# Patient Record
Sex: FEMALE | Race: WHITE | NOT HISPANIC OR LATINO | Employment: OTHER | URBAN - METROPOLITAN AREA
[De-identification: names, ages, dates, MRNs, and addresses within clinical notes are randomized per-mention and may not be internally consistent; named-entity substitution may affect disease eponyms.]

---

## 2018-10-23 ENCOUNTER — NURSE TRIAGE (OUTPATIENT)
Dept: PHYSICAL THERAPY | Facility: OTHER | Age: 83
End: 2018-10-23

## 2018-10-23 DIAGNOSIS — M54.50 ACUTE RIGHT-SIDED LOW BACK PAIN WITHOUT SCIATICA: Primary | ICD-10-CM

## 2018-10-23 NOTE — TELEPHONE ENCOUNTER
Background - Initial Assessment  Clinical complaint: low back pain, pain in right buttock  Date of onset: 8/1/18  Mechanism of injury: fell in garage reaching for cane, landed on back on concrete    Previous Treatment - Previous Treatment  Previous evaluation: spine Dr (at [facility])  Current provider: PCP   Diagnostics: XRAYs  Previous treatment: none    Protocols used: [facility] COMPREHENSIVE SPINE PROGRAM PROTOCOL    Friend, Laurel Cooney (RN) calling on behalf of pt to learn more about this program   States her friend fell in August and has been dealing with sciatic pain since then  Triage nurse explained that [facility] is a physical therapy based program that I would complete an assessment through the phone to determine if the patient would be a candidate for physical therapy or require more specialized treatment  If no red flags, the patient would be scheduled for a consult with one of our advanced spine physical therapists within the next 24-48hrs  RN was able to complete the intake form with pt, Sherri Patel  Pt states she saw a spine dr from [facility] and was told her spine was fine  Pt ambulates with walker and cane  Pt reports that when sit still, the pain is ok, when she gets up or moves its painful  Pt sits on left side  Pt agreed to PTx consult and requested the [facility] location  RN provided pt with facility address and phone after scheduling appt

## 2018-10-24 ENCOUNTER — EVALUATION (OUTPATIENT)
Dept: PHYSICAL THERAPY | Age: 83
End: 2018-10-24
Payer: MEDICARE

## 2018-10-24 VITALS — TEMPERATURE: 98.2 F | DIASTOLIC BLOOD PRESSURE: 75 MMHG | SYSTOLIC BLOOD PRESSURE: 128 MMHG

## 2018-10-24 DIAGNOSIS — M54.50 ACUTE RIGHT-SIDED LOW BACK PAIN WITHOUT SCIATICA: Primary | ICD-10-CM

## 2018-10-24 PROCEDURE — 97161 PT EVAL LOW COMPLEX 20 MIN: CPT | Performed by: PHYSICAL THERAPIST

## 2018-10-24 PROCEDURE — G8978 MOBILITY CURRENT STATUS: HCPCS | Performed by: PHYSICAL THERAPIST

## 2018-10-24 PROCEDURE — G8979 MOBILITY GOAL STATUS: HCPCS | Performed by: PHYSICAL THERAPIST

## 2018-10-24 NOTE — LETTER
2018    Merlin Gray, [address] [address]    Patient: Linda Roque   YOB: 1930    Date of Visit: 10/24/2018       Dear Dr Luis E Ryder:    One of your patients, Linda Roque, was referred to me by the [facility] program   Please review the attached evaluation summary from Carole Him recent visit  Please verify that you agree with the plan of care by signing the attached document and sending it back to our office  If you have any questions or concerns, please don't hesitate to call  Sincerely,    Galen Burk, PT      Primary Care Provider:      I certify that I have read the below Plan of Care and certify the need for these services furnished under this plan of treatment while under my care  Merlin Gray, MD  [address] [place] [address] Street: [phone]           PT Evaluation     Today's date: 10/24/2018  Patient name: Linda Roque  : 1930  MRN: [id number]  Referring provider: Lindy Mederos MD  Dx:   Encounter Diagnosis     ICD-10-CM    1  Acute right-sided low back pain without sciatica M54 5 Ambulatory referral to PT spine                  Assessment  Impairments: pain with function    Assessment details: Patient with significant R sacral/hip pain 8-10 weeks after initial fall  Would prefer to r/o bony disruption prior to starting care  Ambulatory referral to pain management initiated - will hold care until f/u  Understanding of Dx/Px/POC: good   Prognosis: fair    Goals  Short Term goals - 4 weeks  1  Patient will be independent HEP  2   Patient will report a 50% decrease in pain complaints  3   Increase strength 1/2 grade  4   Increase ROM 5-10 degrees  Long Term goals - 8 weeks  1  Patient will report elimination of pain complaints  2   Patient will return to all work related activities without restriction    3   Patient will return to all recreational activities without restriction  4   ROM WFL  5   Strength 5/5  Plan  Planned therapy interventions: manual therapy, strengthening and stretching  Frequency: 2x week  Duration in weeks: 4  Plan details: Start care if cleared by pain management  Subjective Evaluation    History of Present Illness  Mechanism of injury: Patient is a 80 y o  Female who suffered a fall in her garage on cement on 18  Patient was seen at [facility] and spent 2 days as a inpatient - no source of sx's was communicated to patient  Patient reports having multiple tests performed though I am unable to view them at this point  Current sx's:  R sacral/lumbar pain - severe  Sx's aggravated by ambulation, sitting, lying, and bending  MEDS:  None currently  Patient unable to sleep - trying to sleep upright on couch with R side unloaded  No previous history of LB issues  Quality of life: fair    Pain  Current pain ratin  At best pain ratin  At worst pain ratin  Quality: sharp and knife-like  Aggravating factors: standing and sitting  Progression: no change    Patient Goals  Patient goals for therapy: decreased pain, increased strength, independence with ADLs/IADLs and increased motion          Objective     Tenderness     Additional Tenderness Details  Tenderness R sacral region  Active Range of Motion     Lumbar   Flexion: 25 degrees with pain  Extension: 15 degrees with pain  Left lateral flexion: 20 degrees with pain  Right lateral flexion: 20 degrees with pain    Strength/Myotome Testing     Additional Strength Details  R hip strength testing did not increase sx's significantly  General Comments     Lumbar Comments  Unable to fully assess - patient unable to lie supine or prone  R hip ER in supine reproduced some sx's          Flowsheet Rows      Most Recent Value   PT/OT G-Codes   Current Score  44   Projected Score  62   Assessment Type  Evaluation   G code set  Mobility: Walking & Moving Around   Mobility: Walking and Moving Around Current Status ()  CL   Mobility: Walking and Moving Around Goal Status ()  CI          Precautions: Fall risk    Daily Treatment Diary     Manual                                                                                   Exercise Diary                                                                                                                                                                                                                                                                                      Modalities

## 2018-11-09 ENCOUNTER — OFFICE VISIT (OUTPATIENT)
Dept: PAIN MEDICINE | Facility: CLINIC | Age: 83
End: 2018-11-09
Payer: MEDICARE

## 2018-11-09 ENCOUNTER — HOSPITAL ENCOUNTER (OUTPATIENT)
Dept: RADIOLOGY | Facility: CLINIC | Age: 83
Discharge: HOME | End: 2018-11-09
Admitting: ANESTHESIOLOGY
Payer: MEDICARE

## 2018-11-09 VITALS
HEART RATE: 93 BPM | RESPIRATION RATE: 20 BRPM | TEMPERATURE: 97.9 F | OXYGEN SATURATION: 99 % | DIASTOLIC BLOOD PRESSURE: 78 MMHG | SYSTOLIC BLOOD PRESSURE: 168 MMHG

## 2018-11-09 VITALS
HEART RATE: 98 BPM | SYSTOLIC BLOOD PRESSURE: 152 MMHG | DIASTOLIC BLOOD PRESSURE: 80 MMHG | TEMPERATURE: 97.8 F | WEIGHT: 141 LBS

## 2018-11-09 DIAGNOSIS — M46.1 SACROILIITIS (HCC): ICD-10-CM

## 2018-11-09 DIAGNOSIS — M54.50 ACUTE RIGHT-SIDED LOW BACK PAIN WITHOUT SCIATICA: ICD-10-CM

## 2018-11-09 DIAGNOSIS — M46.1 SACROILIITIS (HCC): Primary | ICD-10-CM

## 2018-11-09 PROBLEM — I10 HYPERTENSION, ESSENTIAL: Status: ACTIVE | Noted: 2018-01-12

## 2018-11-09 PROBLEM — S32.000A LUMBAR COMPRESSION FRACTURE (HCC): Status: ACTIVE | Noted: 2018-08-01

## 2018-11-09 PROBLEM — S22.000A THORACIC COMPRESSION FRACTURE (HCC): Status: ACTIVE | Noted: 2018-08-01

## 2018-11-09 PROCEDURE — 27096 INJECT SACROILIAC JOINT: CPT | Performed by: ANESTHESIOLOGY

## 2018-11-09 PROCEDURE — 99204 OFFICE O/P NEW MOD 45 MIN: CPT | Performed by: ANESTHESIOLOGY

## 2018-11-09 RX ORDER — ATORVASTATIN CALCIUM 10 MG/1
10 TABLET, FILM COATED ORAL
COMMUNITY
Start: 2018-01-25

## 2018-11-09 RX ORDER — GABAPENTIN 100 MG/1
100 CAPSULE ORAL
COMMUNITY
Start: 2018-08-20 | End: 2019-08-20

## 2018-11-09 RX ORDER — 0.9 % SODIUM CHLORIDE 0.9 %
10 VIAL (ML) INJECTION ONCE
Status: COMPLETED | OUTPATIENT
Start: 2018-11-09 | End: 2018-11-09

## 2018-11-09 RX ORDER — ACETAMINOPHEN 500 MG
1000 TABLET ORAL
Status: ON HOLD | COMMUNITY
Start: 2018-08-03 | End: 2022-07-25

## 2018-11-09 RX ORDER — METHYLPREDNISOLONE ACETATE 80 MG/ML
80 INJECTION, SUSPENSION INTRA-ARTICULAR; INTRALESIONAL; INTRAMUSCULAR; PARENTERAL; SOFT TISSUE ONCE
Status: COMPLETED | OUTPATIENT
Start: 2018-11-09 | End: 2018-11-09

## 2018-11-09 RX ORDER — BUPIVACAINE HCL/PF 2.5 MG/ML
30 VIAL (ML) INJECTION ONCE
Status: COMPLETED | OUTPATIENT
Start: 2018-11-09 | End: 2018-11-09

## 2018-11-09 RX ORDER — LIDOCAINE 50 MG/G
1 PATCH TOPICAL DAILY
Qty: 30 PATCH | Refills: 0 | Status: SHIPPED | OUTPATIENT
Start: 2018-11-09 | End: 2018-12-13 | Stop reason: SDDI

## 2018-11-09 RX ORDER — UREA 10 %
1000 LOTION (ML) TOPICAL
COMMUNITY
Start: 2013-09-20

## 2018-11-09 RX ADMIN — Medication 3 ML: at 08:09

## 2018-11-09 RX ADMIN — IOHEXOL 1 ML: 300 INJECTION, SOLUTION INTRAVENOUS at 08:09

## 2018-11-09 RX ADMIN — Medication 2.5 ML: at 08:06

## 2018-11-09 RX ADMIN — METHYLPREDNISOLONE ACETATE 80 MG: 80 INJECTION, SUSPENSION INTRA-ARTICULAR; INTRALESIONAL; INTRAMUSCULAR; PARENTERAL; SOFT TISSUE at 08:09

## 2018-11-09 RX ADMIN — SODIUM CHLORIDE 2.5 ML: 9 INJECTION, SOLUTION INTRAMUSCULAR; INTRAVENOUS; SUBCUTANEOUS at 08:06

## 2018-11-09 NOTE — DISCHARGE INSTR - LAB
Steroid Joint Injection   WHAT YOU NEED TO KNOW:   A steroid joint injection is a procedure to inject steroid medicine into a joint  Steroid medicine decreases pain and inflammation  The injection may also contain an anesthetic (numbing medicine) to decrease pain  It may be done to treat conditions such as arthritis, gout, or carpal tunnel syndrome  The injections may be given in your knee, ankle, shoulder, elbow, wrist, ankle or sacroiliac joint  1  Do not apply heat to any area that is numb  If you have discomfort or soreness at the injection site, you may apply ice today, 20 minutes on and 20 minutes off  Tomorrow you may use ice or warm, moist heat  Do not apply ice or heat directly to the skin  2  You may have an increase or change in the discomfort for 36-48 hours after your treatment  Apply ice and continue with any pain medicine you have been prescribed  3  Do not do anything strenuous today  You may shower, but no tub baths or hot tubs today  You may resume your normal activities tomorrow, but do not overdo it  Resume normal activities slowly when you are feeling better  4  If you experience redness, drainage or swelling at the injection site, or if you develop a fever above 100 degrees, please call [facility] at [phone] or go to the Emergency Room  5  Continue to take all routine medicines prescribed by your primary care physician unless otherwise instructed by our staff  Most blood thinners should be started again according to your regularly scheduled dosing  If you have any questions, please give our office a call  If you have a problem specifically related to your procedure, please call our office at [phone]  Problems not related to your procedure should be directed to your primary care physician

## 2018-11-09 NOTE — H&P
History of Present Illness: The patient is a 80 y o  female who presents with complaints of right lower back pain secondary to sacroiliitis and is here today for right-sided sacroiliac joint injection  Patient Active Problem List   Diagnosis    Aortic regurgitation    Bilateral carotid artery disease (HCC)    History of breast cancer    Hypertension, essential    [facility] [address] [facility]    Mixed hyperlipidemia    Osteoporosis    Thoracic compression fracture ([facility] [address] )       No past medical history on file  No past surgical history on file        Current Outpatient Prescriptions:     acetaminophen (TYLENOL) 500 mg tablet, Take 1,000 mg by mouth, Disp: , Rfl:     atorvastatin (LIPITOR) 10 mg tablet, Take 10 mg by mouth, Disp: , Rfl:     B Complex Vitamins (VITAMIN-B COMPLEX PO), Take 1 tablet by mouth, Disp: , Rfl:     Calcium-Magnesium-Vitamin D ER (CALCIUM 1200+D3) 600- MG-MG-UNIT TB24, Take 1 tablet by mouth, Disp: , Rfl:     DOCOSAHEXAENOIC ACID PO, Take by mouth, Disp: , Rfl:     gabapentin (NEURONTIN) 100 mg capsule, Take 100 mg by mouth, Disp: , Rfl:     lidocaine (LIDODERM) 5 %, Apply 1 patch topically daily Remove & Discard patch within 12 hours or as directed by MD, Disp: 30 patch, Rfl: 0    vitamin B-12 (CYANOCOBALAMIN) 100 MCG tablet, Take 1,000 mcg by mouth, Disp: , Rfl:     Allergies   Allergen Reactions    Celecoxib Other (See Comments)       Physical Exam:   Vitals:    11/09/18 0736   BP: (!) 171/83   Pulse: 90   Resp: 18   Temp: 97 9 °F (36 6 °C)   SpO2: 99%     General: Awake, Alert, Oriented x 3, Mood and affect appropriate  Respiratory: Respirations even and unlabored  Cardiovascular: Peripheral pulses intact; no edema  Musculoskeletal Exam:   Right lower back tenderness    ASA Score: 2    Patient/Chart Verification  Patient ID Verified: Verbal  ID Band Applied: No  Consents Confirmed: Procedural, To be obtained in the Pre-Procedure area  H&P( within 30 days) Verified: To be obtained in the Pre-Procedure area  Interval H&P(within 24 hr) Complete (required for Outpatients and Surgery Admit only): To be obtained in the Pre-Procedure area  Allergies Reviewed: Yes  Currently on antibiotics?: No    Assessment:   1   Sacroiliitis ([facility] [address] )        Plan: Right SIJ Injection

## 2018-11-09 NOTE — PROGRESS NOTES
Assessment:  1  Sacroiliitis ([facility] [address] )    2  Acute right-sided low back pain without sciatica        Plan:  The patient's symptoms, history/physical are consistent with pain or from right-sided sacroiliitis following her fall on August 1st   Imaging done at that time showed no acute fractures but old compression fractures which did not correlate with her symptoms  Since she is still symptomatic, I discussed performing a right-sided sacroiliac joint injection which would be both diagnostic and therapeutic  She was apprised of the most common risks and would like to proceed  She will be scheduled as soon as possible under fluoroscopic guidance  In addition, I will prescribe a Lidoderm patch that she can apply to the right lower back 12 hr on/12 hr off to help with pain  Complete risks and benefits including bleeding, infection, tissue reaction, nerve injury and allergic reaction were discussed  The approach was demonstrated using models and literature was provided  Verbal and written consent was obtained  My impressions and treatment recommendations were discussed in detail with the patient who verbalized understanding and had no further questions  Discharge instructions were provided  I personally saw and examined the patient and I agree with the above discussed plan of care  Orders Placed This Encounter   Procedures    FL spine and pain procedure     Standing Status:   Future     Standing Expiration Date:   11/9/2022     Order Specific Question:   Reason for Exam:     Answer:   Right SIJ Injection     Order Specific Question:   Anticoagulant hold needed?      Answer:   No     New Medications Ordered This Visit   Medications    acetaminophen (TYLENOL) 500 mg tablet     Sig: Take 1,000 mg by mouth    atorvastatin (LIPITOR) 10 mg tablet     Sig: Take 10 mg by mouth    B Complex Vitamins (VITAMIN-B COMPLEX PO)     Sig: Take 1 tablet by mouth    Calcium-Magnesium-Vitamin D ER (CALCIUM 1200+D3) 600- MG-MG-UNIT TB24     Sig: Take 1 tablet by mouth    vitamin B-12 (CYANOCOBALAMIN) 100 MCG tablet     Sig: Take 1,000 mcg by mouth    gabapentin (NEURONTIN) 100 mg capsule     Sig: Take 100 mg by mouth    DOCOSAHEXAENOIC ACID PO     Sig: Take by mouth    lidocaine (LIDODERM) 5 %     Sig: Apply 1 patch topically daily Remove & Discard patch within 12 hours or as directed by MD     Dispense:  30 patch     Refill:  0       History of Present Illness:    Mynor Dodge is a 80 y o  female who lower back pain since August 2018  The patient fell in her garage  She was taken to [facility] where she was admitted and had workup that was negative for any acute fractures but old compression fractures  Since then she has been experiencing moderate pain that is rated 5-6/10 on a numeric rating scale and felt intermittently  Symptoms are described to be dull, aching with weakness felt in the legs  Pain is aggravated with lying down, standing, bending, walking, exercise  There is no change with relaxation, coughing, sneezing or bowel movements  Treatment history has included walking which has provided no relief  She is currently using Tylenol extra-strength and Aleve intermittently which is providing mild relief  I have personally reviewed and/or updated the patient's past medical history, past surgical history, family history, social history, current medications, allergies, and vital signs today  Review of Systems:    Review of Systems   Constitutional: Negative for fever and unexpected weight change  HENT: Negative for trouble swallowing  Eyes: Negative for visual disturbance  Respiratory: Negative for shortness of breath and wheezing  Cardiovascular: Negative for chest pain and palpitations  Gastrointestinal: Negative for constipation, diarrhea, nausea and vomiting  Endocrine: Negative for cold intolerance, heat intolerance and polydipsia     Genitourinary: Negative for difficulty urinating and frequency  Musculoskeletal: Positive for gait problem  Negative for arthralgias, joint swelling and myalgias  Skin: Negative for rash  Neurological: Negative for dizziness, seizures, syncope, weakness and headaches  Hematological: Bruises/bleeds easily  Psychiatric/Behavioral: Negative for dysphoric mood  All other systems reviewed and are negative  Patient Active Problem List   Diagnosis    Aortic regurgitation    Bilateral carotid artery disease (HCC)    History of breast cancer    Hypertension, essential    Lumbar compression fracture ([facility] [address] )    Mixed hyperlipidemia    Osteoporosis    Thoracic compression fracture ([facility] [facility] )       No past medical history on file  No past surgical history on file  No family history on file  Social History     Occupational History    Not on file  Social History Main Topics    Smoking status: Not on file    Smokeless tobacco: Not on file    Alcohol use Not on file    Drug use: Unknown    Sexual activity: Not on file       No current outpatient prescriptions on file prior to visit  No current facility-administered medications on file prior to visit  Allergies   Allergen Reactions    Celecoxib Other (See Comments)       Physical Exam:    /80   Pulse 98   Temp 97 8 °F (36 6 °C) (Oral)   Wt 64 kg (141 lb)     Constitutional: normal, well developed, well nourished, alert, in no distress and non-toxic and no overt pain behavior    Eyes: anicteric  HEENT: grossly intact  Neck: supple, symmetric, trachea midline and no masses   Pulmonary:even and unlabored  Cardiovascular:No edema or pitting edema present  Skin:Normal without rashes or lesions and well hydrated  Psychiatric:Mood and affect appropriate  Neurologic:Cranial Nerves II-XII grossly intact  Musculoskeletal:antalgic     Lumbar Spine Exam  Appearance:  Kyphosis  Palpation/Tenderness:  right sacroiliac joint tenderness  Sensory:  no sensory deficits noted  Range of Motion:  Flexion:  No limitation  without pain  Extension:  Minimally limited  with pain  Lateral Flexion - Left:  No limitation  without pain  Lateral Flexion - Right:  Minimally limited  with pain  Rotation - Left:  No limitation  without pain  Rotation - Right:  Moderately limited  with pain  Motor Strength:  Left hip flexion:  5/5  Left hip extension:  5/5  Right hip flexion:  5/5  Right hip extension:  5/5  Left knee flexion:  5/5  Left knee extension:  5/5  Right knee flexion:  5/5  Right knee extension:  5/5  Left foot dorsiflexion:  5/5  Left foot plantar flexion:  5/5  Right foot dorsiflexion:  5/5  Right foot plantar flexion:  5/5  Reflexes:  Left Patellar:  2+   Right Patellar:  2+   Left Achilles:  2+   Right Achilles:  2+   Special Tests:  Left Straight Leg Test:  negative  Right Straight Leg Test:  negative  Left Wm's Maneuver:  negative  Right Wm's Maneuver:  positive    Imaging    XR Sacrum and coccyx (9/11/2018)    No acute fracture or dislocation of the sacrococcygeal spine  CT Lumbar Spine (8/1/2018)    History: Fall  Procedure: Thin section axial CT scan of the Thoracic and Lumbar spine was  obtained by departmental protocol followed by sagittal and coronal reformations  Injury: There is a mild superior endplate compression deformity at T1 which is  stable  There is an age-indeterminate compression fractures of the T10 and T12  vertebral bodies  There is approximately 20% loss of height anteriorly at T10  and 20% loss of height centrally at T12  There is a 3-4 mm associated  retropulsion of the posterior cortex of T12, centrally  Alignment: Otherwise, normal   Prevertebral soft tissues: Atherosclerotic calcification of the intra-abdominal  aorta is noted  There is a multinodular thyroid with substernal extension  Please see the report for a same-day CT for findings in the chest, abdomen and  pelvis      Degenerative changes: There is multilevel degenerative disc disease  There is a  moderate disc bulge at L5 S1  There is also a small disc bulge at L2-L3  There  is multilevel facet arthrosis, greatest at L5-S1

## 2018-11-16 ENCOUNTER — TELEPHONE (OUTPATIENT)
Dept: OBGYN CLINIC | Facility: HOSPITAL | Age: 83
End: 2018-11-16

## 2018-11-23 ENCOUNTER — APPOINTMENT (OUTPATIENT)
Dept: RADIOLOGY | Facility: CLINIC | Age: 83
End: 2018-11-23
Payer: MEDICARE

## 2018-11-23 ENCOUNTER — TRANSCRIBE ORDERS (OUTPATIENT)
Dept: LAB | Facility: CLINIC | Age: 83
End: 2018-11-23

## 2018-11-23 ENCOUNTER — OFFICE VISIT (OUTPATIENT)
Dept: PAIN MEDICINE | Facility: CLINIC | Age: 83
End: 2018-11-23
Payer: MEDICARE

## 2018-11-23 VITALS
BODY MASS INDEX: 23.3 KG/M2 | SYSTOLIC BLOOD PRESSURE: 174 MMHG | DIASTOLIC BLOOD PRESSURE: 80 MMHG | WEIGHT: 145 LBS | HEIGHT: 66 IN | RESPIRATION RATE: 16 BRPM | HEART RATE: 90 BPM

## 2018-11-23 DIAGNOSIS — M51.16 LUMBAR DISC DISEASE WITH RADICULOPATHY: ICD-10-CM

## 2018-11-23 DIAGNOSIS — M25.551 RIGHT HIP PAIN: Primary | ICD-10-CM

## 2018-11-23 DIAGNOSIS — M25.551 RIGHT HIP PAIN: ICD-10-CM

## 2018-11-23 PROCEDURE — 99213 OFFICE O/P EST LOW 20 MIN: CPT | Performed by: NURSE PRACTITIONER

## 2018-11-23 PROCEDURE — 73502 X-RAY EXAM HIP UNI 2-3 VIEWS: CPT

## 2018-11-23 RX ORDER — NAPROXEN SODIUM 220 MG
220 TABLET ORAL 2 TIMES DAILY WITH MEALS
Status: ON HOLD | COMMUNITY
End: 2022-07-25

## 2018-11-23 NOTE — PROGRESS NOTES
Assessment:  1  Right hip pain    2  Lumbar disc disease with radiculopathy        Plan:  The patient is a 80 y o  female with a history of right-sided low back pain without sciatica and sacroiliitis  The patient presents with ongoing right-sided low back pain and hip pain, despite undergoing a right sacroiliac joint injection  The patient was noted to have right hip pain with internal and external rotation as well as tenderness of her right hip  Therefore at this time I have ordered the patient an x-ray of her right hip for further evaluation  The patient was instructed that our office will call with results of this study once is completed  The patient will follow up after her right hip x-ray, or sooner with the worsening of symptoms  History of Present Illness: The patient is a 80 y o  female with a history of right-sided low back pain without sciatica and sacroiliitis  The patient was last seen office on 11/9/2018 where she underwent a right sacroiliac joint injection  She reported that this injection provided her 1 week relief of symptoms prior to the re-emergence of symptoms  She presents for a follow up office visit in regards to chronic pain secondary to right-sided low back pain  The patient currently reports right-sided low back pain, that worsens with ambulation  She denies bilateral leg weakness, or bowel or bladder issues  She reports that her pain is intermittent in nature and has worsened since last office visit  I have personally reviewed and/or updated the patient's past medical history, past surgical history, family history, social history, current medications, allergies, and vital signs today  Review of Systems:    Review of Systems   Respiratory: Negative for shortness of breath  Cardiovascular: Negative for chest pain  Gastrointestinal: Negative for constipation, diarrhea, nausea and vomiting  Musculoskeletal: Positive for back pain and gait problem   Negative for arthralgias, joint swelling and myalgias  Skin: Negative for rash  Neurological: Negative for dizziness, seizures and weakness  All other systems reviewed and are negative  Past Medical History:   Diagnosis Date    Chronic pain disorder     Low back pain        History reviewed  No pertinent surgical history  History reviewed  No pertinent family history  Social History     Occupational History    retired      Social History Main Topics    Smoking status: Never Smoker    Smokeless tobacco: Never Used    Alcohol use No    Drug use: No    Sexual activity: No         Current Outpatient Prescriptions:     acetaminophen (TYLENOL) 500 mg tablet, Take 1,000 mg by mouth, Disp: , Rfl:     atorvastatin (LIPITOR) 10 mg tablet, Take 10 mg by mouth, Disp: , Rfl:     B Complex Vitamins (VITAMIN-B COMPLEX PO), Take 1 tablet by mouth, Disp: , Rfl:     Calcium-Magnesium-Vitamin D ER (CALCIUM 1200+D3) 600- MG-MG-UNIT TB24, Take 1 tablet by mouth, Disp: , Rfl:     gabapentin (NEURONTIN) 100 mg capsule, Take 100 mg by mouth, Disp: , Rfl:     naproxen sodium (ALEVE) 220 MG tablet, Take 220 mg by mouth 2 (two) times a day with meals, Disp: , Rfl:     vitamin B-12 (CYANOCOBALAMIN) 100 MCG tablet, Take 1,000 mcg by mouth, Disp: , Rfl:     DOCOSAHEXAENOIC ACID PO, Take by mouth, Disp: , Rfl:     lidocaine (LIDODERM) 5 %, Apply 1 patch topically daily Remove & Discard patch within 12 hours or as directed by MD, Disp: 30 patch, Rfl: 0    Allergies   Allergen Reactions    Celecoxib Other (See Comments)       Physical Exam:    BP (!) 174/80   Pulse 90   Resp 16   Ht 5' 6" (1 676 m)   Wt 65 8 kg (145 lb)   BMI 23 40 kg/m²     Constitutional:normal, well developed, well nourished, alert, in no distress and non-toxic and no overt pain behavior    Eyes:anicteric  HEENT:grossly intact  Neck:supple, symmetric, trachea midline and no masses   Pulmonary:even and unlabored  Cardiovascular:No edema or pitting edema present  Skin:Normal without rashes or lesions and well hydrated  Psychiatric:Mood and affect appropriate  Neurologic:Cranial Nerves II-XII grossly intact  Musculoskeletal:normal     Lumbar Spine Exam    Appearance:  Normal lordosis  Palpation/Tenderness:  Right hip tenderness   Sensory:  no sensory deficits noted  Range of Motion:  Flexion:  Minimally limited  with pain  Extension:  Minimally limited  with pain  Lateral Flexion - Left:  Minimally limited  with pain  Lateral Flexion - Right:  Minimally limited  with pain  Rotation - Left:  Minimally limited  with pain  Rotation - Right:  Minimally limited  with pain  Motor Strength:  Left hip flexion:  5/5  Right hip flexion:  5/5  Left knee extension:  5/5  Right knee extension:  5/5  Left foot dorsiflexion:  5/5  Left foot plantar flexion:  5/5  Right foot dorsiflexion:  5/5  Right foot plantar flexion:  5/5        Imaging  No orders to display   XR Sacrum and coccyx (9/11/2018)     No acute fracture or dislocation of the sacrococcygeal spine      CT Lumbar Spine (8/1/2018)     History: Fall  Procedure: Thin section axial CT scan of the Thoracic and Lumbar spine was  obtained by departmental protocol followed by sagittal and coronal reformations  Injury: There is a mild superior endplate compression deformity at T1 which is  stable  There is an age-indeterminate compression fractures of the T10 and T12  vertebral bodies  There is approximately 20% loss of height anteriorly at T10  and 20% loss of height centrally at T12  There is a 3-4 mm associated  retropulsion of the posterior cortex of T12, centrally  Alignment: Otherwise, normal   Prevertebral soft tissues: Atherosclerotic calcification of the intra-abdominal  aorta is noted  There is a multinodular thyroid with substernal extension  Please see the report for a same-day CT for findings in the chest, abdomen and  pelvis      Degenerative changes:  There is multilevel degenerative disc disease  There is a  moderate disc bulge at L5 S1  There is also a small disc bulge at L2-L3   There  is multilevel facet arthrosis, greatest at L5-S1           Orders Placed This Encounter   Procedures    XR hip/pelv 2-3 vws right if performed

## 2018-11-29 ENCOUNTER — TELEPHONE (OUTPATIENT)
Dept: RADIOLOGY | Facility: CLINIC | Age: 83
End: 2018-11-29

## 2018-11-29 ENCOUNTER — TELEPHONE (OUTPATIENT)
Dept: PAIN MEDICINE | Facility: CLINIC | Age: 83
End: 2018-11-29

## 2018-11-29 NOTE — TELEPHONE ENCOUNTER
I had sent a result note about this the other day  X-ray showed mild to moderate arthritis   Would recommend right hip injection

## 2018-11-29 NOTE — TELEPHONE ENCOUNTER
I s/w pt and informed her of her x-ray results and that FQ rec trying a right hip steroid inj  I told pt that our  Smith Velasco will call her to schedule it  I advised pt in the mean time to continue with using her Aleve or Tylenol and the lidocaine patches      Pls call pt and schedule inj

## 2018-11-29 NOTE — TELEPHONE ENCOUNTER
See message below from pt about x-ray results  Looks like x-ray ordered by ROWE  Results are final in computer

## 2018-11-29 NOTE — TELEPHONE ENCOUNTER
----- Message from Michelle Baird sent at 11/29/2018 11:16 AM EST -----  Contact: [phone]  PATIENT WALKED INTO OFFICE TODAY, SHE STATES SHE  NEVER RECEIVED A CALL BACK WITH RESULTS OF XRAY  SHE CONTINUES TO HAVE PAIN IN THE RIGHT HIP AREA

## 2018-11-29 NOTE — TELEPHONE ENCOUNTER
----- Message from Dena Loredo MD sent at 11/28/2018  6:07 PM EST -----  X-rays showed mild/moderate arthritis in hip    Would recommend trying right hip intra-articular steroid injection

## 2018-11-29 NOTE — TELEPHONE ENCOUNTER
Current message:  S/w patient would like to speak w/a nurse regarding XR results and increased pain  Call back # is [phone]    Previous message:    Message from Paulina Marti sent at 11/29/2018 11:16 AM EST -----  Contact: [phone]  PATIENT WALKED INTO OFFICE TODAY, SHE STATES SHE  NEVER RECEIVED A CALL BACK WITH RESULTS OF XRAY    SHE CONTINUES TO HAVE PAIN IN THE RIGHT HIP AREA

## 2018-12-13 ENCOUNTER — HOSPITAL ENCOUNTER (OUTPATIENT)
Dept: RADIOLOGY | Facility: CLINIC | Age: 83
Discharge: HOME | End: 2018-12-13
Attending: ANESTHESIOLOGY
Payer: MEDICARE

## 2018-12-13 VITALS
TEMPERATURE: 97.7 F | SYSTOLIC BLOOD PRESSURE: 166 MMHG | RESPIRATION RATE: 20 BRPM | OXYGEN SATURATION: 99 % | HEART RATE: 93 BPM | DIASTOLIC BLOOD PRESSURE: 81 MMHG

## 2018-12-13 DIAGNOSIS — M16.11 PRIMARY OSTEOARTHRITIS OF RIGHT HIP: ICD-10-CM

## 2018-12-13 PROCEDURE — 77002 NEEDLE LOCALIZATION BY XRAY: CPT | Performed by: ANESTHESIOLOGY

## 2018-12-13 PROCEDURE — 20600 DRAIN/INJ JOINT/BURSA W/O US: CPT | Performed by: ANESTHESIOLOGY

## 2018-12-13 PROCEDURE — 77002 NEEDLE LOCALIZATION BY XRAY: CPT

## 2018-12-13 RX ORDER — BUPIVACAINE HCL/PF 2.5 MG/ML
30 VIAL (ML) INJECTION ONCE
Status: COMPLETED | OUTPATIENT
Start: 2018-12-13 | End: 2018-12-13

## 2018-12-13 RX ORDER — METHYLPREDNISOLONE ACETATE 80 MG/ML
80 INJECTION, SUSPENSION INTRA-ARTICULAR; INTRALESIONAL; INTRAMUSCULAR; PARENTERAL; SOFT TISSUE ONCE
Status: COMPLETED | OUTPATIENT
Start: 2018-12-13 | End: 2018-12-13

## 2018-12-13 RX ORDER — 0.9 % SODIUM CHLORIDE 0.9 %
10 VIAL (ML) INJECTION ONCE
Status: COMPLETED | OUTPATIENT
Start: 2018-12-13 | End: 2018-12-13

## 2018-12-13 RX ADMIN — METHYLPREDNISOLONE ACETATE 80 MG: 80 INJECTION, SUSPENSION INTRA-ARTICULAR; INTRALESIONAL; INTRAMUSCULAR; PARENTERAL; SOFT TISSUE at 13:48

## 2018-12-13 RX ADMIN — IOHEXOL 4 ML: 300 INJECTION, SOLUTION INTRAVENOUS at 13:48

## 2018-12-13 RX ADMIN — SODIUM CHLORIDE 2.5 ML: 9 INJECTION, SOLUTION INTRAMUSCULAR; INTRAVENOUS; SUBCUTANEOUS at 13:46

## 2018-12-13 RX ADMIN — Medication 4 ML: at 13:48

## 2018-12-13 RX ADMIN — Medication 2.5 ML: at 13:46

## 2018-12-13 NOTE — H&P
History of Present Illness: The patient is a 80 y o  female who presents with complaints of right hip pain secondary to hip osteoarthritis and is here today for a right hip injection  Patient Active Problem List   Diagnosis    Aortic regurgitation    Bilateral carotid artery disease (HCC)    History of breast cancer    Hypertension, essential    [facility]bar compression fracture ([facility] [address] [facility]    Mixed hyperlipidemia    Osteoporosis    Thoracic compression fracture (HCC)    Sacroiliitis ([facility] [address] )       Past Medical History:   Diagnosis Date    Chronic pain disorder     Low back pain        No past surgical history on file  Current Outpatient Prescriptions:     acetaminophen (TYLENOL) 500 mg tablet, Take 1,000 mg by mouth, Disp: , Rfl:     atorvastatin (LIPITOR) 10 mg tablet, Take 10 mg by mouth, Disp: , Rfl:     B Complex Vitamins (VITAMIN-B COMPLEX PO), Take 1 tablet by mouth, Disp: , Rfl:     Calcium-Magnesium-Vitamin D ER (CALCIUM 1200+D3) 600- MG-MG-UNIT TB24, Take 1 tablet by mouth, Disp: , Rfl:     DOCOSAHEXAENOIC ACID PO, Take by mouth, Disp: , Rfl:     gabapentin (NEURONTIN) 100 mg capsule, Take 100 mg by mouth, Disp: , Rfl:     naproxen sodium (ALEVE) 220 MG tablet, Take 220 mg by mouth 2 (two) times a day with meals, Disp: , Rfl:     vitamin B-12 (CYANOCOBALAMIN) 100 MCG tablet, Take 1,000 mcg by mouth, Disp: , Rfl:     Allergies   Allergen Reactions    Celecoxib Other (See Comments)       Physical Exam:   Vitals:    12/13/18 1328   BP: (!) 172/88   Pulse: 89   Resp: 18   Temp: 97 7 °F (36 5 °C)   SpO2: 98%     General: Awake, Alert, Oriented x 3, Mood and affect appropriate  Respiratory: Respirations even and unlabored  Cardiovascular: Peripheral pulses intact; no edema  Musculoskeletal Exam:   Right hip pain    ASA Score: 3    Patient/Chart Verification  Patient ID Verified: Verbal  Consents Confirmed: Procedural, To be obtained in the Pre-Procedure area  H&P( within 30 days) Verified:  To be obtained in the Pre-Procedure area  Allergies Reviewed: Yes  Anticoag/NSAID held?: NA  Currently on antibiotics?: No    Assessment:   1   Primary osteoarthritis of right hip        Plan: R Hip Intra-articular Steroid Injection

## 2018-12-13 NOTE — DISCHARGE INSTR - LAB
Steroid Joint Injection   WHAT YOU NEED TO KNOW:   A steroid joint injection is a procedure to inject steroid medicine into a joint  Steroid medicine decreases pain and inflammation  The injection may also contain an anesthetic (numbing medicine) to decrease pain  It may be done to treat conditions such as arthritis, gout, or carpal tunnel syndrome  The injections may be given in your knee, ankle, shoulder, elbow, wrist, ankle or sacroiliac joint  1  Do not apply heat to any area that is numb  If you have discomfort or soreness at the injection site, you may apply ice today, 20 minutes on and 20 minutes off  Tomorrow you may use ice or warm, moist heat  Do not apply ice or heat directly to the skin  2  You may have an increase or change in the discomfort for 36-48 hours after your treatment  Apply ice and continue with any pain medicine you have been prescribed  3  Do not do anything strenuous today  You may shower, but no tub baths or hot tubs today  You may resume your normal activities tomorrow, but do not overdo it  Resume normal activities slowly when you are feeling better  4  If you experience redness, drainage or swelling at the injection site, or if you develop a fever above 100 degrees, please call [facility] at [phone] or go to the Emergency Room  5  Continue to take all routine medicines prescribed by your primary care physician unless otherwise instructed by our staff  Most blood thinners should be started again according to your regularly scheduled dosing  If you have any questions, please give our office a call  If you have a problem specifically related to your procedure, please call our office at [phone]  Problems not related to your procedure should be directed to your primary care physician

## 2018-12-20 ENCOUNTER — TELEPHONE (OUTPATIENT)
Dept: PAIN MEDICINE | Facility: CLINIC | Age: 83
End: 2018-12-20

## 2018-12-20 NOTE — TELEPHONE ENCOUNTER
Pt reports only a little improvement   She couldn't give a number of pain   I told her I will cb next week to see if more improvement

## 2018-12-31 NOTE — TELEPHONE ENCOUNTER
I told her at her injection that she had mild/moderate OA in the hips   Please have her scheduled with me for re-eval

## 2018-12-31 NOTE — TELEPHONE ENCOUNTER
Pt is calling stating she still has problems  Pt also stated that she had 2 hip xrays done and no one has told her what the results were  Pain level- (hard to say), sitting is fine, when walking it gets bad  Pt also stating that someone recommended an ortho doctor? Please advise       Pt can be reached at [phone]

## 2019-01-02 NOTE — TELEPHONE ENCOUNTER
Just an FYI: I called patient to schedule- she had just scheduled a f/u w/ Kendra for Friday (with the phone room)

## 2019-01-04 ENCOUNTER — OFFICE VISIT (OUTPATIENT)
Dept: PAIN MEDICINE | Facility: CLINIC | Age: 84
End: 2019-01-04
Payer: MEDICARE

## 2019-01-04 VITALS
BODY MASS INDEX: 22.18 KG/M2 | HEIGHT: 66 IN | SYSTOLIC BLOOD PRESSURE: 150 MMHG | WEIGHT: 138 LBS | DIASTOLIC BLOOD PRESSURE: 78 MMHG | RESPIRATION RATE: 18 BRPM

## 2019-01-04 DIAGNOSIS — M51.16 INTERVERTEBRAL DISC DISORDERS WITH RADICULOPATHY, LUMBAR REGION: ICD-10-CM

## 2019-01-04 DIAGNOSIS — G89.4 CHRONIC PAIN SYNDROME: Primary | ICD-10-CM

## 2019-01-04 DIAGNOSIS — M46.1 SACROILIITIS (HCC): ICD-10-CM

## 2019-01-04 DIAGNOSIS — M16.11 PRIMARY OSTEOARTHRITIS OF RIGHT HIP: ICD-10-CM

## 2019-01-04 PROCEDURE — 99214 OFFICE O/P EST MOD 30 MIN: CPT | Performed by: NURSE PRACTITIONER

## 2019-01-04 RX ORDER — TRAMADOL HYDROCHLORIDE 50 MG/1
TABLET ORAL
Qty: 7 TABLET | Refills: 0 | Status: ON HOLD | OUTPATIENT
Start: 2019-01-04 | End: 2022-07-25

## 2019-01-04 NOTE — PROGRESS NOTES
Pt c/o back and hip pain    Assessment:  1  Chronic pain syndrome    2  Intervertebral disc disorders with radiculopathy, lumbar region    3  Primary osteoarthritis of right hip    4  Sacroiliitis ([address] )        Plan:  Mary Treviño is a 80 y o  female with a history of right hip pain and lumbar disc disease with radiculopathy  The patient present with ongoing right-sided low back pain and right hip pain, despite undergoing a right hip injection on 12/13/2019  She also underwent a right sacroiliac joint injection on 11/9/2018, without relief of symptoms  I discussed with the patient that her symptoms may be stemming from a moderate disc bulge noted at L5-S1 on her last CT scan  I discussed with the patient about possibly moving forward with a right L5-S1 transforaminal epidural steroid injection  However the patient reports that she would like to hold off on any further injections at this time  The patient reports that she is currently taking Tylenol every 6 hr and Aleve every 12 hr, without relief of symptoms  She reports that she has taken tramadol in the past to help with her pain, with her last prescription being filled in September 2018  Therefore, at this time I will start the patient on tramadol 50 mg half a tablet daily as needed for pain  A small prescription for tramadol 50 mg, 7 tablets was sent to the patient's pharmacy, 14 day supply  The she was educated on the medications most common side effects which include dizziness, drowsiness, constipation and nausea  She was instructed not to drive or operate heavy machinery while taking this medication  She was instructed to call the office in 1-2 weeks to update office on the effectiveness of this medication, or sooner with adverse medication side effects  The patient reports that physical therapy had helped in the past with her ongoing low back pain    Therefore, at this time I will refer the patient to physical therapy for evaluation and treatment  There are risks associated with opioid medications, including dependence, addiction and tolerance  The patient understands and agrees to use these medications only as prescribed  Potential side effects of the medications include, but are not limited to, constipation, drowsiness, addiction, impaired judgment and risk of fatal overdose if not taken as prescribed  The patient was warned against driving while taking sedation medications  Sharing medications is a felony  At this point in time, the patient is showing no signs of addiction, abuse, diversion or suicidal ideation  [place] Prescription Drug Monitoring Program report was reviewed and was appropriate     The patient will follow up in 4 weeks, or sooner with the worsening of symptoms  My impressions and treatment recommendations were discussed in detail with the patient who verbalized understanding and had no further questions  Discharge instructions were provided  I personally saw and examined the patient and I agree with the above discussed plan of care  Orders Placed This Encounter   Procedures    Ambulatory referral to Physical Therapy     Standing Status:   Future     Standing Expiration Date:   7/4/2019     Referral Priority:   Routine     Referral Type:   Physical Therapy     Referral Reason:   Specialty Services Required     Requested Specialty:   Physical Therapy     Number of Visits Requested:   1     Expiration Date:   1/4/2020     New Medications Ordered This Visit   Medications    traMADol (ULTRAM) 50 mg tablet     Sig: Take 1/2 tablet daily as needed for pain  Dispense:  7 tablet     Refill:  0       History of Present Illness:  Karl Larson is a 80 y o  female with a history of right hip pain and lumbar disc disease with radiculopathy  The patient was last seen in the office on 12/13/2019, where she underwent a right hip injection  She reports no relief of symptoms after undergoing this injection    She presents for a follow up office visit in regards to Back Pain and Hip Pain  The patients current symptoms include right side low back pain that radiates into her right hip  She denies bilateral leg weakness, or bowel or bladder issues  She describes her pain as a sharp  She reports that her pain is intermittent in nature occurring mostly during the evening hours  She currently rates her pain 8 out 10 numeric pain scale  Current pain medications includes:  Aleve q 12 hours and Tylenol q 6 hours     The patient reports that this regimen is providing minimal % pain relief  The patient is reporting no side effects from this pain medication regimen  I have personally reviewed and/or updated the patient's past medical history, past surgical history, family history, social history, current medications, allergies, and vital signs today  Review of Systems   Respiratory: Negative for shortness of breath  Cardiovascular: Negative for chest pain  Gastrointestinal: Negative for constipation, diarrhea, nausea and vomiting  Musculoskeletal: Positive for gait problem  Negative for arthralgias, joint swelling and myalgias  Skin: Negative for rash  Neurological: Negative for dizziness, seizures and weakness  All other systems reviewed and are negative  Patient Active Problem List   Diagnosis    Aortic regurgitation    Bilateral carotid artery disease (HCC)    History of breast cancer    Hypertension, essential    Lumbar compression fracture ([facility] [address] )    Mixed hyperlipidemia    Osteoporosis    Thoracic compression fracture (HCC)    [facility] ([facility])    Primary osteoarthritis of right hip       Past Medical History:   Diagnosis Date    Chronic pain disorder     Low back pain        No past surgical history on file  No family history on file      Social History     Occupational History    retired      Social History Main Topics    Smoking status: Never Smoker    Smokeless tobacco: Never Used    Alcohol use No    Drug use: No    Sexual activity: No       Current Outpatient Prescriptions on File Prior to Visit   Medication Sig    acetaminophen (TYLENOL) 500 mg tablet Take 1,000 mg by mouth    atorvastatin (LIPITOR) 10 mg tablet Take 10 mg by mouth    B Complex Vitamins (VITAMIN-B COMPLEX PO) Take 1 tablet by mouth    Calcium-Magnesium-Vitamin D ER (CALCIUM 1200+D3) 600- MG-MG-UNIT TB24 Take 1 tablet by mouth    DOCOSAHEXAENOIC ACID PO Take by mouth    gabapentin (NEURONTIN) 100 mg capsule Take 100 mg by mouth    naproxen sodium (ALEVE) 220 MG tablet Take 220 mg by mouth 2 (two) times a day with meals    vitamin B-12 (CYANOCOBALAMIN) 100 MCG tablet Take 1,000 mcg by mouth     No current facility-administered medications on file prior to visit  Allergies   Allergen Reactions    Celecoxib Other (See Comments)       Physical Exam:    /78   Resp 18   Ht 5' 6" (1 676 m)   Wt 62 6 kg (138 lb)   BMI 22 27 kg/m²     Constitutional:normal, well developed, well nourished, alert, in no distress and non-toxic and no overt pain behavior    Eyes:anicteric  HEENT:grossly intact  Neck:supple, symmetric, trachea midline and no masses   Pulmonary:even and unlabored  Cardiovascular:No edema or pitting edema present  Skin:Normal without rashes or lesions and well hydrated  Psychiatric:Mood and affect appropriate  Neurologic:Cranial Nerves II-XII grossly intact  Musculoskeletal:normal     Lumbar Spine Exam    Appearance:  Normal lordosis  Palpation/Tenderness:  right lumbar paraspinal tenderness  Sensory:  no sensory deficits noted  Range of Motion:  Flexion:  Minimally limited  with pain  Extension:  Minimally limited  with pain  Lateral Flexion - Left:  Minimally limited  with pain  Lateral Flexion - Right:  Minimally limited  with pain  Rotation - Left:  Minimally limited  with pain  Rotation - Right:  Minimally limited  with pain  Motor Strength:  Left hip flexion:  5/5  Right hip flexion: 5/5  Left knee extension:  5/5  Right knee extension:  5/5  Left foot dorsiflexion:  5/5  Left foot plantar flexion:  5/5  Right foot dorsiflexion:  5/5  Right foot plantar flexion:  5/5    Imaging    Impression:     Age-indeterminate compression fractures at T10 and T12 with approximately 20%  maximal loss of height at each level  Mild associated retropulsion of the  posterior cortex of T12     Stable T1 compression deformity       Moderate disc bulge at L5 S1  The current study does not evaluate for ligamentous laxity or injury  Evaluation  of contents of the spinal canal is suboptimal  Follow trauma protocol  CT examination performed with dose lowering protocol in accordance with [place]  Workstation:[id number]   Result Narrative            History: Fall  Procedure: Thin section axial CT scan of the Thoracic and Lumbar spine was  obtained by departmental protocol followed by sagittal and coronal reformations  Injury: There is a mild superior endplate compression deformity at T1 which is  stable  There is an age-indeterminate compression fractures of the T10 and T12  vertebral bodies  There is approximately 20% loss of height anteriorly at T10  and 20% loss of height centrally at T12  There is a 3-4 mm associated  retropulsion of the posterior cortex of T12, centrally  Alignment: Otherwise, normal   Prevertebral soft tissues: Atherosclerotic calcification of the intra-abdominal  aorta is noted  There is a multinodular thyroid with substernal extension  Please see the report for a same-day CT for findings in the chest, abdomen and  pelvis      Degenerative changes: There is multilevel degenerative disc disease  There is a  moderate disc bulge at L5 S1  There is also a small disc bulge at L2-L3   There  is multilevel facet arthrosis, greatest at L5-S1           RIGHT HIP     INDICATION:   M25 551: Pain in right hip      COMPARISON:  None     VIEWS:  XR HIP/PELV 2-3 VWS RIGHT W PELVIS IF PERFORMED       FINDINGS:     There is no acute fracture or dislocation      Mild/moderate narrowing is noted bilaterally joints with mild marginal osteophyte formation  There is enthesopathy at the greater trochanters bilaterally  The bones are demineralized      No lytic or blastic osseous lesions      Soft tissues are unremarkable      Scattered degenerative spurring noted along the partially visualized lower lumbar spine      IMPRESSION:     No acute osseous abnormality    Mild/moderate degenerative arthritic changes of the hips         Workstation performed: [id number]

## 2019-01-07 ENCOUNTER — TELEPHONE (OUTPATIENT)
Dept: PAIN MEDICINE | Facility: CLINIC | Age: 84
End: 2019-01-07

## 2019-01-07 NOTE — TELEPHONE ENCOUNTER
Patient is calling to ask if something can be sent to her pharmacy for her pain from her arthritis  She states she is looking for something that is more long term  Right now she is taking the Tramadol and she states that it short term

## 2019-01-11 NOTE — TELEPHONE ENCOUNTER
I s/w pt who has been taking the tramadol 50 mh 0 5 tab daily for 1 wk now  She said the pain is 8/10 with walking but not as much pain with sitting  Pt took her 0 5 tab for today, she still has 3 whole pills left  Told pt I would make Kendra aware and c/b with her rec  FYI: pt was only given # 7 tabs on 1/4 and instr to take 0 5 tab daily

## 2019-01-11 NOTE — TELEPHONE ENCOUNTER
Attempted to reach pt art number provided, had to leave vm for pt to c/b  C/B # and [place] provided  May get [facility] nurse on the phone when pt c/b

## 2019-01-14 NOTE — TELEPHONE ENCOUNTER
I called the patient back she would like to try Tylenol arthritis to help with her arthritis pain and stop taking tramadol  She was instructed to call the office back if Tylenol Arthritis is not effective managing her pain and then we can resume Tramadol or possibly start diclofenac gel  She was instructed not to exceed over 3000 mg of tylenol in a 24 hours period

## 2019-01-17 ENCOUNTER — EVALUATION (OUTPATIENT)
Dept: PHYSICAL THERAPY | Age: 84
End: 2019-01-17
Payer: MEDICARE

## 2019-01-17 DIAGNOSIS — M51.16 INTERVERTEBRAL DISC DISORDERS WITH RADICULOPATHY, LUMBAR REGION: Primary | ICD-10-CM

## 2019-01-17 PROCEDURE — 97162 PT EVAL MOD COMPLEX 30 MIN: CPT | Performed by: PHYSICAL THERAPIST

## 2019-01-17 NOTE — PROGRESS NOTES
PT Evaluation     Today's date: 2019  Patient name: Mayra Ochoa  : 1930  MRN: [id number]  Referring provider: Juany Lloyd MD  Dx:   Encounter Diagnosis     ICD-10-CM    1  Intervertebral disc disorders with radiculopathy, lumbar region M51 16 Ambulatory referral to Physical Therapy                  Assessment  Assessment details: Patient seen for PT evaluation for chronic low back pain  Patient presents with decreased lumbar ROM in all directions, decreased LE strength, decreased core strength, decreased standing balance, pain at rest and with activity, no formal HEP to address deficits  PT established HEP for gentle stretching and strengthening  Talked with patient about arthritis, purpose of PT to strengthen muscles, to improve balance with walking, posture and to decrease back pain  Impairments: abnormal gait, abnormal or restricted ROM, activity intolerance, impaired balance, impaired physical strength, lacks appropriate home exercise program and pain with function  Functional limitations: Patient reports increased back pain with sit>stand transfers, with IADLs, with recreational activities, with standing/walking (ie meal prep)  Uses SPC all the time in the home and outside of the home  Understanding of Dx/Px/POC: good   Prognosis: good    Goals  Impairment Goals to be met within 4 weeks  - Decrease pain to 0/10 at rest, 3/10 with activity  - Improve ROM by 5-10 degrees  - Increase strength to 5/5 throughout B LEs  - Patient to be able to sustain balance x 5 sec SLS without UE support     Functional Goals to be met within 4-6 weeks     - Return to Prior Level of Function  - Increase Functional Status Measure to: expected  - Patient will be independent with HEP  - Improve stride length during gait with SPC   - Patient to be able to tolerate standing x 15 min with back pain <3/10       Plan  Patient would benefit from: skilled physical therapy  Planned modality interventions: cryotherapy and thermotherapy: hydrocollator packs  Planned therapy interventions: abdominal trunk stabilization, manual therapy, neuromuscular re-education, patient education, postural training, therapeutic exercise, strengthening, stretching, therapeutic activities, balance, home exercise program and gait training  Frequency: 2x week  Duration in weeks: 8  Treatment plan discussed with: patient        Subjective Evaluation    History of Present Illness  Mechanism of injury: Patient with history of fall in Fall 2018  Patient initially came to PT for back pain, was referred to spine and pain  Patient has since seen spine and pain, had injections for hip  Patient was diagnosed with moderate disc bulge at L5-S1 level (seen from CT scan), and sacroilliacitis and hip OA  Patient was referred again to PT after recent injection to assist with managing her pain  Patient currently ambulates with SPC  Pain  Current pain ratin  At best pain ratin  At worst pain ratin  Location: low back/SI area  Quality: sharp and dull ache  Aggravating factors: standing, walking, stair climbing and lifting  Progression: no change    Social Support  Steps to enter house: yes  Stairs in house: yes   Lives in: multiple-level home  Lives with: spouse    Employment status: not working    Diagnostic Tests  X-ray: abnormal  CT scan: abnormal  Treatments  Previous treatment: physical therapy  Patient Goals  Patient goals for therapy: decreased pain, improved balance, increased motion, increased strength and return to sport/leisure activities          Objective     Tenderness     Right Hip   Tenderness in the PSIS       Active Range of Motion     Lumbar   Flexion: 50 degrees with pain  Extension: 5 degrees with pain  Left lateral flexion: 10 degrees with pain  Right lateral flexion: 10 degrees with pain  Left rotation: 10 degrees   Right rotation: 10 degrees   Left Hip   Flexion: 120 degrees     Right Hip   Flexion: 120 degrees with pain  Left Knee Normal active range of motion  Extension: 0 degrees     Right Knee   Normal active range of motion  Extension: 0 degrees     Strength/Myotome Testing     Left Hip   Planes of Motion   Flexion: 3+  Extension: 3+  Abduction: 3+  Adduction: 3+    Right Hip   Planes of Motion   Flexion: 3+  Extension: 3+  Abduction: 3+  Adduction: 3+    Left Knee   Flexion: 3+  Extension: 4-    Right Knee   Flexion: 3+  Extension: 4-    Left Ankle/Foot   Dorsiflexion: 4  Plantar flexion: 4    Right Ankle/Foot   Dorsiflexion: 4  Plantar flexion: 4    Ambulation     Observational Gait   Gait: antalgic   Decreased walking speed and stride length  Precautions FALL RISK    Specialty Daily Treatment Diary     Start with reps of 10, progress from there  Manual                                Exercise Diary                 bridges        PPT?         SLR flexion        SAQ 2#       LAQ 3#               Standing hip 3 way        Standing ham curls        HR/TR        Step ups FW 4"               Progress to:        Sidestepping // bars        SLS        Tandem stance        Tandem walking                                Modalities        MH lumbar spine, seated PRN

## 2019-01-23 ENCOUNTER — OFFICE VISIT (OUTPATIENT)
Dept: PHYSICAL THERAPY | Age: 84
End: 2019-01-23
Payer: MEDICARE

## 2019-01-23 DIAGNOSIS — M51.16 INTERVERTEBRAL DISC DISORDERS WITH RADICULOPATHY, LUMBAR REGION: Primary | ICD-10-CM

## 2019-01-23 PROCEDURE — 97110 THERAPEUTIC EXERCISES: CPT

## 2019-01-23 PROCEDURE — 97112 NEUROMUSCULAR REEDUCATION: CPT

## 2019-01-23 NOTE — PROGRESS NOTES
Daily Note     Today's date: 2019  Patient name: Sherryle Perna  : 1930  MRN: [id number]  Referring provider: Sourav Ribera MD  Dx:   Encounter Diagnosis     ICD-10-CM    1  Intervertebral disc disorders with radiculopathy, lumbar region M51 16                   Subjective:  Patient reported she took OTC medication prior to coming to physical therapy today  Pt denies pain, but reported feeling stiffness today  Ambulates with a SPC  Objective: See treatment diary below      Assessment: Initiated treatment plan today, moderated to minimal challenge, VC for proper form and technique  Core engagement throughout all exercises to decrease overall pressure on lower back  Tolerated treatment well  Patient demonstrated fatigue post treatment, exhibited good technique with therapeutic exercises and would benefit from continued PT  Pt reported no pain post treatment and minimal fatigue  Precautions FALL RISK    Specialty Daily Treatment Diary     Start with reps of 10, progress from there  Manual        Foto completed every 4 visits  last date  Exercise Diary         Bike  5 min       bridges 2x10       PPT? SLR flexion 2x10       SAQ 2# 2x10       LAQ 2# 10x               Standing hip 3 way 2x 10       Standing ham curls 20x       HR/TR 20x       Step ups FW 4" 20x               Progress to:        Sidestepping // bars        SLS :10 x 3       Tandem stance        Tandem walking                                Modalities        MH lumbar spine, seated PRN Not needed  Plan: Continue per plan of care

## 2019-01-25 ENCOUNTER — OFFICE VISIT (OUTPATIENT)
Dept: PHYSICAL THERAPY | Age: 84
End: 2019-01-25
Payer: MEDICARE

## 2019-01-25 DIAGNOSIS — M51.16 INTERVERTEBRAL DISC DISORDERS WITH RADICULOPATHY, LUMBAR REGION: Primary | ICD-10-CM

## 2019-01-25 PROCEDURE — 97110 THERAPEUTIC EXERCISES: CPT

## 2019-01-25 NOTE — PROGRESS NOTES
Daily Note     Today's date: 2019  Patient name: Justin Keenan  : 1930  MRN: [id number]  Referring provider: Delaney Coello MD  Dx:   Encounter Diagnosis     ICD-10-CM    1  Intervertebral disc disorders with radiculopathy, lumbar region M51 16                   Subjective:  Patient reported that she only has pain while she walks for distances  Pt reported if she puts her hand on her back while ambulating that it decreased her pain and increased proper posture  Objective: See treatment diary below      Assessment: increased reps in sets today with minimal to moderate challenge  Tolerated treatment well  Patient demonstrated fatigue post treatment, exhibited good technique with therapeutic exercises and would benefit from continued PT  No increased discomfort noted throughout today's session  Precautions FALL RISK    Specialty Daily Treatment Diary     Start with reps of 10, progress from there  Manual       Foto completed every 4 visits  last date  Exercise Diary         Bike  5 min 8 min      bridges 2x10 2x10      PPT? 20x      SLR flexion 2x10 2x10      SAQ 2# 2x10 2# 20x      LAQ 2# 10x 2# 20x              Standing hip 3 way 2x 10 20x      Standing ham curls 20x 20x      HR/TR 20x nt      Step ups FW 4" 20x nt              Progress to:        Sidestepping // bars        SLS :10 x 3 nt      Tandem stance        Tandem walking                                Modalities        MH lumbar spine, seated PRN Not needed  Plan: Continue per plan of care

## 2019-01-30 ENCOUNTER — OFFICE VISIT (OUTPATIENT)
Dept: PHYSICAL THERAPY | Age: 84
End: 2019-01-30
Payer: MEDICARE

## 2019-01-30 DIAGNOSIS — M51.16 INTERVERTEBRAL DISC DISORDERS WITH RADICULOPATHY, LUMBAR REGION: Primary | ICD-10-CM

## 2019-01-30 PROCEDURE — 97110 THERAPEUTIC EXERCISES: CPT

## 2019-01-30 PROCEDURE — 97112 NEUROMUSCULAR REEDUCATION: CPT

## 2019-01-30 NOTE — PROGRESS NOTES
Daily Note     Today's date: 2019  Patient name: Darshan Hendrix  : 1930  MRN: [id number]  Referring provider: Franklin Sorto MD  Dx:   Encounter Diagnosis     ICD-10-CM    1  Intervertebral disc disorders with radiculopathy, lumbar region M51 16                   Subjective:  Patient ambulating with SPC  Pt reported feeling the same, no changes  LB pain is always there but better in the morning  Objective: See treatment diary below      Assessment:  Continued with treatment plan,  Tolerated treatment well  Patient demonstrated fatigue post treatment, exhibited good technique with therapeutic exercises and would benefit from continued PT  Pt compliant with HEP  Pt reported " I feel okay" throughout all exercises  Nv trial to increase wt with standing exercises  Precautions FALL RISK    Specialty Daily Treatment Diary     Start with reps of 10, progress from there  Manual      Foto completed every 4 visits  last date  Foto completed today                     Exercise Diary         Bike  5 min 8 min 10 min     bridges 2x10 2x10 20x     PPT? 20x 20x     SLR flexion 2x10 2x10 20x 1#? SAQ 2# 2x10 2# 20x 2# 20x     LAQ 2# 10x 2# 20x 2# 20x             Standing hip 3 way 2x 10 20x 20x 1#? Standing ham curls 20x 20x 20x 1#? HR/TR 20x nt 20x     Step ups FW 4" 20x nt 10x ea  6"             Progress to:        Sidestepping // bars   4 laps // bars  Add YTB    SLS :10 x 3 nt :05 x 5     Tandem stance   :05 x 5     Tandem walking   4 laps // bars  Modalities        MH lumbar spine, seated PRN Not needed  Not needed  Plan: Continue per plan of care

## 2019-02-01 ENCOUNTER — OFFICE VISIT (OUTPATIENT)
Dept: PHYSICAL THERAPY | Age: 84
End: 2019-02-01
Payer: MEDICARE

## 2019-02-01 DIAGNOSIS — M51.16 INTERVERTEBRAL DISC DISORDERS WITH RADICULOPATHY, LUMBAR REGION: Primary | ICD-10-CM

## 2019-02-01 PROCEDURE — 97110 THERAPEUTIC EXERCISES: CPT

## 2019-02-01 NOTE — PROGRESS NOTES
Daily Note     Today's date: 2019  Patient name: Michelle Serrato  : 1930  MRN: [id number]  Referring provider: Shilpa Lantigua MD  Dx:   No diagnosis found  Subjective:  Patient reported little pain, Reported she performed her exercises with 2# at home  Objective: See treatment diary below      Assessment:  Continued with treatment plan, added 2# to SLR today with minimal difficulty on R side  Pt required verbal and tactile cue to perform exercises with proper form and body mechanics  Tolerated treatment well  Patient demonstrated fatigue post treatment, exhibited good technique with therapeutic exercises and would benefit from continued PT  Pt D/C by evaluating therapist today  Pt received a updated Hep program to continue to perform at home  Precautions FALL RISK    Specialty Daily Treatment Diary     Start with reps of 10, progress from there  Manual     Foto completed every 4 visits  last date  Foto completed today                     Exercise Diary         Bike  5 min 8 min 10 min 10 min     bridges 2x10 2x10 20x 20x    PPT? 20x 20x 20x    SLR flexion 2x10 2x10 20x 2# 10x R, 20x L    SAQ 2# 2x10 2# 20x 2# 20x 2# 20x    LAQ 2# 10x 2# 20x 2# 20x 2# 20x            Standing hip 3 way 2x 10 20x 20x 20x    Standing ham curls 20x 20x 20x 20x    HR/TR 20x nt 20x 20x    Step ups FW 4" 20x nt 10x ea  6" 20x 6"            Progress to:        Sidestepping // bars   4 laps // bars  nt    SLS :10 x 3 nt :05 x 5 nt    Tandem stance   :05 x 5 nt    Tandem walking   4 laps // bars  nt                            Modalities        MH lumbar spine, seated PRN Not needed  Not needed  Plan: Continue per plan of care

## 2019-03-14 ENCOUNTER — TRANSCRIBE ORDERS (OUTPATIENT)
Dept: PHYSICAL THERAPY | Age: 84
End: 2019-03-14

## 2019-03-14 DIAGNOSIS — M54.50 ACUTE RIGHT-SIDED LOW BACK PAIN WITHOUT SCIATICA: Primary | ICD-10-CM

## 2019-07-22 ENCOUNTER — EVALUATION (OUTPATIENT)
Dept: PHYSICAL THERAPY | Age: 84
End: 2019-07-22
Payer: MEDICARE

## 2019-07-22 DIAGNOSIS — M54.16 LUMBAR RADICULOPATHY: Primary | ICD-10-CM

## 2019-07-22 PROCEDURE — 97162 PT EVAL MOD COMPLEX 30 MIN: CPT | Performed by: PHYSICAL THERAPIST

## 2019-07-22 NOTE — PROGRESS NOTES
PT Evaluation     Today's date: 2019  Patient name: Juan Ambrose  : 1930  MRN: [id number]  Referring provider: Evelina Islas MD  Dx:   Encounter Diagnosis     ICD-10-CM    1  Lumbar radiculopathy M54 16                   Assessment  Assessment details: Patient seen for PT evaluation for lumbar radiculopathy  Patient presents with decreased lumbar ROM specifically into extension > lateral flexion > flexion; no pain into LEs at this time  Palpable tightness and pain at SI, glutes and TFL  Pain with palpation at L TFL and glute medius area  Patient with poor posture, flexed with standing and walking, significant balance deficits, unable to sustain balance SLS each  Patient using SPC at all times for balance and gait- recommended patient to continue to use assisted device at all times  Patient presents as high risk for falls  Impairments: abnormal gait, abnormal or restricted ROM, activity intolerance, impaired balance, impaired physical strength, lacks appropriate home exercise program, pain with function, poor posture  and poor body mechanics  Functional limitations: Patient reports moderate limitations with IADLs, standing tolerance, gait, pain with sleeping at night, pain with IADLs and with recreational activities  Understanding of Dx/Px/POC: good   Prognosis: good    Goals  Impairment Goals to be met within 4 weeks  - Decrease pain to 0/10 at rest and with activity  - Improve ROM to minimal loss flexion  - Improve ROM to moderate loss with extension  - Increase strength to 4/5 throughout  - Patient to be able to maintain upright posture in standing  - Patient to be able to perform SLS x 10 sec each        Functional Goals to be met within 4-6 weeks     - Return to Prior Level of Function  - Increase Functional Status Measure to: expected  - Patient will be independent with HEP         Plan  Patient would benefit from: skilled physical therapy  Planned modality interventions: cryotherapy and thermotherapy: hydrocollator packs  Planned therapy interventions: abdominal trunk stabilization, manual therapy, neuromuscular re-education, patient education, postural training, stretching, strengthening, therapeutic activities, therapeutic exercise, home exercise program, gait training and balance  Frequency: 2x week  Duration in weeks: 8  Treatment plan discussed with: patient        Subjective Evaluation    History of Present Illness  Mechanism of injury: Patient with long history of back pain  Patient previously in PT for low back pain; felt relief - short term, then slowly increased again  Patient reports her pain is worse in the afternoon  Pain  Current pain ratin  At best pain ratin  At worst pain ratin  Location: low back   Quality: dull ache  Aggravating factors: standing, sitting, stair climbing and walking  Progression: no change    Social Support  Steps to enter house: yes  Stairs in house: yes   Lives in: multiple-level home  Lives with: spouse    Employment status: not working  Treatments  Previous treatment: physical therapy  Patient Goals  Patient goals for therapy: decreased pain, improved balance, increased motion and increased strength          Objective     Concurrent Complaints  Positive for disturbed sleep  Negative for night pain, bladder dysfunction, bowel dysfunction and saddle (S4) numbness    Postural Observations  Seated posture: fair  Standing posture: fair  Correction of posture: has no consistent effect        Tenderness     Lumbar Spine  Tenderness in the spinous process  Left Hip   Tenderness in the PSIS  Right Hip   Tenderness in the PSIS       Neurological Testing     Sensation     Lumbar   Left   Intact: light touch    Right   Intact: light touch    Knee   Left Knee   Intact: light touch    Right Knee   Intact: light touch     Reflexes   Left   Clonus sign: negative    Right   Clonus sign: negative    Active Range of Motion     Lumbar   Flexion:  with pain Restriction level: moderate  Extension:  with pain Restriction level: maximal  Left lateral flexion:  with pain Restriction level: moderate  Right lateral flexion:  with pain Restriction level: moderate  Left Hip   Flexion: 90 degrees     Right Hip   Flexion: 90 degrees   Left Knee   Normal active range of motion    Right Knee   Normal active range of motion    Strength/Myotome Testing     Left Hip   Planes of Motion   Flexion: 3  Abduction: 3+  External rotation: 3+    Right Hip   Planes of Motion   Flexion: 3  Abduction: 3+  External rotation: 3+    Left Knee   Flexion: 3+  Extension: 3+    Right Knee   Flexion: 3+  Extension: 3+    Tests     Lumbar     Left   Negative slump test      Right   Negative slump test      Ambulation     Observational Gait   Gait: antalgic   Decreased walking speed, stride length, left stance time, right stance time, left swing time, right swing time, left step length and right step length  Functional Assessment        Single Leg Stance   Left: 0 seconds  Right: 0 seconds             Precautions FALL RISK    Specialty Daily Treatment Diary       Manual        Ham stretch        IT band stretch                Exercise Diary                 Bike                 DBL knee to chest        Bridges?         Sup DLS iso abd        Sup DLS alt arm/legs                SLR flexion        S/l hip abd                Sit>stand, hi/lo                Sidestepping         Heel toe walking // bars                                                Modalities                                Progress as able

## 2019-07-25 ENCOUNTER — OFFICE VISIT (OUTPATIENT)
Dept: PHYSICAL THERAPY | Age: 84
End: 2019-07-25
Payer: MEDICARE

## 2019-07-25 DIAGNOSIS — M54.16 LUMBAR RADICULOPATHY: Primary | ICD-10-CM

## 2019-07-25 PROCEDURE — 97110 THERAPEUTIC EXERCISES: CPT

## 2019-07-30 ENCOUNTER — OFFICE VISIT (OUTPATIENT)
Dept: PHYSICAL THERAPY | Age: 84
End: 2019-07-30
Payer: MEDICARE

## 2019-07-30 DIAGNOSIS — M54.16 LUMBAR RADICULOPATHY: Primary | ICD-10-CM

## 2019-07-30 PROCEDURE — 97110 THERAPEUTIC EXERCISES: CPT

## 2019-07-30 NOTE — PROGRESS NOTES
Daily Note     Today's date: 2019  Patient name: Lupe Piña  : 1930  MRN: [id number]  Referring provider: Roberto Bowers MD  Dx:   Encounter Diagnosis     ICD-10-CM    1  Lumbar radiculopathy M54 16                   Subjective: Main c/o is L side LBP w/ some radiation into L hip but also gets pain in R hip at times Reports she cannot lay in supine without extreme pain and  has to help her get up  Objective: See treatment diary below  Manual          Ham stretch  24cmfw4  90nltq2         IT band stretch  69uncq5  63kymc8                       Exercise Diary                            Bike   10m  10m                       DBL knee to chest  w/assist x10  w/assist x10         SKTC  4ziml16  1lyhl60         Sup DLS iso abd  [id number]  5sec 2x10         Sup DLS alt arm/legs  3X61  1# 2x10          Tband abd in supine  red 2x10  red 3x10         SLR flexion             S/l hip abd              SAQ  # 2 2x10  3# 3x10         Sit>stand, hi/lo                           Sidestepping              Heel toe walking // bars              Seated flexion with physioball  42gtbe30  73gzoc36          Seated flexion full range    93levw09                                                   Modalities                                                           Assessment: Tolerated treatment well  Pt able to walk more upright with cane and felt she did not have LBP after session  Pt wants to see if she can longer relief of pain after today's session   Will monitor response and advance as tolerated      Plan: Cont PT per LPT plan

## 2019-08-01 ENCOUNTER — OFFICE VISIT (OUTPATIENT)
Dept: PHYSICAL THERAPY | Age: 84
End: 2019-08-01
Payer: MEDICARE

## 2019-08-01 DIAGNOSIS — M54.16 LUMBAR RADICULOPATHY: Primary | ICD-10-CM

## 2019-08-01 PROCEDURE — 97110 THERAPEUTIC EXERCISES: CPT

## 2019-08-01 NOTE — PROGRESS NOTES
Daily Note     Today's date: 2019  Patient name: Santos Dsouza  : 1930  MRN: [id number]  Referring provider: Janneth Bonilla MD  Dx:   No diagnosis found  Subjective: Reports  L side LBP w/ some radiation into L hip that is increased this AM   Pt just woke up that way per her report  Objective: See treatment diary below  Manual        Ham stretch  83gqms7  42ltbo7  47xhww3       IT band stretch  70wjub8  28jljy7  52hdfz8                     Exercise Diary                            Bike   10m  10m  10m                     DBL knee to chest  w/assist x10  w/assist x10  w/assist x10       SKTC  4iqtk85  0zahx80  3trvr73       Sup DLS iso abd  8FXTI29  5sec 2x10  0lbnm30       Sup DLS alt arm/legs  3Z37  1# 2x10  1# 2x10        Tband abd in supine  red 2x10  red 3x10  red 3x10       SLR flexion             S/l hip abd              SAQ  # 2 2x10  3# 3x10  3# 3x10       Sit>stand, hi/lo                           Sidestepping              Heel toe walking // bars              Seated flexion with physioball  25qhvj17  95uofb95  52jems75        Seated flexion full range    19bruz31  46mpqp20                                                 Modalities              MH to LB in sitting      10min                                       Assessment: Tolerated treatment well  Pt able to walk more upright with cane and felt she did not have LBP after session  Pt apprehensive about ex progression as she does not want to increase her pain   Will monitor response and advance as tolerated      Plan: Cont PT per LPT plan

## 2019-08-02 ENCOUNTER — APPOINTMENT (OUTPATIENT)
Dept: PHYSICAL THERAPY | Age: 84
End: 2019-08-02
Payer: MEDICARE

## 2019-08-06 ENCOUNTER — OFFICE VISIT (OUTPATIENT)
Dept: PHYSICAL THERAPY | Age: 84
End: 2019-08-06
Payer: MEDICARE

## 2019-08-06 DIAGNOSIS — M54.16 LUMBAR RADICULOPATHY: Primary | ICD-10-CM

## 2019-08-06 PROCEDURE — 97110 THERAPEUTIC EXERCISES: CPT

## 2019-08-06 NOTE — PROGRESS NOTES
Daily Note     Today's date: 2019  Patient name: Estuardo Ruiz  : 1930  MRN: [id number]  Referring provider: Erin Dalton MD  Dx:   Encounter Diagnosis     ICD-10-CM    1  Lumbar radiculopathy M54 16                   Subjective: Reports only L sided LB at 3/10 and no radicular pain  Objective: See treatment diary below  Manual      Ham stretch  05qkhb1  90onxp4  52sabq7  75euad2     IT band stretch  87lktz8  71pggu4  24vmas0  04fnqk6                   Exercise Diary                            Bike   10m  10m  10m  10m                   DBL knee to chest  w/assist x10  w/assist x10  w/assist x10  w/assist x10     SKTC  2nbbi54  8fyam20  7ylow25  7klzk37     Sup DLS iso abd  9WIAB10  5sec 2x10  2wsxa43  5sec 2x10     Sup DLS alt arm/legs  9O03  1# 2x10  1# 2x10  1 5# 3x10      Tband abd in supine  red 2x10  red 3x10  red 3x10  red 3x10     SLR flexion             S/l hip abd              SAQ  # 2 2x10  3# 3x10  3# 3x10  3# 3x10     Sit>stand, hi/lo                           Sidestepping              Heel toe walking // bars              Seated flexion with physioball  21omtj05  95knhu05  84eyxl04  61cjyo52      Seated flexion full range    19uynj97  51iise18  34cwux21                                               Modalities              MH to LB in sitting      10min  10 min                                     Assessment: Tolerated treatment well  Responds well to flexion as pain is abolished after session in LB  Will monitor response and advance as tolerated      Plan: Cont PT per LPT plan

## 2019-08-09 ENCOUNTER — OFFICE VISIT (OUTPATIENT)
Dept: PHYSICAL THERAPY | Age: 84
End: 2019-08-09
Payer: MEDICARE

## 2019-08-09 DIAGNOSIS — M54.16 LUMBAR RADICULOPATHY: Primary | ICD-10-CM

## 2019-08-09 PROCEDURE — 97110 THERAPEUTIC EXERCISES: CPT | Performed by: PHYSICAL THERAPIST

## 2019-08-09 NOTE — PROGRESS NOTES
Daily Note     Today's date: 2019  Patient name: Jacque Meza  : 1930  MRN: [id number]  Referring provider: Isha Scales MD  Dx:   Encounter Diagnosis     ICD-10-CM    1  Lumbar radiculopathy M54 16                   Subjective: Reports radicular symptoms are improving/lessening  Sore at L sided low back and c/o scapular pain last night when sleeping  Patient reports a steady, slow improvement  Objective: See treatment diary below      Manual    Ham stretch  16xwch0  69gumx3  75xwvg2  67jlie3 5x:20   IT band stretch  26rpaq5  59loex2  54kqqf6  24daun5  5x:20                 Exercise Diary                            Bike   10m  10m  10m  10m  10'                 DBL knee to chest  w/assist x10  w/assist x10  w/assist x10  w/assist x10  w/ assist x 10   SKTC  8zjzt02  4kquy84  0tiwc53  5wiql32  10x:05   Sup DLS iso abd  5HTMR61  5sec 2x10  8csmd14  5sec 2x10  2x10 :05   Sup DLS alt arm/legs  4W31  1# 2x10  1# 2x10  1 5# 3x10  1 5# 3x10    Tband abd in supine  red 2x10  red 3x10  red 3x10  red 3x10  red 3x10   SLR flexion             S/l hip abd              SAQ  # 2 2x10  3# 3x10  3# 3x10  3# 3x10  3# 3x10   Sit>stand, hi/lo                           Sidestepping              Heel toe walking // bars              Seated flexion with physioball  83gljj69  90nzwe27  95fnky97  46xkfw63  10x:10    Seated flexion full range    15zwxw27  00drke72  26bfka79  10x:10                                             Modalities              MH to LB in sitting      10min  10 min                                     Assessment: Continuing with current program  Making good progress with reduction in radicular symptoms with current exercise program  Scheduled for 2 more weeks  Will follow up and re-assess at that time         Plan: Cont PT per LPT plan

## 2019-08-13 ENCOUNTER — OFFICE VISIT (OUTPATIENT)
Dept: PHYSICAL THERAPY | Age: 84
End: 2019-08-13
Payer: MEDICARE

## 2019-08-13 DIAGNOSIS — M54.16 LUMBAR RADICULOPATHY: Primary | ICD-10-CM

## 2019-08-13 PROCEDURE — 97110 THERAPEUTIC EXERCISES: CPT

## 2019-08-13 PROCEDURE — 97112 NEUROMUSCULAR REEDUCATION: CPT

## 2019-08-13 NOTE — PROGRESS NOTES
Daily Note     Today's date: 2019  Patient name: Omar Pardo  : 1930  MRN: [id number]  Referring provider: Peace Corona MD  Dx:   Encounter Diagnosis     ICD-10-CM    1  Lumbar radiculopathy M54 16                   Subjective: Pt reports mild low back upon arrival  She states majority of her pain is present on R side and sx's increased with lying on back in bed  Objective: See treatment diary below      Manual    Ham stretch  80gllj4  58pgje0  62gxor4  56gglj9 5x:20 20"x5   IT band stretch  45bgvv0  51qewe4  73mrlf5  37xkxv3  5x:20 20"x5                  Exercise Diary                              Bike   10m  10m  10m  10m  10' 10'                  DBL knee to chest  w/assist x10  w/assist x10  w/assist x10  w/assist x10  w/ assist x 10 x10   SKTC  4dgen93  5bqcg31  1ljla22  2nnry43  10x:05 5"x10   Sup DLS iso abd  1NPWZ78  5sec 2x10  4jwbx25  5sec 2x10  2x10 :05 5" 2x10   Sup DLS alt arm/legs  6C26  1# 2x10  1# 2x10  1 5# 3x10  1 5# 3x10 1 5 3x10    Tband abd in supine  red 2x10  red 3x10  red 3x10  red 3x10  red 3x10 RTB   3x10   SLR flexion              S/l hip abd               SAQ  # 2 2x10  3# 3x10  3# 3x10  3# 3x10  3# 3x10 3# 3x10   Sit>stand, hi/lo                             Sidestepping               Heel toe walking // bars               Seated flexion with physioball  44ttsx12  62yehk90  62jazt20  62sqkf25  10x:10 10"x10    Seated flexion full range    00ltvu57  32oads19  32dtet53  10x:10 10"x10                                                Modalities               MH to LB in sitting      10min  10 min   10 min                                      Assessment: Pt doing well with current POC  Improved radicular sx's noted post exercise  Pt continues to improve in LE strength and endurance  Verbal cueing required for proper sequencing with exercise  Fatigue present post session  R hamstring > L hamstring with manual hamstring stretch   Pt would benefit from continued PT       Plan: Cont PT per LPT plan  Pt 1:1 with PTA JW 9:45-10:30   10:30-10:40

## 2019-08-16 ENCOUNTER — OFFICE VISIT (OUTPATIENT)
Dept: PHYSICAL THERAPY | Age: 84
End: 2019-08-16
Payer: MEDICARE

## 2019-08-16 DIAGNOSIS — M54.16 LUMBAR RADICULOPATHY: Primary | ICD-10-CM

## 2019-08-16 PROCEDURE — 97110 THERAPEUTIC EXERCISES: CPT | Performed by: PHYSICAL THERAPIST

## 2019-08-16 NOTE — PROGRESS NOTES
Daily Note     Today's date: 2019  Patient name: Vincent Londono  : 1930  MRN: [id number]  Referring provider: Ever Mcintyre MD  Dx:   Encounter Diagnosis     ICD-10-CM    1  Lumbar radiculopathy M54 16                   Subjective: Pt states her back pain is not too bad today, states that she feels it is slowly getting better  Objective: See treatment diary below      Manual    Ham stretch  82hjzi3  91mvud1  80mmnu6  31veiw3 5x:20 20"x5 20"x5   IT band stretch  04vyul3  96ptti3  34edgn6  23jyvv9  5x:20 20"x5 20x5"                   Exercise Diary                                Bike   10m  10m  10m  10m  10' 10' 10'                   DBL knee to chest  w/assist x10  w/assist x10  w/assist x10  w/assist x10  w/ assist x 10 x10 x10   SKTC  5aqxh24  2gdmo68  7mzas14  8iqoe75  10x:05 5"x10 5"x10   Sup DLS iso abd  8DBDP13  5sec 2x10  3nvkz47  5sec 2x10  2x10 :05 5" 2x10 5", 2x10   Sup DLS alt arm/legs  2X28  1# 2x10  1# 2x10  1 5# 3x10  1 5# 3x10 1 5 3x10 1 5#, x30    Tband abd in supine  red 2x10  red 3x10  red 3x10  red 3x10  red 3x10 RTB   3x10 RTB, x30   SLR flexion               S/l hip abd                SAQ  # 2 2x10  3# 3x10  3# 3x10  3# 3x10  3# 3x10 3# 3x10 LAQ, 2# 2x15   Sit>stand, hi/lo                               Sidestepping                Heel toe walking // bars                Seated flexion with physioball  60kxkd71  39rqtu33  88fofr26  13aiyh27  10x:10 10"x10 10x10"    Seated flexion full range    68eifo66  37faay48  70ajec73  10x:10 10"x10 10x10"                                                   Modalities                MH to LB in sitting      10min  10 min   10 min                                         Assessment: Pt notes increased tightness of R hamstring during manual hamstring stretch  Completed exercise with correct technique and was able to increase lumbar spine motion after exercise   States that she only has a few visits left and is preparing to complete exercises at home after d/c  Pt would benefit from continued PT to reduce pain and increase level of function  Plan: Continue POC  Progress as tolerated

## 2019-08-20 ENCOUNTER — EVALUATION (OUTPATIENT)
Dept: PHYSICAL THERAPY | Age: 84
End: 2019-08-20
Payer: MEDICARE

## 2019-08-20 DIAGNOSIS — M54.16 LUMBAR RADICULOPATHY: Primary | ICD-10-CM

## 2019-08-20 PROCEDURE — 97110 THERAPEUTIC EXERCISES: CPT

## 2019-08-20 NOTE — PROGRESS NOTES
PT Re-Evaluation     Today's date: 2019  Patient name: Primo Magana  : 1930  MRN: [id number]  Referring provider: Liya Palacios MD  Dx:   Encounter Diagnosis     ICD-10-CM    1  Lumbar radiculopathy M54 16        Start Time:   Stop Time: 754  Total time in clinic (min): 55 minutes    Assessment  Assessment details: Patient seen for PT re-assessment  PT performing repeated flexion biased exercises, tolerating well and resolving localized back pain  Adding core stabilization and strengthening program  Improving lumbar ROM; however, still maximally limited with ROM into extension at this time with pain limiting further ROM  However full lumbar flexion ROM in sitting, not full in standing, yet and minimal pain  Patient compliant with HEP and is making progress towards goals  Impairments: abnormal gait, abnormal or restricted ROM, activity intolerance, impaired balance, impaired physical strength, lacks appropriate home exercise program, pain with function, poor posture  and poor body mechanics  Functional limitations: Patient reports moderate limitations with IADLs, standing tolerance, gait, pain with sleeping at night, pain with IADLs and with recreational activities  Understanding of Dx/Px/POC: good   Prognosis: good    Goals  Impairment Goals to be met within 4 weeks  - Decrease pain to 0/10 at rest and with activity progressing  - Improve ROM to minimal loss flexion met  - Improve ROM to moderate loss with extension progressing  - Increase strength to 4/5 throughout progressing  - Patient to be able to maintain upright posture in standing partially met  - Patient to be able to perform SLS x 10 sec each  Not met       Functional Goals to be met within 4-6 weeks     - Return to Prior Level of Function progressing  - Increase Functional Status Measure to: expected progressing  - Patient will be independent with HEP progressing         Plan  Patient would benefit from: skilled physical therapy  Planned modality interventions: cryotherapy and thermotherapy: hydrocollator packs  Planned therapy interventions: abdominal trunk stabilization, manual therapy, neuromuscular re-education, patient education, postural training, stretching, strengthening, therapeutic activities, therapeutic exercise, home exercise program, gait training and balance  Frequency: 2x week  Duration in weeks: 6  Treatment plan discussed with: patient        Subjective Evaluation    History of Present Illness  Mechanism of injury: Patient reports less back pain since starting PT  Feels therapy is helping her  Pain  Current pain ratin  At best pain rating: 3  At worst pain ratin  Location: low back   Quality: dull ache  Aggravating factors: standing, sitting, stair climbing and walking  Progression: no change    Social Support  Steps to enter house: yes  Stairs in house: yes   Lives in: multiple-level home  Lives with: spouse    Employment status: not working  Treatments  Previous treatment: physical therapy  Patient Goals  Patient goals for therapy: decreased pain, improved balance, increased motion and increased strength          Objective     Concurrent Complaints  Positive for disturbed sleep  Negative for night pain, bladder dysfunction, bowel dysfunction and saddle (S4) numbness    Postural Observations  Seated posture: fair  Standing posture: fair  Correction of posture: has no consistent effect        Tenderness     Lumbar Spine  Tenderness in the spinous process  Left Hip   Tenderness in the PSIS  Right Hip   Tenderness in the PSIS       Neurological Testing     Sensation     Lumbar   Left   Intact: light touch    Right   Intact: light touch    Knee   Left Knee   Intact: light touch    Right Knee   Intact: light touch     Reflexes   Left   Clonus sign: negative    Right   Clonus sign: negative    Active Range of Motion     Lumbar   Flexion:  Restriction level: minimal  Extension:  with pain Restriction level: maximal  Left lateral flexion:  with pain Restriction level: moderate  Right lateral flexion:  with pain Restriction level: moderate  Left Hip   Flexion: 90 degrees     Right Hip   Flexion: 90 degrees   Left Knee   Normal active range of motion    Right Knee   Normal active range of motion    Strength/Myotome Testing     Left Hip   Planes of Motion   Flexion: 3+  Abduction: 4-  External rotation: 3+    Right Hip   Planes of Motion   Flexion: 3+  Abduction: 4-  External rotation: 3+    Left Knee   Flexion: 4-  Extension: 4-    Right Knee   Flexion: 4-  Extension: 4-    Tests     Lumbar     Left   Negative slump test      Right   Negative slump test      Ambulation     Observational Gait   Gait: antalgic   Decreased walking speed, stride length, left stance time, right stance time, left swing time, right swing time, left step length and right step length       Functional Assessment        Single Leg Stance   Left: 0 seconds  Right: 0 seconds

## 2019-08-20 NOTE — PROGRESS NOTES
Daily Note     Today's date: 2019  Patient name: Maribell Chand  : 1930  MRN: [id number]  Referring provider: Malinda Brice MD  Dx:   Encounter Diagnosis     ICD-10-CM    1  Lumbar radiculopathy M54 16                   Subjective: Pt states her LBP feels good after session but pain returns in LB carlitos at night  Objective: See treatment diary below      Manual    Ham stretch  96dcye0  31yado1  58ybto0  95ejxo4 5x:20 20"x5 20"x5   IT band stretch  65vhch4  75zeym4  19wani4  46truf6  5x:20 20"x5 20x5"                   Exercise Diary                                Bike   10m  10m  10m  10m  10' 10' 10'                   DBL knee to chest  w/assist x10  w/assist x10  w/assist x10  w/assist x10  w/ assist x 10 x10 x10   SKTC  0otyk83  7dgli95  6ctgd85  4ijpe81  10x:05 5"x10 5"x10   Sup DLS iso abd  7RKXS92  5sec 2x10  8muzo01  5sec 2x10  2x10 :05 5" 2x10 5", 2x10   Sup DLS alt arm/legs  0 3#5B80  1# 2x10  1# 2x10  1 5# 3x10  1 5# 3x10 1 5 3x10 1 5#, x30    Tband abd in supine  red 2x10  red 3x10  red 3x10  red 3x10  red 3x10 RTB   3x10 RTB, x30   SLR flexion               S/l hip abd                SAQ/JLF05ufzf72  # 4 2x10  3# 3x10  3# 3x10  3# 3x10  3# 3x10 3# 3x10 LAQ, 2# 2x15   Sit>stand, hi/lo                               Sidestepping                Heel toe walking // bars                Seated flexion with physioball  55wemd67  24lyde48  57hjmp56  17llap11  10x:10 10"x10 10x10"    Seated flexion full range  35ktdk86  49pkmn46  50sxgu07  54ilyy09  10x:10 10"x10 10x10"                                                   Modalities                MH to LB in sitting  10min    10min  10 min   10 min                                         Assessment: Pt benefits most from lumbar flexion ex as they impact pain most  Pt would benefit from continued PT to reduce pain and increase level of function  Plan: Continue POC  Progress as tolerated

## 2019-08-23 ENCOUNTER — APPOINTMENT (OUTPATIENT)
Dept: PHYSICAL THERAPY | Age: 84
End: 2019-08-23
Payer: MEDICARE

## 2019-08-27 ENCOUNTER — APPOINTMENT (OUTPATIENT)
Dept: PHYSICAL THERAPY | Age: 84
End: 2019-08-27
Payer: MEDICARE

## 2019-08-30 ENCOUNTER — APPOINTMENT (OUTPATIENT)
Dept: PHYSICAL THERAPY | Age: 84
End: 2019-08-30
Payer: MEDICARE

## 2020-01-21 ENCOUNTER — EVALUATION (OUTPATIENT)
Dept: PHYSICAL THERAPY | Age: 85
End: 2020-01-21
Payer: MEDICARE

## 2020-01-21 DIAGNOSIS — S72.25XD CLOSED NONDISPLACED SUBTROCHANTERIC FRACTURE OF LEFT FEMUR WITH ROUTINE HEALING, SUBSEQUENT ENCOUNTER: Primary | ICD-10-CM

## 2020-01-21 PROCEDURE — 97110 THERAPEUTIC EXERCISES: CPT | Performed by: PHYSICAL THERAPIST

## 2020-01-21 PROCEDURE — 97162 PT EVAL MOD COMPLEX 30 MIN: CPT | Performed by: PHYSICAL THERAPIST

## 2020-01-21 NOTE — LETTER
2020    Boyd Garcia, [id number]  Lydia Joe [address] [address]    Patient: Ismael Pierre   YOB: 1930   Date of Visit: 2020     Encounter Diagnosis     ICD-10-CM    1  Closed nondisplaced subtrochanteric fracture of left femur with routine healing, subsequent encounter S72 25XD        Dear Dr Paulino Farr: Thank you for your recent referral of Ismael Pierre  Please review the attached evaluation summary from Tanya's recent visit  Please verify that you agree with the plan of care by signing the attached order  If you have any questions or concerns, please do not hesitate to call  I sincerely appreciate the opportunity to share in the care of one of your patients and hope to have another opportunity to work with you in the near future  Sincerely,    Barry Bar, PT      Referring Provider:      I certify that I have read the below Plan of Care and certify the need for these services furnished under this plan of treatment while under my care  Boyd Garcia MD  [address] [place] [id number]  VIA Facsimile: [phone]          PT Evaluation     Today's date: 2020  Patient name: Ismael Pierre  : 1930  MRN: [id number]  Referring provider: Kirby Ga MD  Dx:   Encounter Diagnosis     ICD-10-CM    1  Closed nondisplaced subtrochanteric fracture of left femur with routine healing, subsequent encounter S72 25XD                   Assessment  Assessment details: Patient seen for PT evaluation s/p L hip ORIF s/p femur fracture  PT reviewed patient's HEP from homehealth PT  Recommended patient to continue with this HEP as she is doing this regularly - 3x/day   Patient presents with moderate weakness grossly throughout L LE, gait dysfunctions and balance deficits as a result of weakness in LE; also with mild- moderate swelling grossly throughout LE, no signs of pitting edema at foot, unable to put previously worn compression stockings on; not drinking enough water  Patient has been using RW at home, tried [facility] with home health, doesn't feel confident to use  Impairments: abnormal gait, abnormal or restricted ROM, activity intolerance, impaired balance, impaired physical strength, lacks appropriate home exercise program, pain with function, poor posture  and poor body mechanics  Functional limitations: Patient reports moderate limitations with IADls, with recreational activities, with sit>stand, with static standing, with stair climbing and with walking > 5 minutesUnderstanding of Dx/Px/POC: good   Prognosis: good    Goals  Impairment Goals to be met within 4 weeks  - Decrease pain to 0/10  - Improve ROM to 105 degrees hip flexion  - Increase strength to 4/5 throughout  - Patient to be able to tolerate SLS x 10 sec firm surface     Functional Goals to be met within 4-6 weeks  - Return to Prior Level of Function  - Increase Functional Status Measure to: expected  - Patient will be independent with HEP  - Patient to be able to progress to [facility]  - Patient to be able to ascend/descend stairs reciprocally  Plan  Patient would benefit from: skilled physical therapy  Planned modality interventions: cryotherapy and thermotherapy: hydrocollator packs  Planned therapy interventions: abdominal trunk stabilization, manual therapy, neuromuscular re-education, patient education, balance, postural training, strengthening, stretching, therapeutic activities, therapeutic exercise, home exercise program and gait training  Frequency: 2x week  Duration in weeks: 6  Treatment plan discussed with: patient and family        Subjective Evaluation    History of Present Illness  Date of surgery: 10/8/2019 (10/8/2019)  Mechanism of injury: Patient sustained L hip fracture s/p fall at home in Oct 2019  Patient reports standing up from a chair at home, was walking to get a book/magazine and blacked out   Kaylee Smith onto her L hip  Patient and spouse called for an ambulance  Patient was taken to the hospital and underwent emergency surgery for L hip ORIF  Patient was in the hospital x 1 week and then transferred to [facility] for 2 weeks, then discharged home to home health PT  Had home health PT x ~ 3 weeks, then discharged to outpatient PT  Patient follows up with surgeon tomorrow  Pain  Current pain ratin  At best pain ratin  At worst pain ratin  Location: L hip/femur  Quality: dull ache, tight and pressure  Relieving factors: ice, relaxation and rest  Aggravating factors: standing, walking, stair climbing, sitting and lifting  Progression: no change    Social Support  Steps to enter house: yes  Stairs in house: yes   Lives in: multiple-level home  Lives with: spouse    Employment status: not working    Diagnostic Tests  X-ray: abnormal  Treatments  No previous or current treatments  Patient Goals  Patient goals for therapy: decreased pain, improved balance, increased motion, return to sport/leisure activities and increased strength          Objective     Palpation   Left   Tenderness of the rectus femoris       Neurological Testing     Sensation     Hip   Left Hip   Intact: light touch  Hypersensation: light touch    Right Hip   Intact: light touch    Active Range of Motion   Left Hip   Flexion: 85 degrees with pain  Abduction: 15 degrees with pain    Strength/Myotome Testing     Left Hip   Planes of Motion   Flexion: 3+  Abduction: 3+  Adduction: 4    Right Hip   Planes of Motion   Flexion: 4+  Abduction: 4+  Adduction: 4+    Left Knee   Flexion: 4-  Extension: 3+    Right Knee   Flexion: 4+  Extension: 4+    Left Ankle/Foot   Dorsiflexion: 5  Plantar flexion: 4+    Right Ankle/Foot   Dorsiflexion: 5  Plantar flexion: 4+    Ambulation   Weight-Bearing Status   Weight-Bearing Status (Left): weight-bearing as tolerated   Assistive device used: rollator walker    Observational Gait   Decreased walking speed and stride length  Precautions ORTHOSTATIC HYPOTENSION, hip fx recently, decreased balance, h/o compression fracture in spine       Specialty Daily Treatment Diary       Manual 1/21                               Exercise Diary                 Nu step        Supine hip abd 10x       Heel slides 10x       QS 10x:03       Glut sets 10x:03       bridges        SAQ        LAQ                SLR x 3        Ham curls        sidestepping                Squats                                                 Modalities

## 2020-01-21 NOTE — PROGRESS NOTES
PT Evaluation     Today's date: 2020  Patient name: Armaan Dooley  : 1930  MRN: [id number]  Referring provider: Arther Mcardle, MD  Dx:   Encounter Diagnosis     ICD-10-CM    1  Closed nondisplaced subtrochanteric fracture of left femur with routine healing, subsequent encounter S72 25XD                   Assessment  Assessment details: Patient seen for PT evaluation s/p L hip ORIF s/p femur fracture  PT reviewed patient's HEP from homehealth PT  Recommended patient to continue with this HEP as she is doing this regularly - 3x/day  Patient presents with moderate weakness grossly throughout L LE, gait dysfunctions and balance deficits as a result of weakness in LE; also with mild- moderate swelling grossly throughout LE, no signs of pitting edema at foot, unable to put previously worn compression stockings on; not drinking enough water  Patient has been using RW at home, tried [facility] with home health, doesn't feel confident to use  Impairments: abnormal gait, abnormal or restricted ROM, activity intolerance, impaired balance, impaired physical strength, lacks appropriate home exercise program, pain with function, poor posture  and poor body mechanics  Functional limitations: Patient reports moderate limitations with IADls, with recreational activities, with sit>stand, with static standing, with stair climbing and with walking > 5 minutesUnderstanding of Dx/Px/POC: good   Prognosis: good    Goals  Impairment Goals to be met within 4 weeks  - Decrease pain to 0/10  - Improve ROM to 105 degrees hip flexion  - Increase strength to 4/5 throughout  - Patient to be able to tolerate SLS x 10 sec firm surface     Functional Goals to be met within 4-6 weeks  - Return to Prior Level of Function  - Increase Functional Status Measure to: expected  - Patient will be independent with HEP  - Patient to be able to progress to [facility]  - Patient to be able to ascend/descend stairs reciprocally         Plan  Patient would benefit from: skilled physical therapy  Planned modality interventions: cryotherapy and thermotherapy: hydrocollator packs  Planned therapy interventions: abdominal trunk stabilization, manual therapy, neuromuscular re-education, patient education, balance, postural training, strengthening, stretching, therapeutic activities, therapeutic exercise, home exercise program and gait training  Frequency: 2x week  Duration in weeks: 6  Treatment plan discussed with: patient and family        Subjective Evaluation    History of Present Illness  Date of surgery: 10/8/2019 (10/8/2019)  Mechanism of injury: Patient sustained L hip fracture s/p fall at home in Oct 2019  Patient reports standing up from a chair at home, was walking to get a book/magazine and blacked out  [employer] onto her L hip  Patient and spouse called for an ambulance  Patient was taken to the hospital and underwent emergency surgery for L hip ORIF  Patient was in the hospital x 1 week and then transferred to [facility] for 2 weeks, then discharged home to home health PT  Had home health PT x ~ 3 weeks, then discharged to outpatient PT  Patient follows up with surgeon tomorrow  Pain  Current pain ratin  At best pain ratin  At worst pain ratin  Location: L hip/femur  Quality: dull ache, tight and pressure  Relieving factors: ice, relaxation and rest  Aggravating factors: standing, walking, stair climbing, sitting and lifting  Progression: no change    Social Support  Steps to enter house: yes  Stairs in house: yes   Lives in: multiple-level home  Lives with: spouse    Employment status: not working    Diagnostic Tests  X-ray: abnormal  Treatments  No previous or current treatments  Patient Goals  Patient goals for therapy: decreased pain, improved balance, increased motion, return to sport/leisure activities and increased strength          Objective     Palpation   Left   Tenderness of the rectus femoris       Neurological Testing Sensation     Hip   Left Hip   Intact: light touch  Hypersensation: light touch    Right Hip   Intact: light touch    Active Range of Motion   Left Hip   Flexion: 85 degrees with pain  Abduction: 15 degrees with pain    Strength/Myotome Testing     Left Hip   Planes of Motion   Flexion: 3+  Abduction: 3+  Adduction: 4    Right Hip   Planes of Motion   Flexion: 4+  Abduction: 4+  Adduction: 4+    Left Knee   Flexion: 4-  Extension: 3+    Right Knee   Flexion: 4+  Extension: 4+    Left Ankle/Foot   Dorsiflexion: 5  Plantar flexion: 4+    Right Ankle/Foot   Dorsiflexion: 5  Plantar flexion: 4+    Ambulation   Weight-Bearing Status   Weight-Bearing Status (Left): weight-bearing as tolerated   Assistive device used: rollator walker    Observational Gait   Decreased walking speed and stride length  Precautions ORTHOSTATIC HYPOTENSION, hip fx recently, decreased balance, h/o compression fracture in spine       Specialty Daily Treatment Diary       Manual 1/21                               Exercise Diary                 Nu step        Supine hip abd 10x       Heel slides 10x       QS 10x:03       Glut sets 10x:03       bridges        SAQ        LAQ                SLR x 3        Ham curls        sidestepping                Squats                                                 Modalities

## 2020-01-23 ENCOUNTER — TRANSCRIBE ORDERS (OUTPATIENT)
Dept: PHYSICAL THERAPY | Age: 85
End: 2020-01-23

## 2020-01-23 DIAGNOSIS — S72.25XD CLOSED NONDISPLACED SUBTROCHANTERIC FRACTURE OF LEFT FEMUR WITH ROUTINE HEALING: Primary | ICD-10-CM

## 2020-01-24 ENCOUNTER — OFFICE VISIT (OUTPATIENT)
Dept: PHYSICAL THERAPY | Age: 85
End: 2020-01-24
Payer: MEDICARE

## 2020-01-24 DIAGNOSIS — S72.25XD CLOSED NONDISPLACED SUBTROCHANTERIC FRACTURE OF LEFT FEMUR WITH ROUTINE HEALING, SUBSEQUENT ENCOUNTER: Primary | ICD-10-CM

## 2020-01-24 PROCEDURE — 97110 THERAPEUTIC EXERCISES: CPT | Performed by: PHYSICAL THERAPIST

## 2020-01-24 NOTE — PROGRESS NOTES
Daily Note     Today's date: 2020  Patient name: Pieter Fullizaiah  : 1930  MRN: [id number]  Referring provider: Georgia العلي MD  Dx:   Encounter Diagnosis     ICD-10-CM    1  Closed nondisplaced subtrochanteric fracture of left femur with routine healing, subsequent encounter S72 25XD                   Subjective: Patient reports MD appt went well, no more follow ups unless something goes wrong  Patient feeling pain/fatigue in hip, 3-4/10      Objective: See treatment diary below      Assessment: Initiated standing and seated exercises today with ankle weights  Allowing for rest between TE as patient is fatigued with addition of weights  Encouraged fluids as patient has history of orthostatic hypotension  Resting as needed  Good tolerance to TE  Fatigued with steps and added blue foam to chair to improve technique with squats      Plan: Continue per plan of care  Precautions ORTHOSTATIC HYPOTENSION, hip fx recently, decreased balance, h/o compression fracture in spine       Specialty Daily Treatment Diary       Manual                               Exercise Diary                 Nu step        Supine hip abd 10x       Heel slides 10x       QS 10x:03       Glut sets 10x:03       bridges        SAQ          Seated HR/TR 2x10      LAQ  2# 2x10        Ball squeeze 2x10      SLR x 3 // bars  2# 2x10      Ham curls // bars  2# x10      sidestepping                Squats   15x to chair- 2 foam pads      Step ups FW  4" 10x ea                                      Modalities

## 2020-01-27 ENCOUNTER — OFFICE VISIT (OUTPATIENT)
Dept: PHYSICAL THERAPY | Age: 85
End: 2020-01-27
Payer: MEDICARE

## 2020-01-27 DIAGNOSIS — S72.25XD CLOSED NONDISPLACED SUBTROCHANTERIC FRACTURE OF LEFT FEMUR WITH ROUTINE HEALING, SUBSEQUENT ENCOUNTER: Primary | ICD-10-CM

## 2020-01-27 PROCEDURE — 97112 NEUROMUSCULAR REEDUCATION: CPT

## 2020-01-27 PROCEDURE — 97110 THERAPEUTIC EXERCISES: CPT

## 2020-01-29 ENCOUNTER — OFFICE VISIT (OUTPATIENT)
Dept: PHYSICAL THERAPY | Age: 85
End: 2020-01-29
Payer: MEDICARE

## 2020-01-29 DIAGNOSIS — S72.25XD CLOSED NONDISPLACED SUBTROCHANTERIC FRACTURE OF LEFT FEMUR WITH ROUTINE HEALING, SUBSEQUENT ENCOUNTER: Primary | ICD-10-CM

## 2020-01-29 PROCEDURE — 97112 NEUROMUSCULAR REEDUCATION: CPT

## 2020-01-29 PROCEDURE — 97110 THERAPEUTIC EXERCISES: CPT

## 2020-01-29 NOTE — PROGRESS NOTES
Daily Note     Today's date: 2020  Patient name: Mary Del Toro  : 1930  MRN: [id number]  Referring provider: Elias Lopez MD  Dx:   Encounter Diagnosis     ICD-10-CM    1  Closed nondisplaced subtrochanteric fracture of left femur with routine healing, subsequent encounter S72 25XD                   Subjective: Patient reports some L  Hip soreness after last session which has since improved      Objective: See treatment diary below      Assessment: Tolerated current regimen well Allowed for rest between TE as patient is fatigued and also to limit WB   Only ex that produced some soreness was step ups but it was minor in nature  Less soreness noted in L hip after session  Good tolerance to TE  Shows good endurance despite age  Plan: Continue per plan of care  Precautions ORTHOSTATIC HYPOTENSION, hip fx recently, decreased balance, h/o compression fracture in spine       Specialty Daily Treatment Diary       Manual                             Exercise Diary                 Nu step        Supine hip abd 10x       Heel slides 10x       QS 10x:03       Glut sets 10x:03       bridges        SAQ          Seated HR/TR 2x10 Seated 2x10 Seated 2x10    LAQ  2# 2x10 2# 3x10 2# 3x10      Ball squeeze 2x10 Ball squeeze 2x10 Ball squeeze 2x10    SLR x 3 // bars  2# 2x10 2# 2x10 2# 3x10    Ham curls // bars  2# x10 2# 2x10 2# 3x10    sidestepping   2# x5 at bars 2# x 5 at bars    Heel toe   X 5 at bars x5    Squats   15x to chair- 2 foam pads 2x10 with 2 foam pads 2x10 with 2 foam pads    Step ups FW  4" 10x ea 4 in 2x10 4in 2x10    Lateral step up   4 in x10 4 in 2x10                            Modalities  used

## 2020-02-03 ENCOUNTER — OFFICE VISIT (OUTPATIENT)
Dept: PHYSICAL THERAPY | Age: 85
End: 2020-02-03
Payer: MEDICARE

## 2020-02-03 DIAGNOSIS — S72.25XD CLOSED NONDISPLACED SUBTROCHANTERIC FRACTURE OF LEFT FEMUR WITH ROUTINE HEALING, SUBSEQUENT ENCOUNTER: Primary | ICD-10-CM

## 2020-02-03 PROCEDURE — 97110 THERAPEUTIC EXERCISES: CPT

## 2020-02-03 PROCEDURE — 97140 MANUAL THERAPY 1/> REGIONS: CPT

## 2020-02-03 NOTE — PROGRESS NOTES
Daily Note     Today's date: 2/3/2020  Patient name: Kate Palacios  : 1930  MRN: [id number]  Referring provider: Aria Orozco MD  Dx:   Encounter Diagnosis     ICD-10-CM    1  Closed nondisplaced subtrochanteric fracture of left femur with routine healing, subsequent encounter S72 25XD                   Subjective: Patient reports L hip pain has been hurting her  Feels it is affecting her sleep at night   Objective: See treatment diary below      Assessment: Tolerated current regimen well Allowed for rest between TE as patient is fatigued and also to limit WB   Added in hip PROM and knee flexion str on L to help alleviate pt's pain symptoms from immobility   Less soreness noted in L hip after session  Good tolerance to TE  Shows good endurance despite age  Plan: Continue per plan of care  Monitor if nighttime pain is better with PROM addition     Precautions ORTHOSTATIC HYPOTENSION, hip fx recently, decreased balance, h/o compression fracture in spine       Specialty Daily Treatment Diary       Manual 1/21 1/24 1/26 1/29 2/3   PROM L hip  x20 reps       S/l quad str [id number] on L               Exercise Diary                 Nu step        Supine hip abd 10x       Heel slides 10x       QS 10x:03       Glut sets 10x:03       bridges        SAQ          Seated HR/TR 2x10 Seated 2x10 Seated 2x10 Seated 23 3x10   LAQ  2# 2x10 2# 3x10 2# 3x10 2# 3x10     Ball squeeze 2x10 Ball squeeze 2x10 Ball squeeze 2x10    SLR x 3 // bars  2# 2x10 2# 2x10 2# 3x10 2# 3x10   Ham curls // bars  2# x10 2# 2x10 2# 3x10 2# 3x10   sidestepping   2# x5 at bars 2# x 5 at bars    Heel toe   X 5 at bars x5 x5   Squats   15x to chair- 2 foam pads 2x10 with 2 foam pads 2x10 with 2 foam pads 2x10 with foam pads   Step ups FW  4" 10x ea 4 in 2x10 4in 2x10 4 in 2x10   Lateral step up   4 in x10 4 in 2x10 4 in 2x10                           Modalities

## 2020-02-05 ENCOUNTER — OFFICE VISIT (OUTPATIENT)
Dept: PHYSICAL THERAPY | Age: 85
End: 2020-02-05
Payer: MEDICARE

## 2020-02-05 DIAGNOSIS — S72.25XD CLOSED NONDISPLACED SUBTROCHANTERIC FRACTURE OF LEFT FEMUR WITH ROUTINE HEALING, SUBSEQUENT ENCOUNTER: Primary | ICD-10-CM

## 2020-02-05 PROCEDURE — 97110 THERAPEUTIC EXERCISES: CPT

## 2020-02-05 PROCEDURE — 97140 MANUAL THERAPY 1/> REGIONS: CPT

## 2020-02-05 NOTE — PROGRESS NOTES
Daily Note     Today's date: 2020  Patient name: Grace Harper  : 1930  MRN: [id number]  Referring provider: Robert Pruett MD  Dx:   Encounter Diagnosis     ICD-10-CM    1  Closed nondisplaced subtrochanteric fracture of left femur with routine healing, subsequent encounter S72 25XD                   Subjective: Patient still reports L hip pain has been hurting her  Feels it is affecting her sleep at night and also with WB  Pt felt manual tx did help slightly after last visit       Objective: See treatment diary below      Assessment: Tolerated current regimen well Allowed for rest between TE as patient is fatigued and also to limit WB   Using hip PROM and knee flexion str on L to help alleviate pt's pain symptoms from immobility   Less soreness noted in L hip after session  Good tolerance to TE  Shows good endurance despite age  Plan: Continue per plan of care  Monitor if nighttime pain/ WB pain is better with PROM addition     Precautions ORTHOSTATIC HYPOTENSION, hip fx recently, decreased balance, h/o compression fracture in spine       Specialty Daily Treatment Diary       Manual 3   PROM L hip  x20 reps    x20 reps   S/l quad str [id number] on L    10sec x10           Exercise Diary                 Nu step        Supine hip abd        Heel slides        QS        Glut sets        bridges        SAQ         Stand HR, TR seated Seated HR/TR 2x10 Seated 2x10 Seated 2x10 Seated 2# 3x10   LAQ 3# 3x10 2# 2x10 2# 3x10 2# 3x10 2# 3x10     Ball squeeze 2x10 Ball squeeze 2x10 Ball squeeze 2x10    SLR x 3 // bars 2 5# 2# 2x10 2# 2x10 2# 3x10 2# 3x10   Ham curls // bars 2 5# 2# x10 2# 2x10 2# 3x10 2# 3x10   sidestepping 2# x5  2# x5 at bars 2# x 5 at bars 2# x5 at bars   Heel toe x5  X 5 at bars x5 x5   Squats  2x10 with foam pads 15x to chair- 2 foam pads 2x10 with 2 foam pads 2x10 with 2 foam pads 2x10 with foam pads   Step ups FW 6in 2x10 4" 10x ea 4 in 2x10 4in 2x10 4 in 2x10 Lateral step up 6in 2x10  4 in x10 4 in 2x10 4 in 2x10                           Modalities

## 2020-02-10 ENCOUNTER — OFFICE VISIT (OUTPATIENT)
Dept: PHYSICAL THERAPY | Age: 85
End: 2020-02-10
Payer: MEDICARE

## 2020-02-10 DIAGNOSIS — S72.25XD CLOSED NONDISPLACED SUBTROCHANTERIC FRACTURE OF LEFT FEMUR WITH ROUTINE HEALING, SUBSEQUENT ENCOUNTER: Primary | ICD-10-CM

## 2020-02-10 PROCEDURE — 97140 MANUAL THERAPY 1/> REGIONS: CPT

## 2020-02-10 PROCEDURE — 97110 THERAPEUTIC EXERCISES: CPT

## 2020-02-10 NOTE — PROGRESS NOTES
Daily Note     Today's date: 2/10/2020  Patient name: Tk Love  : 1930  MRN: [id number]  Referring provider: Ricki Benavidez MD  Dx:   Encounter Diagnosis     ICD-10-CM    1  Closed nondisplaced subtrochanteric fracture of left femur with routine healing, subsequent encounter S72 25XD                   Subjective: Patient still reports L hip pain has been hurting her  Feels it is affecting her sleep at night and also with WB  Main c/o is pain at L lateral hip    Objective: See treatment diary below      Assessment: Tolerated current regimen well and showing functional improvement with increased squat and step up reps  Allowed for rest between TE as patient is fatigued and also to limit WB   Using hip PROM and knee flexion str on L to help alleviate pt's pain symptoms from immobility   Less soreness noted in L hip after session  Good tolerance to TE  Shows good endurance despite age  Plan: Continue per plan of care  Monitor if nighttime pain/ WB pain is better with PROM addition     Precautions ORTHOSTATIC HYPOTENSION, hip fx recently, decreased balance, h/o compression fracture in spine       Specialty Daily Treatment Diary       Manual 2/5 2/10 1/26 1/29 2/3   PROM L hip  x20 reps x20 reps   x20 reps   S/l quad str [id number] on L [id number] on L   10sec x10           Exercise Diary                 Nu step        Supine hip abd        Heel slides        QS        Glut sets        bridges        SAQ         Stand HR, TR seated Seated HR/TR 2x10 Seated 2x10 Seated 2x10 Seated 2# 3x10   LAQ 3# 3x10 3# 3x10 2# 3x10 2# 3x10 2# 3x10     Ball squeeze 2x10 Ball squeeze 2x10 Ball squeeze 2x10    SLR x 3 // bars 2 5# 2 5# 3x10 2# 2x10 2# 3x10 2# 3x10   Ham curls // bars 2 5# 2 5# 3x10 2# 2x10 2# 3x10 2# 3x10   sidestepping 2# x5 2 5# x5 2# x5 at bars 2# x 5 at bars 2# x5 at bars   Heel toe x5 x5 X 5 at bars x5 x5   Squats  2x10 with foam pads 15x2 to chair- 2 foam pads 2x10 with 2 foam pads 2x10 with 2 foam pads 2x10 with foam pads   Step ups FW 6in 2x10 6in 2x`10 4 in 2x10 4in 2x10 4 in 2x10   Lateral step up 6in 2x10 6in 2x10 4 in x10 4 in 2x10 4 in 2x10                           Modalities

## 2020-02-12 ENCOUNTER — OFFICE VISIT (OUTPATIENT)
Dept: PHYSICAL THERAPY | Age: 85
End: 2020-02-12
Payer: MEDICARE

## 2020-02-12 DIAGNOSIS — S72.25XD CLOSED NONDISPLACED SUBTROCHANTERIC FRACTURE OF LEFT FEMUR WITH ROUTINE HEALING, SUBSEQUENT ENCOUNTER: Primary | ICD-10-CM

## 2020-02-12 PROCEDURE — 97110 THERAPEUTIC EXERCISES: CPT

## 2020-02-12 PROCEDURE — 97140 MANUAL THERAPY 1/> REGIONS: CPT

## 2020-02-12 NOTE — PROGRESS NOTES
Daily Note     Today's date: 2020  Patient name: Kristen Coates  : 1930  MRN: [id number]  Referring provider: Sera Potter MD  Dx:   Encounter Diagnosis     ICD-10-CM    1  Closed nondisplaced subtrochanteric fracture of left femur with routine healing, subsequent encounter S72 25XD                   Subjective: Patient still reports L hip pain has been hurting her  Feels it is affecting her sleep at night and also with WB  Main c/o is pain at L groin area today    Objective: See treatment diary below      Assessment: Tolerated current regimen well and showing functional improvement with increased squat and step up reps  Allowed for rest between TE as patient is fatigued and also to limit WB   Using hip PROM and knee flexion str on L to help alleviate pt's pain symptoms from immobility   Less soreness noted in L hip after session  Good tolerance to TE  Shows good endurance despite age  Plan: Continue per plan of care  Monitor if nighttime pain/ WB pain is better with PROM addition     Precautions ORTHOSTATIC HYPOTENSION, hip fx recently, decreased balance, h/o compression fracture in spine       Specialty Daily Treatment Diary       Manual 2/5 2/10 2/12 1/29 2/3   PROM L hip  x20 reps x20 reps   x20 reps   S/l quad str [id number] on L [id number] on L   10sec x10           Exercise Diary                 Nu step        Supine hip abd        Heel slides        QS        Glut sets        bridges        SAQ         Stand HR, TR seated Seated HR/TR 2x10 Seated 2x10 Seated 2x10 Seated 2# 3x10   LAQ 3# 3x10 3# 3x10 3# 3x10 2# 3x10 2# 3x10     Ball squeeze 2x10  Ball squeeze 2x10    SLR x 3 // bars 2 5# 2 5# 3x10 2 5# 3x10 2# 3x10 2# 3x10   Ham curls // bars 2 5# 2 5# 3x10 2 5# 3x10 2# 3x10 2# 3x10   sidestepping 2# x5 2 5# x5 2# x5 at bars 2# x 5 at bars 2# x5 at bars   Heel toe x5 x5 X 5 at bars x5 x5   Squats  2x10 with foam pads 15x2 to chair- 2 foam pads 2x10 with 2 foam pads 2x10 with 2 foam pads 2x10 with foam pads   Step ups FW 6in 2x10 6in 2x`10 6 in 2x10 4in 2x10 4 in 2x10   Lateral step up 6in 2x10 6in 2x10 6in x10 4 in 2x10 4 in 2x10                           Modalities

## 2020-02-17 ENCOUNTER — OFFICE VISIT (OUTPATIENT)
Dept: PHYSICAL THERAPY | Age: 85
End: 2020-02-17
Payer: MEDICARE

## 2020-02-17 DIAGNOSIS — S72.25XD CLOSED NONDISPLACED SUBTROCHANTERIC FRACTURE OF LEFT FEMUR WITH ROUTINE HEALING, SUBSEQUENT ENCOUNTER: Primary | ICD-10-CM

## 2020-02-17 PROCEDURE — 97112 NEUROMUSCULAR REEDUCATION: CPT | Performed by: PHYSICAL THERAPIST

## 2020-02-17 PROCEDURE — 97110 THERAPEUTIC EXERCISES: CPT | Performed by: PHYSICAL THERAPIST

## 2020-02-17 NOTE — PROGRESS NOTES
Daily Note     Today's date: 2020  Patient name: Mario Rowland  : 1930  MRN: [id number]  Referring provider: Zofia Orlando MD  Dx:   Encounter Diagnosis     ICD-10-CM    1  Closed nondisplaced subtrochanteric fracture of left femur with routine healing, subsequent encounter S72 25XD                   Subjective: Patient reports soreness at L hip with sleeping at night, alleviates once in sitting and gets up for the day  Objective: See treatment diary below      Assessment: Moderately fatigued with program today, requiring rest throughout session, able to complete program without increase in pain   asking about patient using SPC in the future  Too fatigued at end of session to practice today  Patient continues to lack hip flexion strength during swing phase, decreased balance during SLS  Progress as able  Plan: Continue per plan of care  Precautions ORTHOSTATIC HYPOTENSION, hip fx recently, decreased balance, h/o compression fracture in spine       Specialty Daily Treatment Diary       Manual 2/5 2/10 2/12 2/17 2/3   PROM L hip  x20 reps x20 reps   x20 reps   S/l quad str [id number] on L [id number] on L   10sec x10           Exercise Diary                 Nu step        Supine hip abd        Heel slides        QS        Glut sets        bridges        SAQ         Stand HR, TR seated Seated HR/TR 2x10 Seated 2x10 Seated 2x10 Seated 2# 3x10   LAQ 3# 3x10 3# 3x10 3# 3x10 3# 3x10 2# 3x10     Ball squeeze 2x10  Ball squeeze 2x10    SLR x 3 // bars 2 5# 2 5# 3x10 2 5# 3x10 2 5# 3x10 2# 3x10   Ham curls // bars 2 5# 2 5# 3x10 2 5# 3x10 2 5# 3x10 2# 3x10   sidestepping 2# x5 2 5# x5 2# x5 at bars 2 5# x 5 at bars 2# x5 at bars   Heel toe x5 x5 X 5 at bars X5 x5   Squats  2x10 with foam pads 15x2 to chair- 2 foam pads 2x10 with 2 foam pads 2x10 with 2 foam pads 2x10 with foam pads   Step ups FW 6in 2x10 6in 2x`10 6 in 2x10 6in 2x10 4 in 2x10   Lateral step up 6in 2x10 6in 2x10 6in x10 6 in x10 4 in 2x10           Gait training [address]- in future                Modalities

## 2020-02-19 ENCOUNTER — EVALUATION (OUTPATIENT)
Dept: PHYSICAL THERAPY | Age: 85
End: 2020-02-19
Payer: MEDICARE

## 2020-02-19 DIAGNOSIS — S72.25XD CLOSED NONDISPLACED SUBTROCHANTERIC FRACTURE OF LEFT FEMUR WITH ROUTINE HEALING, SUBSEQUENT ENCOUNTER: Primary | ICD-10-CM

## 2020-02-19 PROCEDURE — 97110 THERAPEUTIC EXERCISES: CPT | Performed by: PHYSICAL THERAPIST

## 2020-02-19 NOTE — PROGRESS NOTES
Daily Note / Re-assessment    Today's date: 2020  Patient name: Grace Harper  : 1930  MRN: [id number]  Referring provider: Robert Pruett MD  Dx:   Encounter Diagnosis     ICD-10-CM    1  Closed nondisplaced subtrochanteric fracture of left femur with routine healing, subsequent encounter S72 25XD                   Subjective: Patient's hip pain persists, muscle soreness  L ankle/foot swollen today, wears compression socks  Objective: See treatment diary below    ROM  Hip flexion 0-90* AROM  WNL grossly throughout    MMT  Hip flexion 4-/5 L  4+/5 R  Hip abduction 4-/5 L 4+/5 R  Hip adduction 4/5 B    Knee flexion 4-/5 L 4+/5 R  Knee extension 4/5 B    Ankle DF/PF 4/5 B - each    Goals  Impairment Goals to be met within 4 weeks  - Decrease pain to 0/10 - progressing  - Improve ROM to 105 degrees hip flexion progressing  - Increase strength to 4/5 throughout progressing  - Patient to be able to tolerate SLS x 10 sec firm surface progressing    Functional Goals to be met within 4-6 weeks  - Return to Prior Level of Function progressing  - Increase Functional Status Measure to: expected progressing  - Patient will be independent with HEP progressing  - Patient to be able to progress to [facility] progressing  - Patient to be able to ascend/descend stairs reciprocally  progressing      Assessment: Patient seen for re-assessment  Patient is making good progress towards goals, focusing on stretching, strengthening, gait training and balance within patient's tolerance  Trialed [facility] training today, good use of SPC; MIN A for balance and safety  Patient continues to present with decreased L hip flexion strength, decreased stride length on L compared to R  Plan: Recommending patient to continue with PT for strengthening, gait training, for balance and to wean from RW if able  Precautions ORTHOSTATIC HYPOTENSION, hip fx recently, decreased balance, h/o compression fracture in spine       Specialty Daily Treatment Diary       Manual 2/5 2/10 2/12 2/17 2/19   PROM L hip  x20 reps x20 reps      S/l quad str [id number] on L [id number] on L              Exercise Diary                 Nu step        Supine hip abd        Heel slides        QS        Glut sets        bridges        SAQ         Stand HR, TR seated Seated HR/TR 2x10 Seated 2x10 Seated 2x10 Seated 2# 3x10   LAQ 3# 3x10 3# 3x10 3# 3x10 3# 3x10 2# 3x10     Ball squeeze 2x10  Ball squeeze 2x10    SLR x 3 // bars 2 5# 2 5# 3x10 2 5# 3x10 2 5# 3x10 2 5# 3x10   Ham curls // bars 2 5# 2 5# 3x10 2 5# 3x10 2 5# 3x10 2 5# 3x10   sidestepping 2# x5 2 5# x5 2# x5 at bars 2 5# x 5 at bars 2 5# x5 at bars   Heel toe x5 x5 X 5 at bars X5 x5   Squats  2x10 with foam pads 15x2 to chair- 2 foam pads 2x10 with 2 foam pads 2x10 with 2 foam pads 2x10 with foam pads   Step ups FW 6in 2x10 6in 2x`10 6 in 2x10 6in 2x10 6 in 2x10   Lateral step up 6in 2x10 6in 2x10 6in x10 6 in x10 6 in 2x10           Gait training SPC- in future                Modalities

## 2020-02-24 ENCOUNTER — OFFICE VISIT (OUTPATIENT)
Dept: PHYSICAL THERAPY | Age: 85
End: 2020-02-24
Payer: MEDICARE

## 2020-02-24 DIAGNOSIS — S72.25XD CLOSED NONDISPLACED SUBTROCHANTERIC FRACTURE OF LEFT FEMUR WITH ROUTINE HEALING, SUBSEQUENT ENCOUNTER: Primary | ICD-10-CM

## 2020-02-24 PROCEDURE — 97112 NEUROMUSCULAR REEDUCATION: CPT

## 2020-02-24 PROCEDURE — 97110 THERAPEUTIC EXERCISES: CPT

## 2020-02-24 NOTE — PROGRESS NOTES
Daily Note     Today's date: 2020  Patient name: Grace Harper  : 1930  MRN: [id number]  Referring provider: Robert Pruett MD  Dx: No diagnosis found  Subjective: Reports MD appt coming up to look at her hip  Still reports medial pain at L hip at night especially  Pt feels pain is better than it was with WB      Objective: See treatment diary below      Assessment: Tolerated treatment well  Needs cue for step length and proper placement of cane  Pt 's balance is not sufficient for full time ambulation in decreased safety environment at this point in time  Plan: Cont PT per LPT plan     Precautions ORTHOSTATIC HYPOTENSION, hip fx recently, decreased balance, h/o compression fracture in spine       Specialty Daily Treatment Diary       Manual 2/24 2/10 2/12 2/17 2/19   PROM L hip   x20 reps      S/l quad str  [id number] on L              Exercise Diary                 Nu step 10m       Supine hip abd        Heel slides        QS        Glut sets        bridges        SAQ        Stand heel raises 3x10 Seated HR/TR 2x10 Seated 2x10 Seated 2x10 Seated 2# 3x10   LAQ 3# 3x10 3# 3x10 3# 3x10 3# 3x10 2# 3x10     Ball squeeze 2x10  Ball squeeze 2x10    SLR x 3 // bars 2 5# x3x10 2 5# 3x10 2 5# 3x10 2 5# 3x10 2 5# 3x10   Ham curls // bars 2 5#x3x10 2 5# 3x10 2 5# 3x10 2 5# 3x10 2 5# 3x10   sidestepping 2 5# x x5 2 5# x5 2# x5 at bars 2 5# x 5 at bars 2 5# x5 at bars   Heel toe x5 x5 X 5 at bars X5 x5   Squats  2x15 with foam pads 15x2 to chair- 2 foam pads 2x10 with 2 foam pads 2x10 with 2 foam pads 2x10 with foam pads   Step ups FW 6in 2x10 6in 2x`10 6 in 2x10 6in 2x10 6 in 2x10   Lateral step up 6in 2x10 6in 2x10 6in x10 6 in x10 6 in 2x10           Gait training [facility]- in future 100ft x2 with cg/sup of 1               Modalities

## 2020-02-26 ENCOUNTER — OFFICE VISIT (OUTPATIENT)
Dept: PHYSICAL THERAPY | Age: 85
End: 2020-02-26
Payer: MEDICARE

## 2020-02-26 DIAGNOSIS — S72.25XD CLOSED NONDISPLACED SUBTROCHANTERIC FRACTURE OF LEFT FEMUR WITH ROUTINE HEALING, SUBSEQUENT ENCOUNTER: Primary | ICD-10-CM

## 2020-02-26 PROCEDURE — 97110 THERAPEUTIC EXERCISES: CPT

## 2020-02-26 PROCEDURE — 97112 NEUROMUSCULAR REEDUCATION: CPT

## 2020-02-26 NOTE — PROGRESS NOTES
Daily Note     Today's date: 2020  Patient name: Judi Hernandez  : 1930  MRN: [id number]  Referring provider: Mirian Blanchard MD  Dx:   Encounter Diagnosis     ICD-10-CM    1  Closed nondisplaced subtrochanteric fracture of left femur with routine healing, subsequent encounter S72 25XD                   Subjective: Reports MD appt coming up to look at her hip  Still reports medial pain at L hip at night especially  Reports more general joint soreness this AM      Objective: See treatment diary below      Assessment: Tolerated treatment well  Needs cue for step length and proper placement of cane  Pt 's balance is not sufficient for full time ambulation in decreased safety environment at this point in time  Noted more fatigue and need for rest periods with increased weights  Pt denied increased L hip pain with ex      Plan: Cont PT per LPT plan     Precautions ORTHOSTATIC HYPOTENSION, hip fx recently, decreased balance, h/o compression fracture in spine       Specialty Daily Treatment Diary       Manual    PROM L hip         S/l quad str                Exercise Diary                 Nu step 10m 10m      Supine hip abd        Heel slides        QS        Glut sets        bridges        SAQ        Stand heel raises 3x10 3x10 Seated 2x10 Seated 2x10 Seated 2# 3x10   LAQ 3# 3x10 3# 3x10 3# 3x10 3# 3x10 2# 3x10       Ball squeeze 2x10    SLR x 3 // bars 2 5# x3x10 3# 3x10 2 5# 3x10 2 5# 3x10 2 5# 3x10   Ham curls // bars 2 5#x3x10 3# 3x10 2 5# 3x10 2 5# 3x10 2 5# 3x10   sidestepping 2 5# x x5 3# x5 2# x5 at bars 2 5# x 5 at bars 2 5# x5 at bars   Heel toe x5 x5 X 5 at bars X5 x5   Squats  2x15 with foam pads 15x2 to chair- 2 foam pads 2x10 with 2 foam pads 2x10 with 2 foam pads 2x10 with foam pads   Step ups FW 6in 2x10 6in 2x`10 6 in 2x10 6in 2x10 6 in 2x10   Lateral step up 6in 2x10 6in 2x10 6in x10 6 in x10 6 in 2x10           Gait training [facility]- in future 100ft x2 with cg/sup of 1 100ftx 2 with cg/sup of 1              Modalities

## 2020-03-02 ENCOUNTER — OFFICE VISIT (OUTPATIENT)
Dept: PHYSICAL THERAPY | Age: 85
End: 2020-03-02
Payer: MEDICARE

## 2020-03-02 DIAGNOSIS — S72.25XD CLOSED NONDISPLACED SUBTROCHANTERIC FRACTURE OF LEFT FEMUR WITH ROUTINE HEALING, SUBSEQUENT ENCOUNTER: Primary | ICD-10-CM

## 2020-03-02 PROCEDURE — 97110 THERAPEUTIC EXERCISES: CPT

## 2020-03-02 PROCEDURE — 97112 NEUROMUSCULAR REEDUCATION: CPT

## 2020-03-02 NOTE — PROGRESS NOTES
Daily Note     Today's date: 3/2/2020  Patient name: Eileen Byers  : 1930  MRN: [id number]  Referring provider: Gus Upton MD  Dx:   Encounter Diagnosis     ICD-10-CM    1  Closed nondisplaced subtrochanteric fracture of left femur with routine healing, subsequent encounter S72 25XD                   Subjective: Reports MD appt coming up to look at her hip  Still reports medial pain at L hip at night especially  " I don't sleep well at night"  " I have to see the endocrinologist because my osteoporosis is bad"      Objective: See treatment diary below      Assessment: Tolerated treatment well  Needs cue for step length and proper placement of cane  Pt 's balance is not sufficient for full time ambulation in decreased safety environment at this point in time  Noting more fatigue and need for rest periods with increased weights  Pt denied increased L hip pain with ex      Plan: Cont PT per LPT plan     Precautions ORTHOSTATIC HYPOTENSION, hip fx recently, decreased balance, h/o compression fracture in spine       Specialty Daily Treatment Diary       Manual 2/24 2/26 3/2 2/17 2/19   PROM L hip         S/l quad str                Exercise Diary                 Nu step 10m 10m      Supine hip abd        Heel slides        QS        Glut sets        bridges        SAQ        Stand heel raises 3x10 3x10 3x10 Seated 2x10 Seated 2# 3x10   LAQ 3# 3x10 3# 3x10 3# 3x10 3# 3x10 2# 3x10       Ball squeeze 2x10    SLR x 3 // bars 2 5# x3x10 3# 3x10 3# 3x10 2 5# 3x10 2 5# 3x10   Ham curls // bars 2 5#x3x10 3# 3x10 3# 3x10 2 5# 3x10 2 5# 3x10   sidestepping 2 5# x x5 3# x5 3# x5 at bars 2 5# x 5 at bars 2 5# x5 at bars   Heel toe x5 x5 X 5 at bars X5 x5   Squats  2x15 with foam pads 15x2 to chair- 2 foam pads 2x10 with 2 foam pads 2x10 with 2 foam pads 2x10 with foam pads   Step ups FW 6in 2x10 6in 2x`10 6 in 2x10 6in 2x10 6 in 2x10   Lateral step up 6in 2x10 6in 2x10 6in x10 6 in x10 6 in 2x10           Gait training [facility]- in future 100ft x2 with cg/sup of 1 100ftx 2 with cg/sup of 1 100ft x 2 with cg/sup of1             Modalities

## 2020-03-04 ENCOUNTER — OFFICE VISIT (OUTPATIENT)
Dept: PHYSICAL THERAPY | Age: 85
End: 2020-03-04
Payer: MEDICARE

## 2020-03-04 DIAGNOSIS — S72.25XD CLOSED NONDISPLACED SUBTROCHANTERIC FRACTURE OF LEFT FEMUR WITH ROUTINE HEALING, SUBSEQUENT ENCOUNTER: Primary | ICD-10-CM

## 2020-03-04 PROCEDURE — 97112 NEUROMUSCULAR REEDUCATION: CPT

## 2020-03-04 PROCEDURE — 97110 THERAPEUTIC EXERCISES: CPT

## 2020-03-04 NOTE — PROGRESS NOTES
Daily Note     Today's date: 3/4/2020  Patient name: Suzanne Patterson  : 1930  MRN: [id number]  Referring provider: Marian Robert MD  Dx:   Encounter Diagnosis     ICD-10-CM    1  Closed nondisplaced subtrochanteric fracture of left femur with routine healing, subsequent encounter S72 25XD                   Subjective: Reports MD appt coming up to look at her hip  Still reports medial pain at L hip at night especially  Pt going to eye doctor after therapy today      Objective: See treatment diary below      Assessment: Tolerated treatment well  Needs cue for step length and proper placement of cane  Pt 's balance is not sufficient for full time ambulation in decreased safety environment at this point in time  Noting more fatigue and need for rest periods with increased weights  Pt did have some L hip soreness after ex and may decrease weight if pt continue to struggle  Plan: Cont PT per LPT plan     Precautions ORTHOSTATIC HYPOTENSION, hip fx recently, decreased balance, h/o compression fracture in spine       Specialty Daily Treatment Diary       Manual 2/24 2/26 3/2 3/4 2/19   PROM L hip         S/l quad str                Exercise Diary                 Nu step 10m 10m      Supine hip abd        Heel slides        QS        Glut sets        bridges        SAQ        Stand heel raises 3x10 3x10 3x10 3x10 Seated 2# 3x10   LAQ 3# 3x10 3# 3x10 3# 3x10 3# 3x10 2# 3x10       Ball squeeze 2x10    SLR x 3 // bars 2 5# x3x10 3# 2x10 2 5# 3x10 3# 2x10 2 5# 3x10   Ham curls // bars 2 5#x3x10 3# 3x10 2 5# 3x10 3# 2x10 2 5# 3x10   sidestepping 2 5# x x5 3# x5 2 5# x5 at bars 3# x 5 at bars 2 5# x5 at bars   Heel toe x5 x5 X 5 at bars X5 x5   Squats  2x15 with foam pads 15x2 to chair- 2 foam pads 2x10 with 2 foam pads 2x10 with 2 foam pads 2x10 with foam pads   Step ups FW 6in 2x10 6in 2x`10 6 in 2x10 6in 2x10 6 in 2x10   Lateral step up 6in 2x10 6in 2x10 6in x10 6 in x10 6 in 2x10           Gait training [facility]- in future 100ft x2 with cg/sup of 1 100ftx 2 with cg/sup of 1 100ft x 2 with cg/sup of1 100ft with cg/sup of 1            Modalities

## 2020-03-09 ENCOUNTER — OFFICE VISIT (OUTPATIENT)
Dept: PHYSICAL THERAPY | Age: 85
End: 2020-03-09
Payer: MEDICARE

## 2020-03-09 DIAGNOSIS — S72.25XD CLOSED NONDISPLACED SUBTROCHANTERIC FRACTURE OF LEFT FEMUR WITH ROUTINE HEALING, SUBSEQUENT ENCOUNTER: Primary | ICD-10-CM

## 2020-03-09 PROCEDURE — 97112 NEUROMUSCULAR REEDUCATION: CPT

## 2020-03-09 PROCEDURE — 97110 THERAPEUTIC EXERCISES: CPT

## 2020-03-09 NOTE — PROGRESS NOTES
Daily Note     Today's date: 3/9/2020  Patient name: Armaan Dooley  : 1930  MRN: [id number]  Referring provider: Arther Mcardle, MD  Dx:   Encounter Diagnosis     ICD-10-CM    1  Closed nondisplaced subtrochanteric fracture of left femur with routine healing, subsequent encounter S72 25XD                   Subjective: Reports MD appt coming up to look at her hip  Still reports medial pain at L hip at night especially  Pt reports pain in hip is intermittent and more with prolonged WB      Objective: See treatment diary below      Assessment: Tolerated treatment well  Needs cue for step length and proper placement of cane  Pt 's balance is not sufficient for full time ambulation in decreased safety environment at this point in time  Pt is going to practice with walker outside to increase her stamina with nice weather today      Plan: Cont PT per LPT plan     Precautions ORTHOSTATIC HYPOTENSION, hip fx recently, decreased balance, h/o compression fracture in spine       Specialty Daily Treatment Diary       Manual 2/24 2/26 3/2 3/4 3/9   PROM L hip         S/l quad str                Exercise Diary                 Nu step 10m 10m      Supine hip abd        Heel slides        QS        Glut sets        bridges        SAQ        Stand heel raises 3x10 3x10 3x10 3x10 Seated 2# 3x10   LAQ 3# 3x10 3# 3x10 3# 3x10 3# 3x10 3# 3x10       Ball squeeze 2x10    SLR x 3 // bars 2 5# x3x10 3# 2x10 2 5# 3x10 3# 2x10 2 5# 3x10   Ham curls // bars 2 5#x3x10 3# 3x10 2 5# 3x10 3# 2x10 2 5# 3x10   sidestepping 2 5# x x5 3# x5 2 5# x5 at bars 3# x 5 at bars 2 5# x5 at bars   Heel toe x5 x5 X 5 at bars X5 x5   Squats  2x15 with foam pads 15x2 to chair- 2 foam pads 2x10 with 2 foam pads 2x10 with 2 foam pads 2x10 with foam pads   Step ups FW 6in 2x10 6in 2x`10 6 in 2x10 6in 2x10 6 in 2x10   Lateral step up 6in 2x10 6in 2x10 6in x10 6 in x10 6 in 2x10           Gait training [facility]- in future 100ft x2 with cg/sup of 1 100ftx 2 with cg/sup of 1 100ft x 2 with cg/sup of1 100ft with cg/sup of 1 100 ft with cg/sup of1           Modalities

## 2020-03-11 ENCOUNTER — OFFICE VISIT (OUTPATIENT)
Dept: PHYSICAL THERAPY | Age: 85
End: 2020-03-11
Payer: MEDICARE

## 2020-03-11 DIAGNOSIS — S72.25XD CLOSED NONDISPLACED SUBTROCHANTERIC FRACTURE OF LEFT FEMUR WITH ROUTINE HEALING, SUBSEQUENT ENCOUNTER: Primary | ICD-10-CM

## 2020-03-11 PROCEDURE — 97110 THERAPEUTIC EXERCISES: CPT

## 2020-03-11 PROCEDURE — 97112 NEUROMUSCULAR REEDUCATION: CPT

## 2020-03-11 NOTE — PROGRESS NOTES
Daily Note     Today's date: 3/11/2020  Patient name: Oscar Kirk  : 1930  MRN: [id number]  Referring provider: Justin Ta MD  Dx:   Encounter Diagnosis     ICD-10-CM    1  Closed nondisplaced subtrochanteric fracture of left femur with routine healing, subsequent encounter S72 25XD                   Subjective: Reports she had prolia shot at MD yesterday  Still reports medial pain at L hip at night especially  Pt reports pain in hip is intermittent and more with prolonged WB      Objective: See treatment diary below      Assessment: Tolerated treatment well  Needs cues for step length   Pt 's balance is not sufficient for full time ambulation in decreased safety environment at this point in time  Pt is going to practice with walker outside to increase her stamina when weather in nice   Denies increased pain with ex performed      Plan: Cont PT per LPT plan     Precautions ORTHOSTATIC HYPOTENSION, hip fx recently, decreased balance, h/o compression fracture in spine       Specialty Daily Treatment Diary       Manual 3/11 2/26 3/2 3/4 3/9   PROM L hip         S/l quad str                Exercise Diary                 Nu step 10m 10m 10m 10m 10m   Supine hip abd        Heel slides        QS        Glut sets        bridges        SAQ        Stand heel raises 3x10 3x10 3x10 3x10  2# 3x10   LAQ 3# 3x10 3# 3x10 3# 3x10 3# 3x10 3# 3x10       Ball squeeze 2x10    SLR x 3 // bars 2 5# x3x10 3# 2x10 2 5# 3x10 3# 2x10 2 5# 3x10   Ham curls // bars 2 5#x3x10 3# 3x10 2 5# 3x10 3# 2x10 2 5# 3x10   sidestepping 2 5# x x5 3# x5 2 5# x5 at bars 3# x 5 at bars 2 5# x5 at bars   Heel toe x5 x5 X 5 at bars X5 x5   Squats  2x15 with foam pads 15x2 to chair- 2 foam pads 2x10 with 2 foam pads 2x10 with 2 foam pads 2x10 with foam pads   Step ups FW 6in 2x10 6in 2x`10 6 in 2x10 6in 2x10 6 in 2x10   Lateral step up 6in 2x10 6in 2x10 6in x10 6 in x10 6 in 2x10           Gait training [facility]- in future 100ft x2 with cg/sup of 1 100ftx 2 with cg/sup of 1 100ft x 2 with cg/sup of1 100ft with cg/sup of 1 100 ft with cg/sup of1           Modalities

## 2020-03-16 ENCOUNTER — APPOINTMENT (OUTPATIENT)
Dept: PHYSICAL THERAPY | Age: 85
End: 2020-03-16
Payer: MEDICARE

## 2020-03-18 ENCOUNTER — APPOINTMENT (OUTPATIENT)
Dept: PHYSICAL THERAPY | Age: 85
End: 2020-03-18
Payer: MEDICARE

## 2020-03-30 ENCOUNTER — APPOINTMENT (OUTPATIENT)
Dept: PHYSICAL THERAPY | Age: 85
End: 2020-03-30
Payer: MEDICARE

## 2020-06-01 LAB — EXT SARS-COV-2: NOT DETECTED

## 2020-08-17 ENCOUNTER — EVALUATION (OUTPATIENT)
Dept: PHYSICAL THERAPY | Age: 85
End: 2020-08-17
Payer: MEDICARE

## 2020-08-17 DIAGNOSIS — S42.295D OTHER CLOSED NONDISPLACED FRACTURE OF PROXIMAL END OF LEFT HUMERUS WITH ROUTINE HEALING, SUBSEQUENT ENCOUNTER: Primary | ICD-10-CM

## 2020-08-17 DIAGNOSIS — M16.11 PRIMARY OSTEOARTHRITIS OF RIGHT HIP: ICD-10-CM

## 2020-08-17 PROCEDURE — 97110 THERAPEUTIC EXERCISES: CPT | Performed by: PHYSICAL THERAPIST

## 2020-08-17 PROCEDURE — 97163 PT EVAL HIGH COMPLEX 45 MIN: CPT | Performed by: PHYSICAL THERAPIST

## 2020-08-17 NOTE — PROGRESS NOTES
PT Evaluation     Today's date: 2020  Patient name: Abril Luque  : 1930  MRN: [id number]  Referring provider: Chichi Martinez MD  Dx:   Encounter Diagnosis     ICD-10-CM    1  Other closed nondisplaced fracture of proximal end of left humerus with routine healing, subsequent encounter  S42 295D    2  Primary osteoarthritis of right hip  M16 11                   Assessment  Assessment details: Patient seen for PT evaluation for L humerus fracture  Patient's ROM functional in L arm, lacks strength in UE and LE, decreased strength in L hip, significant balance deficits, recommending patient to continue to use the RW for all gait and balance  PT administered HEP for gentle stretching UEs and AROM exercises to improve strength  PT planning to focus on hip strengthening, gait and balance  Impairments: abnormal gait, abnormal or restricted ROM, activity intolerance, impaired balance, impaired physical strength, lacks appropriate home exercise program, pain with function, poor posture  and poor body mechanics  Functional limitations: Patient very fearful of falling, will not stand without her  present  Patient is able to ascend/descend the stairs with B handrails for support    Reports that she's able to get in/out of bed without help  Understanding of Dx/Px/POC: good   Prognosis: good    Goals  Impairment Goals to be met within 4 weeks  - Decrease pain to 0/10  - Improve ROM by 5-10 degrees L shoulder grossly throughout  - Increase strength to 4/5 throughout B UEs  - Increase strength to 4/5 B LEs     Functional Goals to be met within 4-6 weeks     - Return to Prior Level of Function  - Increase Functional Status Measure to: expected  - Patient will be independent with HEP  - Patient to be able to  Tolerate SLS x 3 sec ea LE      Plan  Patient would benefit from: skilled physical therapy  Planned modality interventions: cryotherapy and thermotherapy: hydrocollator packs  Planned therapy interventions: activity modification, manual therapy, neuromuscular re-education, postural training, patient education, strengthening, therapeutic activities, stretching, therapeutic exercise, home exercise program, gait training and balance  Frequency: 2x week  Duration in weeks: 6  Treatment plan discussed with: patient        Subjective Evaluation    History of Present Illness  Onset date: 2020  Mechanism of injury: Patient sustained a proximal humeral fracture s/p fall at home  Patient reports that she was standing up, getting up from a chair, some how lost her balance and ended up on the floor  Patient was taken to the hospital, found to have a proximal humeral fracture on the L  Patient was discharged home the following day, had home health PT/OT and was recently discharged  No surgery for shoulder, only PT  Pain  Current pain ratin  At best pain ratin  At worst pain ratin  Location: L shoulder     Quality: dull ache  Aggravating factors: standing, walking, lifting and overhead activity  Progression: improved    Social Support  Steps to enter house: yes  Stairs in house: yes   Lives in: multiple-level home  Lives with: spouse    Employment status: not working    Diagnostic Tests  X-ray: abnormal  Treatments  Previous treatment: physical therapy  Discharged from (in last 30 days): home health care  Patient Goals  Patient goals for therapy: decreased pain, improved balance, increased motion, increased strength and return to sport/leisure activities          Objective     Active Range of Motion   Left Shoulder   Flexion: 110 degrees   Abduction: 120 degrees   External rotation 45°: 20 degrees   Internal rotation 90°: 90 degrees     Right Shoulder   Normal active range of motion  Left Hip   Flexion: 90 degrees     Right Hip   Flexion: 95 degrees   Left Knee   Flexion: WFL  Extension: 0 degrees     Right Knee   Flexion: WFL  Extension: 0 degrees   Left Ankle/Foot   Normal active range of motion    Right Ankle/Foot   Normal active range of motion    Passive Range of Motion   Left Shoulder   Flexion: 130 degrees with pain  Abduction: 125 (scaption plane) degrees   External rotation 45°: 45 degrees     Strength/Myotome Testing     Left Shoulder     Planes of Motion   Flexion: 3+   Abduction: 3+   External rotation at 0°: 3   Internal rotation at 0°: 3+     Right Shoulder     Planes of Motion   Flexion: 4   Abduction: 4   External rotation at 0°: 4-   Internal rotation at 0°: 4     Left Hip   Planes of Motion   Flexion: 3+  Abduction: 3+  Adduction: 4-    Right Hip   Planes of Motion   Flexion: 4-  Abduction: 4  Adduction: 4    Left Ankle/Foot   Dorsiflexion: 4-  Plantar flexion: 4-    Right Ankle/Foot   Dorsiflexion: 4-  Plantar flexion: 4-    Ambulation     Observational Gait   Gait: antalgic   Decreased walking speed and stride length  Quality of Movement During Gait   Trunk  Forward lean  Flowsheet Rows      Most Recent Value   PT/OT G-Codes   Current Score  53   Projected Score  61             Precautions: FALL RISK     L proximal humeral fracture s/p fall (new)  L hip ORIF (old)  Supine with wedge    Manuals 8/17       PROM  For assessment only purposes, tight at end range and painful  May do better with Active and AA TE                                 Neuro Re-Ed                sidestepping        SLS                                        Ther Ex        Sup cane flexion/ CP        Table slides flexion, scaption        Seated ER stretch, wedge        SLR x 3        Ham curls        HR/TR                                                                        Ther Activity                        Gait Training        With RW PRN                Modalities

## 2020-08-19 ENCOUNTER — OFFICE VISIT (OUTPATIENT)
Dept: PHYSICAL THERAPY | Age: 85
End: 2020-08-19
Payer: MEDICARE

## 2020-08-19 DIAGNOSIS — S42.295D OTHER CLOSED NONDISPLACED FRACTURE OF PROXIMAL END OF LEFT HUMERUS WITH ROUTINE HEALING, SUBSEQUENT ENCOUNTER: Primary | ICD-10-CM

## 2020-08-19 DIAGNOSIS — M16.11 PRIMARY OSTEOARTHRITIS OF RIGHT HIP: ICD-10-CM

## 2020-08-19 PROCEDURE — 97110 THERAPEUTIC EXERCISES: CPT | Performed by: PHYSICAL THERAPIST

## 2020-08-19 NOTE — PROGRESS NOTES
Daily Note     Today's date: 2020  Patient name: Mary Treviño  : 1930  MRN: [id number]  Referring provider: Cherelle Batista MD  Dx:   Encounter Diagnosis     ICD-10-CM    1  Other closed nondisplaced fracture of proximal end of left humerus with routine healing, subsequent encounter  S42 295D    2  Primary osteoarthritis of right hip  M16 11                   Subjective: Patient feels so-so today  No increase in shoulder pain since IE  No issues with HEP  Objective: See treatment diary below      Assessment: Reviewed HEP today with patient and initiated full program today  Pain with shoulder TE, tight and painful at end range  Good tolerance to LE strengthening TE, started with 10 reps to build patient's tolerance to exercising and movement  Tight in to flexion at shoulder seen with pulleys and cane exercises  Feeling no pain after session  Plan: Continue per plan of care  Precautions: FALL RISK     L proximal humeral fracture s/p fall (new)  L hip ORIF (old)  Supine with wedge    Manuals       PROM  For assessment only purposes, tight at end range and painful  May do better with Active and AA TE                                 Neuro Re-Ed                sidestepping        SLS- future                                        Ther Ex                Nu step  x10' L1      Pulleys   2x10      Sup cane flexion/ CP  10x flexion      Table slides flexion, scaption  10x each      Seated ER stretch, wedge  5x: 5 secs              SLR x 3, standing, B  10x ea      LAQ  10x ea      Ball squeezes  10x ea      Ham curls  10x ea      HR/TR  10x ea                                                                      Ther Activity                        Gait Training        With RW PRN                Modalities

## 2020-08-21 ENCOUNTER — OFFICE VISIT (OUTPATIENT)
Dept: PHYSICAL THERAPY | Age: 85
End: 2020-08-21
Payer: MEDICARE

## 2020-08-21 DIAGNOSIS — S42.295D OTHER CLOSED NONDISPLACED FRACTURE OF PROXIMAL END OF LEFT HUMERUS WITH ROUTINE HEALING, SUBSEQUENT ENCOUNTER: Primary | ICD-10-CM

## 2020-08-21 PROCEDURE — 97110 THERAPEUTIC EXERCISES: CPT

## 2020-08-21 NOTE — PROGRESS NOTES
Daily Note     Today's date: 2020  Patient name: Josr Strickland  : 1930  MRN: [id number]  Referring provider: Anurag Silva MD  Dx:   Encounter Diagnosis     ICD-10-CM    1  Other closed nondisplaced fracture of proximal end of left humerus with routine healing, subsequent encounter  S42 167D                   Subjective: Patient feels so-so today  Felt ok after last tx and not fatigued  Patient continuing to do HEP with no complaints  Objective: See treatment diary below      Assessment: Good tolerance to TE today  Tight into flexion with L shld during pulleys and cane exercises  Tolerated increase in repetitions with no complaints  No complaints of new pain post tx  Plan: Continue per plan of care  Precautions: FALL RISK     L proximal humeral fracture s/p fall (new)  L hip ORIF (old)  Supine with wedge    Manuals      PROM  For assessment only purposes, tight at end range and painful  May do better with Active and AA TE                                 Neuro Re-Ed                sidestepping        SLS- future                                        Ther Ex                Nu step  x10' L1 x10' L1     Pulleys   2x10 2x10     Sup cane flexion/ CP  10x flexion 20x flexion  20x CP     Table slides flexion, scaption  10x each 20x each     Seated ER stretch, wedge  5x: 5 secs 20x: 5 secs             SLR x 3, standing, B  10x ea 20x ea     LAQ  10x ea 20x ea     Ball squeezes  10x ea 20x ea     Ham curls  10x ea 20x ea     HR/TR  10x ea 20x ea                                                                     Ther Activity                        Gait Training        With RW PRN                Modalities

## 2020-08-25 ENCOUNTER — OFFICE VISIT (OUTPATIENT)
Dept: PHYSICAL THERAPY | Age: 85
End: 2020-08-25
Payer: MEDICARE

## 2020-08-25 DIAGNOSIS — S42.295D OTHER CLOSED NONDISPLACED FRACTURE OF PROXIMAL END OF LEFT HUMERUS WITH ROUTINE HEALING, SUBSEQUENT ENCOUNTER: Primary | ICD-10-CM

## 2020-08-25 DIAGNOSIS — M16.11 PRIMARY OSTEOARTHRITIS OF RIGHT HIP: ICD-10-CM

## 2020-08-25 PROCEDURE — 97110 THERAPEUTIC EXERCISES: CPT

## 2020-08-25 NOTE — PROGRESS NOTES
Daily Note     Today's date: 2020  Patient name: Manuela Mcmillan  : 1930  MRN: [id number]  Referring provider: Lizeth Sherwood MD  Dx:   Encounter Diagnosis     ICD-10-CM    1  Other closed nondisplaced fracture of proximal end of left humerus with routine healing, subsequent encounter  S42 295D    2  Primary osteoarthritis of right hip  M16 11                   Subjective: Patient reports no issues after last tx  Pt notes some soreness in L hip today but reports L arm is feeling as good as the right      Objective: See treatment diary below      Assessment: Good tolerance to TE today  Tolerated increase in repetitions with no complaints    Did note L hip pain that only hindered performance of LAQ  Needed periodic rest periods between ex  Showed better shld flexion performance with ROM on L during can and pulleys today      Plan: Continue per plan of care  Precautions: FALL RISK     L proximal humeral fracture s/p fall (new)  L hip ORIF (old)  Supine with wedge    Manuals     PROM  For assessment only purposes, tight at end range and painful  May do better with Active and AA TE                                 Neuro Re-Ed                sidestepping        SLS- future                                        Ther Ex                Nu step  x10' L1 x10' L1 x10m L1    Pulleys   2x10 2x10 2x10    Sup cane flexion/ CP  10x flexion 20x flexion  20x CP 20x flexion, 20x CP    Table slides flexion, scaption  10x each 20x each 20x    Seated ER stretch, wedge  5x: 5 secs 20x: 5 secs 20x 5s            SLR x 3, standing, B  10x ea 20x ea 25xea    LAQ  10x ea 20x ea 25x ea    Ball squeezes  10x ea 20x ea 25xea    Ham curls  10x ea 20x ea 25xea    HR/TR  10x ea 20x ea 25xea    Squats at bars    25xea                                                            Ther Activity                        Gait Training        With RW PRN                Modalities

## 2020-08-28 ENCOUNTER — OFFICE VISIT (OUTPATIENT)
Dept: PHYSICAL THERAPY | Age: 85
End: 2020-08-28
Payer: MEDICARE

## 2020-08-28 DIAGNOSIS — S42.295D OTHER CLOSED NONDISPLACED FRACTURE OF PROXIMAL END OF LEFT HUMERUS WITH ROUTINE HEALING, SUBSEQUENT ENCOUNTER: Primary | ICD-10-CM

## 2020-08-28 DIAGNOSIS — M16.11 PRIMARY OSTEOARTHRITIS OF RIGHT HIP: ICD-10-CM

## 2020-08-28 PROCEDURE — 97110 THERAPEUTIC EXERCISES: CPT

## 2020-08-28 NOTE — PROGRESS NOTES
Daily Note     Today's date: 2020  Patient name: Maral Mckeon  : 1930  MRN: [id number]  Referring provider: Mansi Miller MD  Dx:   No diagnosis found  Subjective: Patient reports no issues after last tx  Pt clicking at L knee laterally that happens chronically      Objective: See treatment diary below      Assessment: Good tolerance to TE today  Tolerated increase in repetitions with no complaints    Did not have any hip pain on L with ex   Today  Needed periodic rest periods between ex  Showed better shld flexion performance with ROM on L during cane and pulleys again today      Plan: Continue per plan of care  Consider 1 lb weights for LE ex     Precautions: FALL RISK     L proximal humeral fracture s/p fall (new)  L hip ORIF (old)  Supine with wedge    Manuals    PROM  For assessment only purposes, tight at end range and painful  May do better with Active and AA TE                                 Neuro Re-Ed                sidestepping        SLS- future                                        Ther Ex                Nu step  x10' L1 x10' L1 x10m L1 x10m L1   Pulleys   2x10 2x10 2x10 2x10   Sup cane flexion/ CP  10x flexion 20x flexion  20x CP 20x flexion, 20x CP 20x flexion, 20 x CP   Table slides flexion, scaption  10x each 20x each 20x 20x   Seated ER stretch, wedge  5x: 5 secs 20x: 5 secs 20x 5s 20x 5s           SLR x 3, standing, B  10x ea 20x ea 25xea 25 ea   LAQ  10x ea 20x ea 25x ea 25 ea   Ball squeezes  10x ea 20x ea 25xea 25 ea   Ham curls  10x ea 20x ea 25xea 25 ea   HR/TR  10x ea 20x ea 25xea 25 ea   Squats at bars    25xea 25 ea                                                           Ther Activity                        Gait Training        With RW PRN                Modalities

## 2020-09-01 ENCOUNTER — OFFICE VISIT (OUTPATIENT)
Dept: PHYSICAL THERAPY | Age: 85
End: 2020-09-01
Payer: MEDICARE

## 2020-09-01 DIAGNOSIS — S42.295D OTHER CLOSED NONDISPLACED FRACTURE OF PROXIMAL END OF LEFT HUMERUS WITH ROUTINE HEALING, SUBSEQUENT ENCOUNTER: Primary | ICD-10-CM

## 2020-09-01 DIAGNOSIS — M16.11 PRIMARY OSTEOARTHRITIS OF RIGHT HIP: ICD-10-CM

## 2020-09-01 PROCEDURE — 97110 THERAPEUTIC EXERCISES: CPT

## 2020-09-01 NOTE — PROGRESS NOTES
Daily Note     Today's date: 2020  Patient name: Maribel Burkett  : 1930  MRN: [id number]  Referring provider: Magali Case MD  Dx:   Encounter Diagnosis     ICD-10-CM    1  Other closed nondisplaced fracture of proximal end of left humerus with routine healing, subsequent encounter  S42 295D    2  Primary osteoarthritis of right hip  M16 11                   Subjective: Patient reports she feeling good today  She has some pain in her L hip that she has chronically  Objective: See treatment diary below      Assessment: Good tolerance to TE today  Tolerated increase in intensity with LE exercises with no complaints  Needed rest periods after every 2 exercises  No complaints of increased pain during or post treatment  Plan: Continue per plan of care         Precautions: FALL RISK     L proximal humeral fracture s/p fall (new)  L hip ORIF (old)  Supine with wedge    Manuals    PROM                                 Neuro Re-Ed                sidestepping        SLS- future                                        Ther Ex                Nu step x10' L1 x10' L1 x10' L1 x10m L1 x10m L1   Pulleys  2x10 2x10 2x10 2x10 2x10   Sup cane flexion/ CP NT-doing at home 10x flexion 20x flexion  20x CP 20x flexion, 20x CP 20x flexion, 20 x CP   Table slides flexion, scaption 25x 10x each 20x each 20x 20x   Seated ER stretch, wedge 20x: 5 secs 5x: 5 secs 20x: 5 secs 20x 5s 20x 5s           SLR x 3, standing, B 1# 25x ea 10x ea 20x ea 25xea 25 ea   LAQ 1# 25x 10x ea 20x ea 25x ea 25 ea   Ball squeezes 25x 10x ea 20x ea 25xea 25 ea   Ham curls 1# 25x 10x ea 20x ea 25xea 25 ea   HR/TR 25x 10x ea 20x ea 25xea 25 ea   Squats at bars 25x   25xea 25 ea                                                           Ther Activity                        Gait Training        With RW PRN                Modalities

## 2020-09-04 ENCOUNTER — OFFICE VISIT (OUTPATIENT)
Dept: PHYSICAL THERAPY | Age: 85
End: 2020-09-04
Payer: MEDICARE

## 2020-09-04 DIAGNOSIS — S42.295D OTHER CLOSED NONDISPLACED FRACTURE OF PROXIMAL END OF LEFT HUMERUS WITH ROUTINE HEALING, SUBSEQUENT ENCOUNTER: Primary | ICD-10-CM

## 2020-09-04 DIAGNOSIS — M16.11 PRIMARY OSTEOARTHRITIS OF RIGHT HIP: ICD-10-CM

## 2020-09-04 PROCEDURE — 97110 THERAPEUTIC EXERCISES: CPT | Performed by: PHYSICAL THERAPIST

## 2020-09-04 NOTE — PROGRESS NOTES
Daily Note     Today's date: 2020  Patient name: Mary Treviño  : 1930  MRN: [id number]  Referring provider: Cherelle Batista MD  Dx:   Encounter Diagnosis     ICD-10-CM    1  Other closed nondisplaced fracture of proximal end of left humerus with routine healing, subsequent encounter  S42 295D    2  Primary osteoarthritis of right hip  M16 11                   Subjective: Patient reports she feeling good today  Objective: See treatment diary below      Assessment: Tolerated session well  Pt reports some pain and discomfort and end rage with shoulder AAROM exercises, but able to tolerate  Demonstrated fatigue needing short rest breaks  Cues needed to perform some exercicses properly  Would benefit from continued PT  Plan: Continue per plan of care         Precautions: FALL RISK     L proximal humeral fracture s/p fall (new)  L hip ORIF (old)  Supine with wedge    Manuals    PROM                                 Neuro Re-Ed                sidestepping        SLS- future                                        Ther Ex                Nu step x10' L1 x10' L1 x10' L1 x10m L1 x10m L1   Pulleys  2x10 2x10 2x10 2x10 2x10   Sup cane flexion/ CP NT-doing at home 20x flex 20x flexion  20x CP 20x flexion, 20x CP 20x flexion, 20 x CP   Table slides flexion, scaption 25x 25x ea 20x each 20x 20x   Seated ER stretch, wedge 20x: 5 secs 20x: 5 secs 20x: 5 secs 20x 5s 20x 5s           SLR x 3, standing, B 1# 25x ea 2# 25x ea 20x ea 25xea 25 ea   LAQ 1# 25x 2# 25x 20x ea 25x ea 25 ea   Ball squeezes 25x  20x ea 25xea 25 ea   Ham curls 1# 25x 2# 25x 20x ea 25xea 25 ea   HR/TR 25x 2# 20x 20x ea 25xea 25 ea   Squats at bars 25x 25x  25xea 25 ea   Cane ext and IR  20x ea standing against table for balance                                                       Ther Activity                        Gait Training        With RW PRN                Modalities

## 2020-09-08 ENCOUNTER — EVALUATION (OUTPATIENT)
Dept: PHYSICAL THERAPY | Age: 85
End: 2020-09-08
Payer: MEDICARE

## 2020-09-08 ENCOUNTER — TRANSCRIBE ORDERS (OUTPATIENT)
Dept: PHYSICAL THERAPY | Age: 85
End: 2020-09-08

## 2020-09-08 DIAGNOSIS — M16.11 PRIMARY OSTEOARTHRITIS OF RIGHT HIP: ICD-10-CM

## 2020-09-08 DIAGNOSIS — S42.295D TRAUMATIC CLOSED NONDISPLACED FRACTURE OF ANATOMICAL NECK OF LEFT HUMERUS WITH ROUTINE HEALING: Primary | ICD-10-CM

## 2020-09-08 DIAGNOSIS — S42.295D OTHER CLOSED NONDISPLACED FRACTURE OF PROXIMAL END OF LEFT HUMERUS WITH ROUTINE HEALING, SUBSEQUENT ENCOUNTER: Primary | ICD-10-CM

## 2020-09-08 PROCEDURE — 97110 THERAPEUTIC EXERCISES: CPT | Performed by: PHYSICAL THERAPIST

## 2020-09-08 NOTE — PROGRESS NOTES
Daily Note/Re-assessment     Today's date: 2020  Patient name: Naomie Barreto  : 1930  MRN: [id number]  Referring provider: Marcy Jacobo MD  Dx:   Encounter Diagnosis     ICD-10-CM    1  Other closed nondisplaced fracture of proximal end of left humerus with routine healing, subsequent encounter  S42 295D    2  Primary osteoarthritis of right hip  M16 11                   Subjective: Patient feels therapy is helping with her shoulder pain and is helping to regain ROM in the arm  Patient continues to always use RW with walking  Objective: See treatment diary below    Pain 3/10 L shoulder at rest and with activity    AROM  L Shoulder   Flexion 95  Extension 35  Abduction 98 - scaption plane  IR- 60  ER 50    MMT  L Shoulder  Flexion 3-/5  Abduction 3-/5  IR 3+/5  ER 3+/5    Biceps 3+/5 L, 4/5 R    Hip (L,R)  Flexion 4-/5 L 4/5 R  Abduction 3+/5 B  Adduction 4-/5 B    Knee   Flexion 4-/5 B  Extension 4-/5 B    Ankle   DF 4/5  B  PF 4-/5 B    Goals  Impairment Goals to be met within 4 weeks  - Decrease pain to 0/10 progressing  - Improve ROM by 5-10 degrees L shoulder grossly throughout progressing  - Increase strength to 4/5 throughout B UEs progressing  - Increase strength to 4/5 B LEs  progressing    Functional Goals to be met within 4-6 weeks  - Return to Prior Level of Function progressing  - Increase Functional Status Measure to: expected progressing  - Patient will be independent with HEP progressing  - Patient to be able to  Tolerate SLS x 3 sec ea LE not met        Assessment: Tolerated treatment well  Patient exhibited good technique with therapeutic exercises and was able to increase intensity and repetitions for exercises  No complaints of pain during or post treatment  Patient seen for brief re-assessment  Patient presents with improving shoulder ROM, progressing strength in UEs and LEs  Patient is compliant with HEP and is making progress towards goals   Patient always uses RW in and outside of the home, very fearful of falling  Plan: Continue per plan of care  Precautions: FALL RISK     L proximal humeral fracture s/p fall (new)  L hip ORIF (old)  Supine with wedge    Manuals 9/1 9/8 8/25 8/28   PROM                                 Neuro Re-Ed                sidestepping        SLS- future                                        Ther Ex                Nu step x10' L1 x10' L1 x10' L1 x10m L1 x10m L1   Pulleys  2x10 2x10 2x10 2x10 2x10   Sup cane flexion/ CP NT-doing at home 20x flex 20x flexion  20x CP 20x flexion, 20x CP 20x flexion, 20 x CP   Table slides flexion, scaption 25x 25x ea 20x each 20x 20x   Seated ER stretch, wedge 20x: 5 secs 20x: 5 secs 20x: 5 secs 20x 5s 20x 5s           SLR x 3, standing, B 1# 25x ea 2# 25x ea 2# 30 x ea 25xea 25 ea   LAQ 1# 25x 2# 25x 2# 30x ea 25x ea 25 ea   Ball squeezes 25x  30x ea 25xea 25 ea   Ham curls 1# 25x 2# 25x 2# 30x ea 25xea 25 ea   HR/TR 25x 2# 20x 30x ea 25xea 25 ea   Squats at bars 25x 25x 20x 25xea 25 ea   Cane ext and IR  20x ea standing against table for balance  NT     TB Rows and Ext     NV- YTB                                               Ther Activity                        Gait Training        With RW PRN                Modalities

## 2020-09-08 NOTE — LETTER
2020    Jolene Montes De Oca, [address]  [address] [facility] [address]    Patient: Shmuel Ricardo   YOB: 1930   Date of Visit: 2020     Encounter Diagnosis     ICD-10-CM    1  Other closed nondisplaced fracture of proximal end of left humerus with routine healing, subsequent encounter  S42 295D    2  Primary osteoarthritis of right hip  M16 11        Dear Dr Quiroz : Thank you for your recent referral of Shmuel Ricardo  Please review the attached evaluation summary from Tanya's recent visit  Please verify that you agree with the plan of care by signing the attached order  If you have any questions or concerns, please do not hesitate to call  I sincerely appreciate the opportunity to share in the care of one of your patients and hope to have another opportunity to work with you in the near future  Sincerely,    Dhaval Goldstein PT      Referring Provider:      I certify that I have read the below Plan of Care and certify the need for these services furnished under this plan of treatment while under my care  Jolene Montes De Oca MD  [address] [place] [id number]  [address] [place] [address] Avenue: [phone]          Daily Note/Re-assessment     Today's date: 2020  Patient name: Shmuel Ricardo  : 1930  MRN: [id number]  Referring provider: Anay Stahl MD  Dx:   Encounter Diagnosis     ICD-10-CM    1  Other closed nondisplaced fracture of proximal end of left humerus with routine healing, subsequent encounter  S42 295D    2  Primary osteoarthritis of right hip  M16 11                   Subjective: Patient feels therapy is helping with her shoulder pain and is helping to regain ROM in the arm  Patient continues to always use RW with walking         Objective: See treatment diary below    Pain 3/10 L shoulder at rest and with activity    AROM  L Shoulder   Flexion 95  Extension 35  Abduction 98 - scaption plane  IR- 60  ER 50    MMT  L Shoulder  Flexion 3-/5  Abduction 3-/5  IR 3+/5  ER 3+/5    Biceps 3+/5 L, 4/5 R    Hip (L,R)  Flexion 4-/5 L 4/5 R  Abduction 3+/5 B  Adduction 4-/5 B    Knee   Flexion 4-/5 B  Extension 4-/5 B    Ankle   DF 4/5  B  PF 4-/5 B    Goals  Impairment Goals to be met within 4 weeks  - Decrease pain to 0/10 progressing  - Improve ROM by 5-10 degrees L shoulder grossly throughout progressing  - Increase strength to 4/5 throughout B UEs progressing  - Increase strength to 4/5 B LEs  progressing    Functional Goals to be met within 4-6 weeks  - Return to Prior Level of Function progressing  - Increase Functional Status Measure to: expected progressing  - Patient will be independent with HEP progressing  - Patient to be able to  Tolerate SLS x 3 sec ea LE not met        Assessment: Tolerated treatment well  Patient exhibited good technique with therapeutic exercises and was able to increase intensity and repetitions for exercises  No complaints of pain during or post treatment  Patient seen for brief re-assessment  Patient presents with improving shoulder ROM, progressing strength in UEs and LEs  Patient is compliant with HEP and is making progress towards goals  Patient always uses RW in and outside of the home, very fearful of falling  Plan: Continue per plan of care        Precautions: FALL RISK     L proximal humeral fracture s/p fall (new)  L hip ORIF (old)  Supine with wedge    Manuals 9/1 9/8 8/25 8/28   PROM                                 Neuro Re-Ed                sidestepping        SLS- future                                        Ther Ex                Nu step x10' L1 x10' L1 x10' L1 x10m L1 x10m L1   Pulleys  2x10 2x10 2x10 2x10 2x10   Sup cane flexion/ CP NT-doing at home 20x flex 20x flexion  20x CP 20x flexion, 20x CP 20x flexion, 20 x CP   Table slides flexion, scaption 25x 25x ea 20x each 20x 20x   Seated ER stretch, wedge 20x: 5 secs 20x: 5 secs 20x: 5 secs 20x 5s 20x 5s SLR x 3, standing, B 1# 25x ea 2# 25x ea 2# 30 x ea 25xea 25 ea   LAQ 1# 25x 2# 25x 2# 30x ea 25x ea 25 ea   Ball squeezes 25x  30x ea 25xea 25 ea   Ham curls 1# 25x 2# 25x 2# 30x ea 25xea 25 ea   HR/TR 25x 2# 20x 30x ea 25xea 25 ea   Squats at bars 25x 25x 20x 25xea 25 ea   Cane ext and IR  20x ea standing against table for balance  NT     TB Rows and Ext     NV- YTB                                               Ther Activity                        Gait Training        With RW PRN                Modalities

## 2020-09-11 ENCOUNTER — OFFICE VISIT (OUTPATIENT)
Dept: PHYSICAL THERAPY | Age: 85
End: 2020-09-11
Payer: MEDICARE

## 2020-09-11 DIAGNOSIS — M16.11 PRIMARY OSTEOARTHRITIS OF RIGHT HIP: ICD-10-CM

## 2020-09-11 DIAGNOSIS — S42.295D OTHER CLOSED NONDISPLACED FRACTURE OF PROXIMAL END OF LEFT HUMERUS WITH ROUTINE HEALING, SUBSEQUENT ENCOUNTER: Primary | ICD-10-CM

## 2020-09-11 PROCEDURE — 97112 NEUROMUSCULAR REEDUCATION: CPT

## 2020-09-11 PROCEDURE — 97110 THERAPEUTIC EXERCISES: CPT

## 2020-09-11 NOTE — PROGRESS NOTES
Daily Note     Today's date: 2020  Patient name: Abril Luque  : 1930  MRN: [id number]  Referring provider: Chichi Martinez MD  Dx:   Encounter Diagnosis     ICD-10-CM    1  Other closed nondisplaced fracture of proximal end of left humerus with routine healing, subsequent encounter  S42 295D    2  Primary osteoarthritis of right hip  M16 11           Subjective: Pt states she is feeling okay today  Objective: See treatment diary below       Assessment: Tolerated treatment well with no increase in pain and min to moderate fatigue  Pt was able to add tband  rows and ext  Pt required rest breaks throughout session d/t fatigue  Pt  Patient would benefit from continued PT      Plan: Continue per plan of care  Precautions: FALL RISK     L proximal humeral fracture s/p fall (new)  L hip ORIF (old)  Supine with wedge    Manuals    PROM                             Neuro Re-Ed              sidestepping       SLS- future                                   Ther Ex              Nu step x10' L1 x10' L1 x10' L1 10x l1   Pulleys  2x10 2x10 2x10 2 x 10    Sup cane flexion/ CP NT-doing at home 20x flex 20x flexion  20x CP 20x flexion   20x CP    Table slides flexion, scaption 25x 25x ea 20x each 20x ea    Seated ER stretch, wedge 20x: 5 secs 20x: 5 secs 20x: 5 secs 20x 5"           SLR x 3, standing, B 1# 25x ea 2# 25x ea 2# 30 x ea 2# 30x ea   LAQ 1# 25x 2# 25x 2# 30x ea 2# 30x ea   Ball squeezes 25x  30x ea 30x   Ham curls 1# 25x 2# 25x 2# 30x ea 2#  30x ea   HR/TR 25x 2# 20x 30x ea 30x ea   Squats at bars 25x 25x 20x 20x   Cane ext and IR  20x ea standing against table for balance  NT nt   TB Rows and Ext     NV- YTB   YTB 2 x 10 ea                                       Ther Activity                     Gait Training       With RW PRN              Modalities

## 2020-09-14 ENCOUNTER — OFFICE VISIT (OUTPATIENT)
Dept: PHYSICAL THERAPY | Age: 85
End: 2020-09-14
Payer: MEDICARE

## 2020-09-14 DIAGNOSIS — M16.11 PRIMARY OSTEOARTHRITIS OF RIGHT HIP: ICD-10-CM

## 2020-09-14 DIAGNOSIS — S42.295D OTHER CLOSED NONDISPLACED FRACTURE OF PROXIMAL END OF LEFT HUMERUS WITH ROUTINE HEALING, SUBSEQUENT ENCOUNTER: Primary | ICD-10-CM

## 2020-09-14 PROCEDURE — 97110 THERAPEUTIC EXERCISES: CPT | Performed by: PHYSICAL THERAPIST

## 2020-09-14 NOTE — PROGRESS NOTES
Daily Note     Today's date: 2020  Patient name: Tracy Hwang  : 1930  MRN: [id number]  Referring provider: Glen Wang MD  Dx:   Encounter Diagnosis     ICD-10-CM    1  Other closed nondisplaced fracture of proximal end of left humerus with routine healing, subsequent encounter  S42 295D    2  Primary osteoarthritis of right hip  M16 11           Subjective: Patient denies pain in left upper arm and right hip but she noted fatigue and weakness persists which limits prolonged standing and walking activities but overall is better with use of RW  Objective: See treatment diary below       Assessment: Tolerated treatment well with no production in pain but moderate fatigue and sob persists  Patient continues to require multiple rest breaks throughout session d/t fatigue  Patient presents with left ue limitations during gait as exhibited by decrease in left ue weight baring and right sided lean with use of RW  Patient would benefit from continued PT  Plan: Continue per plan of care        Precautions: FALL RISK     L proximal humeral fracture s/p fall (new)  L hip ORIF (old)  Supine with wedge    Manuals    PROM                             Neuro Re-Ed              sidestepping       SLS- future                                   Ther Ex              Nu step x10' L1 x10' L1 x10' L1 10x l1   Pulleys  2x10 2x10 2x10 2 x 10    Sup cane flexion/ CP 20 x flexion   20 x CP 20x flex 20x flexion  20x CP 20x flexion   20x CP    Table slides flexion, scaption 25x 25x ea 20x each 20x ea    Seated ER stretch, wedge 20x: 5 secs 20x: 5 secs 20x: 5 secs 20x 5"           SLR x 3, standing, B 2# 30x ea 2# 25x ea 2# 30 x ea 2# 30x ea   LAQ 2# 30x 2# 25x 2# 30x ea 2# 30x ea   Ball squeezes 30x  30x ea 30x   Ham curls 2# 30x 2# 25x 2# 30x ea 2#  30x ea   HR/TR 30x 2# 20x 30x ea 30x ea   Squats at bars 30x 25x 20x 20x   Cane ext and IR  20x ea standing against table for balance  NT nt   TB Rows and Ext  YTB x 20  NV- YTB   YTB 2 x 10 ea                                       Ther Activity                     Gait Training       With RW PRN              Modalities

## 2020-09-17 ENCOUNTER — OFFICE VISIT (OUTPATIENT)
Dept: PHYSICAL THERAPY | Age: 85
End: 2020-09-17
Payer: MEDICARE

## 2020-09-17 DIAGNOSIS — M16.11 PRIMARY OSTEOARTHRITIS OF RIGHT HIP: ICD-10-CM

## 2020-09-17 DIAGNOSIS — S42.295D OTHER CLOSED NONDISPLACED FRACTURE OF PROXIMAL END OF LEFT HUMERUS WITH ROUTINE HEALING, SUBSEQUENT ENCOUNTER: Primary | ICD-10-CM

## 2020-09-17 PROCEDURE — 97110 THERAPEUTIC EXERCISES: CPT

## 2020-09-17 NOTE — PROGRESS NOTES
Daily Note     Today's date: 2020  Patient name: Cornel Swanson  : 1930  MRN: [id number]  Referring provider: Flores Garcia MD  Dx:   Encounter Diagnosis     ICD-10-CM    1  Other closed nondisplaced fracture of proximal end of left humerus with routine healing, subsequent encounter  S42 295D    2  Primary osteoarthritis of right hip  M16 11           Subjective: Patient reports minimal pain in her L shoulder but only when she is doing activities  She has noticed her ROM in her L shoulder is a lot better then it was when she first started PT  Objective: See treatment diary below       Assessment: Tolerated treatment well with no production in pain but required multiple rest breaks throughout treatment due to fatigue  Patient would benefit from continued PT to continue to improve ROM in L shoulder  Continue with strengthening and stretching program and progress as patient is able  Plan: Continue per plan of care  Precautions: FALL RISK     L proximal humeral fracture s/p fall (new)  L hip ORIF (old)  Supine with wedge    Manuals    PROM                             Neuro Re-Ed              sidestepping       SLS- future                                   Ther Ex              Nu step x10' L1 x10' L1 x10' L1 10x l1   Pulleys  2x10 2x10 2x10 2 x 10    Sup cane flexion/ CP 20 x flexion   20 x CP 20x flex  30x CP 20x flexion  20x CP 20x flexion   20x CP    Table slides flexion, scaption 25x 25x ea 20x each 20x ea    Seated ER stretch, wedge 20x: 5 secs 20x: 5 secs 20x: 5 secs 20x 5"           SLR x 3, standing, B 2# 30x ea 2# 30x ea 2# 30 x ea 2# 30x ea   LAQ 2# 30x 2# 30x 2# 30x ea 2# 30x ea   Ball squeezes 30x 30x 30x ea 30x   Ham curls 2# 30x 2# 30x 2# 30x ea 2#  30x ea   HR/TR 30x 2# 30x 30x ea 30x ea   Squats at bars 30x 25x 20x 20x   Cane ext and IR   NT nt   TB Rows and Ext   YTB x 20 NT NV- YTB   YTB 2 x 10 ea                                       Ther Activity Gait Training       With RW PRN              Modalities

## 2020-09-22 ENCOUNTER — OFFICE VISIT (OUTPATIENT)
Dept: PHYSICAL THERAPY | Age: 85
End: 2020-09-22
Payer: MEDICARE

## 2020-09-22 DIAGNOSIS — S42.295D OTHER CLOSED NONDISPLACED FRACTURE OF PROXIMAL END OF LEFT HUMERUS WITH ROUTINE HEALING, SUBSEQUENT ENCOUNTER: Primary | ICD-10-CM

## 2020-09-22 DIAGNOSIS — M16.11 PRIMARY OSTEOARTHRITIS OF RIGHT HIP: ICD-10-CM

## 2020-09-22 PROCEDURE — 97110 THERAPEUTIC EXERCISES: CPT

## 2020-09-22 NOTE — PROGRESS NOTES
Daily Note     Today's date: 2020  Patient name: Andrea Villanueva  : 1930  MRN: [id number]  Referring provider: Jeremy Sandhu MD  Dx:   Encounter Diagnosis     ICD-10-CM    1  Other closed nondisplaced fracture of proximal end of left humerus with routine healing, subsequent encounter  S42 295D    2  Primary osteoarthritis of right hip  M16 11           Subjective: Patient reports minimal pain in her L shoulder but only when she is doing activities  She has noticed her ROM in her L shoulder is a lot better then it was when she first started PT  Objective: See treatment diary below       Assessment: Tolerated treatment well with no production in pain but required multiple rest breaks throughout treatment due to fatigue  Patient would benefit from continued PT to continue to improve ROM in L shoulder  Continue with strengthening and stretching program and progress as patient is able  Plan: Continue per plan of care  Precautions: FALL RISK     L proximal humeral fracture s/p fall (new)  L hip ORIF (old)  Supine with wedge    Manuals    PROM                             Neuro Re-Ed              sidestepping       SLS- future                                   Ther Ex              Nu step x10' L1 x10' L1 x10' L1 10x l1   Pulleys  2x10 2x10 2x10 2 x 10    Sup cane flexion/ CP 20 x flexion   20 x CP 20x flex  30x CP 20x flexion  20x CP 20x flexion   20x CP    Table slides flexion, scaption 25x 25x ea 20x each 20x ea    Seated ER stretch, wedge 20x: 5 secs 20x: 5 secs 20x: 5 secs 20x 5"           SLR x 3, standing, B 2# 30x ea 2# 30x ea 2# 30 x ea 2# 30x ea   LAQ 2# 30x 2# 30x 2# 30x ea 2# 30x ea   Ball squeezes 30x 30x 30x ea 30x   Ham curls 2# 30x 2# 30x 2# 30x ea 2#  30x ea   HR/TR 30x 2# 30x 30x ea 30x ea   Squats at bars 30x 25x 20x 20x   Cane ext and IR   horiz ab/add in sitting x20 nt   TB Rows and Ext   YTB x 20 NT NV   YTB 2 x 10 ea Ther Activity                     Gait Training       With RW PRN              Modalities

## 2020-09-24 ENCOUNTER — OFFICE VISIT (OUTPATIENT)
Dept: PHYSICAL THERAPY | Age: 85
End: 2020-09-24
Payer: MEDICARE

## 2020-09-24 DIAGNOSIS — M16.11 PRIMARY OSTEOARTHRITIS OF RIGHT HIP: ICD-10-CM

## 2020-09-24 DIAGNOSIS — S42.295D OTHER CLOSED NONDISPLACED FRACTURE OF PROXIMAL END OF LEFT HUMERUS WITH ROUTINE HEALING, SUBSEQUENT ENCOUNTER: Primary | ICD-10-CM

## 2020-09-24 PROCEDURE — 97110 THERAPEUTIC EXERCISES: CPT

## 2020-09-24 NOTE — PROGRESS NOTES
Daily Note     Today's date: 2020  Patient name: Lani Lindsey  : 1930  MRN: [id number]  Referring provider: Cheyanne Douglas MD  Dx:   Encounter Diagnosis     ICD-10-CM    1  Other closed nondisplaced fracture of proximal end of left humerus with routine healing, subsequent encounter  S42 295D    2  Primary osteoarthritis of right hip  M16 11           Subjective: Patient reports minimal pain in her L shoulder but only when she is doing activities  She has noticed her ROM in her L shoulder is a lot better then it was when she first started PT  Objective: See treatment diary below       Assessment: Tolerated treatment well with no production in pain but required multiple rest breaks throughout treatment due to fatigue  Patient would benefit from continued PT to continue to improve ROM in L shoulder  Continue with strengthening and stretching program and progress as patient is able  Plan: Continue per plan of care  Precautions: FALL RISK     L proximal humeral fracture s/p fall (new)  L hip ORIF (old)  Supine with wedge    Manuals    PROM                             Neuro Re-Ed              sidestepping       SLS- future                                   Ther Ex              Nu step x10' L1 x10' L1 x10' L1 10x l1   Pulleys  2x10 2x10 2x10 2 x 10    Sup cane flexion/ CP 20 x flexion   20 x CP 20x flex  30x CP 20x flexion  20x CP 1 5 #20x flexion   1 5#20x CP    Table slides flexion, scaption 25x 25x ea 20x each 20x ea    Seated ER stretch, wedge 20x: 5 secs 20x: 5 secs 20x: 5 secs 20x 5"    Seated biceps curls    # 2x2x10   SLR x 3, standing, B 2# 30x ea 2# 30x ea 2# 30 x ea 2# 30x ea   LAQ 2# 30x 2# 30x 2# 30x ea 2# 30x ea   Ball squeezes 30x 30x 30x ea 30x   Ham curls 2# 30x 2# 30x 2# 30x ea 2#  30x ea   HR/TR 30x 2# 30x 30x ea 30x ea   Squats at bars 30x 25x 20x 20x   Cane ext and IR   horiz ab/add in sitting x20 horiz ab/add in sitting x20   TB Rows and Ext   YTB x 20 NT NV   Nv                                       Ther Activity                     Gait Training       With RW PRN              Modalities

## 2020-09-29 ENCOUNTER — OFFICE VISIT (OUTPATIENT)
Dept: PHYSICAL THERAPY | Age: 85
End: 2020-09-29
Payer: MEDICARE

## 2020-09-29 DIAGNOSIS — M16.11 PRIMARY OSTEOARTHRITIS OF RIGHT HIP: ICD-10-CM

## 2020-09-29 DIAGNOSIS — S42.295D OTHER CLOSED NONDISPLACED FRACTURE OF PROXIMAL END OF LEFT HUMERUS WITH ROUTINE HEALING, SUBSEQUENT ENCOUNTER: Primary | ICD-10-CM

## 2020-09-29 PROCEDURE — 97110 THERAPEUTIC EXERCISES: CPT

## 2020-09-29 NOTE — PROGRESS NOTES
Daily Note     Today's date: 2020  Patient name: Kendal Cr  : 1930  MRN: [id number]  Referring provider: Soledad Cunha MD  Dx:   Encounter Diagnosis     ICD-10-CM    1  Other closed nondisplaced fracture of proximal end of left humerus with routine healing, subsequent encounter  S42 295D    2  Primary osteoarthritis of right hip  M16 11           Subjective: Patient reports minimal pain in her L shoulder but only when she is doing activities  She has noticed her ROM in her L shoulder is a lot better then it was when she first started PT  Notes some L hip pain with weather change today      Objective: See treatment diary below       Assessment: Tolerated treatment well with no production in pain but required multiple rest breaks throughout treatment due to fatigue  Patient would benefit from continued PT to continue to improve ROM in L shoulder  Continue with strengthening and stretching program and progress as patient is able  Plan: Continue per plan of care        Precautions: FALL RISK     L proximal humeral fracture s/p fall (new)  L hip ORIF (old)  Supine with wedge    Manuals    PROM                             Neuro Re-Ed              sidestepping       SLS- future                                   Ther Ex              Nu step x10' L1 x10' L1 x10' L1 10x l1   Pulleys  2x10 2x10 2x10 2 x 10    Sup cane flexion/ CP 1 5# 20 x flexion   1 5# 20 x CP 20x flex  30x CP 20x flexion  20x CP 1 5 #20x flexion   1 5#20x CP    Table slides flexion, scaption 25x 25x ea 20x each 20x ea    Seated ER stretch, wedge 20x: 5 secs 20x: 5 secs 20x: 5 secs 20x 5"    Seated biceps curls    # 2x2x10   SLR x 3, standing, B 2# 30x ea 2# 30x ea 2# 30 x ea 2# 30x ea   LAQ 2# 30x 2# 30x 2# 30x ea 2# 30x ea   Ball squeezes 30x 30x 30x ea 30x   Ham curls 2# 30x 2# 30x 2# 30x ea 2#  30x ea   HR/TR 30x 2# 30x 30x ea 30x ea   Squats at bars 30x 25x 20x 20x   Cane ext and IR horiz ab/add x20 in sitting horiz ab/add in sitting x20 horiz ab/add in sitting x20   TB Rows and Ext   YTB x 20 NT NV   Nv                                       Ther Activity                     Gait Training       With RW PRN              Modalities

## 2020-10-01 ENCOUNTER — OFFICE VISIT (OUTPATIENT)
Dept: PHYSICAL THERAPY | Age: 85
End: 2020-10-01
Payer: MEDICARE

## 2020-10-01 DIAGNOSIS — M16.11 PRIMARY OSTEOARTHRITIS OF RIGHT HIP: ICD-10-CM

## 2020-10-01 DIAGNOSIS — S42.295D OTHER CLOSED NONDISPLACED FRACTURE OF PROXIMAL END OF LEFT HUMERUS WITH ROUTINE HEALING, SUBSEQUENT ENCOUNTER: Primary | ICD-10-CM

## 2020-10-01 PROCEDURE — 97110 THERAPEUTIC EXERCISES: CPT

## 2020-10-06 ENCOUNTER — OFFICE VISIT (OUTPATIENT)
Dept: PHYSICAL THERAPY | Age: 85
End: 2020-10-06
Payer: MEDICARE

## 2020-10-06 DIAGNOSIS — M16.11 PRIMARY OSTEOARTHRITIS OF RIGHT HIP: ICD-10-CM

## 2020-10-06 DIAGNOSIS — S42.295D OTHER CLOSED NONDISPLACED FRACTURE OF PROXIMAL END OF LEFT HUMERUS WITH ROUTINE HEALING, SUBSEQUENT ENCOUNTER: Primary | ICD-10-CM

## 2020-10-06 PROCEDURE — 97110 THERAPEUTIC EXERCISES: CPT | Performed by: PHYSICAL THERAPIST

## 2020-10-08 ENCOUNTER — OFFICE VISIT (OUTPATIENT)
Dept: PHYSICAL THERAPY | Age: 85
End: 2020-10-08
Payer: MEDICARE

## 2020-10-08 DIAGNOSIS — S42.295D OTHER CLOSED NONDISPLACED FRACTURE OF PROXIMAL END OF LEFT HUMERUS WITH ROUTINE HEALING, SUBSEQUENT ENCOUNTER: Primary | ICD-10-CM

## 2020-10-08 DIAGNOSIS — M16.11 PRIMARY OSTEOARTHRITIS OF RIGHT HIP: ICD-10-CM

## 2020-10-08 PROCEDURE — 97110 THERAPEUTIC EXERCISES: CPT

## 2020-10-13 ENCOUNTER — OFFICE VISIT (OUTPATIENT)
Dept: PHYSICAL THERAPY | Age: 85
End: 2020-10-13
Payer: MEDICARE

## 2020-10-13 DIAGNOSIS — S42.295D OTHER CLOSED NONDISPLACED FRACTURE OF PROXIMAL END OF LEFT HUMERUS WITH ROUTINE HEALING, SUBSEQUENT ENCOUNTER: Primary | ICD-10-CM

## 2020-10-13 DIAGNOSIS — M16.11 PRIMARY OSTEOARTHRITIS OF RIGHT HIP: ICD-10-CM

## 2020-10-13 PROCEDURE — 97110 THERAPEUTIC EXERCISES: CPT | Performed by: PHYSICAL THERAPIST

## 2020-10-15 ENCOUNTER — EVALUATION (OUTPATIENT)
Dept: PHYSICAL THERAPY | Age: 85
End: 2020-10-15
Payer: MEDICARE

## 2020-10-15 DIAGNOSIS — S42.295D OTHER CLOSED NONDISPLACED FRACTURE OF PROXIMAL END OF LEFT HUMERUS WITH ROUTINE HEALING, SUBSEQUENT ENCOUNTER: Primary | ICD-10-CM

## 2020-10-15 DIAGNOSIS — M16.11 PRIMARY OSTEOARTHRITIS OF RIGHT HIP: ICD-10-CM

## 2020-10-15 PROCEDURE — 97110 THERAPEUTIC EXERCISES: CPT | Performed by: PHYSICAL THERAPIST

## 2020-10-20 ENCOUNTER — OFFICE VISIT (OUTPATIENT)
Dept: PHYSICAL THERAPY | Age: 85
End: 2020-10-20
Payer: MEDICARE

## 2020-10-20 DIAGNOSIS — S42.295D OTHER CLOSED NONDISPLACED FRACTURE OF PROXIMAL END OF LEFT HUMERUS WITH ROUTINE HEALING, SUBSEQUENT ENCOUNTER: Primary | ICD-10-CM

## 2020-10-20 DIAGNOSIS — M16.11 PRIMARY OSTEOARTHRITIS OF RIGHT HIP: ICD-10-CM

## 2020-10-20 PROCEDURE — 97110 THERAPEUTIC EXERCISES: CPT

## 2020-10-22 ENCOUNTER — OFFICE VISIT (OUTPATIENT)
Dept: PHYSICAL THERAPY | Age: 85
End: 2020-10-22
Payer: MEDICARE

## 2020-10-22 DIAGNOSIS — S42.295D OTHER CLOSED NONDISPLACED FRACTURE OF PROXIMAL END OF LEFT HUMERUS WITH ROUTINE HEALING, SUBSEQUENT ENCOUNTER: Primary | ICD-10-CM

## 2020-10-22 DIAGNOSIS — M16.11 PRIMARY OSTEOARTHRITIS OF RIGHT HIP: ICD-10-CM

## 2020-10-22 PROCEDURE — 97110 THERAPEUTIC EXERCISES: CPT | Performed by: PHYSICAL THERAPIST

## 2020-10-27 ENCOUNTER — OFFICE VISIT (OUTPATIENT)
Dept: PHYSICAL THERAPY | Age: 85
End: 2020-10-27
Payer: MEDICARE

## 2020-10-27 DIAGNOSIS — M16.11 PRIMARY OSTEOARTHRITIS OF RIGHT HIP: ICD-10-CM

## 2020-10-27 DIAGNOSIS — S42.295D OTHER CLOSED NONDISPLACED FRACTURE OF PROXIMAL END OF LEFT HUMERUS WITH ROUTINE HEALING, SUBSEQUENT ENCOUNTER: Primary | ICD-10-CM

## 2020-10-27 PROCEDURE — 97110 THERAPEUTIC EXERCISES: CPT

## 2020-10-29 ENCOUNTER — OFFICE VISIT (OUTPATIENT)
Dept: PHYSICAL THERAPY | Age: 85
End: 2020-10-29
Payer: MEDICARE

## 2020-10-29 DIAGNOSIS — S42.295D OTHER CLOSED NONDISPLACED FRACTURE OF PROXIMAL END OF LEFT HUMERUS WITH ROUTINE HEALING, SUBSEQUENT ENCOUNTER: Primary | ICD-10-CM

## 2020-10-29 DIAGNOSIS — M16.11 PRIMARY OSTEOARTHRITIS OF RIGHT HIP: ICD-10-CM

## 2020-10-29 PROCEDURE — 97110 THERAPEUTIC EXERCISES: CPT

## 2020-11-03 ENCOUNTER — OFFICE VISIT (OUTPATIENT)
Dept: PHYSICAL THERAPY | Age: 85
End: 2020-11-03
Payer: MEDICARE

## 2020-11-03 DIAGNOSIS — S42.295D OTHER CLOSED NONDISPLACED FRACTURE OF PROXIMAL END OF LEFT HUMERUS WITH ROUTINE HEALING, SUBSEQUENT ENCOUNTER: Primary | ICD-10-CM

## 2020-11-03 DIAGNOSIS — M16.11 PRIMARY OSTEOARTHRITIS OF RIGHT HIP: ICD-10-CM

## 2020-11-03 PROCEDURE — 97110 THERAPEUTIC EXERCISES: CPT

## 2020-11-05 ENCOUNTER — OFFICE VISIT (OUTPATIENT)
Dept: PHYSICAL THERAPY | Age: 85
End: 2020-11-05
Payer: MEDICARE

## 2020-11-05 ENCOUNTER — TRANSCRIBE ORDERS (OUTPATIENT)
Dept: PHYSICAL THERAPY | Age: 85
End: 2020-11-05

## 2020-11-05 DIAGNOSIS — S42.295D OTHER CLOSED NONDISPLACED FRACTURE OF PROXIMAL END OF LEFT HUMERUS WITH ROUTINE HEALING, SUBSEQUENT ENCOUNTER: Primary | ICD-10-CM

## 2020-11-05 DIAGNOSIS — M16.11 PRIMARY OSTEOARTHRITIS OF RIGHT HIP: ICD-10-CM

## 2020-11-05 DIAGNOSIS — S42.295D OTHER NONDISPLACED FRACTURE OF UPPER END OF LEFT HUMERUS, SUBSEQUENT ENCOUNTER FOR FRACTURE WITH ROUTINE HEALING: Primary | ICD-10-CM

## 2020-11-05 PROCEDURE — 97110 THERAPEUTIC EXERCISES: CPT | Performed by: PHYSICAL THERAPIST

## 2020-11-10 ENCOUNTER — OFFICE VISIT (OUTPATIENT)
Dept: PHYSICAL THERAPY | Age: 85
End: 2020-11-10
Payer: MEDICARE

## 2020-11-10 DIAGNOSIS — S42.295D OTHER CLOSED NONDISPLACED FRACTURE OF PROXIMAL END OF LEFT HUMERUS WITH ROUTINE HEALING, SUBSEQUENT ENCOUNTER: Primary | ICD-10-CM

## 2020-11-10 DIAGNOSIS — M16.11 PRIMARY OSTEOARTHRITIS OF RIGHT HIP: ICD-10-CM

## 2020-11-10 PROCEDURE — 97110 THERAPEUTIC EXERCISES: CPT | Performed by: PHYSICAL THERAPIST

## 2020-11-12 ENCOUNTER — OFFICE VISIT (OUTPATIENT)
Dept: PHYSICAL THERAPY | Age: 85
End: 2020-11-12
Payer: MEDICARE

## 2020-11-12 DIAGNOSIS — M16.11 PRIMARY OSTEOARTHRITIS OF RIGHT HIP: ICD-10-CM

## 2020-11-12 DIAGNOSIS — S42.295D OTHER CLOSED NONDISPLACED FRACTURE OF PROXIMAL END OF LEFT HUMERUS WITH ROUTINE HEALING, SUBSEQUENT ENCOUNTER: Primary | ICD-10-CM

## 2020-11-12 PROCEDURE — 97110 THERAPEUTIC EXERCISES: CPT

## 2020-11-17 ENCOUNTER — OFFICE VISIT (OUTPATIENT)
Dept: PHYSICAL THERAPY | Age: 85
End: 2020-11-17
Payer: MEDICARE

## 2020-11-17 DIAGNOSIS — S42.295D OTHER CLOSED NONDISPLACED FRACTURE OF PROXIMAL END OF LEFT HUMERUS WITH ROUTINE HEALING, SUBSEQUENT ENCOUNTER: Primary | ICD-10-CM

## 2020-11-17 DIAGNOSIS — M16.11 PRIMARY OSTEOARTHRITIS OF RIGHT HIP: ICD-10-CM

## 2020-11-17 PROCEDURE — 97110 THERAPEUTIC EXERCISES: CPT

## 2020-11-19 ENCOUNTER — OFFICE VISIT (OUTPATIENT)
Dept: PHYSICAL THERAPY | Age: 85
End: 2020-11-19
Payer: MEDICARE

## 2020-11-19 DIAGNOSIS — M16.11 PRIMARY OSTEOARTHRITIS OF RIGHT HIP: ICD-10-CM

## 2020-11-19 DIAGNOSIS — S42.295D OTHER CLOSED NONDISPLACED FRACTURE OF PROXIMAL END OF LEFT HUMERUS WITH ROUTINE HEALING, SUBSEQUENT ENCOUNTER: Primary | ICD-10-CM

## 2020-11-19 PROCEDURE — 97110 THERAPEUTIC EXERCISES: CPT

## 2020-11-24 ENCOUNTER — APPOINTMENT (OUTPATIENT)
Dept: PHYSICAL THERAPY | Age: 85
End: 2020-11-24
Payer: MEDICARE

## 2021-01-28 ENCOUNTER — EVALUATION (OUTPATIENT)
Dept: PHYSICAL THERAPY | Age: 86
End: 2021-01-28
Payer: MEDICARE

## 2021-01-28 DIAGNOSIS — R26.9 GAIT ABNORMALITY: Primary | ICD-10-CM

## 2021-01-28 DIAGNOSIS — M25.552 LEFT HIP PAIN: ICD-10-CM

## 2021-01-28 PROCEDURE — 97162 PT EVAL MOD COMPLEX 30 MIN: CPT | Performed by: PHYSICAL THERAPIST

## 2021-01-28 PROCEDURE — 97110 THERAPEUTIC EXERCISES: CPT | Performed by: PHYSICAL THERAPIST

## 2021-01-28 NOTE — PROGRESS NOTES
PT Evaluation     Today's date: 2021  Patient name: Eliud Rome  : 1930  MRN: [id number]  Referring provider: Kris Cao MD  Dx:   Encounter Diagnosis     ICD-10-CM    1  Gait abnormality  R26 9    2  Left hip pain  M25 552                   Assessment  Assessment details: Patient seen for PT evaluation for gait, imbalance  Patient presents with decreased LE strength, gait dysfunction, decreased standing balance, tight hip flexors as patient has started to sleep in the recliner vs the bed (in fear of falling out of bed and for ease with getting to/from the bathroom)  PT initiated treatment today, focusing on building strength, activity tolerance  Patient presents with moderate decrease in strength since last therapy session and would benefit to initiate PT for balance and strengthening  PT will attempt to assist with patient's ability to tolerate lying flat again to progress patient back to sleeping in the bed  Impairments: abnormal gait, abnormal or restricted ROM, activity intolerance, impaired balance, impaired physical strength, lacks appropriate home exercise program, pain with function, poor posture  and poor body mechanics  Functional limitations: Patient reports hip and thigh pain with walking, sit>stand, with stairclimbing, with IADLs, with recreational activities, and with bed mobility and transfers  Understanding of Dx/Px/POC: good   Prognosis: good    Goals  Impairment Goals to be met within 4 weeks  - Decrease pain to 0/10 at rest and 3/10 at worst  - Improve ROM to [facility] lumbar flexion and extension  - Increase strength to 4/5 throughout B LEs  - Improve upright posture     Functional Goals to be met within 4-6 weeks  - Return to Prior Level of Function  - Increase Functional Status Measure to: expected  - Patient will be independent with HEP  - Patient to be able to tolerate sit<>stand transfers without pain  - Patient to be able to progress to tolerating lying in the bed again  Plan  Patient would benefit from: skilled physical therapy  Planned modality interventions: cryotherapy and thermotherapy: hydrocollator packs  Planned therapy interventions: abdominal trunk stabilization, manual therapy, neuromuscular re-education, patient education, postural training, strengthening, stretching, therapeutic activities, therapeutic exercise, home exercise program, gait training, balance and body mechanics training  Frequency: 2x week  Duration in weeks: 6  Treatment plan discussed with: patient        Subjective Evaluation    History of Present Illness  Mechanism of injury: Patient referred to PT from PCP with primary concern of balance, gait and hip pain  Patient with history of falls with 2 fractures- 2 separate falls, hip and shoulder  Patient had surgery for hip ORIF  Patient has been sleeping in a recliner at home because of her hip pain with sleeping at night  Patient also feels sleeping in a recliner is easier for her to get up in the night if she needs  Patient reports most of her hip pain is with walking, stair climbing  Notes that she does the steps backwards step to pattern for balance and her fear of falling; wanting to prevent falls  Patient uses a quad cane in the home, a RW when outside the home         Pain  Current pain rating: 3  At best pain ratin  At worst pain ratin  Location: L hip  Quality: dull ache and sharp  Aggravating factors: standing, walking, stair climbing and lifting  Progression: no change    Social Support  Steps to enter house: yes  Stairs in house: yes   Lives in: multiple-level home  Lives with: spouse    Employment status: not working    Diagnostic Tests  No diagnostic tests performed  Treatments  Previous treatment: physical therapy  Patient Goals  Patient goals for therapy: increased strength, return to sport/leisure activities, increased motion, decreased pain and improved balance          Objective     Active Range of Motion     Lumbar Flexion:  with pain Restriction level: minimal  Extension:  with pain Restriction level: moderate  Left lateral flexion:  Restriction level: moderate  Right lateral flexion:  Restriction level: moderate  Left rotation:  Restriction level: moderate  Right rotation:  Restriction level: moderate    Strength/Myotome Testing     Left Hip   Planes of Motion   Flexion: 3+  Extension: 3+  Abduction: 3  Adduction: 4    Right Hip   Planes of Motion   Flexion: 4-  Extension: 4  Abduction: 3+  Adduction: 4    Left Knee   Flexion: 3+  Extension: 3+    Right Knee   Flexion: 4-  Extension: 4-    Left Ankle/Foot   Dorsiflexion: 4  Plantar flexion: 4-    Right Ankle/Foot   Dorsiflexion: 4  Plantar flexion: 4-    Ambulation     Observational Gait   Gait: antalgic   Decreased walking speed and stride length  Quality of Movement During Gait   Trunk  Forward lean  Knee    Knee (Left): Positive stiff knee  Flowsheet Rows      Most Recent Value   PT/OT G-Codes   Current Score  31   Projected Score  50             Precautions: osteoporosis, h/o L hip ORIF and L humeral head fracture      Manuals 1/28                                       Neuro Re-Ed                sidestepping                                                Ther Ex                Nu step  x12'       pulleys x5'               SLR x 3        Ham curls        HR                LAQ        Ball squeezes        scap retraction                Hip flexor stretch? L?- standing back extension with overpressure?                                                 Ther Activity                        Gait Training        With [facility]?- when ready                Modalities

## 2021-02-02 ENCOUNTER — APPOINTMENT (OUTPATIENT)
Dept: PHYSICAL THERAPY | Age: 86
End: 2021-02-02
Payer: MEDICARE

## 2021-02-09 ENCOUNTER — OFFICE VISIT (OUTPATIENT)
Dept: PHYSICAL THERAPY | Age: 86
End: 2021-02-09
Payer: MEDICARE

## 2021-02-09 DIAGNOSIS — M25.552 LEFT HIP PAIN: ICD-10-CM

## 2021-02-09 DIAGNOSIS — R26.9 GAIT ABNORMALITY: Primary | ICD-10-CM

## 2021-02-09 PROCEDURE — 97110 THERAPEUTIC EXERCISES: CPT | Performed by: PHYSICAL THERAPIST

## 2021-02-09 NOTE — PROGRESS NOTES
Daily Note     Today's date: 2021  Patient name: Emir Recinos  : 1930  MRN: [id number]  Referring provider: Mariann Smallwood MD  Dx:   Encounter Diagnosis     ICD-10-CM    1  Gait abnormality  R26 9    2  Left hip pain  M25 552                   Subjective: No new complaints today  Ready to start her therapy, has missed ~ 2 weeks d/t poor weather and scheduling availability with MD appts  Objective: See treatment diary below      Assessment: Patient feeling good with TE today, feeling ready to try harder activities and progress to additional strengthening TE  Patient fatigued post exercising today  Challenged with supine SLR, fatigues easily after 10 reps  Needs cuing and guidance throughout exercise program       Plan: Continue per plan of care  Precautions: osteoporosis, h/o L hip ORIF and L humeral head fracture      Manuals                                       Neuro Re-Ed                sidestepping  1 5# // bars, 5x                                              Ther Ex                Nu step  x12' x10' L1      pulleys x5' x5'        UBE 3/3                              SLR x 3, stand  1 5# 2x10      Ham curls  1 5# 2x10      HR  2x10              LAQ  1 5# 2x10      Ball squeezes  2x10 :03      scap retraction  2x10 :03              Hip flexor stretch? L?- standing back extension with overpressure?                   TB rows and extension, standing Red x10 MIN A                LTR to R 10x:05                Sup cane flex/CP 2# 2x10        Sup SLR flex 10x ea        SAQ 2# 2x10        Bridges 10x                                                                              Ther Activity                        Gait Training        With [facility]?- when ready                Modalities

## 2021-02-11 ENCOUNTER — OFFICE VISIT (OUTPATIENT)
Dept: PHYSICAL THERAPY | Age: 86
End: 2021-02-11
Payer: MEDICARE

## 2021-02-11 DIAGNOSIS — R26.9 GAIT ABNORMALITY: Primary | ICD-10-CM

## 2021-02-11 DIAGNOSIS — M25.552 LEFT HIP PAIN: ICD-10-CM

## 2021-02-11 PROCEDURE — 97112 NEUROMUSCULAR REEDUCATION: CPT | Performed by: PHYSICAL THERAPIST

## 2021-02-11 PROCEDURE — 97110 THERAPEUTIC EXERCISES: CPT | Performed by: PHYSICAL THERAPIST

## 2021-02-11 NOTE — PROGRESS NOTES
Daily Note     Today's date: 2021  Patient name: Mavis Bender  : 1930  MRN: [id number]  Referring provider: Michel Harris MD  Dx:   Encounter Diagnosis     ICD-10-CM    1  Gait abnormality  R26 9    2  Left hip pain  M25 552                   Subjective: Patient reports that she's fatigued, was up and getting her blood work earlier today  Reports that she feels fine when she's here in PT, has more pain when she's home, resting  Objective: See treatment diary below      Assessment: Progressed program to new TE and increased length with sidestepping, increased time with UBE  Patient c/o increased lateral proximal thigh pain with lying flat position today, unable to tolerate this position for supine SLR flex, held exercise for today  Patient feeling good challenge today with program  No pain with TE other than lying supine  Requires cuing and facilitation for each exercise to perform correctly  Plan: Continue per plan of care  Precautions: osteoporosis, h/o L hip ORIF and L humeral head fracture      Manuals                                      Neuro Re-Ed                sidestepping  1 5# // bars, 5x 1 5# 3x ballet bar                                             Ther Ex                Nu step  x12' x10' L1 x10' L1     pulleys x5' x5' x5'       UBE 3/3 4/                             SLR x 3, stand  1 5# 2x10 1 5# 2x10     Ham curls  1 5# 2x10 1 5# 2x10     HR  2x10 2x10             LAQ  1 5# 2x10 1 5# 2x10     Ball squeezes  2x10 :03 2x10 :03     scap retraction  2x10 :03 2x10 :03             Hip flexor stretch? L?- standing back extension with overpressure?                   TB rows and extension, standing Red x10 MIN A NT               LTR to R 10x:05 NT- too painful after attempt at SLR flex               Sup cane flex/CP 2# 2x10 2# 2x10       Sup SLR flex 10x ea Too painful, held       SAQ 2# 2x10 NT       Bridges 10x 2x10 Ther Activity                        Gait Training        With [facility]?- when ready                Modalities

## 2021-02-16 ENCOUNTER — OFFICE VISIT (OUTPATIENT)
Dept: PHYSICAL THERAPY | Age: 86
End: 2021-02-16
Payer: MEDICARE

## 2021-02-16 DIAGNOSIS — M25.552 LEFT HIP PAIN: ICD-10-CM

## 2021-02-16 DIAGNOSIS — R26.9 GAIT ABNORMALITY: Primary | ICD-10-CM

## 2021-02-16 PROCEDURE — 97110 THERAPEUTIC EXERCISES: CPT

## 2021-02-16 NOTE — PROGRESS NOTES
Daily Note     Today's date: 2021  Patient name: Melisa Clarke  : 1930  MRN: [id number]  Referring provider: Jose Raul Rahman MD  Dx:   Encounter Diagnosis     ICD-10-CM    1  Gait abnormality  R26 9    2  Left hip pain  M25 552                   Subjective: Patient reports that she had no ill effects from last tx   Pt expresses frustration that she cannot walk normally and has to use walker and she wishes she didn't have to use the walker      Objective: See treatment diary below  Sofie Pearson for time [phone] today      Assessment: Progressed program weight and reps with ex  Today which challenged pt's strength  L LE weaker than R with all ex  The R LE weakness does limit safe walking without walker as L LE may buckle  Will continue to work toward pt goal of more safe ambulation  Plan: Continue per plan of care  Precautions: osteoporosis, h/o L hip ORIF and L humeral head fracture      Manuals                                     Neuro Re-Ed                sidestepping  1 5# // bars, 5x 1 5# 3x ballet bar 2# x5 at bars                                            Ther Ex                Nu step  x12' x10' L1 x10' L1 x10m L1    pulleys x5' x5' x5' x5      UBE 3/3 4/ 4/4                            SLR x 3, stand  1 5# 2x10 1 5# 2x10 2# 3x10    Ham curls  1 5# 2x10 1 5# 2x10 2#3x10    HR  2x10 2x10 2x10            LAQ  1 5# 2x10 1 5# 2x10 2# 3x10    Ball squeezes  2x10 :03 2x10 :03 2x10     scap retraction  2x10 :03 2x10 :03 2x10            Hip flexor stretch? L?- standing back extension with overpressure?                   TB rows and extension, standing Red x10 MIN A NT NT              LTR to R 10x:05 NT- too painful after attempt at SLR flex               Sup cane flex/CP 2# 2x10 2# 2x10 Seated 2# 2x15 ea      Sup SLR flex 10x ea Too painful, held Sup SLR 1 5# x30R 20 L      SAQ 2# 2x10 NT SAQ 3# 2x15      Bridges 10x 2x10 2x10 Ther Activity                        Gait Training        With [facility]?- when ready                Modalities

## 2021-02-18 ENCOUNTER — APPOINTMENT (OUTPATIENT)
Dept: PHYSICAL THERAPY | Age: 86
End: 2021-02-18
Payer: MEDICARE

## 2021-02-19 ENCOUNTER — APPOINTMENT (OUTPATIENT)
Dept: PHYSICAL THERAPY | Age: 86
End: 2021-02-19
Payer: MEDICARE

## 2021-02-23 ENCOUNTER — OFFICE VISIT (OUTPATIENT)
Dept: PHYSICAL THERAPY | Age: 86
End: 2021-02-23
Payer: MEDICARE

## 2021-02-23 DIAGNOSIS — M25.552 LEFT HIP PAIN: ICD-10-CM

## 2021-02-23 DIAGNOSIS — R26.9 GAIT ABNORMALITY: Primary | ICD-10-CM

## 2021-02-23 PROCEDURE — 97110 THERAPEUTIC EXERCISES: CPT

## 2021-02-23 NOTE — PROGRESS NOTES
Daily Note     Today's date: 2021  Patient name: Marina Burkett  : 1930  MRN: [id number]  Referring provider: Amy Allison MD  Dx:   Encounter Diagnosis     ICD-10-CM    1  Gait abnormality  R26 9    2  Left hip pain  M25 552                   Subjective: Patient reports that she feels L LE is weaker than R and doesn't think it will ever get better      Objective: See treatment diary below  Assessment: Progressed program weight and reps with ex  Remains challenged with pt's strength  L LE weaker than R with all ex  The L LE weakness does limit safe walking without walker as L LE may buckle  Will continue to work toward pt goal of more safe ambulation  Plan: Continue per plan of care  Precautions: osteoporosis, h/o L hip ORIF and L humeral head fracture      Manuals  2/2                                   Neuro Re-Ed                sidestepping  1 5# // bars, 5x 1 5# 3x ballet bar 2# x5 at bars 2# x5 at bars                                           Ther Ex                Nu step  x12' x10' L1 x10' L1 x10m L1 10m L1   pulleys x5' x5' x5' x5 x4     UBE 3/3 4/4 4/4 4/4                           SLR x 3, stand  1 5# 2x10 1 5# 2x10 2# 3x10 2# 3x10   Ham curls  1 5# 2x10 1 5# 2x10 2#3x10 2# 3x10   HR  2x10 2x10 2x10 2x10           LAQ  1 5# 2x10 1 5# 2x10 2# 3x10 2# 3x10   Ball squeezes  2x10 :03 2x10 :03 2x10  2x10   scap retraction  2x10 :03 2x10 :03 2x10            Hip flexor stretch? L?- standing back extension with overpressure?                   TB rows and extension, standing Red x10 MIN A NT NT NT             LTR to R 10x:05 NT- too painful after attempt at SLR flex               Sup cane flex/CP 2# 2x10 2# 2x10 Seated 2# 2x15 ea Seated 2# 2x15     Sup SLR flex 10x ea Too painful, held Sup SLR 1 5# x30R 20 L Supine SLR 1 5# 20 ea     SAQ 2# 2x10 NT SAQ 3# 2x15 SAQ 3# 2x15     Bridges 10x 2x10 2x10 2x10 Ther Activity                        Gait Training        With [facility]?- when ready                Modalities

## 2021-02-25 ENCOUNTER — APPOINTMENT (OUTPATIENT)
Dept: PHYSICAL THERAPY | Age: 86
End: 2021-02-25
Payer: MEDICARE

## 2021-03-02 ENCOUNTER — TRANSCRIBE ORDERS (OUTPATIENT)
Dept: PHYSICAL THERAPY | Age: 86
End: 2021-03-02

## 2021-03-02 ENCOUNTER — EVALUATION (OUTPATIENT)
Dept: PHYSICAL THERAPY | Age: 86
End: 2021-03-02
Payer: MEDICARE

## 2021-03-02 DIAGNOSIS — R26.9 ABNORMALITY OF GAIT: Primary | ICD-10-CM

## 2021-03-02 DIAGNOSIS — M25.552 LEFT HIP PAIN: ICD-10-CM

## 2021-03-02 DIAGNOSIS — R26.9 GAIT ABNORMALITY: Primary | ICD-10-CM

## 2021-03-02 PROCEDURE — 97110 THERAPEUTIC EXERCISES: CPT | Performed by: PHYSICAL THERAPIST

## 2021-03-02 PROCEDURE — 97112 NEUROMUSCULAR REEDUCATION: CPT | Performed by: PHYSICAL THERAPIST

## 2021-03-02 NOTE — LETTER
2021    Jose Savage, [facility] [facility] [address]    Patient: Rudie Jeans   YOB: 1930   Date of Visit: 3/2/2021     Encounter Diagnosis     ICD-10-CM    1  Gait abnormality  R26 9    2  Left hip pain  M25 552        Dear Dr Collin Case: Thank you for your recent referral of Rudie Jeans  Please review the attached evaluation summary from Tanya's recent visit  Please verify that you agree with the plan of care by signing the attached order  If you have any questions or concerns, please do not hesitate to call  I sincerely appreciate the opportunity to share in the care of one of your patients and hope to have another opportunity to work with you in the near future  Sincerely,    Melissa Hidalgo, PT      Referring Provider:      I certify that I have read the below Plan of Care and certify the need for these services furnished under this plan of treatment while under my care  Jose Savage MD  [address] [address]  Via Fax: [phone]          PT Re-Evaluation     Today's date: 3/2/2021  Patient name: Rudie Jeans  : 1930  MRN: [id number]  Referring provider: Jesus Juares MD  Dx:   Encounter Diagnosis     ICD-10-CM    1  Gait abnormality  R26 9    2  Left hip pain  M25 552                   Assessment  Assessment details: Patient seen for PT re-assessment for gait, imbalance, hip pain  Patient is making progress with strength, continues to present with guarded hip extension and decreased flexion range, increased pain at rest and with activity  Patient has been compliant with HEP at home  Patient also notes that she will often try to get outside to walk in her driveway- [facility] [place] 1[place], has had some weeks where she hasn't been able to get outside because of the snow (missed ~ 3 weeks), plans to get outside tomorrow   Patient demonstrates improvement in strength since last re-assessment, would benefit to continue with PT to continue to progress ROM, strength, gait, posture and balance  Impairments: abnormal gait, abnormal or restricted ROM, activity intolerance, impaired balance, impaired physical strength, lacks appropriate home exercise program, pain with function, poor posture  and poor body mechanics  Functional limitations: Patient reports hip and thigh pain and feels weakness with walking, sit>stand, with stairclimbing, with IADLs, with recreational activities, and with bed mobility and transfers  Understanding of Dx/Px/POC: good   Prognosis: good    Goals  Impairment Goals to be met within 4 weeks  - Decrease pain to 0/10 at rest and 3/10 at worst not met  - Improve ROM to [facility] lumbar flexion and extension progressing  - Increase strength to 4/5 throughout B LEs progressing, partially met  - Improve upright posture progressing    Functional Goals to be met within 4-6 weeks  - Return to Prior Level of Function progressing  - Increase Functional Status Measure to: expected progressing  - Patient will be independent with HEP progressing  - Patient to be able to tolerate sit<>stand transfers without pain progressing  - Patient to be able to progress to tolerating lying in the bed again  progressing      Plan  Patient would benefit from: skilled physical therapy  Planned modality interventions: cryotherapy and thermotherapy: hydrocollator packs  Planned therapy interventions: abdominal trunk stabilization, manual therapy, neuromuscular re-education, patient education, postural training, strengthening, stretching, therapeutic activities, therapeutic exercise, home exercise program, gait training, balance and body mechanics training  Frequency: 2x week  Duration in weeks: 6  Treatment plan discussed with: patient        Subjective Evaluation    History of Present Illness  Mechanism of injury: Patient referred to PT from PCP with primary concern of balance, gait and hip pain  Patient with history of falls with 2 fractures- 2 separate falls, hip and shoulder  Patient had surgery for hip ORIF  Patient has been sleeping in a recliner at home because of her hip pain with sleeping at night  Patient also feels sleeping in a recliner is easier for her to get up in the night if she needs  Patient reports most of her hip pain is with walking, stair climbing  Notes that she does the steps backwards step to pattern for balance and her fear of falling; wanting to prevent falls  Patient uses a quad cane in the home, a RW when outside the home  Pain  Current pain rating: 3  At best pain ratin  At worst pain ratin  Location: L hip  Quality: dull ache and sharp  Aggravating factors: standing, walking, stair climbing and lifting  Progression: no change    Social Support  Steps to enter house: yes  Stairs in house: yes   Lives in: multiple-level home  Lives with: spouse    Employment status: not working    Diagnostic Tests  No diagnostic tests performed  Treatments  Previous treatment: physical therapy  Patient Goals  Patient goals for therapy: increased strength, return to sport/leisure activities, increased motion, decreased pain and improved balance          Objective     Active Range of Motion   Left Shoulder   Flexion: 140 degrees with pain  Extension: 40 degrees   Abduction: 130 degrees   External rotation BTH: Active external rotation behind the head: top of the head  Internal rotation 90°: Left shoulder active internal rotation at 90 degrees: to lateral hip  Right Shoulder   Flexion: 150 degrees   Extension: 50 degrees   Abduction: 145 degrees   External rotation BTH: Active external rotation behind the head: just behind the head/C2 level       Left Elbow   Normal active range of motion    Right Elbow   Normal active range of motion    Lumbar   Flexion:  with pain Restriction level: minimal  Extension:  with pain Restriction level: moderate  Left lateral flexion:  Restriction level: moderate  Right lateral flexion:  Restriction level: moderate  Left rotation:  Restriction level: moderate  Right rotation:  Restriction level: moderate  Left Hip   Flexion: 95 degrees with pain  Extension: 0 degrees with pain  Abduction: WFL  Adduction: WFL    Strength/Myotome Testing     Left Shoulder     Planes of Motion   Flexion: 3+   Abduction: 3+   External rotation at 0°: 3+   Internal rotation at 0°: 3+     Right Shoulder     Planes of Motion   Flexion: 3+   Abduction: 3+   External rotation at 0°: 3+   Internal rotation at 0°: 3+     Left Elbow   Flexion: 4  Extension: 4-    Right Elbow   Flexion: 4  Extension: 4-    Left Hip   Planes of Motion   Flexion: 4-  Extension: 3+  Abduction: 3+  Adduction: 4+    Right Hip   Planes of Motion   Flexion: 4  Extension: 4  Abduction: 4-  Adduction: 4    Left Knee   Flexion: 4  Extension: 4-    Right Knee   Flexion: 4  Extension: 4    Left Ankle/Foot   Dorsiflexion: 4+  Plantar flexion: 4-    Right Ankle/Foot   Dorsiflexion: 4+  Plantar flexion: 4-    Ambulation     Observational Gait   Gait: antalgic   Decreased walking speed and stride length  Quality of Movement During Gait   Trunk  Forward lean  Knee    Knee (Left): Positive stiff knee  Functional Assessment        Comments  TUG time 15 sec with use of RW for balance, with 5 errors- slow speed, difficulty with turning managing her RW, increased steps to complete turn, takes 7 small steps to turn, uses hands to assist with lifting self in to upright position  SLS 0 sec ea without UE assist  SLS with hand support 10 sec before hip starts to have pain - on L, no pain on R, able to stand x 12 sec before fatigued  Sit to stand test, performs 5 reps at chair height, with UE assist in 30 seconds  Patient with h/o orthostatic hypotension, needs to perform slowly                Precautions: osteoporosis, h/o L hip ORIF and L humeral head fracture      Manuals 1/28 2/9 2/11 2/16 3/2 Neuro Re-Ed                sidestepping  1 5# // bars, 5x 1 5# 3x ballet bar 2# x5 at bars 2# x5 at bars                                           Ther Ex                Nu step  x12' x10' L1 x10' L1 x10m L1 10m L1   pulleys x5' x5' x5' x5 x5'     UBE 3/3 4/4 4/4 4/4                           SLR x 3, stand  1 5# 2x10 1 5# 2x10 2# 3x10 2# 3x10   Ham curls  1 5# 2x10 1 5# 2x10 2#3x10 2# 3x10   HR  2x10 2x10 2x10 2x10           LAQ  1 5# 2x10 1 5# 2x10 2# 3x10 2# 3x10   Ball squeezes  2x10 :03 2x10 :03 2x10  2x10   scap retraction  2x10 :03 2x10 :03 2x10 2x10 :03           Hip flexor stretch? L?- standing back extension with overpressure?                   TB rows and extension, standing Red x10 MIN A NT NT NT             LTR to R 10x:05 NT- too painful after attempt at SLR flex  -             Sup cane flex/CP 2# 2x10 2# 2x10 Seated 2# 2x15 ea Seated 2# 2x15     Sup SLR flex 10x ea Too painful, held Sup SLR 1 5# x30R 20 L Supine SLR 1 5# 20 ea- NT     SAQ 2# 2x10 NT SAQ 3# 2x15 SAQ 3# 2x15- NT     Bridges 10x 2x10 2x10 2x10 - NT                                                                           Ther Activity                        Gait Training        With [facility]?- when ready                Modalities

## 2021-03-02 NOTE — PROGRESS NOTES
PT Re-Evaluation     Today's date: 3/2/2021  Patient name: Mary Del Toro  : 1930  MRN: [id number]  Referring provider: Elias Lopez MD  Dx:   Encounter Diagnosis     ICD-10-CM    1  Gait abnormality  R26 9    2  Left hip pain  M25 552                   Assessment  Assessment details: Patient seen for PT re-assessment for gait, imbalance, hip pain  Patient is making progress with strength, continues to present with guarded hip extension and decreased flexion range, increased pain at rest and with activity  Patient has been compliant with HEP at home  Patient also notes that she will often try to get outside to walk in her driveway- tries for 1x/week, has had some weeks where she hasn't been able to get outside because of the snow (missed ~ 3 weeks), plans to get outside tomorrow  Patient demonstrates improvement in strength since last re-assessment, would benefit to continue with PT to continue to progress ROM, strength, gait, posture and balance  Impairments: abnormal gait, abnormal or restricted ROM, activity intolerance, impaired balance, impaired physical strength, lacks appropriate home exercise program, pain with function, poor posture  and poor body mechanics  Functional limitations: Patient reports hip and thigh pain and feels weakness with walking, sit>stand, with stairclimbing, with IADLs, with recreational activities, and with bed mobility and transfers  Understanding of Dx/Px/POC: good   Prognosis: good    Goals  Impairment Goals to be met within 4 weeks  - Decrease pain to 0/10 at rest and 3/10 at worst not met  - Improve ROM to [facility] lumbar flexion and extension progressing  - Increase strength to 4/5 throughout B LEs progressing, partially met  - Improve upright posture progressing    Functional Goals to be met within 4-6 weeks     - Return to Prior Level of Function progressing  - Increase Functional Status Measure to: expected progressing  - Patient will be independent with HEP progressing  - Patient to be able to tolerate sit<>stand transfers without pain progressing  - Patient to be able to progress to tolerating lying in the bed again  progressing      Plan  Patient would benefit from: skilled physical therapy  Planned modality interventions: cryotherapy and thermotherapy: hydrocollator packs  Planned therapy interventions: abdominal trunk stabilization, manual therapy, neuromuscular re-education, patient education, postural training, strengthening, stretching, therapeutic activities, therapeutic exercise, home exercise program, gait training, balance and body mechanics training  Frequency: 2x week  Duration in weeks: 6  Treatment plan discussed with: patient        Subjective Evaluation    History of Present Illness  Mechanism of injury: Patient referred to PT from PCP with primary concern of balance, gait and hip pain  Patient with history of falls with 2 fractures- 2 separate falls, hip and shoulder  Patient had surgery for hip ORIF  Patient has been sleeping in a recliner at home because of her hip pain with sleeping at night  Patient also feels sleeping in a recliner is easier for her to get up in the night if she needs  Patient reports most of her hip pain is with walking, stair climbing  Notes that she does the steps backwards step to pattern for balance and her fear of falling; wanting to prevent falls  Patient uses a quad cane in the home, a RW when outside the home         Pain  Current pain rating: 3  At best pain ratin  At worst pain ratin  Location: L hip  Quality: dull ache and sharp  Aggravating factors: standing, walking, stair climbing and lifting  Progression: no change    Social Support  Steps to enter house: yes  Stairs in house: yes   Lives in: multiple-level home  Lives with: spouse    Employment status: not working    Diagnostic Tests  No diagnostic tests performed  Treatments  Previous treatment: physical therapy  Patient Goals  Patient goals for therapy: increased strength, return to sport/leisure activities, increased motion, decreased pain and improved balance          Objective     Active Range of Motion   Left Shoulder   Flexion: 140 degrees with pain  Extension: 40 degrees   Abduction: 130 degrees   External rotation BTH: Active external rotation behind the head: top of the head  Internal rotation 90°: Left shoulder active internal rotation at 90 degrees: to lateral hip  Right Shoulder   Flexion: 150 degrees   Extension: 50 degrees   Abduction: 145 degrees   External rotation BTH: Active external rotation behind the head: just behind the head/C2 level       Left Elbow   Normal active range of motion    Right Elbow   Normal active range of motion    Lumbar   Flexion:  with pain Restriction level: minimal  Extension:  with pain Restriction level: moderate  Left lateral flexion:  Restriction level: moderate  Right lateral flexion:  Restriction level: moderate  Left rotation:  Restriction level: moderate  Right rotation:  Restriction level: moderate  Left Hip   Flexion: 95 degrees with pain  Extension: 0 degrees with pain  Abduction: WFL  Adduction: WFL    Strength/Myotome Testing     Left Shoulder     Planes of Motion   Flexion: 3+   Abduction: 3+   External rotation at 0°: 3+   Internal rotation at 0°: 3+     Right Shoulder     Planes of Motion   Flexion: 3+   Abduction: 3+   External rotation at 0°: 3+   Internal rotation at 0°: 3+     Left Elbow   Flexion: 4  Extension: 4-    Right Elbow   Flexion: 4  Extension: 4-    Left Hip   Planes of Motion   Flexion: 4-  Extension: 3+  Abduction: 3+  Adduction: 4+    Right Hip   Planes of Motion   Flexion: 4  Extension: 4  Abduction: 4-  Adduction: 4    Left Knee   Flexion: 4  Extension: 4-    Right Knee   Flexion: 4  Extension: 4    Left Ankle/Foot   Dorsiflexion: 4+  Plantar flexion: 4-    Right Ankle/Foot   Dorsiflexion: 4+  Plantar flexion: 4-    Ambulation     Observational Gait   Gait: antalgic Decreased walking speed and stride length  Quality of Movement During Gait   Trunk  Forward lean  Knee    Knee (Left): Positive stiff knee  Functional Assessment        Comments  TUG time 15 sec with use of RW for balance, with 5 errors- slow speed, difficulty with turning managing her RW, increased steps to complete turn, takes 7 small steps to turn, uses hands to assist with lifting self in to upright position  SLS 0 sec ea without UE assist  SLS with hand support 10 sec before hip starts to have pain - on L, no pain on R, able to stand x 12 sec before fatigued  Sit to stand test, performs 5 reps at chair height, with UE assist in 30 seconds  Patient with h/o orthostatic hypotension, needs to perform slowly  Precautions: osteoporosis, h/o L hip ORIF and L humeral head fracture      Manuals 1/28 2/9 2/11 2/16 3/2                                   Neuro Re-Ed                sidestepping  1 5# // bars, 5x 1 5# 3x ballet bar 2# x5 at bars 2# x5 at bars                                           Ther Ex                Nu step  x12' x10' L1 x10' L1 x10m L1 10m L1   pulleys x5' x5' x5' x5 x5'     UBE 3/3 4/4 4/4 4/4                           SLR x 3, stand  1 5# 2x10 1 5# 2x10 2# 3x10 2# 3x10   Ham curls  1 5# 2x10 1 5# 2x10 2#3x10 2# 3x10   HR  2x10 2x10 2x10 2x10           LAQ  1 5# 2x10 1 5# 2x10 2# 3x10 2# 3x10   Ball squeezes  2x10 :03 2x10 :03 2x10  2x10   scap retraction  2x10 :03 2x10 :03 2x10 2x10 :03           Hip flexor stretch? L?- standing back extension with overpressure?                   TB rows and extension, standing Red x10 MIN A NT NT NT             LTR to R 10x:05 NT- too painful after attempt at SLR flex  -             Sup cane flex/CP 2# 2x10 2# 2x10 Seated 2# 2x15 ea Seated 2# 2x15     Sup SLR flex 10x ea Too painful, held Sup SLR 1 5# x30R 20 L Supine SLR 1 5# 20 ea- NT     SAQ 2# 2x10 NT SAQ 3# 2x15 SAQ 3# 2x15- NT     Bridges 10x 2x10 2x10 2x10 - NT Ther Activity                        Gait Training        With [facility]?- when ready                Modalities

## 2021-03-04 ENCOUNTER — OFFICE VISIT (OUTPATIENT)
Dept: PHYSICAL THERAPY | Age: 86
End: 2021-03-04
Payer: MEDICARE

## 2021-03-04 DIAGNOSIS — M25.552 LEFT HIP PAIN: ICD-10-CM

## 2021-03-04 DIAGNOSIS — R26.9 GAIT ABNORMALITY: Primary | ICD-10-CM

## 2021-03-04 PROCEDURE — 97110 THERAPEUTIC EXERCISES: CPT

## 2021-03-04 NOTE — PROGRESS NOTES
Daily Note     Today's date: 3/4/2021  Patient name: Judi Hernandez  : 1930  MRN: [id number]  Referring provider: Mirian Blanchard MD  Dx:   Encounter Diagnosis     ICD-10-CM    1  Gait abnormality  R26 9    2  Left hip pain  M25 552                   Subjective: Reports that L hip feels "pretty good" today      Objective: See treatment diary below      Assessment: Tolerated treatment well  Challenged with endurance with current routine  May look to add rises out oh chair for more function to help pt get out of chair  Can benefit from pt for functional strengthening so pt can aid  with ADL's at home      Plan: Cont PT per LPT plan     Precautions: osteoporosis, h/o L hip ORIF and L humeral head fracture      Manuals 3/6 2/9 2/11 2/16 3/2                                   Neuro Re-Ed                sidestepping 2# x3 at bars 1 5# // bars, 5x 1 5# 3x ballet bar 2# x5 at bars 2# x5 at bars                                           Ther Ex                Nu step  x12' x10' L1 x10' L1 x10m L1 10m L1   pulleys x5' x5' x5' x5 x5'    UBE UBE 3/3 4/4 4/4 4/4                           SLR x 3, stand 2# 3x10 1 5# 2x10 1 5# 2x10 2# 3x10 2# 3x10   Ham curls 2# 3x10 1 5# 2x10 1 5# 2x10 2#3x10 2# 3x10   HR 2x10 2x10 2x10 2x10 2x10   Stand hip flexion 2# 3x10       LAQ  1 5# 2x10 1 5# 2x10 2# 3x10 2# 3x10   Ball squeezes  2x10 :03 2x10 :03 2x10  2x10   scap retraction  2x10 :03 2x10 :03 2x10 2x10 :03           Hip flexor stretch? L?- standing back extension with overpressure?                   TB rows and extension, standing Red x10 MIN A NT NT NT             LTR to R 10x:05 NT- too painful after attempt at SLR flex  -           Seated cane flex/CP 2# 3x10 Sup cane flex/CP 2# 2x10 2# 2x10 Seated 2# 2x15 ea Seated 2# 2x15   Seated bicep& hammer curls 2# 3x 10ea Sup SLR flex 10x ea Too painful, held Sup SLR 1 5# x30R 20 L Supine SLR 1 5# 20 ea- NT     SAQ 2# 2x10 NT SAQ 3# 2x15 SAQ 3# 2x15- NT     Bridges 10x 2x10 2x10 2x10 - NT                                                                           Ther Activity                        Gait Training        With [facility]?- when ready                Modalities

## 2021-03-05 ENCOUNTER — APPOINTMENT (OUTPATIENT)
Dept: PHYSICAL THERAPY | Age: 86
End: 2021-03-05
Payer: MEDICARE

## 2021-03-09 ENCOUNTER — OFFICE VISIT (OUTPATIENT)
Dept: PHYSICAL THERAPY | Age: 86
End: 2021-03-09
Payer: MEDICARE

## 2021-03-09 DIAGNOSIS — R26.9 GAIT ABNORMALITY: Primary | ICD-10-CM

## 2021-03-09 DIAGNOSIS — M25.552 LEFT HIP PAIN: ICD-10-CM

## 2021-03-09 PROCEDURE — 97110 THERAPEUTIC EXERCISES: CPT

## 2021-03-09 NOTE — PROGRESS NOTES
Daily Note     Today's date: 3/9/2021  Patient name: Rosi Harrington  : 1930  MRN: [id number]  Referring provider: Prabhjot Yeung MD  Dx:   Encounter Diagnosis     ICD-10-CM    1  Gait abnormality  R26 9    2  Left hip pain  M25 552                   Subjective: Reports that L hip feels is not as painful today  " It is a good day"      Objective: See treatment diary below      Assessment: Tolerated treatment well  Challenged with endurance with current routine  May look to add rises out of chair for more function to help pt get out of chair  Can benefit from pt for functional strengthening so pt can aid  with ADL's at home      Plan: Cont PT per LPT plan     Precautions: osteoporosis, h/o L hip ORIF and L humeral head fracture      Manuals 3/6 3/9 2/11 2/16 3/2                                   Neuro Re-Ed                sidestepping 2# x3 at bars 2# // bars, 5x 1 5# 3x ballet bar 2# x5 at bars 2# x5 at bars                                           Ther Ex                Nu step  x12' x10' L1 x10' L1 x10m L1 10m L1   pulleys x5' x5' x5' x5 x5'    UBE 5/5 UBE 5/5 4/4 4/4 4/4                           SLR x 3, stand 2# 3x10 2# 3x10 1 5# 2x10 2# 3x10 2# 3x10   Ham curls 2# 3x10 2# 3x10 1 5# 2x10 2#3x10 2# 3x10   HR 2x10 2x10 2x10 2x10 2x10   Stand hip flexion 2# 3x10       LAQ  2# 3x10 1 5# 2x10 2# 3x10 2# 3x10   Ball squeezes   2x10 :03 2x10  2x10   scap retraction   2x10 :03 2x10 2x10 :03           Hip flexor stretch? L?- standing back extension with overpressure?                    NT NT NT              NT- too painful after attempt at SLR flex  -           Seated cane flex/CP 2# 3x10  2# 3x10 2# 2x10 Seated 2# 2x15 ea Seated 2# 2x15   Seated bicep& hammer curls 2# 3x 10ea 2# 3x10 Too painful, held Sup SLR 1 5# x30R 20 L Supine SLR 1 5# 20 ea- NT     SAQ 2# 2x10- NT NT SAQ 3# 2x15 SAQ 3# 2x15- NT     Bridges 10x- NT 2x10 2x10 2x10 - NT Ther Activity                        Gait Training        With [facility]?- when ready                Modalities

## 2021-03-11 ENCOUNTER — OFFICE VISIT (OUTPATIENT)
Dept: PHYSICAL THERAPY | Age: 86
End: 2021-03-11
Payer: MEDICARE

## 2021-03-11 DIAGNOSIS — M25.552 LEFT HIP PAIN: ICD-10-CM

## 2021-03-11 DIAGNOSIS — R26.9 GAIT ABNORMALITY: Primary | ICD-10-CM

## 2021-03-11 PROCEDURE — 97110 THERAPEUTIC EXERCISES: CPT | Performed by: PHYSICAL THERAPIST

## 2021-03-11 NOTE — PROGRESS NOTES
Daily Note     Today's date: 3/11/2021  Patient name: Elsa Laguna  : 1930  MRN: [id number]  Referring provider: Shawnee Blankenship MD  Dx:   Encounter Diagnosis     ICD-10-CM    1  Gait abnormality  R26 9    2  Left hip pain  M25 552                   Subjective: Patient reports that she's feeling good today  Hasn't had a chance to get outside to walk yet, as it has been colder, but will try today  Objective: See treatment diary below      Assessment:  Patient fatigued with program today, tolerating 30 reps well with 2# weights  Challenged with L LE as patient is weaker here, needing cues to perform exercises correctly with standing TE d/t weakness at L compared to R  Less pain while exercising today  Still c/o pain with prolonged sitting at home and pain at night  Night pain 5/10, and varies from 3-5/10 during the day depending on her activity  Good use of RW with gait within clinic, improving safety with use of RW (ie with obstacle negotiation, backing up to the chair), etc        Plan: Continue per plan of care  Precautions: osteoporosis, h/o L hip ORIF and L humeral head fracture      Manuals 3/6 3/9 3/11 2/16 3/2                                   Neuro Re-Ed                sidestepping 2# x3 at bars 2# // bars, 5x NT 2# x5 at bars 2# x5 at bars                                           Ther Ex                Nu step  x12' x10' L1 x10' L1 x10m L1 10m L1   pulleys x5' x5' x5' x5 x5'    UBE 5/5 UBE 5/5 4/4 4/4 4/4                           SLR x 3, stand 2# 3x10 2# 3x10 2# 3x10 2# 3x10 2# 3x10   Ham curls 2# 3x10 2# 3x10 2# 3x10 2#3x10 2# 3x10   HR 2x10 2x10 2x10 2x10 2x10   Stand hip flexion 2# 3x10  - NT     LAQ  2# 3x10 2# 3x10 2# 3x10 2# 3x10   Ball squeezes    2x10  2x10   scap retraction    2x10 2x10 :03           Hip flexor stretch? L?- standing back extension with overpressure?                                      -           Seated cane flex/CP 2# 3x10  2# 3x10 2# 2310 Seated 2# 2x15 ea Seated 2# 2x15   Seated bicep& hammer curls 2# 3x 10ea 2# 3x10 2# 3x10 Sup SLR 1 5# x30R 20 L Supine SLR 1 5# 20 ea- NT     SAQ 2# 2x10- NT NT SAQ 3# 2x15 SAQ 3# 2x15- NT     Bridges 10x- NT 2x10 2x10 2x10 - NT                                                                           Ther Activity                        Gait Training        With [facility]?- when ready                Modalities

## 2021-03-12 ENCOUNTER — APPOINTMENT (OUTPATIENT)
Dept: PHYSICAL THERAPY | Age: 86
End: 2021-03-12
Payer: MEDICARE

## 2021-03-16 ENCOUNTER — OFFICE VISIT (OUTPATIENT)
Dept: PHYSICAL THERAPY | Age: 86
End: 2021-03-16
Payer: MEDICARE

## 2021-03-16 DIAGNOSIS — R26.9 GAIT ABNORMALITY: Primary | ICD-10-CM

## 2021-03-16 DIAGNOSIS — M25.552 LEFT HIP PAIN: ICD-10-CM

## 2021-03-16 PROCEDURE — 97110 THERAPEUTIC EXERCISES: CPT | Performed by: PHYSICAL THERAPIST

## 2021-03-16 NOTE — PROGRESS NOTES
Daily Note     Today's date: 3/16/2021  Patient name: Rosi Harrington  : 1930  MRN: [id number]  Referring provider: Prabhjot Yeung MD  Dx:   Encounter Diagnosis     ICD-10-CM    1  Gait abnormality  R26 9    2  Left hip pain  M25 552                   Subjective: Patient reports that she's feeling good today - does still c/o L hip pain and feels L LE is much weaker than her R side  Feels the shoulder motion is improving, will not be here this Thursday because of getting her 2nd COVID shot  Objective: See treatment diary below      Assessment: Patient complaining of clicking at lateral knee with LAQ exercise, palpated and appears to be IT band tightness causing pain  PT added stretching for IT band today, patient reporting less pain at lateral knee  May benefit from light [place] over lateral knee area  Good tolerance to exercises today  Patient fatigued with program, no increase in pain  Still challenged and working on hip/LE strength  Plan: Continue per plan of care  Precautions: osteoporosis, h/o L hip ORIF and L humeral head fracture      Manuals 3/6 3/9 3/11 3/16 3/2                                   Neuro Re-Ed                sidestepping 2# x3 at bars 2# // bars, 5x NT 2# x5 at bars 2# x5 at bars                                           Ther Ex                Nu step  x12' x10' L1 x10' L1 x12 m L1 10m L1   pulleys x5' x5' x5' x5 x5'    UBE 5/5 UBE 5/5 4/4 5/5 4/4                           SLR x 3, stand 2# 3x10 2# 3x10 2# 3x10 2# 3x10 2# 3x10   Ham curls 2# 3x10 2# 3x10 2# 3x10 2#3x10 2# 3x10   HR 2x10 2x10 2x10 2x10 2x10   Stand hip flexion 2# 3x10  - NT -    LAQ  2# 3x10 2# 3x10 2# 3x10 2# 3x10   Ball squeezes     2x10   scap retraction     2x10 :03           Hip flexor stretch? L?- standing back extension with overpressure?                                      -           Seated cane flex/CP 2# 3x10  2# 3x10 2# 2310 Seated 2# 2x15 ea Seated 2# 2x15   Seated bicep& hammer curls 2# 3x 10ea 2# 3x10 2# 3x10 Sup SLR 1 5# x30R 20 L Supine SLR 1 5# 20 ea- NT     SAQ 2# 2x10- NT NT SAQ 3# 2x15 SAQ 3# 2x15- NT     Bridges 10x- NT 2x10 2x10 2x10 - NT                                                                           Ther Activity                        Gait Training        With [facility]?- when ready                Modalities

## 2021-03-18 ENCOUNTER — APPOINTMENT (OUTPATIENT)
Dept: PHYSICAL THERAPY | Age: 86
End: 2021-03-18
Payer: MEDICARE

## 2021-03-23 ENCOUNTER — OFFICE VISIT (OUTPATIENT)
Dept: PHYSICAL THERAPY | Age: 86
End: 2021-03-23
Payer: MEDICARE

## 2021-03-23 DIAGNOSIS — M25.552 LEFT HIP PAIN: ICD-10-CM

## 2021-03-23 DIAGNOSIS — R26.9 GAIT ABNORMALITY: Primary | ICD-10-CM

## 2021-03-23 PROCEDURE — 97110 THERAPEUTIC EXERCISES: CPT

## 2021-03-23 NOTE — PROGRESS NOTES
Daily Note     Today's date: 3/23/2021  Patient name: Constantino Sanford  : 1930  MRN: [id number]  Referring provider: Severo Ask, MD  Dx:   Encounter Diagnosis     ICD-10-CM    1  Gait abnormality  R26 9    2  Left hip pain  M25 552                   Subjective: Patient reports no issues with 2nd Covid shot  Reports L hip is not too painful today  Pt still wants to improve her walking quality      Objective: See treatment diary below      Assessment: No c/o of knee pain today  Good tolerance to exercises today  Patient fatigued with program, no increase in pain  Still challenged and working on hip/LE strength  Plan: Continue per plan of care  Precautions: osteoporosis, h/o L hip ORIF and L humeral head fracture      Manuals 3/6 3/9 3/11 3/16 3/23                                   Neuro Re-Ed                sidestepping 2# x3 at bars 2# // bars, 5x NT 2# x5 at bars 2# x5 at bars                                           Ther Ex                Nu step  x12' x10' L1 x10' L1 x12 m L1 10m L1   pulleys x5' x5' x5' x5 x5'    UBE 5/5 UBE 5/5 4/4 5/5 4/4                           SLR x 3, stand 2# 3x10 2# 3x10 2# 3x10 2# 3x10 2# 3x10   Ham curls 2# 3x10 2# 3x10 2# 3x10 2#3x10 2# 3x10   HR 2x10 2x10 2x10 2x10 2x10   Stand hip flexion 2# 3x10  - NT -    LAQ  2# 3x10 2# 3x10 2# 3x10 2# 3x10   Ball squeezes     2x10   scap retraction     2x10 :03           Hip flexor stretch? L?- standing back extension with overpressure?                                      -           Seated cane flex/CP 2# 3x10  2# 3x10 2# 3x10 Seated 2# 2x15 ea Seated 2 5# 2x15   Seated bicep& hammer curls 2# 3x 10ea 2# 3x10 2# 3x10  2# 3x10     SAQ 2# 2x10- NT NT SAQ 3# 2x15      Bridges 10x- NT 2x10 2x10                                                                            Ther Activity                        Gait Training        With [facility]?- when ready                Modalities

## 2021-03-25 ENCOUNTER — OFFICE VISIT (OUTPATIENT)
Dept: PHYSICAL THERAPY | Age: 86
End: 2021-03-25
Payer: MEDICARE

## 2021-03-25 DIAGNOSIS — M25.552 LEFT HIP PAIN: ICD-10-CM

## 2021-03-25 DIAGNOSIS — R26.9 GAIT ABNORMALITY: Primary | ICD-10-CM

## 2021-03-25 PROCEDURE — 97110 THERAPEUTIC EXERCISES: CPT | Performed by: PHYSICAL THERAPIST

## 2021-03-25 NOTE — PROGRESS NOTES
Daily Note     Today's date: 3/25/2021  Patient name: Melisa Clarke  : 1930  MRN: [id number]  Referring provider: Jose Raul Rahman MD  Dx:   Encounter Diagnosis     ICD-10-CM    1  Gait abnormality  R26 9    2  Left hip pain  M25 552                   Subjective: Patient reports, " my pain isn't worth complaining about today " Patient feels good with exercises, feels therapy is helping her to walk better  Objective: See treatment diary below      Assessment: Progressed weight today with TE to 2 5#'s  Patient fatigued with increase in weights  Patient fatigued with current program today  Progressing well with strengthening, tolerance to exercises and with UE strength as well  Performed seated flexion with 2# weight vs use of the cane today  Plan: Continue per plan of care  Re-assessment next week  Precautions: osteoporosis, h/o L hip ORIF and L humeral head fracture      Manuals 3/25 3/9 3/11 3/16 3/23                                   Neuro Re-Ed                sidestepping 2 5# x5 at bars 2# // bars, 5x NT 2# x5 at bars 2# x5 at bars                                           Ther Ex                Nu step  x15' L1 x10' L1 x10' L1 x12 m L1 10m L1   pulleys x5' x5' x5' x5 x5'   UBE  UBE /5- NT UBE 5 / 5/5 4/4                           SLR x 3, stand 2 5# 3x10 2# 3x10 2# 3x10 2# 3x10 2# 3x10   Ham curls 2 5# 3x10 2# 3x10 2# 3x10 2#3x10 2# 3x10   HR 2x10 2x10 2x10 2x10 2x10   Stand hip flexion -  - NT -    squats 3x10               LAQ 2 5# 3x10 2# 3x10 2# 3x10 2# 3x10 2# 3x10   Ball squeezes 2x10    2x10   scap retraction 2x10:03    2x10 :03           Hip flexor stretch? L?- standing back extension with overpressure?                                      -   Shoulder scaption range, seated 2# 3x10       Seated cane flex/CP 2 5# 3x10- NT  2# 3x10 2# 3x10 Seated 2# 2x15 ea Seated 2 5# 2x15   Seated bicep& hammer curls 2# 3x 10ea 2# 3x10 2# 3x10  2# 3x10     SAQ 2# 2x10- NT NT SAQ 3# 2x15 Ottoniel 10x- NT 2x10 2x10                                                                            Ther Activity                        Gait Training        With [facility]?- when ready                Modalities

## 2021-03-30 ENCOUNTER — OFFICE VISIT (OUTPATIENT)
Dept: PHYSICAL THERAPY | Age: 86
End: 2021-03-30
Payer: MEDICARE

## 2021-03-30 DIAGNOSIS — R26.9 GAIT ABNORMALITY: Primary | ICD-10-CM

## 2021-03-30 DIAGNOSIS — M25.552 LEFT HIP PAIN: ICD-10-CM

## 2021-03-30 PROCEDURE — 97110 THERAPEUTIC EXERCISES: CPT | Performed by: PHYSICAL THERAPIST

## 2021-03-30 PROCEDURE — 97112 NEUROMUSCULAR REEDUCATION: CPT | Performed by: PHYSICAL THERAPIST

## 2021-03-30 NOTE — PROGRESS NOTES
Daily Note     Today's date: 3/30/2021  Patient name: Maame Case  : 1930  MRN: [id number]  Referring provider: Dyan Contreras MD  Dx:   Encounter Diagnosis     ICD-10-CM    1  Gait abnormality  R26 9    2  Left hip pain  M25 552                   Subjective: Patient reports that she's been feeling good today  Reports that she will be walking outside later today as it will be warm outside  Objective: See treatment diary below      Assessment: Continued with 2 5# weights today  Patient fatigued and challenged with current program  Great tolerance to exercises, resting between exercises as needed  No increase in pain with TE  Plan: Continue per plan of care  Re-assessment NV  Precautions: osteoporosis, h/o L hip ORIF and L humeral head fracture      Manuals 3/25 3/30 3/11 3/16 3/23                                   Neuro Re-Ed                sidestepping 2 5# x5 at bars 2 5# // bars, 5x NT 2# x5 at bars 2# x5 at bars                                           Ther Ex                Nu step  x15' L1 x15' L1 x10' L1 x12 m L1 10m L1   pulleys x5' x5' x5' x5 x5'   UBE  UBE 5/5- NT UBE 5/5- NT /4 5/5 4/4                           SLR x 3, stand 2 5# 3x10 2 5# 3x10 2# 3x10 2# 3x10 2# 3x10   Ham curls 2 5# 3x10 2 5# 3x10 2# 3x10 2#3x10 2# 3x10   HR 2x10 3x10 2x10 2x10 2x10   Stand hip flexion - 2 5# 30x - NT -    squats 3x10 3x10              LAQ 2 5# 3x10 2 5# 3x10 2# 3x10 2# 3x10 2# 3x10   Ball squeezes 2x10 2x10   2x10   scap retraction 2x10:03 2x10 :03   2x10 :03           Hip flexor stretch? L?- standing back extension with overpressure?                                      -   Shoulder scaption range, seated 2# 3x10 2# 3x10      Seated cane flex/CP 2 5# 3x10- NT  2# 3x10 2# 3x10 Seated 2# 2x15 ea Seated 2 5# 2x15   Seated bicep& hammer curls 2# 3x 10ea 2# 3x10 2# 3x10  2# 3x10     SAQ 2# 2x10- NT NT SAQ 3# 2x15      Bridges 10x- NT 2x10 2x10 Ther Activity                        Gait Training        With [facility]?- when ready                Modalities

## 2021-04-01 ENCOUNTER — APPOINTMENT (OUTPATIENT)
Dept: PHYSICAL THERAPY | Age: 86
End: 2021-04-01
Payer: MEDICARE

## 2021-04-06 ENCOUNTER — OFFICE VISIT (OUTPATIENT)
Dept: PHYSICAL THERAPY | Age: 86
End: 2021-04-06
Payer: MEDICARE

## 2021-04-06 DIAGNOSIS — M25.552 LEFT HIP PAIN: ICD-10-CM

## 2021-04-06 DIAGNOSIS — R26.9 GAIT ABNORMALITY: Primary | ICD-10-CM

## 2021-04-06 PROCEDURE — 97110 THERAPEUTIC EXERCISES: CPT

## 2021-04-06 NOTE — PROGRESS NOTES
Daily Note     Today's date: 2021  Patient name: Karl Larson  : 1930  MRN: [id number]  Referring provider: Claudine Bowen MD  Dx:   Encounter Diagnosis     ICD-10-CM    1  Gait abnormality  R26 9    2  Left hip pain  M25 552                   Subjective: Dash Tyson Reports that she is walking more with weather improvement  Reports L hip has been doing better with pain      Objective: See treatment diary below      Assessment: Continued with 2 5# weights today  Patient fatigued and challenged with current program  Great tolerance to exercises, resting between exercises as needed  Pt showing better pace of ambulation with walker  Plan: Continue per plan of care  Re-assessment NV  Precautions: osteoporosis, h/o L hip ORIF and L humeral head fracture      Manuals 3/25 3/30 4/6 3/16 3/23                                   Neuro Re-Ed                sidestepping 2 5# x5 at bars 2 5# // bars, 5x 2 5# // bars x5 2# x5 at bars 2# x5 at bars                                           Ther Ex                Nu step  x15' L1 x15' L1 x10' L1 x12 m L1 10m L1   pulleys x5' x5' x5' x5 x5'   UBE  UBE 5/5- NT UBE 5/5- NT 5/5 5/5                            SLR x 3, stand 2 5# 3x10 2 5# 3x10 2 5# 3x10 2# 3x10 2# 3x10   Ham curls 2 5# 3x10 2 5# 3x10 2 5# 3x10 2#3x10 2# 3x10   HR 2x10 3x10 2x10 2x10 2x10   Stand hip flexion - 2 5# 30x 2 5# x30 -    squats 3x10 3x10 3x10             LAQ 2 5# 3x10 2 5# 3x10 2# 3x10 2# 3x10 2# 3x10   Ball squeezes 2x10 2x10   2x10   scap retraction 2x10:03 2x10 :03   2x10 :03           Hip flexor stretch? L?- standing back extension with overpressure?                                      -   Shoulder scaption range, seated 2# 3x10 2# 3x10 2# 3x10     Seated cane flex/CP 2 5# 3x10- NT  2# 3x10 2 5# 3x10 Seated 2# 2x15 ea Seated 2 5# 2x15   Seated bicep& hammer curls 2# 3x 10ea 2# 3x10 2# 3x10  2# 3x10     SAQ 2# 2x10- NT NT SAQ 3# 2x15      Bridges 10x- NT NT 2x10 Ther Activity                        Gait Training        With [facility]?- when ready                Modalities

## 2021-04-08 ENCOUNTER — APPOINTMENT (OUTPATIENT)
Dept: PHYSICAL THERAPY | Age: 86
End: 2021-04-08
Payer: MEDICARE

## 2021-04-13 ENCOUNTER — OFFICE VISIT (OUTPATIENT)
Dept: PHYSICAL THERAPY | Age: 86
End: 2021-04-13
Payer: MEDICARE

## 2021-04-13 DIAGNOSIS — R26.9 GAIT ABNORMALITY: Primary | ICD-10-CM

## 2021-04-13 DIAGNOSIS — M25.552 LEFT HIP PAIN: ICD-10-CM

## 2021-04-13 PROCEDURE — 97110 THERAPEUTIC EXERCISES: CPT

## 2021-04-13 NOTE — PROGRESS NOTES
Daily Note     Today's date: 2021  Patient name: Karl Larson  : 1930  MRN: [id number]  Referring provider: Claudine Bowen MD  Dx:   Encounter Diagnosis     ICD-10-CM    1  Gait abnormality  R26 9    2  Left hip pain  M25 552                   Subjective: Dash Tyson Reports that she has recovered from being ill last week  Objective: See treatment diary below      Assessment: Continued with 2 5# weights today  Patient fatigued and challenged with current program  Great tolerance to exercises, resting between exercises as needed  Pt showing better pace of ambulation with walker  Still can imporve ability to get out of chair and will look to add ex to help that task      Plan: Continue per plan of care  Re-assessment NV  Precautions: osteoporosis, h/o L hip ORIF and L humeral head fracture      Manuals 3/25 3/30 4/6 4/13 3/23                                   Neuro Re-Ed                sidestepping 2 5# x5 at bars 2 5# // bars, 5x 2 5# // bars x5 2# x5 at bars 2# x5 at bars                                           Ther Ex                Nu step  x15' L1 x15' L1 x10' L1 x12 m L1 10m L1   pulleys x5' x5' x5' x5 x5'   UBE  UBE 5/5- NT UBE 5/5- NT                            SLR x 3, stand 2 5# 3x10 2 5# 3x10 2 5# 3x10 2 5# 3x10 2# 3x10   Ham curls 2 5# 3x10 2 5# 3x10 2 5# 3x10 2  5#3x10 2# 3x10   HR 2x10 3x10 2x10 2 5x10 2x10   Stand hip flexion - 2 5# 30x 2 5# x30 -    squats 3x10 3x10 3x10 3x10            LAQ 2 5# 3x10 2 5# 3x10 2# 3x10 2 5# 3x10 2# 3x10   Ball squeezes 2x10 2x10   2x10   scap retraction 2x10:03 2x10 :03   2x10 :03           Hip flexor stretch? L?- standing back extension with overpressure?                                      -   Shoulder scaption range, seated 2# 3x10 2# 3x10 2# 3x10 2# 3x10    Seated cane flex/CP 2 5# 3x10- NT  2# 3x10 2 5# 3x10 Seated 2 5# 2x15 ea Seated 2 5# 2x15   Seated bicep& hammer curls 2# 3x 10ea 2# 3x10 2# 3x10 2# x3x10 2# 3x10     SAQ 2# 2x10- NT NT Ottoniel 10x- NT NT                                                                             Ther Activity                        Gait Training        With [facility]?- when ready                Modalities

## 2021-04-15 ENCOUNTER — OFFICE VISIT (OUTPATIENT)
Dept: PHYSICAL THERAPY | Age: 86
End: 2021-04-15
Payer: MEDICARE

## 2021-04-15 ENCOUNTER — TRANSCRIBE ORDERS (OUTPATIENT)
Dept: PHYSICAL THERAPY | Age: 86
End: 2021-04-15

## 2021-04-15 DIAGNOSIS — R26.9 ABNORMALITY OF GAIT: Primary | ICD-10-CM

## 2021-04-15 DIAGNOSIS — M25.552 LEFT HIP PAIN: ICD-10-CM

## 2021-04-15 DIAGNOSIS — R26.9 GAIT ABNORMALITY: Primary | ICD-10-CM

## 2021-04-15 PROCEDURE — 97110 THERAPEUTIC EXERCISES: CPT | Performed by: PHYSICAL THERAPIST

## 2021-04-15 NOTE — PROGRESS NOTES
PT Re-Evaluation     Today's date: 4/15/2021  Patient name: Anil Francois  : 1930  MRN: [id number]  Referring provider: Reshma Corbett MD  Dx:   Encounter Diagnosis     ICD-10-CM    1  Gait abnormality  R26 9    2  Left hip pain  M25 552                   Assessment  Assessment details: Patient seen for PT re-assessment for gait, imbalance, hip pain  Patient is making steady progress in all areas especially strength and balance, improved L hip flexion since IE, pain unchanged at this time, appears to be arthritic in nature  PT recommending patient to continue with PT  Impairments: abnormal gait, abnormal or restricted ROM, activity intolerance, impaired balance, impaired physical strength, lacks appropriate home exercise program, pain with function, poor posture  and poor body mechanics  Functional limitations: Patient reports hip and thigh pain and feels weakness with walking >10 min, sit>stand, with stairclimbing, with IADLs, with recreational activities, and with bed mobility and transfers  Understanding of Dx/Px/POC: good   Prognosis: good    Goals  Impairment Goals to be met within 4 weeks  - Decrease pain to 0/10 at rest and 3/10 at worst not met  - Improve ROM to [facility] lumbar flexion and extension progressing  - Increase strength to 4/5 throughout B LEs progressing, partially met  - Improve upright posture progressing    Functional Goals to be met within 4-6 weeks  - Return to Prior Level of Function progressing  - Increase Functional Status Measure to: expected progressing  - Patient will be independent with HEP progressing  - Patient to be able to tolerate sit<>stand transfers without pain progressing  - Patient to be able to progress to tolerating lying in the bed again    progressing      Plan  Patient would benefit from: skilled physical therapy  Planned modality interventions: cryotherapy and thermotherapy: hydrocollator packs  Planned therapy interventions: abdominal trunk stabilization, manual therapy, neuromuscular re-education, patient education, postural training, strengthening, stretching, therapeutic activities, therapeutic exercise, home exercise program, gait training, balance and body mechanics training  Frequency: 2x week  Duration in weeks: 6  Treatment plan discussed with: patient        Subjective Evaluation    History of Present Illness  Mechanism of injury: Patient feels therapy is beneficial to her  Continues to feel lateral distal thigh pain  Patient does feel better when she comes to therapy and when she leaves  Patient attributes her pain to age  Patient has been staying active at home and is exercising at home in addition to PT and is trying to get back to a daily walking program  Patient was previously wanting to progress to walking with [facility], however, feels because of her balance, she needs to use the RW at all times, even in the home  From last re-eval  Patient referred to PT from PCP with primary concern of balance, gait and hip pain  Patient with history of falls with 2 fractures- 2 separate falls, hip and shoulder  Patient had surgery for hip ORIF  Patient has been sleeping in a recliner at home because of her hip pain with sleeping at night  Patient also feels sleeping in a recliner is easier for her to get up in the night if she needs  Patient reports most of her hip pain is with walking, stair climbing  Notes that she does the steps backwards step to pattern for balance and her fear of falling; wanting to prevent falls  Patient uses a quad cane in the home, a RW when outside the home         Pain  Current pain rating: 3  At best pain ratin  At worst pain ratin  Location: L distal thigh  Quality: dull ache  Aggravating factors: standing, walking, stair climbing and lifting  Progression: no change    Social Support  Steps to enter house: yes  Stairs in house: yes   Lives in: multiple-level home  Lives with: spouse    Employment status: not working    Diagnostic Tests  No diagnostic tests performed  Treatments  Previous treatment: physical therapy  Patient Goals  Patient goals for therapy: increased strength, return to sport/leisure activities, increased motion, decreased pain and improved balance          Objective     Active Range of Motion   Left Shoulder   Flexion: 140 degrees with pain  Extension: 40 degrees   Abduction: 130 degrees   External rotation BTH: Active external rotation behind the head: top of the head  Internal rotation 90°: Left shoulder active internal rotation at 90 degrees: to lateral hip  Right Shoulder   Flexion: 150 degrees   Extension: 50 degrees   Abduction: 145 degrees   External rotation BTH: Active external rotation behind the head: just behind the head/C2 level       Left Elbow   Normal active range of motion    Right Elbow   Normal active range of motion    Lumbar   Flexion:  with pain Restriction level: minimal  Extension:  with pain Restriction level: moderate  Left lateral flexion:  Restriction level: moderate  Right lateral flexion:  Restriction level: moderate  Left rotation:  Restriction level: moderate  Right rotation:  Restriction level: moderate  Left Hip   Flexion: 100 degrees with pain  Extension: 5 degrees with pain  Abduction: WFL  Adduction: WFL    Strength/Myotome Testing     Left Shoulder     Planes of Motion   Flexion: 3+   Abduction: 3+   External rotation at 0°: 3+   Internal rotation at 0°: 3+     Right Shoulder     Planes of Motion   Flexion: 3+   Abduction: 3+   External rotation at 0°: 3+   Internal rotation at 0°: 3+     Left Elbow   Flexion: 4  Extension: 4-    Right Elbow   Flexion: 4  Extension: 4-    Left Hip   Planes of Motion   Flexion: 4-  Extension: 4-  Abduction: 4  Adduction: 5    Right Hip   Planes of Motion   Flexion: 5  Extension: 4+  Abduction: 4+  Adduction: 5    Left Knee   Flexion: 5  Extension: 4-    Right Knee   Flexion: 5  Extension: 4+    Left Ankle/Foot   Dorsiflexion: 5  Plantar flexion: 5    Right Ankle/Foot   Dorsiflexion: 5  Plantar flexion: 5    Ambulation     Observational Gait   Gait: antalgic   Decreased walking speed and stride length  Quality of Movement During Gait   Trunk  Forward lean  Knee    Knee (Left): Positive stiff knee  Functional Assessment        Comments  TUG time 12 sec with use of RW for balance, with 5 errors- slow speed, increased steps to complete turn, takes 7 small steps to turn, uses hands to assist with lifting self in to upright position  SLS 3 sec L, 5 sec R without UE assist  No longer has pain during SLS on L as she did prior  Sit to stand test, performs 8 reps at chair height, with UE assist in 30 seconds  Patient with h/o orthostatic hypotension, needs to perform slowly  Precautions: osteoporosis, h/o L hip ORIF and L humeral head fracture      Manuals 3/25 3/30 4/6 4/13 4/15                                   Neuro Re-Ed                sidestepping 2 5# x5 at bars 2 5# // bars, 5x 2 5# // bars x5 2# x5 at bars 2 5# x5 at bars                                           Ther Ex                Nu step  x15' L1 x15' L1 x10' L1 x12 m L1 15m L1   pulleys x5' x5' x5' x5 x5'   UBE  UBE 5/5- NT UBE 5/5- NT 5/5 5/5 5/5                           SLR x 3, stand 2 5# 3x10 2 5# 3x10 2 5# 3x10 2 5# 3x10 2# 3x10   Ham curls 2 5# 3x10 2 5# 3x10 2 5# 3x10 2  5#3x10 2# 3x10   HR 2x10 3x10 2x10 2 5x10 2x10   Stand hip flexion - 2 5# 30x 2 5# x30 -    squats 3x10 3x10 3x10 3x10            LAQ 2 5# 3x10 2 5# 3x10 2# 3x10 2 5# 3x10 2# 3x10   Ball squeezes 2x10 2x10   2x10   scap retraction 2x10:03 2x10 :03   2x10 :03           Hip flexor stretch? L?- standing back extension with overpressure?                                      -   Shoulder scaption range, seated 2# 3x10 2# 3x10 2# 3x10 2# 3x10    Seated cane flex/CP 2 5# 3x10- NT  2# 3x10 2 5# 3x10 Seated 2 5# 2x15 ea -   Seated bicep& hammer curls 2# 3x 10ea 2# 3x10 2# 3x10 2# x3x10 -     SAQ 2# 2x10- NT TOMEKA Alcazar 10x- NT NT                                                                             Ther Activity                        Gait Training        With [facility]?- when ready                Modalities

## 2021-04-15 NOTE — LETTER
April 15, 2021    Nico Esparza, [address] [facility] [id number]    Patient: Lani Lindsey   YOB: 1930   Date of Visit: 4/15/2021     Encounter Diagnosis     ICD-10-CM    1  Gait abnormality  R26 9    2  Left hip pain  M25 552        Dear Dr Gaby Belle: Thank you for your recent referral of Lani Lindsey  Please review the attached evaluation summary from Tanya's recent visit  Please verify that you agree with the plan of care by signing the attached order  If you have any questions or concerns, please do not hesitate to call  I sincerely appreciate the opportunity to share in the care of one of your patients and hope to have another opportunity to work with you in the near future  Sincerely,    Samy Duran, PT      Referring Provider:      I certify that I have read the below Plan of Care and certify the need for these services furnished under this plan of treatment while under my care  Nico Esparza MD  [address] [place] [id number]  Via Fax: [phone]          PT Re-Evaluation     Today's date: 4/15/2021  Patient name: Lani Lindsey  : 1930  MRN: [id number]  Referring provider: Cheyanne Douglas MD  Dx:   Encounter Diagnosis     ICD-10-CM    1  Gait abnormality  R26 9    2  Left hip pain  M25 552                   Assessment  Assessment details: Patient seen for PT re-assessment for gait, imbalance, hip pain  Patient is making steady progress in all areas especially strength and balance, improved L hip flexion since IE, pain unchanged at this time, appears to be arthritic in nature  PT recommending patient to continue with PT    Impairments: abnormal gait, abnormal or restricted ROM, activity intolerance, impaired balance, impaired physical strength, lacks appropriate home exercise program, pain with function, poor posture  and poor body mechanics  Functional limitations: Patient reports hip and thigh pain and feels weakness with walking >10 min, sit>stand, with stairclimbing, with IADLs, with recreational activities, and with bed mobility and transfers  Understanding of Dx/Px/POC: good   Prognosis: good    Goals  Impairment Goals to be met within 4 weeks  - Decrease pain to 0/10 at rest and 3/10 at worst not met  - Improve ROM to [facility] lumbar flexion and extension progressing  - Increase strength to 4/5 throughout B LEs progressing, partially met  - Improve upright posture progressing    Functional Goals to be met within 4-6 weeks  - Return to Prior Level of Function progressing  - Increase Functional Status Measure to: expected progressing  - Patient will be independent with HEP progressing  - Patient to be able to tolerate sit<>stand transfers without pain progressing  - Patient to be able to progress to tolerating lying in the bed again  progressing      Plan  Patient would benefit from: skilled physical therapy  Planned modality interventions: cryotherapy and thermotherapy: hydrocollator packs  Planned therapy interventions: abdominal trunk stabilization, manual therapy, neuromuscular re-education, patient education, postural training, strengthening, stretching, therapeutic activities, therapeutic exercise, home exercise program, gait training, balance and body mechanics training  Frequency: 2x week  Duration in weeks: 6  Treatment plan discussed with: patient        Subjective Evaluation    History of Present Illness  Mechanism of injury: Patient feels therapy is beneficial to her  Continues to feel lateral distal thigh pain  Patient does feel better when she comes to therapy and when she leaves  Patient attributes her pain to age   Patient has been staying active at home and is exercising at home in addition to PT and is trying to get back to a daily walking program  Patient was previously wanting to progress to walking with [facility], however, feels because of her balance, she needs to use the  at all times, even in the home  From last re-eval  Patient referred to PT from PCP with primary concern of balance, gait and hip pain  Patient with history of falls with 2 fractures- 2 separate falls, hip and shoulder  Patient had surgery for hip ORIF  Patient has been sleeping in a recliner at home because of her hip pain with sleeping at night  Patient also feels sleeping in a recliner is easier for her to get up in the night if she needs  Patient reports most of her hip pain is with walking, stair climbing  Notes that she does the steps backwards step to pattern for balance and her fear of falling; wanting to prevent falls  Patient uses a quad cane in the home, a RW when outside the home  Pain  Current pain rating: 3  At best pain ratin  At worst pain ratin  Location: L distal thigh  Quality: dull ache  Aggravating factors: standing, walking, stair climbing and lifting  Progression: no change    Social Support  Steps to enter house: yes  Stairs in house: yes   Lives in: multiple-level home  Lives with: spouse    Employment status: not working    Diagnostic Tests  No diagnostic tests performed  Treatments  Previous treatment: physical therapy  Patient Goals  Patient goals for therapy: increased strength, return to sport/leisure activities, increased motion, decreased pain and improved balance          Objective     Active Range of Motion   Left Shoulder   Flexion: 140 degrees with pain  Extension: 40 degrees   Abduction: 130 degrees   External rotation BTH: Active external rotation behind the head: top of the head  Internal rotation 90°: Left shoulder active internal rotation at 90 degrees: to lateral hip  Right Shoulder   Flexion: 150 degrees   Extension: 50 degrees   Abduction: 145 degrees   External rotation BTH: Active external rotation behind the head: just behind the head/C2 level       Left Elbow   Normal active range of motion    Right Elbow   Normal active range of motion    Lumbar   Flexion:  with pain Restriction level: minimal  Extension:  with pain Restriction level: moderate  Left lateral flexion:  Restriction level: moderate  Right lateral flexion:  Restriction level: moderate  Left rotation:  Restriction level: moderate  Right rotation:  Restriction level: moderate  Left Hip   Flexion: 100 degrees with pain  Extension: 5 degrees with pain  Abduction: WFL  Adduction: WFL    Strength/Myotome Testing     Left Shoulder     Planes of Motion   Flexion: 3+   Abduction: 3+   External rotation at 0°: 3+   Internal rotation at 0°: 3+     Right Shoulder     Planes of Motion   Flexion: 3+   Abduction: 3+   External rotation at 0°: 3+   Internal rotation at 0°: 3+     Left Elbow   Flexion: 4  Extension: 4-    Right Elbow   Flexion: 4  Extension: 4-    Left Hip   Planes of Motion   Flexion: 4-  Extension: 4-  Abduction: 4  Adduction: 5    Right Hip   Planes of Motion   Flexion: 5  Extension: 4+  Abduction: 4+  Adduction: 5    Left Knee   Flexion: 5  Extension: 4-    Right Knee   Flexion: 5  Extension: 4+    Left Ankle/Foot   Dorsiflexion: 5  Plantar flexion: 5    Right Ankle/Foot   Dorsiflexion: 5  Plantar flexion: 5    Ambulation     Observational Gait   Gait: antalgic   Decreased walking speed and stride length  Quality of Movement During Gait   Trunk  Forward lean  Knee    Knee (Left): Positive stiff knee  Functional Assessment        Comments  TUG time 12 sec with use of RW for balance, with 5 errors- slow speed, increased steps to complete turn, takes 7 small steps to turn, uses hands to assist with lifting self in to upright position  SLS 3 sec L, 5 sec R without UE assist  No longer has pain during SLS on L as she did prior  Sit to stand test, performs 8 reps at chair height, with UE assist in 30 seconds  Patient with h/o orthostatic hypotension, needs to perform slowly                Precautions: osteoporosis, h/o L hip ORIF and L humeral head fracture      Manuals 3/25 3/30 4/6 4/13 4/15                                   Neuro Re-Ed                sidestepping 2 5# x5 at bars 2 5# // bars, 5x 2 5# // bars x5 2# x5 at bars 2 5# x5 at bars                                           Ther Ex                Nu step  x15' L1 x15' L1 x10' L1 x12 m L1 15m L1   pulleys x5' x5' x5' x5 x5'   UBE  UBE 5/5- NT UBE 5/5- NT 5/5 5/5 5/5                           SLR x 3, stand 2 5# 3x10 2 5# 3x10 2 5# 3x10 2 5# 3x10 2# 3x10   Ham curls 2 5# 3x10 2 5# 3x10 2 5# 3x10 2  5#3x10 2# 3x10   HR 2x10 3x10 2x10 2 5x10 2x10   Stand hip flexion - 2 5# 30x 2 5# x30 -    squats 3x10 3x10 3x10 3x10            LAQ 2 5# 3x10 2 5# 3x10 2# 3x10 2 5# 3x10 2# 3x10   Ball squeezes 2x10 2x10   2x10   scap retraction 2x10:03 2x10 :03   2x10 :03           Hip flexor stretch? L?- standing back extension with overpressure?                                      -   Shoulder scaption range, seated 2# 3x10 2# 3x10 2# 3x10 2# 3x10    Seated cane flex/CP 2 5# 3x10- NT  2# 3x10 2 5# 3x10 Seated 2 5# 2x15 ea -   Seated bicep& hammer curls 2# 3x 10ea 2# 3x10 2# 3x10 2# x3x10 -     SAQ 2# 2x10- NT NT       Bridges 10x- NT NT                                                                             Ther Activity                        Gait Training        With [facility]?- when ready                Modalities

## 2021-04-20 ENCOUNTER — OFFICE VISIT (OUTPATIENT)
Dept: PHYSICAL THERAPY | Age: 86
End: 2021-04-20
Payer: MEDICARE

## 2021-04-20 DIAGNOSIS — M25.552 LEFT HIP PAIN: ICD-10-CM

## 2021-04-20 DIAGNOSIS — R26.9 GAIT ABNORMALITY: Primary | ICD-10-CM

## 2021-04-20 PROCEDURE — 97110 THERAPEUTIC EXERCISES: CPT

## 2021-04-20 NOTE — PROGRESS NOTES
Daily Note     Today's date: 2021  Patient name: Lawyer Villa  : 1930  MRN: [id number]  Referring provider: Mercy Anton MD  Dx:   Encounter Diagnosis     ICD-10-CM    1  Gait abnormality  R26 9    2  Left hip pain  M25 552                   Subjective: Reports that her R hip always bothers her but is not any worse today      Objective: See treatment diary below      Assessment: Tolerated treatment well  Pt challenged with ex with endurance  Denies pain with ex performed  Pt continues to benefit from PT to keep function with limitations of pandemic  Plan: Cont PT per LPT plan     Precautions: osteoporosis, h/o L hip ORIF and L humeral head fracture      Manuals 4/20 3/30 4/6 4/13 4/15                                   Neuro Re-Ed                sidestepping 2 5 // bars 2 5# // bars, 5x 2 5# // bars x5 2# x5 at bars 2 5# x5 at bars                                           Ther Ex                Nu step  x15' L1 x15' L1 x10' L1 x12 m L1 15m L1   pulleys x5' x5' x5' x5 x5'   UBE  UBE 5/5- UBE 5/5- NT /5 5/5 5/5                           SLR x 3, stand 2 5# 3x10 2 5# 3x10 2 5# 3x10 2 5# 3x10 2# 3x10   Ham curls 2 5# 3x10 2 5# 3x10 2 5# 3x10 2  5#3x10 2# 3x10   HR 2x10 3x10 2x10 2 5x10 2x10   Stand hip flexion - 2 5# 30x 2 5# x30 -    squats 3x10 3x10 3x10 3x10            LAQ 2 5# 3x10 2 5# 3x10 2# 3x10 2 5# 3x10 2# 3x10   Ball squeezes 2x10 2x10   2x10   scap retraction 2x10:03 2x10 :03   2x10 :03           Hip flexor stretch? L?- standing back extension with overpressure?                                      -   Shoulder scaption range, seated 2# 3x10 2# 3x10 2# 3x10 2# 3x10    Seated cane flex/CP 2 5# 3x10  2# 3x10 2 5# 3x10 Seated 2 5# 2x15 ea -   Seated bicep& hammer curls 2# 3x 10ea 2# 3x10 2# 3x10 2# x3x10 -     SAQ 2# 2x10- NT NT       Bridges 10x- NT NT                                                                             Ther Activity                        Gait Training        With [facility]?- when ready                Modalities

## 2021-04-22 ENCOUNTER — OFFICE VISIT (OUTPATIENT)
Dept: PHYSICAL THERAPY | Age: 86
End: 2021-04-22
Payer: MEDICARE

## 2021-04-22 DIAGNOSIS — R26.9 GAIT ABNORMALITY: Primary | ICD-10-CM

## 2021-04-22 DIAGNOSIS — M25.552 LEFT HIP PAIN: ICD-10-CM

## 2021-04-22 PROCEDURE — 97110 THERAPEUTIC EXERCISES: CPT | Performed by: PHYSICAL THERAPIST

## 2021-04-22 NOTE — PROGRESS NOTES
Daily Note     Today's date: 2021  Patient name: Sylvester Ortega  : 1930  MRN: [id number]  Referring provider: Frank Crawford MD  Dx:   Encounter Diagnosis     ICD-10-CM    1  Gait abnormality  R26 9    2  Left hip pain  M25 552                   Subjective: Patient reports she's so-so today  Patient does feel therapy is beneficial to her ability to walk and keeping her strength up  Feels better after exercising, notices increased pain with static sitting  Objective: See treatment diary below      Assessment: Patient fatigued today with treatment, unable to complete full program within time constraints  Patient requiring increased rest with TE  No increase in pain  Patient requiring VC for correct technique  Plan: Continue per plan of care  Precautions: osteoporosis, h/o L hip ORIF and L humeral head fracture      Manuals 4/20 4/22 4/6 4/13 4/15                                   Neuro Re-Ed                sidestepping 2 5 // bars 2 5# bars, 5x 2 5# // bars x5 2# x5 at bars 2 5# x5 at bars                                           Ther Ex                Nu step  x15' L1 x15' L1 x10' L1 x12 m L1 15m L1   pulleys x5' x5' x5' x5 x5'   UBE  UBE 5/5- UBE 5/5 5/5 5/5 5/5                           SLR x 3, stand 2 5# 3x10 2 5# 3x10 2 5# 3x10 2 5# 3x10 2# 3x10   Ham curls 2 5# 3x10 2 5# 3x10 2 5# 3x10 2  5#3x10 2# 3x10   HR 2x10 3x10 2x10 2 5x10 2x10   Stand hip flexion - 2 5# 30x 2 5# x30 -    squats 3x10 3x10 3x10 3x10            LAQ 2 5# 3x10 2 5# 3x10 2# 3x10 2 5# 3x10 2# 3x10   Ball squeezes 2x10 2x10   2x10   scap retraction 2x10:03 2x10 :03   2x10 :03                                                -   Shoulder scaption range, seated 2# 3x10 2# 3x10- NT 2# 3x10 2# 3x10    Seated cane flex/CP 2 5# 3x10  2# 3x10- NT 2 5# 3x10 Seated 2 5# 2x15 ea -   Seated bicep& hammer curls 2# 3x 10ea 2# 3x10- NT 2# 3x10 2# x3x10 - Ther Activity                        Gait Training        With [facility]?- when ready                Modalities

## 2021-04-27 ENCOUNTER — OFFICE VISIT (OUTPATIENT)
Dept: PHYSICAL THERAPY | Age: 86
End: 2021-04-27
Payer: MEDICARE

## 2021-04-27 DIAGNOSIS — R26.9 GAIT ABNORMALITY: Primary | ICD-10-CM

## 2021-04-27 DIAGNOSIS — M25.552 LEFT HIP PAIN: ICD-10-CM

## 2021-04-27 PROCEDURE — 97110 THERAPEUTIC EXERCISES: CPT

## 2021-04-27 NOTE — PROGRESS NOTES
Daily Note     Today's date: 2021  Patient name: Suellen Varela  : 1930  MRN: [id number]  Referring provider: Camron Laguna MD  Dx:   Encounter Diagnosis     ICD-10-CM    1  Gait abnormality  R26 9    2  Left hip pain  M25 552                   Subjective: Patient reports her L hip hurts but pain is intermittent and is better today Feels better after exercising, notices increased pain with static sitting  Objective: See treatment diary below      Assessment: Patient fatigued today post exercises  Denies any increased pain with ex  Still challenged with weight and ex intensity  Advancing weight should be done cautiously as pt can have hip or shoulder pain with weight increases  Plan: Continue per plan of care  Precautions: osteoporosis, h/o L hip ORIF and L humeral head fracture      Manuals 4/20 4/22 4/27 4/13 4/15                                   Neuro Re-Ed                sidestepping 2 5 // bars 2 5# bars, 5x 2 5# // bars x5 2# x5 at bars 2 5# x5 at bars                                           Ther Ex                Nu step  x15' L1 x15' L1 x10' L1 x12 m L1 15m L1   pulleys x5' x5' x5' x5 x5'   UBE  UBE 5/5- UBE 5/5 5/5 5/5 5/5                           SLR x 3, stand 2 5# 3x10 2 5# 3x10 2 5# 3x10 2 5# 3x10 2# 3x10   Ham curls 2 5# 3x10 2 5# 3x10 2 5# 3x10 2  5#3x10 2# 3x10   HR 2x10 3x10 3x10 2 5x10 2x10   Stand hip flexion - 2 5# 30x 2 5# x30 -    squats 3x10 3x10 3x10 3x10            LAQ 2 5# 3x10 2 5# 3x10 2# 3x10 2 5# 3x10 2# 3x10   Ball squeezes 2x10 2x10 2x10  2x10   scap retraction 2x10:03 2x10 :03   2x10 :03                                                -   Shoulder scaption range, seated 2# 3x10 2# 3x10- NT 2# 3x10 2# 3x10    Seated cane flex/CP 2 5# 3x10  2# 3x10- NT 2 5# 3x10 Seated 2 5# 2x15 ea -   Seated bicep& hammer curls 2# 3x 10ea 2# 3x10- NT 2# 3x10 2# x3x10 -                                                                                           Ther Activity Gait Training        With [facility]?- when ready                Modalities

## 2021-04-29 ENCOUNTER — OFFICE VISIT (OUTPATIENT)
Dept: PHYSICAL THERAPY | Age: 86
End: 2021-04-29
Payer: MEDICARE

## 2021-04-29 DIAGNOSIS — M25.552 LEFT HIP PAIN: ICD-10-CM

## 2021-04-29 DIAGNOSIS — R26.9 GAIT ABNORMALITY: Primary | ICD-10-CM

## 2021-04-29 PROCEDURE — 97110 THERAPEUTIC EXERCISES: CPT | Performed by: PHYSICAL THERAPIST

## 2021-04-29 PROCEDURE — 97112 NEUROMUSCULAR REEDUCATION: CPT | Performed by: PHYSICAL THERAPIST

## 2021-04-29 NOTE — PROGRESS NOTES
Daily Note     Today's date: 2021  Patient name: Mary Treviño  : 1930  MRN: [id number]  Referring provider: Cherelle Batista MD  Dx:   Encounter Diagnosis     ICD-10-CM    1  Gait abnormality  R26 9    2  Left hip pain  M25 552                   Subjective: Patient denies increased pain today, feeling the same as she always does, arthritic pain  Objective: See treatment diary below      Assessment: Patient is able to tolerate program without increased pain  Focusing on building strength and endurance  May benefit from progression to maintenance program at next re-assessment  Patient does feel therapy is helpful in reducing her fall risk, in strengthening and for her balance  Modified program today to limitations d/t space in the clinic  Plan: Continue per plan of care  Precautions: osteoporosis, h/o L hip ORIF and L humeral head fracture      Manuals 4/20 4/22 4/27 4/29 4/15                                   Neuro Re-Ed                sidestepping 2 5 // bars 2 5# bars, 5x 2 5# // bars x5 2 5# x5 at bars 2 5# x5 at bars                                           Ther Ex                Nu step  x15' L1 x15' L1 x10' L1 x14 m L1 15m L1   pulleys x5' x5' x5' x5 x5'   UBE  UBE 5/5- UBE 5/5 5/5 5/5 5/5                           SLR x 3, stand 2 5# 3x10 2 5# 3x10 2 5# 3x10 2 5# 3x10 2# 3x10   Ham curls 2 5# 3x10 2 5# 3x10 2 5# 3x10 2  5#3x10 2# 3x10   HR 2x10 3x10 3x10 2 5x10 2x10   Stand hip flexion - 2 5# 30x 2 5# x30 -    squats 3x10 3x10 3x10 3x10            LAQ 2 5# 3x10 2 5# 3x10 2# 3x10 2 5# 3x10 2# 3x10   Ball squeezes 2x10 2x10 2x10 2x10 2x10   scap retraction 2x10:03 2x10 :03  2x10:03 2x10 :03                                                -   Shoulder scaption range, seated 2# 3x10 2# 3x10- NT 2# 3x10 2# 3x10 NT    Seated cane flex/CP 2 5# 3x10  2# 3x10- NT 2 5# 3x10 Seated 2 5# 2x15 ea NT -   Seated bicep& hammer curls 2# 3x 10ea 2# 3x10- NT 2# 3x10 2# x3x10 NT - Ther Activity                        Gait Training        With [facility]?- when ready                Modalities

## 2021-05-04 ENCOUNTER — OFFICE VISIT (OUTPATIENT)
Dept: PHYSICAL THERAPY | Age: 86
End: 2021-05-04
Payer: MEDICARE

## 2021-05-04 ENCOUNTER — APPOINTMENT (OUTPATIENT)
Dept: PHYSICAL THERAPY | Age: 86
End: 2021-05-04
Payer: MEDICARE

## 2021-05-04 DIAGNOSIS — R26.9 GAIT ABNORMALITY: Primary | ICD-10-CM

## 2021-05-04 DIAGNOSIS — M25.552 LEFT HIP PAIN: ICD-10-CM

## 2021-05-04 PROCEDURE — 97110 THERAPEUTIC EXERCISES: CPT

## 2021-05-04 NOTE — PROGRESS NOTES
Daily Note     Today's date: 2021  Patient name: Reva Arora  : 1930  MRN: [id number]  Referring provider: Sami Carney MD  Dx:   Encounter Diagnosis     ICD-10-CM    1  Gait abnormality  R26 9    2  Left hip pain  M25 552                   Subjective: Patient denies increased pain today, just normal aches and pains  Pt notes she has to take  to MD due to lack of urination after her session      Objective: See treatment diary below      Assessment: Patient is able to tolerate program without increased pain  Focusing on building strength and endurance  May benefit from progression to maintenance program at next re-assessment  Patient does feel therapy is helpful in reducing her fall risk, in strengthening and for her balance  Plan: Continue per plan of care  Precautions: osteoporosis, h/o L hip ORIF and L humeral head fracture      Manuals                                    Neuro Re-Ed                sidestepping 2 5 // bars 2 5# bars, 5x 2 5# // bars x5 2 5# x5 at bars 2 5# x5 at bars                                           Ther Ex                Nu step  x15' L1 x15' L1 x10' L1 x14 m L1 15m L1   pulleys x5' x5' x5' x5 x5'   UBE  UBE 5/5- UBE 5/5 5/5 5/5 5/5                           SLR x 3, stand 2 5# 3x10 2 5# 3x10 2 5# 3x10 2 5# 3x10 2 5# 3x10   Ham curls 2 5# 3x10 2 5# 3x10 2 5# 3x10 2  5#3x10 2 5# 3x10   HR 2x10 3x10 3x10 2 5x10 2 5# 3x10   Stand hip flexion - 2 5# 30x 2 5# x30 -    squats 3x10 3x10 3x10 3x10 3x10           LAQ 2 5# 3x10 2 5# 3x10 2# 3x10 2 5# 3x10 2 5# 3x10   Ball squeezes 2x10 2x10 2x10 2x10 2x10   scap retraction 2x10:03 2x10 :03  2x10:03 2x10 :03                                                -   Shoulder scaption range, seated 2# 3x10 2# 3x10- NT 2# 3x10 2# 3x10 NT 2# 3x10   Seated cane flex/CP 2 5# 3x10  2# 3x10- NT 2 5# 3x10 Seated 2 5# 2x15 ea NT Seated 2 5# 2x15   Seated bicep& hammer curls 2# 3x 10ea 2# 3x10- NT 2# 3x10 2# x3x10 NT 2# 3x10                                                                                           Ther Activity                        Gait Training        With [facility]?- when ready                Modalities

## 2021-05-06 ENCOUNTER — OFFICE VISIT (OUTPATIENT)
Dept: PHYSICAL THERAPY | Age: 86
End: 2021-05-06
Payer: MEDICARE

## 2021-05-06 DIAGNOSIS — M25.552 LEFT HIP PAIN: ICD-10-CM

## 2021-05-06 DIAGNOSIS — R26.9 GAIT ABNORMALITY: Primary | ICD-10-CM

## 2021-05-06 PROCEDURE — 97110 THERAPEUTIC EXERCISES: CPT | Performed by: PHYSICAL THERAPIST

## 2021-05-06 NOTE — PROGRESS NOTES
Daily Note     Today's date: 2021  Patient name: Talisha Lawrence  : 1930  MRN: [id number]  Referring provider: Kym López MD  Dx:   Encounter Diagnosis     ICD-10-CM    1  Gait abnormality  R26 9    2  Left hip pain  M25 552                   Subjective: Patient offers no new complaints today  Her  is doing better since the other day  Objective: See treatment diary below      Assessment: Tolerated treatment fair today  Attempted to increase weight with standing TE, patient unable to tolerate 3# weight with any exercise today  Decreased weight to 2 5# again and needing increased rest today  Patient feeling pain and weakness in L LE with exercises, good UE strength and tolerance to exercise today  Plan: Continue per plan of care  Plan to progress patient to maintenance program next re-assessment  Precautions: osteoporosis, h/o L hip ORIF and L humeral head fracture      Manuals                                    Neuro Re-Ed                sidestepping 2 5 // bars 2 5# bars, 5x 2 5# // bars x5 2 5# x5 at bars 2 5# x5 at bars                                           Ther Ex                Nu step  x15' L1 x15' L1 x10' L1 x14 m L1 15m L1   pulleys x5' x5' x5' x5 x5'   UBE  UBE - NT UBE /                           SLR x 3, stand 2 5# 3x10 2 5# 3x10 2 5# 3x10 2 5# 3x10 2 5# 3x10   Ham curls 2 5# 3x103 2 5# 3x10 2 5# 3x10 2  5#3x10 2 5# 3x10   HR 2x10 3x10 3x10 2 5x10 2 5# 3x10   Stand hip flexion - 2 5# 30x 2 5# x30 -    squats 3x10 NT 3x10 3x10 3x10 3x10           LAQ 2 5# 3x10 2 5# 3x10 2# 3x10 2 5# 3x10 2 5# 3x10   Ball squeezes 2x10 2x10 2x10 2x10 2x10   scap retraction 2x10:03 2x10 :03  2x10:03 2x10 :03                                                -   Shoulder scaption range, seated 2# 3x10 2# 3x10- NT 2# 3x10 2# 3x10 NT 2# 3x10   Seated cane flex/CP 2# 3x10  2# 3x10- NT 2 5# 3x10 Seated 2 5# 2x15 ea NT Seated 2 5# 2x15   Seated marvel& hammer curls 2# 3x 10ea 2# 3x10- NT 2# 3x10 2# x3x10 NT 2# 3x10                                                                                           Ther Activity                        Gait Training        With [facility]?- when ready                Modalities

## 2021-05-11 ENCOUNTER — OFFICE VISIT (OUTPATIENT)
Dept: PHYSICAL THERAPY | Age: 86
End: 2021-05-11
Payer: MEDICARE

## 2021-05-11 DIAGNOSIS — M25.552 LEFT HIP PAIN: ICD-10-CM

## 2021-05-11 DIAGNOSIS — R26.9 GAIT ABNORMALITY: Primary | ICD-10-CM

## 2021-05-11 PROCEDURE — 97110 THERAPEUTIC EXERCISES: CPT

## 2021-05-11 NOTE — PROGRESS NOTES
Daily Note     Today's date: 2021  Patient name: Linda Roque  : 1930  MRN: [id number]  Referring provider: Jonathan Ramos MD  Dx:   Encounter Diagnosis     ICD-10-CM    1  Gait abnormality  R26 9    2  Left hip pain  M25 552                   Subjective: Patient reports her L hip was hurting this AM and  encouraged her to do therapy so it would feel better      Objective: See treatment diary below      Assessment: Tolerated treatment fair today  Pt felt L hip pain was improved and did not have pain with exercise performed  Continues to have fatigue and is challenged with current ex regimen    Plan: Continue per plan of care  Plan to progress patient to maintenance program next re-assessment  Precautions: osteoporosis, h/o L hip ORIF and L humeral head fracture      Manuals                                    Neuro Re-Ed                sidestepping 2 5 // bars 2 5# bars, 5x 2 5# // bars x5 2 5# x5 at bars 2 5# x5 at bars                                           Ther Ex                Nu step  x15' L1 x15' L1 x10' L1 x14 m L1 15m L1   pulleys x5' x5' x5' x5 x5'   UBE  UBE 5/5- NT UBE //5 5/5 5/5                           SLR x 3, stand 2 5# 3x10 2 5# 3x10 2 5# 3x10 2 5# 3x10 2 5# 3x10   Ham curls 2 5# 3x103 2 5# 3x10 2 5# 3x10 2  5#3x10 2 5# 3x10   HR 2x10 3x10 3x10 2 5x10 2 5# 3x10   Stand hip flexion - 2,5# 30x 2 5# x30 -    squats 3x10 NT 3x10 3x10 3x10 3x10           LAQ 2 5# 3x10 3# 3x10 2# 3x10 2 5# 3x10 2 5# 3x10   Ball squeezes 2x10 2x10 2x10 2x10 2x10   scap retraction 2x10:03 2x10 :03  2x10:03 2x10 :03                                                -   Shoulder scaption range, seated 2# 3x10 2# 3x10- NT 2# 3x10 2# 3x10 NT 2# 3x10   Seated cane flex/CP 2# 3x10  3# 3x10- NT 2 5# 3x10 Seated 2 5# 2x15 ea NT Seated 2 5# 2x15   Seated bicep& hammer curls 2# 3x 10ea 2# 3x10- NT 2# 3x10 2# x3x10 NT 2# 3x10 Ther Activity                        Gait Training        With [facility]?- when ready                Modalities

## 2021-05-13 ENCOUNTER — OFFICE VISIT (OUTPATIENT)
Dept: PHYSICAL THERAPY | Age: 86
End: 2021-05-13
Payer: MEDICARE

## 2021-05-13 DIAGNOSIS — M25.552 LEFT HIP PAIN: ICD-10-CM

## 2021-05-13 DIAGNOSIS — R26.9 GAIT ABNORMALITY: Primary | ICD-10-CM

## 2021-05-13 PROCEDURE — 97110 THERAPEUTIC EXERCISES: CPT

## 2021-05-13 NOTE — PROGRESS NOTES
Daily Note     Today's date: 2021  Patient name: Arpan Davidson  : 1930  MRN: [id number]  Referring provider: Elidia Willoughby MD  Dx:   Encounter Diagnosis     ICD-10-CM    1  Gait abnormality  R26 9    2  Left hip pain  M25 552                   Subjective: Patient reports her L hip is better and less sore today  Objective: See treatment diary below      Assessment: Tolerated treatment fair today  Continues to have fatigue and is challenged with current ex regimen  Pt can still improve ability to transfer out of chair and increase her walking distances    Plan: Continue per plan of care  Plan to progress patient to maintenance program next re-assessment  Precautions: osteoporosis, h/o L hip ORIF and L humeral head fracture      Manuals                                    Neuro Re-Ed                sidestepping 2 5 // bars 2 5# bars, 5x 3# // bars x5 2 5# x5 at bars 2 5# x5 at bars                                           Ther Ex                Nu step  x15' L1 x15' L1 x10' L1 x14 m L1 15m L1   pulleys x5' x5' x5' x5 x5'   UBE  UBE 5/5- NT UBE /5 //                           SLR x 3, stand 2 5# 3x10 2 5# 3x10 3# 3x10 2 5# 3x10 2 5# 3x10   Ham curls 2 5# 3x103 2 5# 3x10 3# 3x10 2  5#3x10 2 5# 3x10   HR 2x10 3x10 3x10 2 5x10 2 5# 3x10   Stand hip flexion - 2,5# 30x 3# x30 -    squats 3x10 NT 3x10 3x10 3x10 3x10           LAQ 2 5# 3x10 3# 3x10 3# 3x10 2 5# 3x10 2 5# 3x10   Ball squeezes 2x10 2x10 2x10 2x10 2x10   scap retraction 2x10:03 2x10 :03  2x10:03 2x10 :03                                                -   Shoulder scaption range, seated 2# 3x10 2# 3x10- NT 3# 3x10 2# 3x10 NT 2# 3x10   Seated cane flex/CP 2# 3x10  3# 3x10- NT 3# 3x10 Seated 2 5# 2x15 ea NT Seated 2 5# 2x15   Seated bicep& hammer curls 2# 3x 10ea 2# 3x10- NT 2# 3x10 2# x3x10 NT 2# 3x10                                                                                           Ther Activity Gait Training        With [facility]?- when ready                Modalities

## 2021-05-18 ENCOUNTER — OFFICE VISIT (OUTPATIENT)
Dept: PHYSICAL THERAPY | Age: 86
End: 2021-05-18
Payer: MEDICARE

## 2021-05-18 DIAGNOSIS — M25.552 LEFT HIP PAIN: ICD-10-CM

## 2021-05-18 DIAGNOSIS — R26.9 GAIT ABNORMALITY: Primary | ICD-10-CM

## 2021-05-18 PROCEDURE — 97110 THERAPEUTIC EXERCISES: CPT

## 2021-05-18 NOTE — PROGRESS NOTES
Daily Note     Today's date: 2021  Patient name: Daryn Jimenez  : 1930  MRN: [id number]  Referring provider: Sindi Wilson MD  Dx:   Encounter Diagnosis     ICD-10-CM    1  Gait abnormality  R26 9    2  Left hip pain  M25 552                   Subjective: Patient reports she broke her glasses so she doesn't see a well today  Pt has appt to get them repaired  No increased pain c/o's      Objective: See treatment diary below      Assessment: Tolerated treatment fair today  Continues to have fatigue and is challenged with current ex regimen  Pt can still improve ability to transfer out of chair and increase her walking distances    Plan: Continue per plan of care  Plan to progress patient to maintenance program next re-assessment       Precautions: osteoporosis, h/o L hip ORIF and L humeral head fracture      Manuals  5                                   Neuro Re-Ed                sidestepping 2 5 // bars 2 5# bars, 5x 3# // bars x5 3# x5 at bars 2 5# x5 at bars                                           Ther Ex                Nu step  x15' L1 x15' L1 x10' L1 x15 m L1 15m L1   pulleys x5' x5' x5' x5 x5'   UBE  UBE 5/5- NT UBE / 5/5 5/5 5/5                           SLR x 3, stand 2 5# 3x10 2 5# 3x10 3# 3x10 3# 3x10 2 5# 3x10   Ham curls 2 5# 3x103 2 5# 3x10 3# 3x10 3#3x10 2 5# 3x10   HR 2x10 3x10 3x10 3x10 2 5# 3x10   Stand hip flexion - 2,5# 30x 3# x30 -    squats 3x10 NT 3x10 3x10 3x10 3x10           LAQ 2 5# 3x10 3# 3x10 3# 3x10 3# 3x10 2 5# 3x10   Ball squeezes 2x10 2x10 2x10 2x10 2x10   scap retraction 2x10:03 2x10 :03  2x10:03 2x10 :03                                                -   Shoulder scaption range, seated 2# 3x10 2# 3x10- NT 3# 3x10 2# 3x10 2# 3x10   Seated cane flex/CP 2# 3x10  3# 3x10- NT 3# 3x10 Seated 3# 3x10 Seated 2 5# 2x15   Seated bicep& hammer curls 2# 3x 10ea 2# 3x10- NT 2# 3x10 2# x3x10 2# 3x10 Ther Activity                        Gait Training        With [facility]?- when ready                Modalities

## 2021-05-20 ENCOUNTER — OFFICE VISIT (OUTPATIENT)
Dept: PHYSICAL THERAPY | Age: 86
End: 2021-05-20
Payer: MEDICARE

## 2021-05-20 DIAGNOSIS — M25.552 LEFT HIP PAIN: ICD-10-CM

## 2021-05-20 DIAGNOSIS — R26.9 GAIT ABNORMALITY: Primary | ICD-10-CM

## 2021-05-20 PROCEDURE — 97110 THERAPEUTIC EXERCISES: CPT

## 2021-05-20 NOTE — PROGRESS NOTES
Daily Note     Today's date: 2021  Patient name: Willian Machado  : 1930  MRN: [id number]  Referring provider: Kallie Robbins MD  Dx:   Encounter Diagnosis     ICD-10-CM    1  Gait abnormality  R26 9    2  Left hip pain  M25 552                   Subjective: Tim Servin Pt frustrated over not having her glasses repaired yet  No increased pain c/o's      Objective: See treatment diary below      Assessment: Tolerated treatment fair today  Continues to have fatigue and is challenged with current ex regimen  Pt can still improve ability to transfer out of chair and increase her walking distances    Plan: Continue per plan of care  Plan to progress patient to maintenance program next re-assessment       Precautions: osteoporosis, h/o L hip ORIF and L humeral head fracture      Manuals                                    Neuro Re-Ed                sidestepping 2 5 // bars 2 5# bars, 5x 3# // bars x5 3# x5 at bars 3# x5 at bars                                           Ther Ex                Nu step  x15' L1 x15' L1 x10' L1 x15 m L1 15m L1   pulleys x5' x5' x5' x5 x5'   UBE  UBE 5/5- NT UBE 5/5 5/5 5/5 5/5                           SLR x 3, stand 2 5# 3x10 2 5# 3x10 3# 3x10 3# 3x10 3# 3x10   Ham curls 2 5# 3x103 2 5# 3x10 3# 3x10 3#3x10 3# 3x10   HR 2x10 3x10 3x10 3x10  3x10   Stand hip flexion - 2,5# 30x 3# x30 -    squats 3x10 NT 3x10 3x10 3x10 3x10           LAQ 2 5# 3x10 3# 3x10 3# 3x10 3# 3x10 3# 3x10   Ball squeezes 2x10 2x10 2x10 2x10 2x10   scap retraction 2x10:03 2x10 :03  2x10:03 2x10 :03                                                -   Shoulder scaption range, seated 2# 3x10 2# 3x10- NT 3# 3x10 2# 3x10 2# 3x10   Seated cane flex/CP 2# 3x10  3# 3x10- NT 3# 3x10 Seated 3# 3x10 Seated 3# 2x15   Seated bicep& hammer curls 2# 3x 10ea 2# 3x10- NT 2# 3x10 2# x3x10 3# 3x10                                                                                           Ther Activity Gait Training        With [facility]?- when ready                Modalities

## 2021-05-25 ENCOUNTER — APPOINTMENT (OUTPATIENT)
Dept: PHYSICAL THERAPY | Age: 86
End: 2021-05-25
Payer: MEDICARE

## 2021-05-27 ENCOUNTER — OFFICE VISIT (OUTPATIENT)
Dept: PHYSICAL THERAPY | Age: 86
End: 2021-05-27
Payer: MEDICARE

## 2021-05-27 DIAGNOSIS — R26.9 GAIT ABNORMALITY: Primary | ICD-10-CM

## 2021-05-27 PROCEDURE — 97110 THERAPEUTIC EXERCISES: CPT | Performed by: PHYSICAL THERAPIST

## 2021-05-27 NOTE — PROGRESS NOTES
Daily Note  Re-assessment    Today's date: 2021  Patient name: Sandeep Urrutia  : 1930  MRN: [id number]  Referring provider: Roxie Bryan MD  Dx:   Encounter Diagnosis     ICD-10-CM    1  Gait abnormality  R26 9                   Subjective: Patient and spouse concerned regarding patient's persistent L hip pain during weightbearing and patient's inability to ambulate with [facility] despite her strengthening in PT  Patient plans to see surgeon regarding hip pain and will stop PT in the mean time  Objective: See treatment diary below      Assessment: PT discussed patient's concerns  Patient has been tolerating progressive strengthening program well, however, has not had any specific reduction in pain since PT  PT in agreement with patient setting up an appt and seeing the surgeon  Recommended patient to use RW in the home vs her cane as she walks better with the RW and doesn't have any pain in the hip  POC:  Discharge from PT             Precautions: osteoporosis, h/o L hip ORIF and L humeral head fracture      Manuals                                    Neuro Re-Ed                sidestepping 3# x 5 // bars 2 5# bars, 5x 3# // bars x5 3# x5 at bars 3# x5 at bars                                           Ther Ex                Nu step  x15' L1 x15' L1 x10' L1 x15 m L1 15m L1   pulleys x5' x5' x5' x5 x5'   UBE  UBE 5/5 UBE 5/5 5/5 5/5 5/5                           SLR x 3, stand 3# 3x10 2 5# 3x10 3# 3x10 3# 3x10 3# 3x10   Ham curls 3# 3x103 2 5# 3x10 3# 3x10 3#3x10 3# 3x10   HR 3x10 3x10 3x10 3x10  3x10   Stand hip flexion - 2,5# 30x 3# x30 -    squats 3x10 3x10 3x10 3x10 3x10           LAQ 3# 3x10 3# 3x10 3# 3x10 3# 3x10 3# 3x10   Ball squeezes 2x10 2x10 2x10 2x10 2x10   scap retraction 2x10:03 2x10 :03  2x10:03 2x10 :03                                            NT- below    -   Shoulder scaption range, seated 2# 3x10 2# 3x10- NT 3# 3x10 2# 3x10 2# 3x10   Seated cane flex/CP 2# 3x10  3# 3x10- NT 3# 3x10 Seated 3# 3x10 Seated 3# 2x15   Seated bicep& hammer curls 2  # 3x 10ea 2# 3x10- NT 2# 3x10 2# x3x10 3# 3x10                                                                                           Ther Activity                        Gait Training        With [facility]?- when ready                Modalities

## 2021-06-01 ENCOUNTER — APPOINTMENT (OUTPATIENT)
Dept: PHYSICAL THERAPY | Age: 86
End: 2021-06-01
Payer: MEDICARE

## 2021-06-03 ENCOUNTER — APPOINTMENT (OUTPATIENT)
Dept: PHYSICAL THERAPY | Age: 86
End: 2021-06-03
Payer: MEDICARE

## 2021-06-08 ENCOUNTER — APPOINTMENT (OUTPATIENT)
Dept: PHYSICAL THERAPY | Age: 86
End: 2021-06-08
Payer: MEDICARE

## 2021-06-10 ENCOUNTER — APPOINTMENT (OUTPATIENT)
Dept: PHYSICAL THERAPY | Age: 86
End: 2021-06-10
Payer: MEDICARE

## 2021-06-15 ENCOUNTER — APPOINTMENT (OUTPATIENT)
Dept: PHYSICAL THERAPY | Age: 86
End: 2021-06-15
Payer: MEDICARE

## 2021-06-17 ENCOUNTER — APPOINTMENT (OUTPATIENT)
Dept: PHYSICAL THERAPY | Age: 86
End: 2021-06-17
Payer: MEDICARE

## 2021-06-22 ENCOUNTER — APPOINTMENT (OUTPATIENT)
Dept: PHYSICAL THERAPY | Age: 86
End: 2021-06-22
Payer: MEDICARE

## 2021-06-24 ENCOUNTER — APPOINTMENT (OUTPATIENT)
Dept: PHYSICAL THERAPY | Age: 86
End: 2021-06-24
Payer: MEDICARE

## 2021-06-29 ENCOUNTER — EVALUATION (OUTPATIENT)
Dept: PHYSICAL THERAPY | Age: 86
End: 2021-06-29
Payer: MEDICARE

## 2021-06-29 ENCOUNTER — APPOINTMENT (OUTPATIENT)
Dept: PHYSICAL THERAPY | Age: 86
End: 2021-06-29
Payer: MEDICARE

## 2021-06-29 DIAGNOSIS — R26.9 GAIT ABNORMALITY: Primary | ICD-10-CM

## 2021-06-29 PROCEDURE — 97164 PT RE-EVAL EST PLAN CARE: CPT | Performed by: PHYSICAL THERAPIST

## 2021-06-29 PROCEDURE — 97110 THERAPEUTIC EXERCISES: CPT | Performed by: PHYSICAL THERAPIST

## 2021-06-29 NOTE — LETTER
2021    Derek Ormond, [place] [address] [facility] [address]  [address] [facility] [address]    Patient: Josr Strickland   YOB: 1930   Date of Visit: 2021     Encounter Diagnosis     ICD-10-CM    1  Gait abnormality  R26 9        Dear Dr Patria Landeros: Thank you for your recent referral of Josr Strickland  Please review the attached evaluation summary from Tanya's recent visit  Please verify that you agree with the plan of care by signing the attached order  If you have any questions or concerns, please do not hesitate to call  I sincerely appreciate the opportunity to share in the care of one of your patients and hope to have another opportunity to work with you in the near future  Sincerely,    Vale Orlando, PT      Referring Provider:      I certify that I have read the below Plan of Care and certify the need for these services furnished under this plan of treatment while under my care  Derek Ormond, MD  [address] [address]  Via Fax: [phone]          PT Evaluation  and PT Re-Evaluation     Today's date: 2021  Patient name: Josr Strickland  : 1930  MRN: [id number]  Referring provider: Anurag Silva MD  Dx:   Encounter Diagnosis     ICD-10-CM    1  Gait abnormality  R26 9                   Assessment  Assessment details: Patient seen for re-assessment as patient has been discharged/referred back in to the doctor as she was having more pain  Patient presents with decreased strength since last re-eval, balance and gait deficits, lacks walking distance within 2 minute time frame  Patient would benefit to continue PT to improve balance, strength and gait then planning to transition back to a maintenance program to prevent further decline of function and gait     Impairments: abnormal gait, abnormal or restricted ROM, activity intolerance, impaired balance, impaired physical strength, lacks appropriate home exercise program, pain with function, poor posture  and poor body mechanics  Functional limitations: Patient with moderate limitations - decreased standing balance, tolerance, decreased gait tolerance, pain in L hip with functional activites  Has not been able to keep up her exercises as home and outside of PTUnderstanding of Dx/Px/POC: good   Prognosis: good    Goals  Impairment Goals to be met within 4 weeks  - Decrease pain to 3/10 with activity  - Increase strength to 4/5 throughout B LEs  - Improve UE strength to 4/5  - Improve B shoulder flexion ROM by 10 degrees     Functional Goals to be met within 4-6 weeks  - Return to Prior Level of Function  - Increase Functional Status Measure to: expected  - Patient will be independent with HEP  - Patient to be able to perform 5 sit to stands within 15 seconds   - Improve 2 minute walk test by 50'         Plan  Patient would benefit from: skilled physical therapy  Planned modality interventions: cryotherapy and thermotherapy: hydrocollator packs  Planned therapy interventions: activity modification, joint mobilization, manual therapy, neuromuscular re-education, patient education, postural training, strengthening, stretching, therapeutic activities, therapeutic exercise, home exercise program, balance and body mechanics training  Frequency: 1x month  Duration in weeks: 6  Treatment plan discussed with: patient and family        Subjective Evaluation    History of Present Illness  Mechanism of injury: Patient has since followed up with the surgeon and was told that she's not a surgical candidate at this time to address her ongoing hip pain  Patient reports that she wasn't able to keep up with the exercises at home and would like to re-start therapy as she still has hip pain, would like to keep and maintain her independence at home with her spouse   Patient reports that her hip does hurt, but always felt better when she was in therapy and isn't able to replicate the therapy at home and at the gym as she is fearful of COVID-19  Pain  Current pain ratin  At best pain ratin  At worst pain ratin  Location: L hip  Quality: sharp and dull ache  Aggravating factors: sitting, standing, walking, stair climbing and lifting  Progression: no change    Social Support  Steps to enter house: yes  Stairs in house: yes   Lives in: multiple-level home  Lives with: spouse    Employment status: not working    Diagnostic Tests  No diagnostic tests performed  Treatments  Previous treatment: physical therapy  Patient Goals  Patient goals for therapy: decreased pain, increased motion, improved balance, increased strength and return to sport/leisure activities          Objective     Active Range of Motion   Left Shoulder   Flexion: 140 degrees     Right Shoulder   Flexion: 140 degrees   Left Knee   Normal active range of motion    Right Knee   Normal active range of motion    Strength/Myotome Testing     Left Shoulder     Planes of Motion   Flexion: 3+   Abduction: 3+     Right Shoulder     Planes of Motion   Flexion: 3+   Abduction: 3+     Left Hip   Planes of Motion   Flexion: 3  Extension: 3+  Abduction: 3+  Adduction: 3+    Right Hip   Planes of Motion   Flexion: 4-  Extension: 4-  Abduction: 4-  Adduction: 4-    Left Knee   Flexion: 3+  Prone flexion: 3+    Right Knee   Flexion: 4-  Prone flexion: 4-    Left Ankle/Foot   Dorsiflexion: 4  Plantar flexion: 4-    Right Ankle/Foot   Dorsiflexion: 4  Plantar flexion: 4-  Neuro Exam:     Functional outcomes   5x sit to stand: 20 (seconds)  2 minute walk test: 125'  TU (seconds)  Functional outcome gait comment: Patient ambulates with decreased and unequal step length, pain with WB during L SLS, lacks  L hip extension which limits stride length and decreased push off B  Uses RW at all times for balance and gait        Balance assessments   Single leg stance   Left eyes open firm surface: 1  Right eyes open firm surface: 5 Precautions: FALL risk      Manuals 6/29                                       Neuro Re-Ed                sidestepping                                                Ther Ex                Nu step x10'       pulleys x5'       UBE x10' 120 rpm               SLR x 3        Ham curls        HR                Cane flex/CP        Bicep curls        Cane horizontal abd/add                                                                                                Ther Activity                        Gait Training                        Modalities

## 2021-06-29 NOTE — PROGRESS NOTES
PT Evaluation  and PT Re-Evaluation     Today's date: 2021  Patient name: Justin Keenan  : 1930  MRN: [id number]  Referring provider: Andrea Villa MD  Dx:   Encounter Diagnosis     ICD-10-CM    1  Gait abnormality  R26 9                   Assessment  Assessment details: Patient seen for re-assessment as patient has been discharged/referred back in to the doctor as she was having more pain  Patient presents with decreased strength since last re-eval, balance and gait deficits, lacks walking distance within 2 minute time frame  Patient would benefit to continue PT to improve balance, strength and gait then planning to transition back to a maintenance program to prevent further decline of function and gait  Impairments: abnormal gait, abnormal or restricted ROM, activity intolerance, impaired balance, impaired physical strength, lacks appropriate home exercise program, pain with function, poor posture  and poor body mechanics  Functional limitations: Patient with moderate limitations - decreased standing balance, tolerance, decreased gait tolerance, pain in L hip with functional activites  Has not been able to keep up her exercises as home and outside of PTUnderstanding of Dx/Px/POC: good   Prognosis: good    Goals  Impairment Goals to be met within 4 weeks  - Decrease pain to 3/10 with activity  - Increase strength to 4/5 throughout B LEs  - Improve UE strength to 4/5  - Improve B shoulder flexion ROM by 10 degrees     Functional Goals to be met within 4-6 weeks     - Return to Prior Level of Function  - Increase Functional Status Measure to: expected  - Patient will be independent with HEP  - Patient to be able to perform 5 sit to stands within 15 seconds   - Improve 2 minute walk test by 50'         Plan  Patient would benefit from: skilled physical therapy  Planned modality interventions: cryotherapy and thermotherapy: hydrocollator packs  Planned therapy interventions: activity modification, joint mobilization, manual therapy, neuromuscular re-education, patient education, postural training, strengthening, stretching, therapeutic activities, therapeutic exercise, home exercise program, balance and body mechanics training  Frequency: 1x month  Duration in weeks: 6  Treatment plan discussed with: patient and family        Subjective Evaluation    History of Present Illness  Mechanism of injury: Patient has since followed up with the surgeon and was told that she's not a surgical candidate at this time to address her ongoing hip pain  Patient reports that she wasn't able to keep up with the exercises at home and would like to re-start therapy as she still has hip pain, would like to keep and maintain her independence at home with her spouse  Patient reports that her hip does hurt, but always felt better when she was in therapy and isn't able to replicate the therapy at home and at the gym as she is fearful of COVID-19  Pain  Current pain ratin  At best pain ratin  At worst pain ratin  Location: L hip     Quality: sharp and dull ache  Aggravating factors: sitting, standing, walking, stair climbing and lifting  Progression: no change    Social Support  Steps to enter house: yes  Stairs in house: yes   Lives in: multiple-level home  Lives with: spouse    Employment status: not working    Diagnostic Tests  No diagnostic tests performed  Treatments  Previous treatment: physical therapy  Patient Goals  Patient goals for therapy: decreased pain, increased motion, improved balance, increased strength and return to sport/leisure activities          Objective     Active Range of Motion   Left Shoulder   Flexion: 140 degrees     Right Shoulder   Flexion: 140 degrees   Left Knee   Normal active range of motion    Right Knee   Normal active range of motion    Strength/Myotome Testing     Left Shoulder     Planes of Motion   Flexion: 3+   Abduction: 3+     Right Shoulder     Planes of Motion   Flexion: 3+ Abduction: 3+     Left Hip   Planes of Motion   Flexion: 3  Extension: 3+  Abduction: 3+  Adduction: 3+    Right Hip   Planes of Motion   Flexion: 4-  Extension: 4-  Abduction: 4-  Adduction: 4-    Left Knee   Flexion: 3+  Prone flexion: 3+    Right Knee   Flexion: 4-  Prone flexion: 4-    Left Ankle/Foot   Dorsiflexion: 4  Plantar flexion: 4-    Right Ankle/Foot   Dorsiflexion: 4  Plantar flexion: 4-  Neuro Exam:     Functional outcomes   5x sit to stand: 20 (seconds)  2 minute walk test: 125'  TU (seconds)  Functional outcome gait comment: Patient ambulates with decreased and unequal step length, pain with WB during L SLS, lacks  L hip extension which limits stride length and decreased push off B  Uses RW at all times for balance and gait        Balance assessments   Single leg stance   Left eyes open firm surface: 1  Right eyes open firm surface: 5             Precautions: FALL risk      Manuals                                        Neuro Re-Ed                sidestepping                                                Ther Ex                Nu step x10'       pulleys x5'       UBE x10' 120 rpm               SLR x 3        Ham curls        HR                Cane flex/CP        Bicep curls        Cane horizontal abd/add                                                                                                Ther Activity                        Gait Training                        Modalities

## 2021-07-01 ENCOUNTER — APPOINTMENT (OUTPATIENT)
Dept: PHYSICAL THERAPY | Age: 86
End: 2021-07-01
Payer: MEDICARE

## 2021-07-01 ENCOUNTER — OFFICE VISIT (OUTPATIENT)
Dept: PHYSICAL THERAPY | Age: 86
End: 2021-07-01
Payer: MEDICARE

## 2021-07-01 DIAGNOSIS — R26.9 GAIT ABNORMALITY: Primary | ICD-10-CM

## 2021-07-01 PROCEDURE — 97110 THERAPEUTIC EXERCISES: CPT

## 2021-07-01 NOTE — PROGRESS NOTES
Daily Note     Today's date: 2021  Patient name: Justus Frazier  : 1930  MRN: [id number]  Referring provider: Wm Vickers MD  Dx:   Encounter Diagnosis     ICD-10-CM    1  Gait abnormality  R26 9                   Subjective: Pt returns after month hiatus  Pt's  brought her back due to decline in strength and functional status  Pt is familiar with therapy and is motivated to return to exercise      Objective: See treatment diary below      Assessment: Tolerated treatment well  Reintroducing ex back to program with less intensity to build pt back to previous status  Pt showed good recall with ex but noted that her endurance has declined since last therapy in spring        Plan: Cont PT per LPT plan     Precautions: FALL risk      Manuals                                       Neuro Re-Ed                sidestepping                                                Ther Ex                Nu step x10' x10      pulleys x5' x5      UBE x10' 120 rpm 10 min 120RPM              SLR x 3  2# x30      Ham curls  2# x30      HR  x30      squats  x20      Cane flex/CP  # 2 x 20      Bicep curls  # 2 reg and hammer 2x10      Cane horizontal abd/add  2# 2x10                                                                                              Ther Activity                        Gait Training                        Modalities

## 2021-07-07 ENCOUNTER — APPOINTMENT (OUTPATIENT)
Dept: PHYSICAL THERAPY | Age: 86
End: 2021-07-07
Payer: MEDICARE

## 2021-07-09 ENCOUNTER — OFFICE VISIT (OUTPATIENT)
Dept: PHYSICAL THERAPY | Age: 86
End: 2021-07-09
Payer: MEDICARE

## 2021-07-09 DIAGNOSIS — R26.9 GAIT ABNORMALITY: Primary | ICD-10-CM

## 2021-07-09 PROCEDURE — 97110 THERAPEUTIC EXERCISES: CPT

## 2021-07-09 NOTE — PROGRESS NOTES
Daily Note     Today's date: 2021  Patient name: Juan Ambrose  : 1930  MRN: [id number]  Referring provider: Michael Kothari MD  Dx:   Encounter Diagnosis     ICD-10-CM    1  Gait abnormality  R26 9                   Subjective: Pt returns after month hiatus  Pt's  brought her back due to decline in strength and functional status  Pt is familiar with therapy and is motivated to return to exercise  Pt reports her L  Hip is sore and stiff at previous fx  area      Objective: See treatment diary below      Assessment: Tolerated treatment well  Reintroducing ex back to program with less intensity to build pt back to previous status  Pt showed good recall with ex but noted that her endurance has declined since last therapy in spring        Plan: Cont PT per LPT plan     Precautions: FALL risk      Manuals                                      Neuro Re-Ed                sidestepping                                                Ther Ex                Nu step x10' x10 x10 m     pulleys x5' x5 x5     UBE x10' 120 rpm 10 min 120RPM 10 min 90 RPM             SLR x 3  2# x30 2# x30     Ham curls  2# x30 2# x30     HR  x30 x30     squats  x20 x30     Cane flex/CP  # 2 x 20 2# x30     Bicep curls  # 2 reg and hammer 2x10 #2 reg& hammer x30     Cane horizontal abd/add  2# 2x10 2# 2x15                                                                                             Ther Activity                        Gait Training                        Modalities

## 2021-07-14 ENCOUNTER — OFFICE VISIT (OUTPATIENT)
Dept: PHYSICAL THERAPY | Age: 86
End: 2021-07-14
Payer: MEDICARE

## 2021-07-14 DIAGNOSIS — R26.9 GAIT ABNORMALITY: Primary | ICD-10-CM

## 2021-07-14 PROCEDURE — 97110 THERAPEUTIC EXERCISES: CPT

## 2021-07-14 NOTE — PROGRESS NOTES
Daily Note     Today's date: 2021  Patient name: Simone Osler  : 1930  MRN: [id number]  Referring provider: Boaz Salazar MD  Dx:   Encounter Diagnosis     ICD-10-CM    1  Gait abnormality  R26 9                   Subjective: Pt reports that her L hip continues to be painful but acknowledges that MD cannot do anything about it due to her age      Objective: See treatment diary below      Assessment: Tolerated treatment well  Reintroducing ex back to program with less intensity to build pt back to previous status   Pt showed good recall with ex but noted that her endurance is less and L hip is more painful      Plan: Cont PT per LPT plan     Precautions: FALL risk      Manuals                                    Neuro Re-Ed                sidestepping                                                Ther Ex                Nu step x10' x10 x10 m x10 m x10 min   pulleys x5' x5 x5 x5 x5m   UBE x10' 120 rpm 10 min 120RPM 10 min 90 RPM 10 min 90RPM 10 min 90RPM           SLR x 3  2# x30 2# x30 2 5# x30    Ham curls  2# x30 2# x30 2 5#x30    HR  x30 x30 x30    squats  x20 x30 x30    Cane flex/CP  # 2 x 20 2# x30 2# x30    Bicep curls  # 2 reg and hammer 2x10 #2 reg& hammer x30 2# reg & hammer x30    Cane horizontal abd/add  2# 2x10 2# 2x15 2# 2x15                                                                                            Ther Activity                        Gait Training                        Modalities

## 2021-07-16 ENCOUNTER — OFFICE VISIT (OUTPATIENT)
Dept: PHYSICAL THERAPY | Age: 86
End: 2021-07-16
Payer: MEDICARE

## 2021-07-16 DIAGNOSIS — R26.9 GAIT ABNORMALITY: Primary | ICD-10-CM

## 2021-07-16 PROCEDURE — 97110 THERAPEUTIC EXERCISES: CPT | Performed by: PHYSICAL THERAPIST

## 2021-07-16 NOTE — PROGRESS NOTES
Daily Note     Today's date: 2021  Patient name: Gerry Johnson  : 1930  MRN: [id number]  Referring provider: Yousuf Londono MD  Dx:   Encounter Diagnosis     ICD-10-CM    1  Gait abnormality  R26 9                   Subjective: Patient reports that she's feeling good today  Feels weakness in the leg and is challenged with exercising in PT  Objective: See treatment diary below      Assessment: Tolerated treatment well  Patient demonstrated fatigue post treatment, exhibited good technique with therapeutic exercises and would benefit from continued PT Patient visibly dragging her R LE during walking with her walker  Tired and challenged with PT, no increase in pain with PT  Plan: Continue per plan of care        Precautions: FALL risk      Manuals                                    Neuro Re-Ed                sidestepping     3x                                           Ther Ex                Nu step x10' x10 x10 m x10 m x10 min   pulleys x5' x5 x5 x5 x5m   UBE x10' 120 rpm 10 min 120RPM 10 min 90 RPM 10 min 90RPM 10 min 90RPM           SLR x 3  2# x30 2# x30 2 5# x30 2 5# 30x   Ham curls  2# x30 2# x30 2 5#x30 2 5# 30x   HR  x30 x30 x30 x30   squats  x20 x30 x30 x30   Cane flex/CP  # 2 x 20 2# x30 2# x30 2# 30x   Bicep curls  # 2 reg and hammer 2x10 #2 reg& hammer x30 2# reg & hammer x30 2# 30x, both   Cane horizontal abd/add  2# 2x10 2# 2x15 2# 2x15 2# 2x15                                                                                           Ther Activity                        Gait Training                        Modalities

## 2021-07-19 ENCOUNTER — EVALUATION (OUTPATIENT)
Dept: PHYSICAL THERAPY | Age: 86
End: 2021-07-19
Payer: MEDICARE

## 2021-07-19 DIAGNOSIS — R26.9 GAIT ABNORMALITY: Primary | ICD-10-CM

## 2021-07-19 PROCEDURE — 97110 THERAPEUTIC EXERCISES: CPT | Performed by: PHYSICAL THERAPIST

## 2021-07-19 NOTE — PROGRESS NOTES
PT Re-Evaluation     Today's date: 2021  Patient name: Florence Connelly  : 1930  MRN: [id number]  Referring provider: Lisa Bui MD  Dx:   Encounter Diagnosis     ICD-10-CM    1  Gait abnormality  R26 9                   Assessment  Assessment details: Patient seen for re-assessment  Patient is making good progress towards goals of strengthening, ROM in shoulders  Gait deficits persist and patient is a high risk for falls  Patient presents with immediate improvement in gait after PT today, specifically improved stride length (equal)  Patient has potential to gain additional strength with goal of improving gait pattern consistently and posture prior to placing patient on a maintenance therapy program- will re-assess next month to see gains and will transition to maintenance program once ready- if applicable  Impairments: abnormal gait, abnormal or restricted ROM, activity intolerance, impaired balance, impaired physical strength, lacks appropriate home exercise program, pain with function, poor posture  and poor body mechanics  Functional limitations: Patient with moderate limitations - decreased standing balance, tolerance, decreased gait tolerance, pain in L hip with functional activites  Has not been able to keep up her exercises as home and outside of PTUnderstanding of Dx/Px/POC: good   Prognosis: good    Goals  Impairment Goals to be met within 4 weeks  - Decrease pain to 3/10 with activity progressing  - Increase strength to 4/5 throughout B LEs progressing  - Improve UE strength to 4/5 progressing  - Improve B shoulder flexion ROM by 10 degrees  Partially met    Functional Goals to be met within 4-6 weeks  - Return to Prior Level of Function progressing  - Increase Functional Status Measure to: expected met  - Patient will be independent with HEP has HEP, not consistent with exercising at home, but will try to encourage more walking in the home    - Patient to be able to perform 5 sit to stands within 15 seconds not met   - Improve 2 minute walk test by 50'  Not met        Plan  Patient would benefit from: skilled physical therapy  Planned modality interventions: cryotherapy and thermotherapy: hydrocollator packs  Planned therapy interventions: activity modification, joint mobilization, manual therapy, neuromuscular re-education, patient education, postural training, strengthening, stretching, therapeutic activities, therapeutic exercise, home exercise program, balance and body mechanics training  Frequency: 2x week  Duration in weeks: 6  Treatment plan discussed with: patient and family        Subjective Evaluation    History of Present Illness  Mechanism of injury: Patient does feel therapy is important to her for strengthening and to keep her ability to walk at home with her   Patient does report that her pain has not changed, but her goal is to stay home with her  and to stay physically active  Patient has since followed up with the surgeon and was told that she's not a surgical candidate at this time to address her ongoing hip pain  Patient reports that she wasn't able to keep up with the exercises at home and would like to re-start therapy as she still has hip pain, would like to keep and maintain her independence at home with her spouse  Patient reports that her hip does hurt, but always felt better when she was in therapy and isn't able to replicate the therapy at home and at the gym as she is fearful of COVID-19  Pain  Current pain ratin  At best pain ratin  At worst pain ratin  Location: L hip     Quality: sharp and dull ache  Aggravating factors: sitting, standing, walking, stair climbing and lifting  Progression: no change    Social Support  Steps to enter house: yes  Stairs in house: yes   Lives in: multiple-level home  Lives with: spouse    Employment status: not working    Diagnostic Tests  No diagnostic tests performed  Treatments  Previous treatment: physical therapy  Patient Goals  Patient goals for therapy: decreased pain, increased motion, improved balance, increased strength and return to sport/leisure activities          Objective     Active Range of Motion   Left Shoulder   Flexion: 150 degrees     Right Shoulder   Flexion: 160 degrees   Left Knee   Normal active range of motion    Right Knee   Normal active range of motion    Strength/Myotome Testing     Left Shoulder     Planes of Motion   Flexion: 4-   Abduction: 4-     Right Shoulder     Planes of Motion   Flexion: 4-   Abduction: 4-     Left Elbow   Flexion: 4-  Extension: 4-    Right Elbow   Flexion: 4-  Extension: 4-    Left Hip   Planes of Motion   Flexion: 3+  Extension: 4-  Abduction: 3+  Adduction: 4-    Right Hip   Planes of Motion   Flexion: 4-  Extension: 4-  Abduction: 4-  Adduction: 4-    Left Knee   Flexion: 4-  Prone flexion: 3+    Right Knee   Flexion: 4-  Prone flexion: 4-    Left Ankle/Foot   Dorsiflexion: 4  Plantar flexion: 4-    Right Ankle/Foot   Dorsiflexion: 4  Plantar flexion: 4-  Neuro Exam:     Functional outcomes   5x sit to stand: 20 (seconds)  2 minute walk test: 125'  TU (seconds)  Functional outcome gait comment: Patient ambulates with RW, unequal stride length, decreased L stride length compared to R 2* weakness in the L hip, no push off/foot flat contact, decreased gait speed, flexed trunk/posture        Balance assessments   Single leg stance   Left eyes open firm surface: 1  Right eyes open firm surface: 5             Precautions: FALL risk      Manuals  7    FOTO d/c'd                               Neuro Re-Ed                sidestepping 3x 2 5#     3x                                           Ther Ex                Nu step x10' x10 x10 m x10 m x10 min   pulleys x5' x5 x5 x5 x5m   UBE x10' 90 rpm 10 min 120RPM 10 min 90 RPM 10 min 90RPM 10 min 90RPM           SLR x 3 2 5# 30x 2# x30 2# x30 2 5# x30 2 5# 30x   Ham curls 2 5# 30x 2# x30 2# x30 2 5#x30 2 5# 30x   HR x30 x30 x30 x30 x30   squats x30 sit to stand to chair, holding on to ballet bar x20 x30 x30 x30   Cane flex/CP 2# 30x # 2 x 20 2# x30 2# x30 2# 30x   Bicep curls, regular and hammer curls 2# 30x  # 2 reg and hammer 2x10 #2 reg& hammer x30 2# reg & hammer x30 2# 30x, both   Cane horizontal abd/add 2# 30x 2# 2x10 2# 2x15 2# 2x15 2# 2x15                                                                                           Ther Activity                        Gait Training                        Modalities

## 2021-07-21 ENCOUNTER — OFFICE VISIT (OUTPATIENT)
Dept: PHYSICAL THERAPY | Age: 86
End: 2021-07-21
Payer: MEDICARE

## 2021-07-21 DIAGNOSIS — R26.9 GAIT ABNORMALITY: Primary | ICD-10-CM

## 2021-07-21 PROCEDURE — 97110 THERAPEUTIC EXERCISES: CPT | Performed by: PHYSICAL THERAPIST

## 2021-07-21 NOTE — PROGRESS NOTES
Daily Note     Today's date: 2021  Patient name: Thee Mancilla  : 1930  MRN: [id number]  Referring provider: Livan Mora MD  Dx:   Encounter Diagnosis     ICD-10-CM    1  Gait abnormality  R26 9                   Subjective: Patient reports that she's sore today in her L hip  Patient's pain is 5/10 today  Patient reporting her pain is 3/10 today after session  And feels that she can walk better today  Objective: See treatment diary below      Assessment: Patient needing cues for exercise progression  Good tolerance to program and exercising today despite pain  Patient eager to progress with program and to strengthen with PT  Patient continues to demonstrate improved gait pattern after PT, specifically improved and equal stride length; however, today, patient fatigued and slower in gait  Plan: Continue per plan of care        Precautions: FALL risk      Manuals     FOTO d/c'd                               Neuro Re-Ed                sidestepping 3x 2 5#  NT   3x                                           Ther Ex                Nu step x10' x10 x10 m x10 m x10 min   pulleys x5' x5 x5 x5 x5m   UBE x10' 90 rpm 10 min 90RPM 10 min 90 RPM 10 min 90RPM 10 min 90RPM           SLR x 3 2 5# 30x 2# x30 2# x30 2 5# x30 2 5# 30x   Ham curls 2 5# 30x 2# x30 2# x30 2 5#x30 2 5# 30x   HR x30 x30 x30 x30 x30   squats x30 sit to stand to chair, holding on to ballet bar x20 x30 x30 x30   Cane flex/CP 2# 30x # 2 x 20 2# x30 2# x30 2# 30x   Bicep curls, regular and hammer curls 2# 30x  # 2 reg and hammer 2x10 #2 reg& hammer x30 2# reg & hammer x30 2# 30x, both   Cane horizontal abd/add 2# 30x 2# 2x10 2# 2x15 2# 2x15 2# 2x15                                                                                           Ther Activity                        Gait Training                        Modalities

## 2021-07-27 ENCOUNTER — OFFICE VISIT (OUTPATIENT)
Dept: PHYSICAL THERAPY | Age: 86
End: 2021-07-27
Payer: MEDICARE

## 2021-07-27 DIAGNOSIS — R26.9 GAIT ABNORMALITY: Primary | ICD-10-CM

## 2021-07-27 PROCEDURE — 97110 THERAPEUTIC EXERCISES: CPT

## 2021-07-27 NOTE — PROGRESS NOTES
Daily Note     Today's date: 2021  Patient name: Angela Covington  : 1930  MRN: [id number]  Referring provider: Diego Menezes MD  Dx:   Encounter Diagnosis     ICD-10-CM    1  Gait abnormality  R26 9                   Subjective: Patient reports she had some nausea that almost caused her to cancel but she is now feeling better    Objective: See treatment diary below      Assessment: Patient needing cues for exercise progression  Good tolerance to program and exercising today despite pain  Patient eager to progress with program and to strengthen with PT  Patient continues to demonstrate improved gait pattern after PT, specifically improved and equal stride length      Plan: Continue per plan of care        Precautions: FALL risk      Manuals     FOTO d/c'd                               Neuro Re-Ed                sidestepping 3x 2 5#  NT 3x 2 5#  3x                                           Ther Ex                Nu step x10' x10 x10 m x10 m x10 min   pulleys x5' x5 x5 x5 x5m   UBE x10' 90 rpm 10 min 90RPM 10 min 90 RPM 10 min 90RPM 10 min 90RPM           SLR x 3 2 5# 30x 2# x30 2 5# x30 2 5# x30 2 5# 30x   Ham curls 2 5# 30x 2# x30 2 5# x30 2 5#x30 2 5# 30x   HR x30 x30 x30 x30 x30   squats x30 sit to stand to chair, holding on to ballet bar x20 x30 x30 x30   Cane flex/CP 2# 30x # 2 x 20 2 5# x30 2# x30 2# 30x   Bicep curls, regular and hammer curls 2# 30x  # 2 reg and hammer 2x10 #2 reg& hammer x30 2# reg & hammer x30 2# 30x, both   Cane horizontal abd/add 2# 30x 2# 2x10 2# 2x15 2# 2x15 2# 2x15                                                                                           Ther Activity                        Gait Training                        Modalities

## 2021-07-29 ENCOUNTER — OFFICE VISIT (OUTPATIENT)
Dept: PHYSICAL THERAPY | Age: 86
End: 2021-07-29
Payer: MEDICARE

## 2021-07-29 DIAGNOSIS — R26.9 GAIT ABNORMALITY: Primary | ICD-10-CM

## 2021-07-29 PROCEDURE — 97110 THERAPEUTIC EXERCISES: CPT | Performed by: PHYSICAL THERAPIST

## 2021-07-29 NOTE — PROGRESS NOTES
Daily Note     Today's date: 2021  Patient name: Dima Beard  : 1930  MRN: [id number]  Referring provider: Steve Velez MD  Dx:   Encounter Diagnosis     ICD-10-CM    1  Gait abnormality  R26 9                   Subjective: Patient feels good today, tired, but no more than her usual every day pain  Rates her pain as 4/10 today in the L hip  Objective: See treatment diary below      Assessment: Tolerated treatment well  Patient demonstrated fatigue post treatment, exhibited good technique with therapeutic exercises and would benefit from continued PT   Patient was able to tolerate full program today with less rest despite fatigue initially  Patient does present with improved gait, specifically stride length after PT compared to a smaller, antalgic gait pattern prior  Plan: Continue per plan of care        Precautions: FALL risk      Manuals     FOTO d/c'd                               Neuro Re-Ed                sidestepping 3x 2 5#  NT 3x 2 5#  3x                                           Ther Ex                Nu step x10' x10 x10 m x10 m x10 min   pulleys x5' x5 x5 x5 x5m   UBE x10' 90 rpm 10 min 90RPM 10 min 90 RPM 10 min 90RPM 10 min 90RPM           SLR x 3 2 5# 30x 2# x30 2 5# x30 2 5# x30 2 5# 30x   Ham curls 2 5# 30x 2# x30 2 5# x30 2 5#x30 2 5# 30x   HR x30 x30 x30 x30 x30   squats x30 sit to stand to chair, holding on to ballet bar x20 x30 x30 x30   Cane flex/CP 2# 30x # 2 x 20 2 5# x30 2# x30 2# 30x   Bicep curls, regular and hammer curls 2# 30x  # 2 reg and hammer 2x10 #2 reg& hammer x30 2# reg & hammer x30 2# 30x, both   Cane horizontal abd/add 2# 30x 2# 2x10 2# 2x15 2# 2x15 2# 2x15                                                                                           Ther Activity                        Gait Training                        Modalities

## 2021-08-03 ENCOUNTER — OFFICE VISIT (OUTPATIENT)
Dept: PHYSICAL THERAPY | Age: 86
End: 2021-08-03
Payer: MEDICARE

## 2021-08-03 DIAGNOSIS — R26.9 GAIT ABNORMALITY: Primary | ICD-10-CM

## 2021-08-03 PROCEDURE — 97110 THERAPEUTIC EXERCISES: CPT

## 2021-08-03 NOTE — PROGRESS NOTES
Daily Note     Today's date: 8/3/2021  Patient name: Estuardo Ruiz  : 1930  MRN: [id number]  Referring provider: Anthony Deutsch MD  Dx:   Encounter Diagnosis     ICD-10-CM    1  Gait abnormality  R26 9                   Subjective: Rates her pain as 4/10 today in the L hip  Notes some fatigue as well      Objective: See treatment diary below      Assessment: Tolerated treatment well  Patient demonstrated fatigue post treatment, exhibited good technique with therapeutic exercises and would benefit from continued PT   Patient was able to tolerate full program today with less rest despite fatigue initially  Patient does present with improved gait, specifically stride length after PT compared to a smaller, antalgic gait pattern prior  Plan: Continue per plan of care        Precautions: FALL risk      Manuals 7/19 7/21 7/27 7/29 8/3    FOTO d/c'd                               Neuro Re-Ed                sidestepping 3x 2 5#  NT 3x 2 5#  3x 2 5#                                           Ther Ex                Nu step x10' x10 x10 m x10 m x10 min   pulleys x5' x5 x5 x5 x5m   UBE x10' 90 rpm 10 min 90RPM 10 min 90 RPM 10 min 90RPM 10 min 90RPM           SLR x 3 2 5# 30x 2# x30 2 5# x30 2 5# x30 2 5# 30x   Ham curls 2 5# 30x 2# x30 2 5# x30 2 5#x30 2 5# 30x   HR x30 x30 x30 x30 x30   squats x30 sit to stand to chair, holding on to ballet bar x20 x30 x30 x30   Cane flex/CP 2# 30x # 2 x 20 2 5# x30 2# x30 2# 30x   Bicep curls, regular and hammer curls 2# 30x  # 2 reg and hammer 2x10 #2 reg& hammer x30 2# reg & hammer x30 2# 30x, both   Cane horizontal abd/add 2# 30x 2# 2x10 2# 2x15 2# 2x15 2# 2x15                                                                                           Ther Activity                        Gait Training                        Modalities

## 2021-08-05 ENCOUNTER — OFFICE VISIT (OUTPATIENT)
Dept: PHYSICAL THERAPY | Age: 86
End: 2021-08-05
Payer: MEDICARE

## 2021-08-05 DIAGNOSIS — R26.9 GAIT ABNORMALITY: Primary | ICD-10-CM

## 2021-08-05 PROCEDURE — 97530 THERAPEUTIC ACTIVITIES: CPT | Performed by: PHYSICAL MEDICINE & REHABILITATION

## 2021-08-05 PROCEDURE — 97112 NEUROMUSCULAR REEDUCATION: CPT | Performed by: PHYSICAL MEDICINE & REHABILITATION

## 2021-08-05 PROCEDURE — 97110 THERAPEUTIC EXERCISES: CPT | Performed by: PHYSICAL MEDICINE & REHABILITATION

## 2021-08-05 NOTE — PROGRESS NOTES
Daily Note     Today's date: 2021  Patient name: Lupe Piña  : 1930  MRN: [id number]  Referring provider: Dav Lal MD  Dx:   Encounter Diagnosis     ICD-10-CM    1  Gait abnormality  R26 9                   Subjective: Patient notes continued LLE discomfort with activity, offers no new complaints to begin session  Objective: See treatment diary below      Assessment: Tolerated treatment well  Challenged with current program specifically in terms of fatigue  Intermittent VCs for form maintenance with fair carryover  Patient demonstrated fatigue post treatment, exhibited good technique with therapeutic exercises and would benefit from continued PT  Plan: Continue per plan of care        Precautions: FALL risk      Manuals 8/5  7/27 7/29 8/3                                   Neuro Re-Ed                sidestepping 5 laps  3x 2 5#  3x 2 5#                                           Ther Ex                Nu step 10'  x10 m x10 m x10 min   pulleys 5'  x5 x5 x5m   UBE 5'/5'  10 min 90 RPM 10 min 90RPM 10 min 90RPM           SLR x 3 2 5# 30x ea  2 5# x30 2 5# x30 2 5# 30x   Ham curls 2 5# 30x ea  2 5# x30 2 5#x30 2 5# 30x   HR 30x  x30 x30 x30   squats 30x  x30 x30 x30   Cane flex/CP 2#, 30x  2 5# x30 2# x30 2# 30x   Bicep curls, regular and hammer curls 2#, 30x ea  #2 reg& hammer x30 2# reg & hammer x30 2# 30x, both   Cane horizontal abd/add   2# 2x15 2# 2x15 2# 2x15                                                                                           Ther Activity                        Gait Training                        Modalities

## 2021-08-10 ENCOUNTER — OFFICE VISIT (OUTPATIENT)
Dept: PHYSICAL THERAPY | Age: 86
End: 2021-08-10
Payer: MEDICARE

## 2021-08-10 DIAGNOSIS — R26.9 GAIT ABNORMALITY: Primary | ICD-10-CM

## 2021-08-10 PROCEDURE — 97110 THERAPEUTIC EXERCISES: CPT | Performed by: PHYSICAL THERAPIST

## 2021-08-10 NOTE — PROGRESS NOTES
Daily Note     Today's date: 8/10/2021  Patient name: Bridgett Lundborg  : 1930  MRN: [id number]  Referring provider: Curt Harmon MD  Dx:   Encounter Diagnosis     ICD-10-CM    1  Gait abnormality  R26 9                   Subjective: Patient reports that she's feeling so-so today  Has increased pain in the thigh when she's at home, no pain when she's here in PT today  Patient plans to discuss her pain with ortho and is hoping for an injection for her pain and/or a cream that she can use for pain  Objective: See treatment diary below      Assessment: Tolerated treatment well despite her current pain  Patient does tolerate therapy well, feels good with exercises, ambulates better and transfers better after therapy  Patient still feels some pain (describes more as bone pain) and is moderately fatigued post treatment  Plan: Continue per plan of care        Precautions: FALL risk      Manuals 8/5 8/10 7/27 7/29 8/3                                   Neuro Re-Ed                sidestepping 5 laps 2 5# 5x 3x 2 5#  3x 2 5#                                           Ther Ex                Nu step 10' x10' x10 m x10 m x10 min   pulleys 5' 5' x5 x5 x5m   UBE 5'/5' 5/5 10 min 90 RPM 10 min 90RPM 10 min 90RPM           SLR x 3 2 5# 30x ea 2 5# 30x ea 2 5# x30 2 5# x30 2 5# 30x   Ham curls 2 5# 30x ea 2 5# 30x 2 5# x30 2 5#x30 2 5# 30x   HR 30x 30x x30 x30 x30   squats 30x 30x NT x30 x30 x30   Cane flex/CP 2#, 30x 2# 30x NT 2 5# x30 2# x30 2# 30x   Bicep curls, regular and hammer curls 2#, 30x ea 2# 30x NT #2 reg& hammer x30 2# reg & hammer x30 2# 30x, both   Cane horizontal abd/add  2# 30x NT 2# 2x15 2# 2x15 2# 2x15                                                                                           Ther Activity                        Gait Training                        Modalities

## 2021-08-12 ENCOUNTER — OFFICE VISIT (OUTPATIENT)
Dept: PHYSICAL THERAPY | Age: 86
End: 2021-08-12
Payer: MEDICARE

## 2021-08-12 DIAGNOSIS — R26.9 GAIT ABNORMALITY: Primary | ICD-10-CM

## 2021-08-12 PROCEDURE — 97110 THERAPEUTIC EXERCISES: CPT | Performed by: PHYSICAL THERAPIST

## 2021-08-12 NOTE — PROGRESS NOTES
Daily Note     Today's date: 2021  Patient name: Calixto Mendoza  : 1930  MRN: [id number]  Referring provider: Marce Wallace MD  Dx:   Encounter Diagnosis     ICD-10-CM    1  Gait abnormality  R26 9                   Subjective: Patient reports that she's so-so today  Patient is more tired today, tired of her persistent pain in the L thigh  Objective: See treatment diary below      Assessment: Progressed to 3# weight today  Patient willing to try increase in weight  Patient frustrated with her constant pain in the thigh, plans to call ortho for assessment/any medications for her pain  Patient will be taking next week off from therapy, will be going away to a wedding, locally  Will re-assess patient and discuss patient's goals NV  Plan: Continue per plan of care  Progress note during next visit        Precautions: FALL risk      Manuals 8/5 8/10 8/12 7/29 8/3                                   Neuro Re-Ed                sidestepping 5 laps 2 5# 5x Too fatigued  3x 2 5#                                           Ther Ex                Nu step 10' x10' x10 m x10 m x10 min   pulleys 5' 5' x5 x5 x5m   UBE 5'/5' 5/5 10 min 90 RPM- NT time constraints 10 min 90RPM 10 min 90RPM           SLR x 3 2 5# 30x ea 2 5# 30x ea 3# x30 2 5# x30 2 5# 30x   Ham curls 2 5# 30x ea 2 5# 30x 3# x30 2 5#x30 2 5# 30x   HR 30x 30x x30 x30 x30   squats 30x 30x NT x30- NT x30 x30   Cane flex/CP 2#, 30x 2# 30x NT 2 5# x30- NT 2# x30 2# 30x   Bicep curls, regular and hammer curls 2#, 30x ea 2# 30x NT #2 reg& hammer x30- NT 2# reg & hammer x30 2# 30x, both   Cane horizontal abd/add  2# 30x NT 2# 2x15 - NT 2# 2x15 2# 2x15                                                                                           Ther Activity                        Gait Training                        Modalities

## 2021-08-17 ENCOUNTER — APPOINTMENT (OUTPATIENT)
Dept: PHYSICAL THERAPY | Age: 86
End: 2021-08-17
Payer: MEDICARE

## 2021-08-19 ENCOUNTER — APPOINTMENT (OUTPATIENT)
Dept: PHYSICAL THERAPY | Age: 86
End: 2021-08-19
Payer: MEDICARE

## 2021-08-24 ENCOUNTER — APPOINTMENT (OUTPATIENT)
Dept: PHYSICAL THERAPY | Age: 86
End: 2021-08-24
Payer: MEDICARE

## 2021-08-24 ENCOUNTER — OFFICE VISIT (OUTPATIENT)
Dept: PHYSICAL THERAPY | Age: 86
End: 2021-08-24
Payer: MEDICARE

## 2021-08-24 DIAGNOSIS — R26.9 GAIT ABNORMALITY: Primary | ICD-10-CM

## 2021-08-24 PROCEDURE — 97164 PT RE-EVAL EST PLAN CARE: CPT | Performed by: PHYSICAL THERAPIST

## 2021-08-24 PROCEDURE — 97140 MANUAL THERAPY 1/> REGIONS: CPT | Performed by: PHYSICAL THERAPIST

## 2021-08-24 NOTE — PROGRESS NOTES
PT Re-Evaluation     Today's date: 2021  Patient name: Juan Ambrose  : 1930  MRN: [id number]  Referring provider: Michael Kothari MD  Dx:   Encounter Diagnosis     ICD-10-CM    1  Gait abnormality  R26 9                   Assessment  Assessment details: Patient seen for re-assessment  Patient has been having increasing L lateral hip pain that has not decreased with previous exercise program  PT modified program and added Graston, stretched L hip manually  Patient did report less lateral thigh pain after Graston today  Performing Graston very light over quads and IT band  Modified session today and focused on stretching  Patient presents with tightness at adductors, lacks ER ROM, WFL IT band with minor stretching  Discussed plan with patient to add Graston to treatment program to see if patient has any residual decrease in pain; and patient will decide NV if she'd like to continue if she finds any relief with PT  Patient would benefit to continue PT if she finds relief of leg pain with Graston and manual stretching in addition to PT treatment plan  Impairments: abnormal gait, abnormal or restricted ROM, activity intolerance, impaired balance, impaired physical strength, lacks appropriate home exercise program, pain with function, poor posture  and poor body mechanics  Functional limitations: Patient with moderate limitations - decreased standing balance, tolerance, decreased gait tolerance, pain in L hip with functional activites  Has not been able to keep up her exercises as home and outside of PTUnderstanding of Dx/Px/POC: good   Prognosis: good    Goals  Impairment Goals to be met within 4 weeks  - Decrease pain to 3/10 with activity not met- recent increase in pain  - Increase strength to 4/5 throughout B LEs still very painful today  - Improve UE strength to 4/5 progressing  - Improve B shoulder flexion ROM by 10 degrees  Partially met    Functional Goals to be met within 4-6 weeks     - Return to Prior Level of Function progressing  - Increase Functional Status Measure to: expected met  - Patient will be independent with HEP has HEP, not consistent with exercising at home, but will try to encourage more walking in the home  - Patient to be able to perform 5 sit to stands within 15 seconds not met   - Improve 2 minute walk test by 50'  Not met        Plan  Patient would benefit from: skilled physical therapy  Planned modality interventions: cryotherapy and thermotherapy: hydrocollator packs  Planned therapy interventions: activity modification, joint mobilization, manual therapy, neuromuscular re-education, patient education, postural training, strengthening, stretching, therapeutic activities, therapeutic exercise, home exercise program, balance and body mechanics training  Frequency: 2x week  Duration in weeks: 6  Treatment plan discussed with: patient and family        Subjective Evaluation    History of Present Illness  Mechanism of injury: Patient reports that she called and followed up with ortho again, was given another injection for her thigh pain, hoping this will give her some relief  Patient also notes that she still has pain today  Patient unsure if she should continue with PT or stop as her pain has steadily worsened over the past few weeks  Patient's spouse did report that MD is not able to perform surgery as patient is not a surgical candidate to address her pain and would benefit to continue exercising  Pain  Current pain ratin  At best pain ratin  At worst pain ratin  Location: L hip     Quality: sharp and dull ache  Aggravating factors: sitting, standing, walking, stair climbing and lifting  Progression: no change    Social Support  Steps to enter house: yes  Stairs in house: yes   Lives in: multiple-level home  Lives with: spouse    Employment status: not working    Diagnostic Tests  No diagnostic tests performed  Treatments  Previous treatment: physical therapy  Patient Goals  Patient goals for therapy: decreased pain, increased motion, improved balance, increased strength and return to sport/leisure activities          Objective     Palpation     Additional Palpation Details  Tenderness at lateral knee and IT band area  Active Range of Motion   Left Shoulder   Flexion: 150 degrees     Right Shoulder   Flexion: 160 degrees   Left Knee   Normal active range of motion    Right Knee   Normal active range of motion    Strength/Myotome Testing     Left Shoulder     Planes of Motion   Flexion: 4-   Abduction: 4-     Right Shoulder     Planes of Motion   Flexion: 4-   Abduction: 4-     Left Elbow   Flexion: 4-  Extension: 4-    Right Elbow   Flexion: 4-  Extension: 4-    Left Hip   Planes of Motion   Flexion: 3+  Extension: 4-  Abduction: 3+  Adduction: 4-    Right Hip   Planes of Motion   Flexion: 4-  Extension: 4-  Abduction: 4-  Adduction: 4-    Left Knee   Flexion: 4-  Prone flexion: 3+    Right Knee   Flexion: 4-  Prone flexion: 4-    Left Ankle/Foot   Dorsiflexion: 4  Plantar flexion: 4-    Right Ankle/Foot   Dorsiflexion: 4  Plantar flexion: 4-  Neuro Exam:     Functional outcomes   5x sit to stand: 20 (seconds)  2 minute walk test: 125'  TU (seconds)  Functional outcome gait comment: Patient ambulates with RW, unequal stride length, decreased L stride length compared to R 2* weakness in the L hip, no push off/foot flat contact, decreased gait speed, flexed trunk/posture        Balance assessments   Single leg stance   Left eyes open firm surface: 1  Right eyes open firm surface: 5             Precautions: FALL risk      Manuals 8/5 8/10 8/12 8/24 8/3       Graston x 10' supine L IT band        IT band stretch 5x:20        Supine adductor stretch 5x:20 L            Neuro Re-Ed                sidestepping 5 laps 2 5# 5x Too fatigued - 3x 2 5#                                           Ther Ex                Nu step 10' x10' x10 m x10 m x10 min   pulleys 5' 5' x5 - x5m UBE 5'/5' 5/5 10 min 90 RPM- NT time constraints - 10 min 90RPM           SLR x 3 2 5# 30x ea 2 5# 30x ea 3# x30 0# x30 2 5# 30x   Ham curls 2 5# 30x ea 2 5# 30x 3# x30 - 2 5# 30x   HR 30x 30x x30 - x30   squats 30x 30x NT x30- NT - x30   Cane flex/CP 2#, 30x 2# 30x NT 2 5# x30- NT - 2# 30x   Bicep curls, regular and hammer curls 2#, 30x ea 2# 30x NT #2 reg& hammer x30- NT - 2# 30x, both   Cane horizontal abd/add  2# 30x NT 2# 2x15 - NT - 2# 2x15                                                                                           Ther Activity                        Gait Training                        Modalities

## 2021-08-26 ENCOUNTER — OFFICE VISIT (OUTPATIENT)
Dept: PHYSICAL THERAPY | Age: 86
End: 2021-08-26
Payer: MEDICARE

## 2021-08-26 ENCOUNTER — APPOINTMENT (OUTPATIENT)
Dept: PHYSICAL THERAPY | Age: 86
End: 2021-08-26
Payer: MEDICARE

## 2021-08-26 DIAGNOSIS — R26.9 GAIT ABNORMALITY: Primary | ICD-10-CM

## 2021-08-26 PROCEDURE — 97110 THERAPEUTIC EXERCISES: CPT

## 2021-08-26 PROCEDURE — 97140 MANUAL THERAPY 1/> REGIONS: CPT

## 2021-08-26 NOTE — PROGRESS NOTES
Daily Note     Today's date: 2021  Patient name: Maribell Chand  : 1930  MRN: [id number]  Referring provider: Nat Deleon MD  Dx:   Encounter Diagnosis     ICD-10-CM    1  Gait abnormality  R26 9                   Subjective: Reports L hip pain is constant  " I don't know if it will ever get better"      Objective: See treatment diary below      Assessment: Tolerated treatment fair  Pursued PROM of L hip once again to try to provide some pain relief for patient  Noted tightness especially with adductors  Pt may get some relief if flexibility in that area is improved  Will let pain and inflammation reduce and adjust active ex as needed        Plan: Cont PT per LPT plan     Precautions: FALL risk      Manuals 8/5 8/10 8/12 8/24 8/26       Graston x 10' supine L IT band Not today       IT band stretch 5x:20 IT band stretch 5x 20s       Supine adductor stretch 5x:20 L Adductor stretch x10 reps           Neuro Re-Ed                sidestepping 5 laps 2 5# 5x Too fatigued -                                            Ther Ex                Nu step 10' x10' x10 m x10 m x10 min   pulleys 5' 5' x5 - x5m   UBE 5'/5' 5/5 10 min 90 RPM- NT time constraints - 10 min 90RPM           SLR x 3 2 5# 30x ea 2 5# 30x ea 3# x30 0# x30 1 5# 20x   Ham curls 2 5# 30x ea 2 5# 30x 3# x30 - 1 5# 20x   HR 30x 30x x30 - x20   squats 30x 30x NT x30- NT - x20   Cane flex/CP 2#, 30x 2# 30x NT 2 5# x30- NT - 2# 30x- NT   Bicep curls, regular and hammer curls 2#, 30x ea 2# 30x NT #2 reg& hammer x30- NT - 2# 30x, both- NT   Cane horizontal abd/add  2# 30x NT 2# 2x15 - NT - 2# 2x15- NT                                                                                           Ther Activity                        Gait Training                        Modalities

## 2021-08-31 ENCOUNTER — OFFICE VISIT (OUTPATIENT)
Dept: PHYSICAL THERAPY | Age: 86
End: 2021-08-31
Payer: MEDICARE

## 2021-08-31 DIAGNOSIS — R26.9 GAIT ABNORMALITY: Primary | ICD-10-CM

## 2021-08-31 PROCEDURE — 97140 MANUAL THERAPY 1/> REGIONS: CPT

## 2021-08-31 PROCEDURE — 97110 THERAPEUTIC EXERCISES: CPT

## 2021-08-31 NOTE — PROGRESS NOTES
Daily Note     Today's date: 2021  Patient name: Jacque Meza  : 1930  MRN: [id number]  Referring provider: Jeff Swanson MD  Dx:   Encounter Diagnosis     ICD-10-CM    1  Gait abnormality  R26 9                   Subjective: Reports L hip pain is constant  " I really hurt on L hip and back today"      Objective: See treatment diary below      Assessment: Tolerated treatment fair  Pursued PROM of L hip once again to try to provide some pain relief for patient  Noted tightness especially with adductors  Decreased L LE emphasis to help LBP/hip pain and focused on UE ex  Also applied MH to L hip and LB for pain control  Will let pain and inflammation reduce and adjust active ex as needed        Plan: Cont PT per LPT plan     Precautions: FALL risk      Manuals 8/31 8/10 8/12 8/24 8/26       Graston x 10' supine L IT band Not today       IT band stretch 5x:20 IT band stretch 5x 20s       Supine adductor stretch 5x:20 L Adductor stretch x10 reps           Neuro Re-Ed                sidestepping 5 laps- NT 2 5# 5x Too fatigued -                                            Ther Ex                Nu step 10' x10' x10 m x10 m x10 min   pulleys 5' 5' x5 - x5m   UBE 5'/5' 5/5 10 min 90 RPM- NT time constraints - 10 min 90RPM           SLR x 3 2 5# 30x ea-NT 2 5# 30x ea 3# x30 0# x30 1 5# 20x   Ham curls 2 5# 30x ea- NT 2 5# 30x 3# x30 - 1 5# 20x   HR 30x- NT 30x x30 - x20   squats 30x- NT 30x NT x30- NT - x20   Cane flex/CP 2#, 30x 2# 30x NT 2 5# x30- NT - 2# 30x- NT   Bicep curls, regular and hammer curls 2#, 30x ea 2# 30x NT #2 reg& hammer x30- NT - 2# 30x, both- NT   Cane horizontal abd/add 2# 30x 2# 30x NT 2# 2x15 - NT - 2# 2x15- NT   SAQ 2# x30       LAQ 2# x30                                                                               Ther Activity                        Gait Training                        Modalities        MH to LB /l hip 10min

## 2022-02-17 NOTE — TELEPHONE ENCOUNTER
FYI only:  I s/w pt and explained that darci just started her on the tramadol last Friday to help her pain  [facility] just started her on a 2 wk Rx of tramadol 50 mg take 0 5 tab once a day to see how that works before prescribing more  I told pt that [facility] wants her to c/b after taking for 1 wk with an update, told pt to c/b this Fri or next Mon  Pt understood and agreed  Pt said she is going to do the PT near [facility] which is closer to where she lives  I told pt that was fine and I would make [facility] aware  Head , normocephalic , atraumatic , Face , Face within normal limits , Ears , External ears within normal limits , Nose/Nasopharynx , External nose  normal appearance , Mouth and Throat , Oral cavity appearance normal

## 2022-04-04 ENCOUNTER — APPOINTMENT (OUTPATIENT)
Dept: LAB | Age: 87
End: 2022-04-04
Payer: MEDICARE

## 2022-04-04 DIAGNOSIS — M81.8 IDIOPATHIC OSTEOPOROSIS: ICD-10-CM

## 2022-04-04 LAB
ALBUMIN SERPL BCP-MCNC: 3.7 G/DL (ref 3.5–5)
ALP SERPL-CCNC: 65 U/L (ref 46–116)
ALT SERPL W P-5'-P-CCNC: 25 U/L (ref 12–78)
ANION GAP SERPL CALCULATED.3IONS-SCNC: 4 MMOL/L (ref 4–13)
AST SERPL W P-5'-P-CCNC: 21 U/L (ref 5–45)
BILIRUB SERPL-MCNC: 0.64 MG/DL (ref 0.2–1)
BUN SERPL-MCNC: 12 MG/DL (ref 5–25)
CALCIUM SERPL-MCNC: 9.4 MG/DL (ref 8.3–10.1)
CHLORIDE SERPL-SCNC: 108 MMOL/L (ref 100–108)
CO2 SERPL-SCNC: 28 MMOL/L (ref 21–32)
CREAT SERPL-MCNC: 0.66 MG/DL (ref 0.6–1.3)
GFR SERPL CREATININE-BSD FRML MDRD: 77 ML/MIN/1.73SQ M
GLUCOSE SERPL-MCNC: 119 MG/DL (ref 65–140)
POTASSIUM SERPL-SCNC: 4.1 MMOL/L (ref 3.5–5.3)
PROT SERPL-MCNC: 7.2 G/DL (ref 6.4–8.2)
SODIUM SERPL-SCNC: 140 MMOL/L (ref 136–145)

## 2022-04-04 PROCEDURE — 80053 COMPREHEN METABOLIC PANEL: CPT

## 2022-04-04 PROCEDURE — 36415 COLL VENOUS BLD VENIPUNCTURE: CPT

## 2022-07-24 ENCOUNTER — APPOINTMENT (EMERGENCY)
Dept: RADIOLOGY | Facility: HOSPITAL | Age: 87
DRG: 177 | End: 2022-07-24
Payer: MEDICARE

## 2022-07-24 ENCOUNTER — HOSPITAL ENCOUNTER (EMERGENCY)
Facility: HOSPITAL | Age: 87
Discharge: HOME | DRG: 177 | End: 2022-07-25
Attending: EMERGENCY MEDICINE
Payer: MEDICARE

## 2022-07-24 ENCOUNTER — APPOINTMENT (EMERGENCY)
Dept: CT IMAGING | Facility: HOSPITAL | Age: 87
DRG: 177 | End: 2022-07-24
Payer: MEDICARE
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
## 2022-07-24 LAB
ALBUMIN SERPL BCP-MCNC: 3.5 G/DL (ref 3.5–5)
ALP SERPL-CCNC: 47 U/L (ref 34–104)
ALT SERPL W P-5'-P-CCNC: 10 U/L (ref 7–52)
ANION GAP SERPL CALCULATED.3IONS-SCNC: 7 MMOL/L (ref 4–13)
AST SERPL W P-5'-P-CCNC: 16 U/L (ref 13–39)
BASOPHILS # BLD AUTO: 0.03 THOUSANDS/ΜL (ref 0–0.1)
BASOPHILS NFR BLD AUTO: 0 % (ref 0–1)
BILIRUB SERPL-MCNC: 0.59 MG/DL (ref 0.2–1)
BNP SERPL-MCNC: 59 PG/ML (ref 0–100)
BUN SERPL-MCNC: 15 MG/DL (ref 5–25)
CALCIUM SERPL-MCNC: 8.4 MG/DL (ref 8.4–10.2)
CARDIAC TROPONIN I PNL SERPL HS: 4 NG/L
CHLORIDE SERPL-SCNC: 102 MMOL/L (ref 96–108)
CO2 SERPL-SCNC: 25 MMOL/L (ref 21–32)
CREAT SERPL-MCNC: 0.68 MG/DL (ref 0.6–1.3)
EOSINOPHIL # BLD AUTO: 0.09 THOUSAND/ΜL (ref 0–0.61)
EOSINOPHIL NFR BLD AUTO: 1 % (ref 0–6)
ERYTHROCYTE [DISTWIDTH] IN BLOOD BY AUTOMATED COUNT: 14.2 % (ref 11.6–15.1)
GFR SERPL CREATININE-BSD FRML MDRD: 76 ML/MIN/1.73SQ M
GLUCOSE SERPL-MCNC: 127 MG/DL (ref 65–140)
HCT VFR BLD AUTO: 35 % (ref 34.8–46.1)
HGB BLD-MCNC: 11.3 G/DL (ref 11.5–15.4)
IMM GRANULOCYTES # BLD AUTO: 0.05 THOUSAND/UL (ref 0–0.2)
IMM GRANULOCYTES NFR BLD AUTO: 1 % (ref 0–2)
LYMPHOCYTES # BLD AUTO: 0.57 THOUSANDS/ΜL (ref 0.6–4.47)
LYMPHOCYTES NFR BLD AUTO: 8 % (ref 14–44)
MCH RBC QN AUTO: 30.1 PG (ref 26.8–34.3)
MCHC RBC AUTO-ENTMCNC: 32.3 G/DL (ref 31.4–37.4)
MCV RBC AUTO: 93 FL (ref 82–98)
MONOCYTES # BLD AUTO: 0.92 THOUSAND/ΜL (ref 0.17–1.22)
MONOCYTES NFR BLD AUTO: 12 % (ref 4–12)
NEUTROPHILS # BLD AUTO: 5.74 THOUSANDS/ΜL (ref 1.85–7.62)
NEUTS SEG NFR BLD AUTO: 78 % (ref 43–75)
NRBC BLD AUTO-RTO: 0 /100 WBCS
PLATELET # BLD AUTO: 209 THOUSANDS/UL (ref 149–390)
PMV BLD AUTO: 10.1 FL (ref 8.9–12.7)
POTASSIUM SERPL-SCNC: 4.1 MMOL/L (ref 3.5–5.3)
PROT SERPL-MCNC: 6.2 G/DL (ref 6.4–8.4)
RBC # BLD AUTO: 3.76 MILLION/UL (ref 3.81–5.12)
SODIUM SERPL-SCNC: 134 MMOL/L (ref 135–147)
WBC # BLD AUTO: 7.4 THOUSAND/UL (ref 4.31–10.16)

## 2022-07-24 PROCEDURE — 99285 EMERGENCY DEPT VISIT HI MDM: CPT

## 2022-07-24 PROCEDURE — 71045 X-RAY EXAM CHEST 1 VIEW: CPT

## 2022-07-24 PROCEDURE — 83880 ASSAY OF NATRIURETIC PEPTIDE: CPT | Performed by: EMERGENCY MEDICINE

## 2022-07-24 PROCEDURE — 72125 CT NECK SPINE W/O DYE: CPT

## 2022-07-24 PROCEDURE — 84484 ASSAY OF TROPONIN QUANT: CPT | Performed by: EMERGENCY MEDICINE

## 2022-07-24 PROCEDURE — 85025 COMPLETE CBC W/AUTO DIFF WBC: CPT | Performed by: EMERGENCY MEDICINE

## 2022-07-24 PROCEDURE — 80053 COMPREHEN METABOLIC PANEL: CPT | Performed by: EMERGENCY MEDICINE

## 2022-07-24 PROCEDURE — 70450 CT HEAD/BRAIN W/O DYE: CPT

## 2022-07-24 PROCEDURE — 99282 EMERGENCY DEPT VISIT SF MDM: CPT | Performed by: EMERGENCY MEDICINE

## 2022-07-24 PROCEDURE — G1004 CDSM NDSC: HCPCS

## 2022-07-24 PROCEDURE — 93005 ELECTROCARDIOGRAM TRACING: CPT

## 2022-07-24 PROCEDURE — 36415 COLL VENOUS BLD VENIPUNCTURE: CPT | Performed by: EMERGENCY MEDICINE

## 2022-07-24 PROCEDURE — 73502 X-RAY EXAM HIP UNI 2-3 VIEWS: CPT

## 2022-07-24 RX ORDER — POLYETHYLENE GLYCOL 3350 17 G/17G
17 POWDER, FOR SOLUTION ORAL DAILY PRN
COMMUNITY

## 2022-07-24 RX ORDER — SENNA PLUS 8.6 MG/1
1 TABLET ORAL DAILY
Status: ON HOLD | COMMUNITY
End: 2022-07-25

## 2022-07-24 RX ORDER — CHOLECALCIFEROL (VITAMIN D3) 125 MCG
1 CAPSULE ORAL
Status: ON HOLD | COMMUNITY
End: 2022-07-25

## 2022-07-25 ENCOUNTER — HOSPITAL ENCOUNTER (EMERGENCY)
Dept: VASCULAR ULTRASOUND | Facility: HOSPITAL | Age: 87
Discharge: HOME | DRG: 177 | End: 2022-07-25
Payer: MEDICARE

## 2022-07-25 ENCOUNTER — APPOINTMENT (EMERGENCY)
Dept: CT IMAGING | Facility: HOSPITAL | Age: 87
DRG: 177 | End: 2022-07-25
Payer: MEDICARE

## 2022-07-25 ENCOUNTER — HOSPITAL ENCOUNTER (INPATIENT)
Facility: HOSPITAL | Age: 87
LOS: 1 days | Discharge: HOME | DRG: 177 | End: 2022-07-27
Attending: EMERGENCY MEDICINE | Admitting: HOSPITALIST
Payer: MEDICARE

## 2022-07-25 ENCOUNTER — APPOINTMENT (EMERGENCY)
Dept: RADIOLOGY | Facility: HOSPITAL | Age: 87
DRG: 177 | End: 2022-07-25
Payer: MEDICARE

## 2022-07-25 VITALS
DIASTOLIC BLOOD PRESSURE: 67 MMHG | TEMPERATURE: 98.3 F | HEART RATE: 88 BPM | BODY MASS INDEX: 22.6 KG/M2 | WEIGHT: 140 LBS | RESPIRATION RATE: 17 BRPM | SYSTOLIC BLOOD PRESSURE: 163 MMHG | OXYGEN SATURATION: 97 %

## 2022-07-25 DIAGNOSIS — U07.1 COVID-19: ICD-10-CM

## 2022-07-25 DIAGNOSIS — E87.1 HYPONATREMIA: ICD-10-CM

## 2022-07-25 DIAGNOSIS — R41.82 ALTERED MENTAL STATUS: Primary | ICD-10-CM

## 2022-07-25 PROBLEM — R60.0 BILATERAL LEG EDEMA: Status: ACTIVE | Noted: 2022-07-25

## 2022-07-25 PROBLEM — G93.41 ACUTE METABOLIC ENCEPHALOPATHY: Status: ACTIVE | Noted: 2022-07-25

## 2022-07-25 PROBLEM — K40.90 UNILATERAL INGUINAL HERNIA WITHOUT OBSTRUCTION OR GANGRENE: Status: ACTIVE | Noted: 2022-07-25

## 2022-07-25 PROBLEM — I95.1 ORTHOSTATIC HYPOTENSION: Status: ACTIVE | Noted: 2022-07-25

## 2022-07-25 LAB
ABO GROUP BLD: NORMAL
ALBUMIN SERPL BCP-MCNC: 3.5 G/DL (ref 3.5–5)
ALP SERPL-CCNC: 49 U/L (ref 34–104)
ALT SERPL W P-5'-P-CCNC: 10 U/L (ref 7–52)
ANION GAP SERPL CALCULATED.3IONS-SCNC: 5 MMOL/L (ref 4–13)
AST SERPL W P-5'-P-CCNC: 22 U/L (ref 13–39)
ATRIAL RATE: 91 BPM
BASOPHILS # BLD AUTO: 0.03 THOUSANDS/ΜL (ref 0–0.1)
BASOPHILS NFR BLD AUTO: 0 % (ref 0–1)
BILIRUB SERPL-MCNC: 0.65 MG/DL (ref 0.2–1)
BUN SERPL-MCNC: 8 MG/DL (ref 5–25)
CALCIUM SERPL-MCNC: 8.2 MG/DL (ref 8.4–10.2)
CARDIAC TROPONIN I PNL SERPL HS: 8 NG/L (ref 8–18)
CHLORIDE SERPL-SCNC: 99 MMOL/L (ref 96–108)
CK MB SERPL-MCNC: 1.8 % (ref 0–2.5)
CK MB SERPL-MCNC: 3 NG/ML (ref 0.6–6.3)
CK SERPL-CCNC: 169 U/L (ref 26–192)
CO2 SERPL-SCNC: 25 MMOL/L (ref 21–32)
CREAT SERPL-MCNC: 0.52 MG/DL (ref 0.6–1.3)
CRP SERPL QL: 18.4 MG/L
D DIMER PPP FEU-MCNC: 1.36 UG/ML FEU
EOSINOPHIL # BLD AUTO: 0 THOUSAND/ΜL (ref 0–0.61)
EOSINOPHIL NFR BLD AUTO: 0 % (ref 0–6)
ERYTHROCYTE [DISTWIDTH] IN BLOOD BY AUTOMATED COUNT: 13.8 % (ref 11.6–15.1)
FLUAV RNA RESP QL NAA+PROBE: NEGATIVE
FLUBV RNA RESP QL NAA+PROBE: NEGATIVE
GFR SERPL CREATININE-BSD FRML MDRD: 83 ML/MIN/1.73SQ M
GLUCOSE SERPL-MCNC: 108 MG/DL (ref 65–140)
HCT VFR BLD AUTO: 37.9 % (ref 34.8–46.1)
HGB BLD-MCNC: 12.5 G/DL (ref 11.5–15.4)
IMM GRANULOCYTES # BLD AUTO: 0.02 THOUSAND/UL (ref 0–0.2)
IMM GRANULOCYTES NFR BLD AUTO: 0 % (ref 0–2)
LIPASE SERPL-CCNC: 9 U/L (ref 11–82)
LYMPHOCYTES # BLD AUTO: 0.67 THOUSANDS/ΜL (ref 0.6–4.47)
LYMPHOCYTES NFR BLD AUTO: 8 % (ref 14–44)
MAGNESIUM SERPL-MCNC: 2 MG/DL (ref 1.9–2.7)
MCH RBC QN AUTO: 30 PG (ref 26.8–34.3)
MCHC RBC AUTO-ENTMCNC: 33 G/DL (ref 31.4–37.4)
MCV RBC AUTO: 91 FL (ref 82–98)
MONOCYTES # BLD AUTO: 1.2 THOUSAND/ΜL (ref 0.17–1.22)
MONOCYTES NFR BLD AUTO: 14 % (ref 4–12)
NEUTROPHILS # BLD AUTO: 6.77 THOUSANDS/ΜL (ref 1.85–7.62)
NEUTS SEG NFR BLD AUTO: 78 % (ref 43–75)
NRBC BLD AUTO-RTO: 0 /100 WBCS
P AXIS: 61 DEGREES
PLATELET # BLD AUTO: 223 THOUSANDS/UL (ref 149–390)
PMV BLD AUTO: 9.9 FL (ref 8.9–12.7)
POTASSIUM SERPL-SCNC: 3.8 MMOL/L (ref 3.5–5.3)
PR INTERVAL: 184 MS
PROCALCITONIN SERPL-MCNC: <0.05 NG/ML
PROT SERPL-MCNC: 6.2 G/DL (ref 6.4–8.4)
QRS AXIS: -5 DEGREES
QRSD INTERVAL: 86 MS
QT INTERVAL: 364 MS
QTC INTERVAL: 438 MS
RBC # BLD AUTO: 4.16 MILLION/UL (ref 3.81–5.12)
RH BLD: POSITIVE
RSV RNA RESP QL NAA+PROBE: NEGATIVE
SARS-COV-2 RNA RESP QL NAA+PROBE: POSITIVE
SODIUM SERPL-SCNC: 129 MMOL/L (ref 135–147)
T WAVE AXIS: 27 DEGREES
TSH SERPL DL<=0.05 MIU/L-ACNC: 0.3 UIU/ML (ref 0.45–4.5)
VENTRICULAR RATE: 87 BPM
WBC # BLD AUTO: 8.69 THOUSAND/UL (ref 4.31–10.16)

## 2022-07-25 PROCEDURE — 84145 PROCALCITONIN (PCT): CPT | Performed by: NURSE PRACTITIONER

## 2022-07-25 PROCEDURE — 82746 ASSAY OF FOLIC ACID SERUM: CPT | Performed by: NURSE PRACTITIONER

## 2022-07-25 PROCEDURE — 99220 PR INITIAL OBSERVATION CARE/DAY 70 MINUTES: CPT | Performed by: NURSE PRACTITIONER

## 2022-07-25 PROCEDURE — 82607 VITAMIN B-12: CPT | Performed by: NURSE PRACTITIONER

## 2022-07-25 PROCEDURE — 82550 ASSAY OF CK (CPK): CPT | Performed by: NURSE PRACTITIONER

## 2022-07-25 PROCEDURE — 85379 FIBRIN DEGRADATION QUANT: CPT | Performed by: NURSE PRACTITIONER

## 2022-07-25 PROCEDURE — 93971 EXTREMITY STUDY: CPT | Performed by: SURGERY

## 2022-07-25 PROCEDURE — 82728 ASSAY OF FERRITIN: CPT | Performed by: NURSE PRACTITIONER

## 2022-07-25 PROCEDURE — 84484 ASSAY OF TROPONIN QUANT: CPT | Performed by: NURSE PRACTITIONER

## 2022-07-25 PROCEDURE — 83880 ASSAY OF NATRIURETIC PEPTIDE: CPT | Performed by: NURSE PRACTITIONER

## 2022-07-25 PROCEDURE — 82553 CREATINE MB FRACTION: CPT | Performed by: NURSE PRACTITIONER

## 2022-07-25 PROCEDURE — 85025 COMPLETE CBC W/AUTO DIFF WBC: CPT | Performed by: EMERGENCY MEDICINE

## 2022-07-25 PROCEDURE — 84443 ASSAY THYROID STIM HORMONE: CPT | Performed by: NURSE PRACTITIONER

## 2022-07-25 PROCEDURE — G1004 CDSM NDSC: HCPCS

## 2022-07-25 PROCEDURE — 99285 EMERGENCY DEPT VISIT HI MDM: CPT | Performed by: EMERGENCY MEDICINE

## 2022-07-25 PROCEDURE — 83735 ASSAY OF MAGNESIUM: CPT | Performed by: NURSE PRACTITIONER

## 2022-07-25 PROCEDURE — 83690 ASSAY OF LIPASE: CPT | Performed by: EMERGENCY MEDICINE

## 2022-07-25 PROCEDURE — 93971 EXTREMITY STUDY: CPT

## 2022-07-25 PROCEDURE — 74177 CT ABD & PELVIS W/CONTRAST: CPT

## 2022-07-25 PROCEDURE — 80053 COMPREHEN METABOLIC PANEL: CPT | Performed by: EMERGENCY MEDICINE

## 2022-07-25 PROCEDURE — 0241U HB NFCT DS VIR RESP RNA 4 TRGT: CPT | Performed by: EMERGENCY MEDICINE

## 2022-07-25 PROCEDURE — 93010 ELECTROCARDIOGRAM REPORT: CPT | Performed by: INTERNAL MEDICINE

## 2022-07-25 PROCEDURE — 86900 BLOOD TYPING SEROLOGIC ABO: CPT | Performed by: NURSE PRACTITIONER

## 2022-07-25 PROCEDURE — 93005 ELECTROCARDIOGRAM TRACING: CPT

## 2022-07-25 PROCEDURE — 84439 ASSAY OF FREE THYROXINE: CPT | Performed by: NURSE PRACTITIONER

## 2022-07-25 PROCEDURE — 86140 C-REACTIVE PROTEIN: CPT | Performed by: NURSE PRACTITIONER

## 2022-07-25 PROCEDURE — 86901 BLOOD TYPING SEROLOGIC RH(D): CPT | Performed by: NURSE PRACTITIONER

## 2022-07-25 PROCEDURE — 71045 X-RAY EXAM CHEST 1 VIEW: CPT

## 2022-07-25 RX ORDER — BENZONATATE 100 MG/1
100 CAPSULE ORAL 3 TIMES DAILY PRN
Status: DISCONTINUED | OUTPATIENT
Start: 2022-07-25 | End: 2022-07-27 | Stop reason: HOSPADM

## 2022-07-25 RX ORDER — HEPARIN SODIUM 5000 [USP'U]/ML
5000 INJECTION, SOLUTION INTRAVENOUS; SUBCUTANEOUS EVERY 8 HOURS SCHEDULED
Status: DISCONTINUED | OUTPATIENT
Start: 2022-07-25 | End: 2022-07-27 | Stop reason: HOSPADM

## 2022-07-25 RX ORDER — SENNA AND DOCUSATE SODIUM 50; 8.6 MG/1; MG/1
1 TABLET, FILM COATED ORAL 2 TIMES DAILY
Status: ON HOLD | COMMUNITY
End: 2022-08-24 | Stop reason: CLARIF

## 2022-07-25 RX ORDER — POLYETHYLENE GLYCOL 3350 17 G/17G
17 POWDER, FOR SOLUTION ORAL DAILY PRN
Status: DISCONTINUED | OUTPATIENT
Start: 2022-07-25 | End: 2022-07-27 | Stop reason: HOSPADM

## 2022-07-25 RX ORDER — ACETAMINOPHEN 325 MG/1
650 TABLET ORAL EVERY 6 HOURS PRN
Status: DISCONTINUED | OUTPATIENT
Start: 2022-07-25 | End: 2022-07-27 | Stop reason: HOSPADM

## 2022-07-25 RX ORDER — AMOXICILLIN 250 MG
1 CAPSULE ORAL 2 TIMES DAILY
Status: DISCONTINUED | OUTPATIENT
Start: 2022-07-25 | End: 2022-07-27 | Stop reason: HOSPADM

## 2022-07-25 RX ORDER — ONDANSETRON 2 MG/ML
4 INJECTION INTRAMUSCULAR; INTRAVENOUS ONCE
Status: COMPLETED | OUTPATIENT
Start: 2022-07-25 | End: 2022-07-25

## 2022-07-25 RX ORDER — MELATONIN
1000 DAILY
Status: DISCONTINUED | OUTPATIENT
Start: 2022-07-26 | End: 2022-07-27 | Stop reason: HOSPADM

## 2022-07-25 RX ORDER — ATORVASTATIN CALCIUM 10 MG/1
10 TABLET, FILM COATED ORAL
Status: DISCONTINUED | OUTPATIENT
Start: 2022-07-26 | End: 2022-07-27 | Stop reason: HOSPADM

## 2022-07-25 RX ORDER — MELATONIN
1000 DAILY
COMMUNITY

## 2022-07-25 RX ORDER — GUAIFENESIN 600 MG/1
1200 TABLET, EXTENDED RELEASE ORAL EVERY 12 HOURS SCHEDULED
Status: DISCONTINUED | OUTPATIENT
Start: 2022-07-25 | End: 2022-07-27 | Stop reason: HOSPADM

## 2022-07-25 RX ADMIN — ONDANSETRON 4 MG: 2 INJECTION INTRAMUSCULAR; INTRAVENOUS at 16:12

## 2022-07-25 RX ADMIN — HEPARIN SODIUM 5000 UNITS: 5000 INJECTION INTRAVENOUS; SUBCUTANEOUS at 22:13

## 2022-07-25 RX ADMIN — SENNOSIDES AND DOCUSATE SODIUM 1 TABLET: 50; 8.6 TABLET ORAL at 22:13

## 2022-07-25 RX ADMIN — SODIUM CHLORIDE 500 ML: 0.9 INJECTION, SOLUTION INTRAVENOUS at 16:08

## 2022-07-25 RX ADMIN — GUAIFENESIN 1200 MG: 600 TABLET ORAL at 22:13

## 2022-07-25 RX ADMIN — IOHEXOL 70 ML: 350 INJECTION, SOLUTION INTRAVENOUS at 16:43

## 2022-07-25 NOTE — ED PROVIDER NOTES
History  Chief Complaint   Patient presents with    Lethargy    Altered Mental Status      Patient recently seen last night  here in ed for vomiting and dizziness, which patient states that has resolved  Per EMS sissy rojas stated that patient returned to them covered in urine, and when rapid swab for covid was postive  States that she was lethargic throughout night  Patient states that she has no complaints and is not sure why she is here again  Patient is alert and oriented  Patient is a 70-year-old female with a history of hypertension, hyperlipidemia and aortic insufficiency who presents with change in mental status  Patient was seen yesterday in the emergency department for a fall  She had a CT head and cervical spine which were negative for acute traumatic injury  She also had a chest x-ray and left hip x-ray which were also unremarkable  She was discharged back to her living facility  When she arrived, nursing home staff noted that she had an episode of vomiting  She also had coarse breath sounds and did have a rapid COVID test which was positive today  She has appeared more weak than baseline and also confused  Staff states that she was not oriented x3, as she normally is  Patient offers no complaints at this time  She specifically denies chest pain, shortness of breath or other concerns  History provided by:  Patient  Altered Mental Status  Presenting symptoms: confusion and lethargy    Most recent episode:  Yesterday  Episode history:  Continuous  Timing:  Constant  Progression:  Unchanged  Chronicity:  New  Context: not dementia    Associated symptoms: vomiting    Associated symptoms: no abdominal pain, no fever, no headaches, no light-headedness, no nausea, no palpitations, no rash and no seizures        Prior to Admission Medications   Prescriptions Last Dose Informant Patient Reported? Taking?    B Complex Vitamins (VITAMIN-B COMPLEX PO)   Yes No   Sig: Take 1 tablet by mouth Calcium Carb-Cholecalciferol (OSCAL-D) 500 mg-200 units per tablet   Yes Yes   Sig: Take 1 tablet by mouth 2 (two) times a day with meals   atorvastatin (LIPITOR) 10 mg tablet   Yes No   Sig: Take 10 mg by mouth   cholecalciferol (VITAMIN D3) 1,000 units tablet   Yes Yes   Sig: Take 1,000 Units by mouth daily   fluocinolone (SYNALAR) 0 01 % cream   Yes Yes   Sig: Apply topically in the morning Apply to scalpe for 2-4 hours daily  Protect with shower cap and shampoo out    gabapentin (NEURONTIN) 100 mg capsule   Yes No   Sig: Take 100 mg by mouth   polyethylene glycol (MIRALAX) 17 g packet   Yes No   Sig: Take 17 g by mouth daily as needed   senna-docusate sodium (SENOKOT-S) 8 6-50 mg per tablet   Yes Yes   Sig: Take 1 tablet by mouth 2 (two) times a day   vitamin B-12 (CYANOCOBALAMIN) 100 MCG tablet   Yes No   Sig: Take 1,000 mcg by mouth      Facility-Administered Medications: None       Past Medical History:   Diagnosis Date    Chronic pain disorder     Low back pain        History reviewed  No pertinent surgical history  History reviewed  No pertinent family history  I have reviewed and agree with the history as documented  E-Cigarette/Vaping    E-Cigarette Use Never User      E-Cigarette/Vaping Substances     Social History     Tobacco Use    Smoking status: Never Smoker    Smokeless tobacco: Never Used   Vaping Use    Vaping Use: Never used   Substance Use Topics    Alcohol use: No    Drug use: No       Review of Systems   Constitutional: Negative for chills, diaphoresis and fever  HENT: Negative for nosebleeds, sore throat and trouble swallowing  Eyes: Negative for photophobia, pain and visual disturbance  Respiratory: Negative for cough, chest tightness and shortness of breath  Cardiovascular: Negative for chest pain, palpitations and leg swelling  Gastrointestinal: Positive for vomiting  Negative for abdominal pain, constipation, diarrhea and nausea     Endocrine: Negative for polydipsia and polyuria  Genitourinary: Negative for difficulty urinating, dysuria, hematuria, pelvic pain, vaginal bleeding and vaginal discharge  Musculoskeletal: Negative for back pain, neck pain and neck stiffness  Skin: Negative for pallor and rash  Neurological: Negative for dizziness, seizures, light-headedness and headaches  Psychiatric/Behavioral: Positive for confusion  All other systems reviewed and are negative  Physical Exam  Physical Exam  Vitals and nursing note reviewed  Constitutional:       General: She is not in acute distress  Appearance: She is well-developed  HENT:      Head: Normocephalic and atraumatic  Eyes:      Pupils: Pupils are equal, round, and reactive to light  Cardiovascular:      Rate and Rhythm: Normal rate and regular rhythm  Pulses: Normal pulses  Heart sounds: Normal heart sounds  Pulmonary:      Effort: Pulmonary effort is normal  No respiratory distress  Breath sounds: Decreased breath sounds present  Abdominal:      General: There is no distension  Palpations: Abdomen is soft  Abdomen is not rigid  Tenderness: There is no abdominal tenderness  There is no guarding or rebound  Musculoskeletal:         General: Swelling ( bilateral lower extremities, which is baseline according to patient) present  No tenderness  Normal range of motion  Cervical back: Normal range of motion and neck supple  Left hip: Bony tenderness (Tenderness at the greater trochanter, which patient states is chronic ) present  Lymphadenopathy:      Cervical: No cervical adenopathy  Skin:     General: Skin is warm and dry  Capillary Refill: Capillary refill takes less than 2 seconds  Neurological:      Mental Status: She is alert  GCS: GCS eye subscore is 4  GCS verbal subscore is 4  GCS motor subscore is 6  Cranial Nerves: No cranial nerve deficit  Sensory: No sensory deficit  Motor: Motor function is intact  Comments: Disoriented to time  Oriented to person and place    Cerebellar exam normal   Motor, sensation normal          Vital Signs  ED Triage Vitals [07/25/22 1104]   Temperature Pulse Respirations Blood Pressure SpO2   (!) 97 4 °F (36 3 °C) 89 18 170/72 96 %      Temp Source Heart Rate Source Patient Position - Orthostatic VS BP Location FiO2 (%)   Oral Monitor Lying Left arm --      Pain Score       No Pain           Vitals:    07/25/22 2041 07/26/22 0700 07/26/22 1500 07/26/22 2101   BP: 134/66 127/74 145/75 141/83   Pulse: 88 77 74 81   Patient Position - Orthostatic VS:   Lying Lying         Visual Acuity  Visual Acuity    Flowsheet Row Most Recent Value   L Pupil Size (mm) 3   R Pupil Size (mm) 3          ED Medications  Medications   atorvastatin (LIPITOR) tablet 10 mg (10 mg Oral Given 7/26/22 1544)   polyethylene glycol (MIRALAX) packet 17 g (has no administration in time range)   cholecalciferol (VITAMIN D3) tablet 1,000 Units (1,000 Units Oral Given 7/26/22 0936)   calcium carbonate-vitamin D (OSCAL-D) 500 mg-200 units per tablet 1 tablet (1 tablet Oral Given 7/26/22 1544)   senna-docusate sodium (SENOKOT S) 8 6-50 mg per tablet 1 tablet (1 tablet Oral Given 7/26/22 2138)   acetaminophen (TYLENOL) tablet 650 mg (has no administration in time range)   heparin (porcine) subcutaneous injection 5,000 Units (5,000 Units Subcutaneous Given 7/27/22 0507)   guaiFENesin (MUCINEX) 12 hr tablet 1,200 mg (1,200 mg Oral Given 7/26/22 2138)   benzonatate (TESSALON PERLES) capsule 100 mg (has no administration in time range)   sodium chloride 0 9 % bolus 500 mL (0 mL Intravenous Stopped 7/25/22 1914)   ondansetron (ZOFRAN) injection 4 mg (4 mg Intravenous Given 7/25/22 1612)   iohexol (OMNIPAQUE) 350 MG/ML injection (SINGLE-DOSE) 70 mL (70 mL Intravenous Given 7/25/22 1643)   sodium chloride 0 9 % bolus 500 mL (500 mL Intravenous New Bag 7/26/22 1656)       Diagnostic Studies  Results Reviewed     Procedure Component Value Units Date/Time    T4, free [[id number]]  (Normal) Collected: 07/25/22    Lab Status: Final result Specimen: Blood Updated: 07/26/22 0530     Free T4 1 06 ng/dL     Procalcitonin [[id number]]  (Normal) Collected: 07/25/22    Lab Status: Final result Specimen: Blood Updated: 07/25/22 2214     Procalcitonin <0 05 ng/ml     CKMB [[id number]]  (Normal) Collected: 07/25/22    Lab Status: Final result Specimen: Blood Updated: 07/25/22 2214     CK-MB Index 1 8 %      CK-MB 3 0 ng/mL     TSH, 3rd generation with Free T4 reflex [[id number]]  (Abnormal) Collected: 07/25/22    Lab Status: Final result Specimen: Blood Updated: 07/25/22 2214     TSH 3RD GENERATON 0 300 uIU/mL     Narrative:      Patients undergoing fluorescein dye angiography may retain small amounts of fluorescein in the body for 48-72 hours post procedure  Samples containing fluorescein can produce falsely depressed TSH values  If the patient had this procedure,a specimen should be resubmitted post fluorescein clearance        Magnesium [[id number]]  (Normal) Collected: 07/25/22    Lab Status: Final result Specimen: Blood Updated: 07/25/22 2122     Magnesium 2 0 mg/dL     C-reactive protein [[id number]]  (Abnormal) Collected: 07/25/22    Lab Status: Final result Specimen: Blood Updated: 07/25/22 2122     CRP 18 4 mg/L     CK (with reflex to MB) [[id number]]  (Normal) Collected: 07/25/22    Lab Status: Final result Specimen: Blood Updated: 07/25/22 2122     Total  U/L     Lipase [[id number]]  (Abnormal) Collected: 07/25/22    Lab Status: Final result Specimen: Blood Updated: 07/25/22 1535     Lipase 9 u/L     FLU/RSV/COVID - if FLU/RSV clinically relevant [[id number]]  (Abnormal) Collected: 07/25/22 1343    Lab Status: Final result Specimen: Nares from Nose Updated: 07/25/22 1430     SARS-CoV-2 Positive     INFLUENZA A PCR Negative     INFLUENZA B PCR Negative     RSV PCR Negative    Narrative:      FOR PEDIATRIC PATIENTS - copy/paste COVID Guidelines URL to browser: [email or web address]    SARS-CoV-2 assay is a Nucleic Acid Amplification assay intended for the  qualitative detection of nucleic acid from SARS-CoV-2 in nasopharyngeal  swabs  Results are for the presumptive identification of SARS-CoV-2 RNA  Positive results are indicative of infection with SARS-CoV-2, the virus  causing COVID-19, but do not rule out bacterial infection or co-infection  with other viruses  Laboratories within the [place] and its  territories are required to report all positive results to the appropriate  public health authorities  Negative results do not preclude SARS-CoV-2  infection and should not be used as the sole basis for treatment or other  patient management decisions  Negative results must be combined with  clinical observations, patient history, and epidemiological information  This test has not been FDA cleared or approved  This test has been authorized by FDA under an Emergency Use Authorization  (EUA)  This test is only authorized for the duration of time the  declaration that circumstances exist justifying the authorization of the  emergency use of an in vitro diagnostic tests for detection of SARS-CoV-2  virus and/or diagnosis of COVID-19 infection under section 564(b)(1) of  the Act, 21 U  S C  003XDB-4(P)(8), unless the authorization is terminated  or revoked sooner  The test has been validated but independent review by FDA  and CLIA is pending  Test performed using [facility]: This RT-PCR assay targets N2,  a region unique to SARS-CoV-2  A conserved region in the E-gene was chosen  for pan-Sarbecovirus detection which includes SARS-CoV-2      Comprehensive metabolic panel [[id number]]  (Abnormal) Collected: 07/25/22    Lab Status: Final result Specimen: Blood Updated: 07/25/22 1409     Sodium 129 mmol/L      Potassium 3 8 mmol/L      Chloride 99 mmol/L      CO2 25 mmol/L ANION GAP 5 mmol/L      BUN 8 mg/dL      Creatinine 0 52 mg/dL      Glucose 108 mg/dL      Calcium 8 2 mg/dL      AST 22 U/L      ALT 10 U/L      Alkaline Phosphatase 49 U/L      Total Protein 6 2 g/dL      Albumin 3 5 g/dL      Total Bilirubin 0 65 mg/dL      eGFR 83 ml/min/1 73sq m     Narrative:      National Kidney Disease Foundation guidelines for Chronic Kidney Disease (CKD):     Stage 1 with normal or high GFR (GFR > 90 mL/min/1 73 square meters)    Stage 2 Mild CKD (GFR = 60-89 mL/min/1 73 square meters)    Stage 3A Moderate CKD (GFR = 45-59 mL/min/1 73 square meters)    Stage 3B Moderate CKD (GFR = 30-44 mL/min/1 73 square meters)    Stage 4 Severe CKD (GFR = 15-29 mL/min/1 73 square meters)    Stage 5 End Stage CKD (GFR <15 mL/min/1 73 square meters)  Note: GFR calculation is accurate only with a steady state creatinine    CBC and differential [[id number]]  (Abnormal) Collected: 07/25/22    Lab Status: Final result Specimen: Blood Updated: 07/25/22 1350     WBC 8 69 Thousand/uL      RBC 4 16 Million/uL      Hemoglobin 12 5 g/dL      Hematocrit 37 9 %      MCV 91 fL      MCH 30 0 pg      MCHC 33 0 g/dL      RDW 13 8 %      MPV 9 9 fL      Platelets 259 Thousands/uL      nRBC 0 /100 WBCs      Neutrophils Relative 78 %      Immat GRANS % 0 %      Lymphocytes Relative 8 %      Monocytes Relative 14 %      Eosinophils Relative 0 %      Basophils Relative 0 %      Neutrophils Absolute 6 77 Thousands/µL      Immature Grans Absolute 0 02 Thousand/uL      Lymphocytes Absolute 0 67 Thousands/µL      Monocytes Absolute 1 20 Thousand/µL      Eosinophils Absolute 0 00 Thousand/µL      Basophils Absolute 0 03 Thousands/µL     UA (URINE) with reflex to Scope [[id number]]     Lab Status: No result Specimen: Urine                  CT abdomen pelvis with contrast   Final Result by Estuardo Garcia MD (07/25 1702)      No acute findings  Cholelithiasis without cholecystitis        Currently uncomplicated small bowel containing right inguinal hernia not causing bowel obstruction  Workstation performed: [id number]         XR chest 1 view portable   Final Result by Moni Michaud DO (07/25 1657)   No acute cardiopulmonary disease  In the setting of proven COVID-19, this plain film appearance does not contain findings that raise concern for viral pneumonia such as COVID-19, but does not rule out this diagnosis  Workstation performed: [id number]         VAS lower limb venous duplex study, unilateral/limited   Final Result by Yina Gardiner MD (07/25 5762)                 Procedures  ECG 12 Lead Documentation Only    Date/Time: 7/25/2022 2:12 PM  Performed by: Jarred Grove DO  Authorized by: Jarred Grove DO     ECG reviewed by me, the ED Provider: yes    Patient location:  ED  Previous ECG:     Previous ECG:  Compared to current    Similarity:  No change    Comparison to cardiac monitor: Yes    Comments:      Normal sinus rhythm at a rate of 87 beats per minute  Normal intervals  Leftward axis  Normal QRS  Nonspecific ST T wave abnormalities  Similar to previous from 07/24/2022  ED Course  ED Course as of 07/27/22 0748   Mon Jul 25, 2022   1408 DVT study negative  [address] spoke with nursing facility  When patient returned yesterday, she had an episode of bilious vomiting  She is normally alert and oriented  She seemed confused overnight and this morning according to staff  Her lungs also sounded coarse and they were concerned because her  recently had COVID   200 I spoke with son at length  He was somewhat upset the patient was discharged from the ED yesterday  He states that she continues to have confusion and has had episodes of vomiting at the facility  He does not feel she is at baseline and is not appropriate for discharge back to her facility at this time                                               [facility]  Number of Diagnoses or Management Options  Altered mental status: new and requires workup  COVID-19: new and requires workup  Hyponatremia: new and requires workup  Diagnosis management comments: Patient presents with confusion and vomiting  Patient was evaluated last evening and was discharge back to her nursing facility  However nursing staff and son are concerned that her mental status is not at baseline and she had additional episodes of vomiting  CT head was performed less than 24 hours ago and was unremarkable  Therefore I did not repeat today  I did perform CT abdomen/pelvis given recurrent vomiting  No acute pathology  Labs reveal mild hyponatremia, which is likely hypovolemic  Patient is positive for COVID-19  She is not having severe respiratory symptoms  It is unclear whether this is the cause of her encephalopathy  Will hospitalize for further workup and treatment  Portions of the above record have been created with voice recognition software   Occasional wrong word or "sound alike" substitutions may have occurred due to the inherent limitations of voice recognition software   Read the chart carefully and recognize, using context, where substitutions may have occurred  Amount and/or Complexity of Data Reviewed  Clinical lab tests: ordered and reviewed  Tests in the radiology section of CPT®: ordered and reviewed  Tests in the medicine section of CPT®: ordered and reviewed  Review and summarize past medical records: yes  Discuss the patient with other providers: yes  Independent visualization of images, tracings, or specimens: yes    Risk of Complications, Morbidity, and/or Mortality  Presenting problems: high  Diagnostic procedures: high  Management options: moderate    Patient Progress  Patient progress: stable      Disposition  Final diagnoses:    Altered mental status   COVID-19   Hyponatremia     Time reflects when diagnosis was documented in both MDM as applicable and the Disposition within this note     Time User Action Codes Description Comment    7/25/2022  6:11 PM Uriel Shaffer Add [R41 82] Altered mental status     7/25/2022  6:11 PM Consuelo ANDREWS Add [U07 1] COVID-19     7/25/2022  6:11 PM Uriel Shaffer Add [E87 1] Hyponatremia       ED Disposition     ED Disposition   Admit    Condition   Stable    Date/Time   Mon Jul 25, 2022  6:10 PM    Comment   Case was discussed with COREY and the patient's admission status was agreed to be Admission Status: observation status to the service of Dr Ramy Xiong   Follow-up Information    None         Current Discharge Medication List      CONTINUE these medications which have NOT CHANGED    Details   Calcium Carb-Cholecalciferol (OSCAL-D) 500 mg-200 units per tablet Take 1 tablet by mouth 2 (two) times a day with meals      cholecalciferol (VITAMIN D3) 1,000 units tablet Take 1,000 Units by mouth daily      fluocinolone (SYNALAR) 0 01 % cream Apply topically in the morning Apply to scalpe for 2-4 hours daily  Protect with shower cap and shampoo out  senna-docusate sodium (SENOKOT-S) 8 6-50 mg per tablet Take 1 tablet by mouth 2 (two) times a day      atorvastatin (LIPITOR) 10 mg tablet Take 10 mg by mouth      B Complex Vitamins (VITAMIN-B COMPLEX PO) Take 1 tablet by mouth      gabapentin (NEURONTIN) 100 mg capsule Take 100 mg by mouth      polyethylene glycol (MIRALAX) 17 g packet Take 17 g by mouth daily as needed      vitamin B-12 (CYANOCOBALAMIN) 100 MCG tablet Take 1,000 mcg by mouth             No discharge procedures on file      PDMP Review       Value Time User    PDMP Reviewed  Yes 7/25/2022  8:59 PM Karmen Vidal          ED Provider  Electronically Signed by           Luna Berry DO  07/27/22 2096

## 2022-07-25 NOTE — ED NOTES
Patient returned from CT with leaky IV  This RN changed dressing and IV was flushed, blood return also noted       April Meredith Martínez, RN  07/25/22 2650 Yes-Patient/Caregiver accepts free interpretation services...

## 2022-07-25 NOTE — ED PROVIDER NOTES
History  Chief Complaint   Patient presents with    Fall     Patient states she got dizzy and fell; denies HS, BT; vomited X 1     HPI     59-year-old female with history of HTN, HLD, aortic insufficiency  Patient presents from her assisted living facility after a ground level fall  Patient is very hard of hearing however is alert and oriented x4 and does not have any apparent cognitive deficits  She tells me that she was ambulating with her walker when she lost her balance and fell, landing on her right side  She denies hitting her head or loss of consciousness  Despite this being listed in the nursing triage note patient denies feeling dizzy prior to or since the fall  She reports pain only in her left hip which she tells me is chronic and not new today  Denies headache, neck pain, back pain, chest pain, or pain in her abdomen  She apparently vomited once EN route to the emergency department  Patient is noted to have 2+ pitting edema to the bilateral lower extremities which she tells me is chronic and at baseline  She denies any shortness of breath  Prior to Admission Medications   Prescriptions Last Dose Informant Patient Reported? Taking?    B Complex Vitamins (VITAMIN-B COMPLEX PO) 7/24/2022 at Unknown time  Yes Yes   Sig: Take 1 tablet by mouth   Calcium-Magnesium-Vitamin D - MG-MG-UNIT TB24 7/24/2022 at Unknown time  Yes Yes   Sig: Take 1 tablet by mouth   DOCOSAHEXAENOIC ACID PO Unknown at Unknown time  Yes No   Sig: Take by mouth   Melatonin 5 MG TABS 7/23/2022 at Unknown time  Yes Yes   Sig: Take 1 tablet by mouth daily at bedtime   acetaminophen (TYLENOL) 500 mg tablet Unknown at Unknown time  Yes No   Sig: Take 1,000 mg by mouth   atorvastatin (LIPITOR) 10 mg tablet 7/23/2022 at Unknown time  Yes Yes   Sig: Take 10 mg by mouth   gabapentin (NEURONTIN) 100 mg capsule   Yes No   Sig: Take 100 mg by mouth   naproxen sodium (ALEVE) 220 MG tablet Unknown at Unknown time  Yes No Sig: Take 220 mg by mouth 2 (two) times a day with meals   polyethylene glycol (MIRALAX) 17 g packet More than a month at Unknown time  Yes No   Sig: Take 17 g by mouth daily as needed   senna (SENOKOT) 8 6 MG tablet 7/24/2022 at Unknown time  Yes Yes   Sig: Take 1 tablet by mouth daily   traMADol (ULTRAM) 50 mg tablet Unknown at Unknown time  No No   Sig: Take 1/2 tablet daily as needed for pain  vitamin B-12 (CYANOCOBALAMIN) 100 MCG tablet Unknown at Unknown time  Yes No   Sig: Take 1,000 mcg by mouth      Facility-Administered Medications: None       Past Medical History:   Diagnosis Date    Chronic pain disorder     Low back pain        No past surgical history on file  No family history on file  I have reviewed and agree with the history as documented  E-Cigarette/Vaping    E-Cigarette Use Never User      E-Cigarette/Vaping Substances     Social History     Tobacco Use    Smoking status: Never Smoker    Smokeless tobacco: Never Used   Vaping Use    Vaping Use: Never used   Substance Use Topics    Alcohol use: No    Drug use: No       Review of Systems   Constitutional: Negative for chills and fever  HENT: Negative for congestion and facial swelling  Eyes: Negative for visual disturbance  Respiratory: Negative for cough and shortness of breath  Cardiovascular: Negative for chest pain and leg swelling  Gastrointestinal: Positive for vomiting (1 episode prior to arrival)  Negative for abdominal pain, diarrhea and nausea  Genitourinary: Negative for dysuria, flank pain and frequency  Musculoskeletal: Positive for arthralgias (L hip, reportedly chronic and at baseline)  Negative for back pain, neck pain and neck stiffness  Skin: Negative for rash  Neurological: Negative for dizziness, speech difficulty, weakness, numbness and headaches  Psychiatric/Behavioral: Negative for agitation, behavioral problems and confusion         Physical Exam  Physical Exam  Constitutional: General: She is not in acute distress  Appearance: She is well-developed  She is not diaphoretic  HENT:      Head: Normocephalic and atraumatic  Right Ear: External ear normal       Left Ear: External ear normal       Nose: Nose normal       Mouth/Throat:      Mouth: Mucous membranes are moist       Pharynx: Oropharynx is clear  Eyes:      Extraocular Movements: Extraocular movements intact  Conjunctiva/sclera: Conjunctivae normal       Pupils: Pupils are equal, round, and reactive to light  Neck:      Comments: No midline TTP over the cervical spine  Cardiovascular:      Rate and Rhythm: Normal rate and regular rhythm  Pulses: Normal pulses  Heart sounds: Normal heart sounds  No murmur heard  No friction rub  No gallop  Pulmonary:      Effort: Pulmonary effort is normal  No respiratory distress  Breath sounds: Normal breath sounds  No wheezing or rales  Chest:      Chest wall: No tenderness  Abdominal:      General: Bowel sounds are normal  There is no distension  Palpations: Abdomen is soft  Tenderness: There is no abdominal tenderness  There is no guarding  Comments: Abdomen benign to deep palpation  No abdominal bruising  Musculoskeletal:         General: No deformity  Normal range of motion  Cervical back: Normal range of motion and neck supple  Comments: No midline TTP over the R or L spine  Extremities without visible trauma  TTP over the L hip which the pt states is chronic and at baseline  No pain with full ROM of the bilateral hips, knees, or ankles  Skin:     General: Skin is warm and dry  Neurological:      Mental Status: She is alert  Motor: No abnormal muscle tone  Comments: Oriented to person, place, and situation  Initially states that the year is 2023 but then corrects herself  Supplies details of her medical history accurately  Normal strength in all 4 extremities  Clear speech      Psychiatric:         Mood and Affect: Mood normal          Vital Signs  ED Triage Vitals   Temperature Pulse Respirations Blood Pressure SpO2   07/24/22 1842 07/24/22 1842 07/24/22 1842 07/24/22 1842 07/24/22 1842   98 3 °F (36 8 °C) 99 18 167/72 94 %      Temp Source Heart Rate Source Patient Position - Orthostatic VS BP Location FiO2 (%)   07/24/22 1842 07/24/22 1842 07/24/22 2045 07/24/22 2045 --   Oral Monitor Sitting Right arm       Pain Score       07/24/22 1842       No Pain           Vitals:    07/24/22 2035 07/24/22 2045 07/24/22 2130 07/24/22 2200   BP: 158/70 158/70  149/69   Pulse: 87 96 96 96   Patient Position - Orthostatic VS:  Sitting           Visual Acuity  Visual Acuity    Flowsheet Row Most Recent Value   L Pupil Size (mm) 3   R Pupil Size (mm) 3          ED Medications  Medications - No data to display    Diagnostic Studies  Results Reviewed     Procedure Component Value Units Date/Time    B-Type Natriuretic Peptide(BNP), AN, CA, EA Camp[facility] Only [[id number]]  (Normal) Collected: 07/24/22 1906    Lab Status: Final result Specimen: Blood from Arm, Right Updated: 07/24/22 1951     BNP 59 pg/mL     HS Troponin 0hr (reflex protocol) [[id number]]  (Normal) Collected: 07/24/22 1906    Lab Status: Final result Specimen: Blood from Arm, Right Updated: 07/24/22 1949     hs TnI 0hr 4 ng/L     Comprehensive metabolic panel [[id number]]  (Abnormal) Collected: 07/24/22 1906    Lab Status: Final result Specimen: Blood from Arm, Right Updated: 07/24/22 1931     Sodium 134 mmol/L      Potassium 4 1 mmol/L      Chloride 102 mmol/L      CO2 25 mmol/L      ANION GAP 7 mmol/L      BUN 15 mg/dL      Creatinine 0 68 mg/dL      Glucose 127 mg/dL      Calcium 8 4 mg/dL      AST 16 U/L      ALT 10 U/L      Alkaline Phosphatase 47 U/L      Total Protein 6 2 g/dL      Albumin 3 5 g/dL      Total Bilirubin 0 59 mg/dL      eGFR 76 ml/min/1 73sq m     Narrative:      [place] guidelines for Chronic Kidney Disease (CKD):     Stage 1 with normal or high GFR (GFR > 90 mL/min/1 73 square meters)    Stage 2 Mild CKD (GFR = 60-89 mL/min/1 73 square meters)    Stage 3A Moderate CKD (GFR = 45-59 mL/min/1 73 square meters)    Stage 3B Moderate CKD (GFR = 30-44 mL/min/1 73 square meters)    Stage 4 Severe CKD (GFR = 15-29 mL/min/1 73 square meters)    Stage 5 End Stage CKD (GFR <15 mL/min/1 73 square meters)  Note: GFR calculation is accurate only with a steady state creatinine    CBC and differential [[id number]]  (Abnormal) Collected: 07/24/22 1906    Lab Status: Final result Specimen: Blood from Arm, Right Updated: 07/24/22 1915     WBC 7 40 Thousand/uL      RBC 3 76 Million/uL      Hemoglobin 11 3 g/dL      Hematocrit 35 0 %      MCV 93 fL      MCH 30 1 pg      MCHC 32 3 g/dL      RDW 14 2 %      MPV 10 1 fL      Platelets 724 Thousands/uL      nRBC 0 /100 WBCs      Neutrophils Relative 78 %      Immat GRANS % 1 %      Lymphocytes Relative 8 %      Monocytes Relative 12 %      Eosinophils Relative 1 %      Basophils Relative 0 %      Neutrophils Absolute 5 74 Thousands/µL      Immature Grans Absolute 0 05 Thousand/uL      Lymphocytes Absolute 0 57 Thousands/µL      Monocytes Absolute 0 92 Thousand/µL      Eosinophils Absolute 0 09 Thousand/µL      Basophils Absolute 0 03 Thousands/µL                  CT head without contrast   Final Result by Arpan Cain MD (07/24 2036)      No acute intracranial abnormality  Workstation performed: [id number]         CT spine cervical without contrast   Final Result by Arpan Cain MD (07/24 2038)      No cervical spine fracture or traumatic malalignment  Workstation performed: [id number]         XR hip/pelv 2-3 vws left if performed   Final Result by Gisel Agee MD (07/24 2044)      Status post internal fixation of the left femur  No acute fracture or dislocation              Workstation performed: [id number]         XR chest 1 view portable Final Result by Lucas Arreguin MD (07/24 2044)      No focal consolidation  Workstation performed: [id number]                    Procedures  Procedures         ED Course                                             MDM  Number of Diagnoses or Management Options  Fall, initial encounter: new and requires workup  Diagnosis management comments: Afebrile and hemodynamically stable  Patient offers no complaints but does have tenderness to palpation on the left hip which she states is chronic since her hip replacement  CT scans were obtained of the head and cervical spine which were unremarkable  X-ray of the left hip with no acute fracture or malalignment  Patient incidentally noted to have 2+ pitting edema to the bilateral lower extremities below the knees  She states that this is chronic and has no increased work of breathing and is satting 97% on room air  Lungs are clear to auscultation bilaterally  Chest x-ray with no evidence of pulmonary edema  I doubt decompensated heart failure  I personally interpreted the patient's EKG which reveals normal rate, sinus rhythm with occasional PACs, left axis deviation, normal intervals, Q-waves in leads 3 and AVF with no prior available for comparison in our system  Patient does have RSR prime pattern in V1 suggestive of right ventricular conduction delay  Patient denies any chest pain and is a reliable historian  Troponin obtained in the setting of risk stratification which is 4  Heart score not calculated in the absence of chest pain  BNP not elevated  Patient is stable for discharge back to her skilled nursing facility  She is able to ambulate with her walker at baseline prior to discharge  She had no episodes of vomiting in the Emergency Department and is not reporting nausea            Amount and/or Complexity of Data Reviewed  Clinical lab tests: ordered and reviewed  Tests in the radiology section of CPT®: ordered and reviewed  Independent visualization of images, tracings, or specimens: yes    Patient Progress  Patient progress: stable         Disposition  Final diagnoses:   Fall, initial encounter     Time reflects when diagnosis was documented in both MDM as applicable and the Disposition within this note     Time User Action Codes Description Comment    7/24/2022 10:37 PM Trenda Junior Aiyana White Robinsonchilango, initial encounter       ED Disposition     ED Disposition   Discharge    Condition   Stable    Date/Time   Sun Jul 24, 2022 10:37 PM    Comment   Maral Mckeon discharge to home/self care  Follow-up Information     Follow up With Specialties Details Why Contact Info Additional Information    [facility] Emergency Department Emergency Medicine  Please return to the Emergency Department for severe pain, confusion, or for new or concerning symptoms  [address] Emergency Department, [address], [place], [place], [facility]          Patient's Medications   Discharge Prescriptions    No medications on file       No discharge procedures on file      PDMP Review     None          ED Provider  Electronically Signed by           Palomo Reina MD  07/24/22 9010

## 2022-07-25 NOTE — ED NOTES
Pt changed and repositioned  On cardiac monitor  Will continue to monitor        Axel Mitchell, KERA  07/25/22 2981

## 2022-07-26 LAB
ALBUMIN SERPL BCP-MCNC: 3.2 G/DL (ref 3.5–5)
ALP SERPL-CCNC: 45 U/L (ref 34–104)
ALT SERPL W P-5'-P-CCNC: 11 U/L (ref 7–52)
ANION GAP SERPL CALCULATED.3IONS-SCNC: 7 MMOL/L (ref 4–13)
AST SERPL W P-5'-P-CCNC: 22 U/L (ref 13–39)
ATRIAL RATE: 97 BPM
BASOPHILS # BLD AUTO: 0.02 THOUSANDS/ΜL (ref 0–0.1)
BASOPHILS NFR BLD AUTO: 0 % (ref 0–1)
BILIRUB SERPL-MCNC: 0.54 MG/DL (ref 0.2–1)
BUN SERPL-MCNC: 8 MG/DL (ref 5–25)
CALCIUM ALBUM COR SERPL-MCNC: 8.4 MG/DL (ref 8.3–10.1)
CALCIUM SERPL-MCNC: 7.8 MG/DL (ref 8.4–10.2)
CHLORIDE SERPL-SCNC: 100 MMOL/L (ref 96–108)
CO2 SERPL-SCNC: 24 MMOL/L (ref 21–32)
CREAT SERPL-MCNC: 0.61 MG/DL (ref 0.6–1.3)
EOSINOPHIL # BLD AUTO: 0 THOUSAND/ΜL (ref 0–0.61)
EOSINOPHIL NFR BLD AUTO: 0 % (ref 0–6)
ERYTHROCYTE [DISTWIDTH] IN BLOOD BY AUTOMATED COUNT: 13.8 % (ref 11.6–15.1)
FERRITIN SERPL-MCNC: 84 NG/ML (ref 8–388)
FOLATE SERPL-MCNC: >20 NG/ML (ref 3.1–17.5)
GFR SERPL CREATININE-BSD FRML MDRD: 79 ML/MIN/1.73SQ M
GLUCOSE SERPL-MCNC: 82 MG/DL (ref 65–140)
HCT VFR BLD AUTO: 37 % (ref 34.8–46.1)
HGB BLD-MCNC: 12 G/DL (ref 11.5–15.4)
IMM GRANULOCYTES # BLD AUTO: 0.04 THOUSAND/UL (ref 0–0.2)
IMM GRANULOCYTES NFR BLD AUTO: 1 % (ref 0–2)
LYMPHOCYTES # BLD AUTO: 0.93 THOUSANDS/ΜL (ref 0.6–4.47)
LYMPHOCYTES NFR BLD AUTO: 13 % (ref 14–44)
MCH RBC QN AUTO: 29.5 PG (ref 26.8–34.3)
MCHC RBC AUTO-ENTMCNC: 32.4 G/DL (ref 31.4–37.4)
MCV RBC AUTO: 91 FL (ref 82–98)
MONOCYTES # BLD AUTO: 1.38 THOUSAND/ΜL (ref 0.17–1.22)
MONOCYTES NFR BLD AUTO: 19 % (ref 4–12)
NEUTROPHILS # BLD AUTO: 4.81 THOUSANDS/ΜL (ref 1.85–7.62)
NEUTS SEG NFR BLD AUTO: 67 % (ref 43–75)
NRBC BLD AUTO-RTO: 0 /100 WBCS
NT-PROBNP SERPL-MCNC: 955 PG/ML
P AXIS: 67 DEGREES
PLATELET # BLD AUTO: 210 THOUSANDS/UL (ref 149–390)
PMV BLD AUTO: 10 FL (ref 8.9–12.7)
POTASSIUM SERPL-SCNC: 3.5 MMOL/L (ref 3.5–5.3)
PR INTERVAL: 186 MS
PROT SERPL-MCNC: 5.7 G/DL (ref 6.4–8.4)
QRS AXIS: -28 DEGREES
QRSD INTERVAL: 88 MS
QT INTERVAL: 358 MS
QTC INTERVAL: 454 MS
RBC # BLD AUTO: 4.07 MILLION/UL (ref 3.81–5.12)
SODIUM SERPL-SCNC: 131 MMOL/L (ref 135–147)
T WAVE AXIS: 44 DEGREES
T4 FREE SERPL-MCNC: 1.06 NG/DL (ref 0.76–1.46)
VENTRICULAR RATE: 97 BPM
VIT B12 SERPL-MCNC: 99 PG/ML (ref 100–900)
WBC # BLD AUTO: 7.18 THOUSAND/UL (ref 4.31–10.16)

## 2022-07-26 PROCEDURE — 93010 ELECTROCARDIOGRAM REPORT: CPT | Performed by: INTERNAL MEDICINE

## 2022-07-26 PROCEDURE — 97167 OT EVAL HIGH COMPLEX 60 MIN: CPT

## 2022-07-26 PROCEDURE — 97163 PT EVAL HIGH COMPLEX 45 MIN: CPT

## 2022-07-26 PROCEDURE — 99232 SBSQ HOSP IP/OBS MODERATE 35: CPT | Performed by: HOSPITALIST

## 2022-07-26 PROCEDURE — 97116 GAIT TRAINING THERAPY: CPT

## 2022-07-26 PROCEDURE — 85025 COMPLETE CBC W/AUTO DIFF WBC: CPT | Performed by: NURSE PRACTITIONER

## 2022-07-26 PROCEDURE — 80053 COMPREHEN METABOLIC PANEL: CPT | Performed by: NURSE PRACTITIONER

## 2022-07-26 PROCEDURE — 83529 ASAY OF INTERLEUKIN-6 (IL-6): CPT | Performed by: NURSE PRACTITIONER

## 2022-07-26 PROCEDURE — 97535 SELF CARE MNGMENT TRAINING: CPT

## 2022-07-26 RX ADMIN — SENNOSIDES AND DOCUSATE SODIUM 1 TABLET: 50; 8.6 TABLET ORAL at 09:36

## 2022-07-26 RX ADMIN — ATORVASTATIN CALCIUM 10 MG: 10 TABLET, FILM COATED ORAL at 15:44

## 2022-07-26 RX ADMIN — GUAIFENESIN 1200 MG: 600 TABLET ORAL at 21:38

## 2022-07-26 RX ADMIN — Medication 1 TABLET: at 09:42

## 2022-07-26 RX ADMIN — HEPARIN SODIUM 5000 UNITS: 5000 INJECTION INTRAVENOUS; SUBCUTANEOUS at 21:39

## 2022-07-26 RX ADMIN — SODIUM CHLORIDE 500 ML: 0.9 INJECTION, SOLUTION INTRAVENOUS at 16:56

## 2022-07-26 RX ADMIN — Medication 1000 UNITS: at 09:36

## 2022-07-26 RX ADMIN — SENNOSIDES AND DOCUSATE SODIUM 1 TABLET: 50; 8.6 TABLET ORAL at 21:38

## 2022-07-26 RX ADMIN — GUAIFENESIN 1200 MG: 600 TABLET ORAL at 09:36

## 2022-07-26 RX ADMIN — HEPARIN SODIUM 5000 UNITS: 5000 INJECTION INTRAVENOUS; SUBCUTANEOUS at 05:12

## 2022-07-26 RX ADMIN — HEPARIN SODIUM 5000 UNITS: 5000 INJECTION INTRAVENOUS; SUBCUTANEOUS at 12:44

## 2022-07-26 RX ADMIN — Medication 1 TABLET: at 15:44

## 2022-07-26 NOTE — ASSESSMENT & PLAN NOTE
· CT AP: "uncomplicated small bowel containing right inguinal hernia not causing bowel obstruction "   · Serial abdominal exams  · Denies abdominal pain  No further episodes of nausea or vomiting

## 2022-07-26 NOTE — ASSESSMENT & PLAN NOTE
· 2/2 poor PO intake, nausea, vomiting, COVID  · Gentle fluids--> improving and will continue for now   · BMP daily

## 2022-07-26 NOTE — PLAN OF CARE
Problem: OCCUPATIONAL THERAPY ADULT  Goal: Performs self-care activities at highest level of function for planned discharge setting  See evaluation for individualized goals  Description:   Outcome: Progressing  Note: Limitation: Decreased ADL status, Decreased cognition, Decreased endurance, Decreased self-care trans  Prognosis: Good  Assessment: Pt is a 80 y o  female seen for OT evaluation at [facility], admitted 0/94/1172 w/ Acute metabolic encephalopathy  OT completed extensive review of pt's medical and social history  Comorbidities affecting pt's functional performance at time of assessment include: HTN, orthostatic HTN, hx breast cancer, osteoporosis, OA R hip  Personal factors affecting pt at time of IE include:difficulty performing ADLS and health management   Prior to admission, pt was living at [facility] and was assisted as needed for ADL, dependent for IADL, uses RW  Upon evaluation, pt presents to OT below baseline due to the following performance deficits: weakness, decreased strength, decreased balance, decreased tolerance, impaired memory and decreased safety awareness  Pt to benefit from continued skilled OT tx while in the hospital to address deficits as defined above and maximize level of functional independence w ADL's and functional mobility  Occupational Performance areas to address include: grooming, bathing/shower, toilet hygiene, dressing, functional mobility and functional transfers, bed mobility  The patient's raw score on the AM-PAC Daily Activity inpatient short form is 16, standardized score is 35 96, less than 39 4  Patients at this level are likely to benefit from DC to post-acute rehabilitation services  Based on findings, pt is of high complexity   At this time, OT recommendations at time of discharge are return to [facility] with OT services     OT Discharge Recommendation: Return to facility with rehabilitation services

## 2022-07-26 NOTE — PLAN OF CARE
Problem: PHYSICAL THERAPY ADULT  Goal: Performs mobility at highest level of function for planned discharge setting  See evaluation for individualized goals  Description: Treatment/Interventions: Functional transfer training, LE strengthening/ROM, Therapeutic exercise, Endurance training, Cognitive reorientation, Patient/family training, Equipment eval/education, Bed mobility, Gait training, Spoke to nursing, Spoke to case management, OT  Equipment Recommended: Ozzy Castro       See flowsheet documentation for full assessment, interventions and recommendations  Note: Prognosis: Fair  Problem List: Decreased strength, Decreased range of motion, Decreased endurance, Impaired balance, Decreased mobility, Decreased cognition, Decreased safety awareness, Impaired judgement, Impaired hearing, Decreased skin integrity (Gait deviations)  Assessment: Assessment: Pt is a 80 y o  female seen for PT evaluation s/p admit to [facility] 0/74/[facility]93 w/ Acute metabolic encephalopathy  Order placed for PT  Prior to admission: Pt lived at [facility] and used a walker for primary means of mobility, however, patient recently in the ED status post fall  Upon evaluation: Pt requires Min assist for transfers and ambulation for short distances within the room using a walker  Pt's clinical presentation is currently unstable/unpredictable given the functional mobility deficits above, especially (but not limited to) gait deviations and decreased functional mobility tolerance, coupled with fall risks including hx of falls, impaired balance, decreased safety awareness, decreased cognition and Limited hearing, and combined with medical complications of multiple readmissions and abnormal sodium values  Recommend continued trials with rolling walker over next 1-3 sessions, TherEx, high-level standing activities    If patient continues to have to have retropulsion after medical optimization, would recommend formal workup for same from a medicine standpoint if this is new to patient's signs  Barriers to Discharge: Inaccessible home environment  Barriers to Discharge Comments: Uncertain how independent patient needs to be in how much assistance facility can provide in order to return to assisted living  PT Discharge Recommendation: Post acute rehabilitation services (Unless facility can provide physical assistance and continued PT upon discharge)    See flowsheet documentation for full assessment

## 2022-07-26 NOTE — CASE MANAGEMENT
Case Management Discharge Planning Note    Patient name Charly Nieto  Location S /S -01 MRN [id number]  : 1930 Date 2022       Current Admission Date: 2022  Current Admission Diagnosis:Hyponatremia   Patient Active Problem List    Diagnosis Date Noted    Acute metabolic encephalopathy 15/49/4173    Orthostatic hypotension 2022    COVID-19 2022    Hyponatremia 2022    Unilateral inguinal hernia without obstruction or gangrene 2022    Bilateral leg edema 2022    Primary osteoarthritis of right hip     [facility] )     Lumbar compression fracture ([facility] ) 2018    Thoracic compression fracture ([facility] [facility] ) 2018    Hypertension, essential 2018    Mixed hyperlipidemia 2016    History of breast cancer 2016    Aortic regurgitation 2015    Bilateral carotid artery disease ([facility] ) 2015    Osteoporosis 10/03/2012      LOS (days): 0  Geometric Mean LOS (GMLOS) (days):   Days to GMLOS:     OBJECTIVE:            Current admission status: Observation   Preferred Pharmacy:   [facility] [address] [facility], [address] [place] [address]  Phone: [phone] Fax: [phone]    Primary Care Provider: Tamara Nair MD    Primary Insurance: MEDICARE  Secondary Insurance: AARP    DISCHARGE DETAILS:    Discharge planning discussed with[de-identified] Patient spoke to Artem at [facility] and son  Freedom of Choice: Yes  Comments - Freedom of Choice: CM spoke to son and Artem at [facility] to discuss discharge plans  After reviewing patient's progress with therapy, Artem stated administration feels she would benefit with SNF prior to returning  As per son, he does not want her to go to a SNF  As per Dr Cliff Villanueva, we'll have therapy see her in morning to see if there is any improvement for her to return back to [facility]  Son in agreement with plan    CM contacted family/caregiver?: Yes  Were Treatment Team discharge recommendations reviewed with patient/caregiver?: Yes  Did patient/caregiver verbalize understanding of patient care needs?: N/A- going to facility  Were patient/caregiver advised of the risks associated with not following Treatment Team discharge recommendations?: Yes    Contacts  Patient Contacts: Carlos Nguyen, son  Relationship to Patient[de-identified] Family  Contact Method: Phone  Reason/Outcome: Discharge Planning              Other Referral/Resources/Interventions Provided:  Referral Comments: CM spoke to son and Barbera Goltz at [facility] to discuss discharge plans  After reviewing patient's progress with therapy, Barbera Goltz stated administration feels she would benefit with SNF prior to returning  As per son, he does not want her to go to a SNF  As per Dr Dave May, we'll have therapy see her in morning to see if there is any improvement for her to return back to [facility]  Son in agreement with plan

## 2022-07-26 NOTE — ASSESSMENT & PLAN NOTE
· Likely secondary to infection, poor PO intake; seems resolved at this time   · CT head, C spine, CXR, CT CAP without acute findings   · Currently alert and oriented without complaints  · Neuro checks

## 2022-07-26 NOTE — ASSESSMENT & PLAN NOTE
· Noted  · Fall precautions  · Up with assist and supervise at all times  · Trial compression stockings

## 2022-07-26 NOTE — ASSESSMENT & PLAN NOTE
· Likely secondary to infection, poor PO intake   · CT head, C spine, CXR, CT CAP without acute findings   · Currently alert and oriented without complaints  · Follow-up baseline labs:  TSH, folate, B12  · Neuro checks

## 2022-07-26 NOTE — ASSESSMENT & PLAN NOTE
·  recently positive for covid  · No oxygen requirements  Had episode of vomiting earlier    · Supportive care

## 2022-07-26 NOTE — PROGRESS NOTES
[facility]  Progress Note - Sexton Amen 12/11/1930, 80 y o  female MRN: [id number]  Unit/Bed#: S -01 Encounter: [id number]  Primary Care Provider: Ricci Higgins MD   Date and time admitted to hospital: 7/25/2022 10:59 AM    * Hyponatremia  Assessment & Plan  · 2/2 poor PO intake, nausea, vomiting, COVID  · Gentle fluids--> improving and will continue for now   · BMP daily  Acute metabolic encephalopathy  Assessment & Plan  · Likely secondary to infection, poor PO intake; seems resolved at this time   · CT head, C spine, CXR, CT CAP without acute findings   · Currently alert and oriented without complaints  · Neuro checks    COVID-19  Assessment & Plan  ·  recently positive for covid  · No oxygen requirements; continue to monitor   · No covid directed therapy at this time  Hypertension, essential  Assessment & Plan  · BP stable on current regimen    Bilateral leg edema  Assessment & Plan  · Chronic bilateral lower extremity edema  No DVT on ultrasound  · Trial compression stockings   · Could consider low dose diuretic, but may increase fall risk with known orthostatic hypotension  Would defer to PCP  Unilateral inguinal hernia without obstruction or gangrene  Assessment & Plan  · CT AP: "uncomplicated small bowel containing right inguinal hernia not causing bowel obstruction "   · Serial abdominal exams  · Denies abdominal pain  No further episodes of nausea or vomiting  Orthostatic hypotension  Assessment & Plan  · Noted  · Fall precautions  · Up with assist and supervise at all times  · Trial compression stockings        VTE Pharmacologic Prophylaxis: VTE Score: 4 Moderate Risk (Score 3-4) - Pharmacological DVT Prophylaxis Ordered: enoxaparin (Lovenox)  Patient Centered Rounds: I performed bedside rounds with nursing staff today    Discussions with Specialists or Other Care Team Provider:  Case management     Education and Discussions with Family / Patient: Updated  (son) via phone  Time Spent for Care: 45 minutes  More than 50% of total time spent on counseling and coordination of care as described above  Current Length of Stay: 0 day(s)  Current Patient Status: Observation   Certification Statement: The patient, admitted on an observation basis, will now require > 2 midnight hospital stay due to ongoing electrolyte abnormalities requiring lab monitoring   Discharge Plan: Anticipate discharge in 24-48 hrs to discharge location to be determined pending rehab evaluations  Code Status: Level 1 - Full Code    Subjective:     Patient has no shortness of breath, no cough  Her appetite is poor but this is baseline per patient  Denies abdominal pain or no for    Objective:     Vitals:   Temp (24hrs), Av 3 °F (36 8 °C), Min:98 °F (36 7 °C), Max:98 6 °F (37 °C)    Temp:  [98 °F (36 7 °C)-98 6 °F (37 °C)] 98 3 °F (36 8 °C)  HR:  [] 74  Resp:  [16-18] 18  BP: (127-163)/(66-75) 145/75  SpO2:  [91 %-98 %] 98 %  Body mass index is 21 74 kg/m²  Input and Output Summary (last 24 hours): Intake/Output Summary (Last 24 hours) at 2022 1622  Last data filed at 2022 1300  Gross per 24 hour   Intake 300 ml   Output 600 ml   Net -300 ml       Physical Exam:   Physical Exam  Constitutional:       General: She is not in acute distress  Appearance: She is not toxic-appearing  Cardiovascular:      Rate and Rhythm: Normal rate and regular rhythm  Pulmonary:      Effort: Pulmonary effort is normal  No respiratory distress  Breath sounds: No wheezing or rhonchi  Abdominal:      General: Abdomen is flat  There is no distension  Palpations: Abdomen is soft  Musculoskeletal:      Right lower leg: Edema present  Left lower leg: Edema present  Neurological:      General: No focal deficit present  Mental Status: She is alert and oriented to person, place, and time            Additional Data:     Labs:  Results from last 7 days   Lab Units 07/26/22  0546   WBC Thousand/uL 7 18   HEMOGLOBIN g/dL 12 0   HEMATOCRIT % 37 0   PLATELETS Thousands/uL 210   NEUTROS PCT % 67   LYMPHS PCT % 13*   MONOS PCT % 19*   EOS PCT % 0     Results from last 7 days   Lab Units 07/26/22  0546   SODIUM mmol/L 131*   POTASSIUM mmol/L 3 5   CHLORIDE mmol/L 100   CO2 mmol/L 24   BUN mg/dL 8   CREATININE mg/dL 0 61   ANION GAP mmol/L 7   CALCIUM mg/dL 7 8*   ALBUMIN g/dL 3 2*   TOTAL BILIRUBIN mg/dL 0 54   ALK PHOS U/L 45   ALT U/L 11   AST U/L 22   GLUCOSE RANDOM mg/dL 82                 Results from last 7 days   Lab Units 07/25/22  0000   PROCALCITONIN ng/ml <0 05       Lines/Drains:  Invasive Devices  Report    Peripheral Intravenous Line  Duration           Peripheral IV 07/24/22 Left Antecubital 2 days                      Imaging: Reviewed radiology reports from this admission including: chest xray and abdominal/pelvic CT    Recent Cultures (last 7 days):         Last 24 Hours Medication List:   Current Facility-Administered Medications   Medication Dose Route Frequency Provider Last Rate    acetaminophen  650 mg Oral Q6H PRN EDI Unger      atorvastatin  10 mg Oral Daily With [employer], EDI      benzonatate  100 mg Oral TID PRN EDI Unger      calcium carbonate-vitamin D  1 tablet Oral BID With Meals EDI Unger      cholecalciferol  1,000 Units Oral Daily EDI Unger      guaiFENesin  1,200 mg Oral Q12H 8391 EDI Blanton      heparin (porcine)  5,000 Units Subcutaneous [facility] EDI Unger      polyethylene glycol  17 g Oral Daily PRN EDI Unger      senna-docusate sodium  1 tablet Oral BID EDI Unger      sodium chloride  500 mL Intravenous Once Luci Carson MD          Today, Patient Was Seen By: Luci Carson MD    **Please Note: This note may have been constructed using a voice recognition system  **

## 2022-07-26 NOTE — H&P
[facility]  H&P- Mayra Iron 12/11/1930, 80 y o  female MRN: [id number]  Unit/Bed#: S -01 Encounter: [id number]  Primary Care Provider: Malcolm Stephenson MD   Date and time admitted to hospital: 7/25/2022 10:59 AM    * Acute metabolic encephalopathy  Assessment & Plan  · Likely secondary to infection, poor PO intake   · CT head, C spine, CXR, CT CAP without acute findings   · Currently alert and oriented without complaints  · Follow-up baseline labs:  TSH, folate, B12  · Neuro checks    COVID-19  Assessment & Plan  ·  recently positive for covid  · No oxygen requirements  Had episode of vomiting earlier  · Supportive care     Hyponatremia  Assessment & Plan  · 2/2 poor PO intake, nausea, vomiting, COVID  · Gentle fluids  · BMP in AM    Unilateral inguinal hernia without obstruction or gangrene  Assessment & Plan  · CT AP: "uncomplicated small bowel containing right inguinal hernia not causing bowel obstruction "   · Serial abdominal exams  · Denies abdominal pain  No further episodes of nausea or vomiting  Bilateral leg edema  Assessment & Plan  · Chronic bilateral lower extremity edema  No DVT on ultrasound  · Trial compression stockings   · Could consider low dose diuretic, but may increase fall risk with known orthostatic hypotension  Would defer to PCP  Orthostatic hypotension  Assessment & Plan  · Noted  · Fall precautions  · Up with assist and supervise at all times  · Trial compression stockings    Hypertension, essential  Assessment & Plan  · BP stable on current regimen     VTE Pharmacologic Prophylaxis: VTE Score: 4 High Risk (Score >/= 5) - Pharmacological DVT Prophylaxis Ordered: heparin  Sequential Compression Devices Ordered  Code Status: Level 1 - Full Code   Discussion with family: Patient declined update to family    Reports her son is aware she is in the hospital      Anticipated Length of Stay: Patient will be admitted on an observation basis with an anticipated length of stay of less than 2 midnights secondary to acute metabolic encephalopathy, hyponatremia  Total Time for Visit, including Counseling / Coordination of Care: 70 minutes Greater than 50% of this total time spent on direct patient counseling and coordination of care  Chief Complaint:  Altered mental status    History of Present Illness:  Daryn Jimenez is a 80 y o  female with a PMH of hypertension, metastatic hypotension, hyperlipidemia, aortic regurgitation, bilateral carotid artery disease who presents with altered mental status and lethargy  Nursing staff reported episode of vomiting and a positive rapid COVID test prompting evaluation in the ED today  Currently, patient is pleasant alert and oriented  She does tell me that her  is being treated for COVID here at [facility], though I do not see his name in any of the units  She is without complaints  She has a mild occasional nonproductive cough  Note, patient was seen 7/24 for a fall  She underwent head CT, CT C-spine, chest x-ray, x-ray left hip which are unremarkable  She was discharged back to nursing facility  Review of Systems:  Review of Systems   Respiratory: Positive for cough  Gastrointestinal:        Denies further episodes of nausea or vomiting   Psychiatric/Behavioral:        Confusion resolved   All other systems reviewed and are negative  Past Medical and Surgical History:   Past Medical History:   Diagnosis Date    Chronic pain disorder     Low back pain        History reviewed  No pertinent surgical history  Meds/Allergies:  Prior to Admission medications    Medication Sig Start Date End Date Taking?  Authorizing Provider   acetaminophen (TYLENOL) 500 mg tablet Take 1,000 mg by mouth 8/3/18   Historical Provider, MD   atorvastatin (LIPITOR) 10 mg tablet Take 10 mg by mouth 1/25/18   Historical Provider, MD   B Complex Vitamins (VITAMIN-B COMPLEX PO) Take 1 tablet by mouth 8/18/14   Historical Provider, MD   Calcium-Magnesium-Vitamin D - MG-MG-UNIT TB24 Take 1 tablet by mouth 2/8/13   Historical Provider, MD   DOCOSAHEXAENOIC ACID PO Take by mouth 8/9/13   Historical Provider, MD   gabapentin (NEURONTIN) 100 mg capsule Take 100 mg by mouth 8/20/18 8/20/19  Historical Provider, MD   Melatonin 5 MG TABS Take 1 tablet by mouth daily at bedtime    Historical Provider, MD   naproxen sodium (ALEVE) 220 MG tablet Take 220 mg by mouth 2 (two) times a day with meals    Historical Provider, MD   polyethylene glycol (MIRALAX) 17 g packet Take 17 g by mouth daily as needed    Historical Provider, MD   senna (SENOKOT) 8 6 MG tablet Take 1 tablet by mouth daily    Historical Provider, MD   traMADol (ULTRAM) 50 mg tablet Take 1/2 tablet daily as needed for pain  1/4/19   EDI Sanchez   vitamin B-12 (CYANOCOBALAMIN) 100 MCG tablet Take 1,000 mcg by mouth 9/20/13   Historical Provider, MD     I have reviewed home medications with a medical source (PCP, Pharmacy, other)  Allergies: Allergies   Allergen Reactions    Celecoxib Other (See Comments)       Social History:  Marital Status: /Civil Union   Occupation:   Patient Pre-hospital Living Situation: Assisted Living  Patient Pre-hospital Level of Mobility: unable to be assessed at time of evaluation  Patient Pre-hospital Diet Restrictions:   Substance Use History:   Social History     Substance and Sexual Activity   Alcohol Use No     Social History     Tobacco Use   Smoking Status Never Smoker   Smokeless Tobacco Never Used     Social History     Substance and Sexual Activity   Drug Use No       Family History:  History reviewed  No pertinent family history      Physical Exam:     Vitals:   Blood Pressure: 134/66 (07/25/22 2041)  Pulse: 88 (07/25/22 2041)  Temperature: 98 °F (36 7 °C) (07/25/22 2041)  Temp Source: Oral (07/25/22 1104)  Respirations: 18 (07/25/22 2041)  Weight - Scale: 61 4 kg (135 lb 5 8 oz) (07/25/22 2041)  SpO2: 91 % (07/25/22 2041)    Physical Exam  Constitutional:       General: She is not in acute distress  Appearance: Normal appearance  She is not ill-appearing  HENT:      Head: Normocephalic  Right Ear: External ear normal       Left Ear: External ear normal       Ears:      Comments: [place]     Nose: Nose normal       Mouth/Throat:      Mouth: Mucous membranes are dry  Pharynx: Oropharynx is clear  Comments: Surgical mask in place  Eyes:      General:         Left eye: No discharge  Extraocular Movements: Extraocular movements intact  Conjunctiva/sclera: Conjunctivae normal       Pupils: Pupils are equal, round, and reactive to light  Cardiovascular:      Rate and Rhythm: Normal rate and regular rhythm  Pulses: Normal pulses  Heart sounds: Normal heart sounds  Pulmonary:      Effort: Pulmonary effort is normal       Comments: Coarse breath sounds bilaterally  Abdominal:      General: Bowel sounds are normal  There is no distension  Palpations: Abdomen is soft  Tenderness: There is no abdominal tenderness  There is no right CVA tenderness, left CVA tenderness, guarding or rebound  Musculoskeletal:         General: Normal range of motion  Cervical back: Normal range of motion  No rigidity  Right lower leg: Edema present  Left lower leg: Edema present  Comments: Reports chronic lower extremity edema   Skin:     General: Skin is warm and dry  Neurological:      General: No focal deficit present  Mental Status: She is alert  Comments: Pleasant    Oriented to self and place  Follows commands appropriately   Psychiatric:         Mood and Affect: Mood normal          Behavior: Behavior normal           Additional Data:     Lab Results:  Results from last 7 days   Lab Units 07/25/22  0000   WBC Thousand/uL 8 69   HEMOGLOBIN g/dL 12 5   HEMATOCRIT % 37 9   PLATELETS Thousands/uL 223   NEUTROS PCT % 78*   LYMPHS PCT % 8*   MONOS PCT % 14*   EOS PCT % 0     Results from last 7 days   Lab Units 07/25/22  0000   SODIUM mmol/L 129*   POTASSIUM mmol/L 3 8   CHLORIDE mmol/L 99   CO2 mmol/L 25   BUN mg/dL 8   CREATININE mg/dL 0 52*   ANION GAP mmol/L 5   CALCIUM mg/dL 8 2*   ALBUMIN g/dL 3 5   TOTAL BILIRUBIN mg/dL 0 65   ALK PHOS U/L 49   ALT U/L 10   AST U/L 22   GLUCOSE RANDOM mg/dL 108                       Imaging: Reviewed radiology reports from this admission including: chest xray, chest CT scan and CT head  CT abdomen pelvis with contrast   Final Result by Junior Irvin MD (07/25 1702)      No acute findings  Cholelithiasis without cholecystitis  Currently uncomplicated small bowel containing right inguinal hernia not causing bowel obstruction  Workstation performed: [id number]         XR chest 1 view portable   Final Result by Elizabeth Sneed DO (07/25 5687)   No acute cardiopulmonary disease  In the setting of proven COVID-19, this plain film appearance does not contain findings that raise concern for viral pneumonia such as COVID-19, but does not rule out this diagnosis  Workstation performed: [id number]         VAS lower limb venous duplex study, unilateral/limited   Final Result by Delight Kanner, MD (07/25 0472)          EKG and Other Studies Reviewed on Admission:   · EKG: NSR  HR 87     ** Please Note: This note has been constructed using a voice recognition system   **

## 2022-07-26 NOTE — OCCUPATIONAL THERAPY NOTE
Occupational Therapy Evaluation & Treatment Note      Kendal Cr    7/26/2022    Principal Problem:    Acute metabolic encephalopathy  Active Problems:    Hypertension, essential    Orthostatic hypotension    COVID-19    Hyponatremia    Unilateral inguinal hernia without obstruction or gangrene    Bilateral leg edema      Past Medical History:   Diagnosis Date    Chronic pain disorder     Low back pain        History reviewed  No pertinent surgical history  07/26/22 0910   OT Last Visit   OT Visit Date 07/26/22   Note Type   Note type Evaluation  (& Treatment)   Restrictions/Precautions   Weight Bearing Precautions Per Order No   Other Precautions Contact/isolation; Airborne/isolation;Cognitive; Bed Alarm; Chair Alarm; Fall Risk;Hard of hearing  (Covid+; used written communication 2* [place])   Pain Assessment   Pain Assessment Tool 0-10   Pain Score No Pain   Home Living   Type of Home Assisted living  [facility])   Home Layout One level   Home Equipment Walker   Prior Function   Level of [place] Needs assistance with ADLs and functional mobility; Needs assistance with IADLs   Lives With Facility staff   Receives Help From Personal care attendant   ADL Assistance Needs assistance   IADLs Needs assistance   Falls in the last 6 months 0   Psychosocial   Psychosocial (WDL) WDL   Subjective   Subjective Pt very [place]; written communication utilized   ADL   Eating Assistance 6  Modified independent   Grooming Assistance 5  Supervision/Setup   UB Bathing Assistance 5  Supervision/Setup   LB Bathing Assistance 4  Minimal Assistance   UB Dressing Assistance 5  Supervision/Setup   LB Dressing Assistance 4  Minimal [address]  4  Minimal Assistance   Bed Mobility   Supine to Sit 4  Minimal assistance   Additional items Assist x 1   Transfers   Sit to Stand 4  Minimal assistance   Additional items Assist x 1   Stand to Sit 4  Minimal assistance   Additional items Assist x 1   Stand pivot 4 Minimal assistance   Additional items Assist x 1  (RW)   Activity Tolerance   Activity Tolerance Patient tolerated treatment well   Nurse Made Aware RN, PCA   RUE Assessment   RUE Assessment WFL   LUE Assessment   LUE Assessment WFL   Hand Function   Gross Motor Coordination Functional   Fine Motor Coordination Functional   Cognition   Overall Cognitive Status WFL   Arousal/Participation Alert; Cooperative   Attention Attends with cues to redirect   Orientation Level Oriented to person;Oriented to place; Disoriented to time  (States 2021  Does not know date/month )   Memory Decreased short term memory   Following Commands Follows one step commands with increased time or repetition  (2* [place])   Comments Pt limited by severe [place]   Assessment   Limitation Decreased ADL status; Decreased cognition;Decreased endurance;Decreased self-care trans   Prognosis Good   Assessment Pt is a 80 y o  female seen for OT evaluation at [facility], admitted 2/45/9905 w/ Acute metabolic encephalopathy  OT completed extensive review of pt's medical and social history  Comorbidities affecting pt's functional performance at time of assessment include: HTN, orthostatic HTN, hx breast cancer, osteoporosis, OA R hip  Personal factors affecting pt at time of IE include:difficulty performing ADLS and health management   Prior to admission, pt was living at [facility] and was assisted as needed for ADL, dependent for IADL, uses RW  Upon evaluation, pt presents to OT below baseline due to the following performance deficits: weakness, decreased strength, decreased balance, decreased tolerance, impaired memory and decreased safety awareness  Pt to benefit from continued skilled OT tx while in the hospital to address deficits as defined above and maximize level of functional independence w ADL's and functional mobility   Occupational Performance areas to address include: grooming, bathing/shower, toilet hygiene, dressing, functional mobility and functional transfers, bed mobility  The patient's raw score on the AM-PAC Daily Activity inpatient short form is 16, standardized score is 35 96, less than 39 4  Patients at this level are likely to benefit from DC to post-acute rehabilitation services  Based on findings, pt is of high complexity  At this time, OT recommendations at time of discharge are return to [facility] with OT services   Goals   Patient Goals eat breakfast   Plan   Treatment Interventions ADL retraining;Functional transfer training;UE strengthening/ROM; Endurance training;Cognitive reorientation;Equipment evaluation/education;Patient/family training; Compensatory technique education;Continued evaluation; Energy conservation   Goal Expiration Date 08/06/22   OT Frequency 2-3x/wk   Additional Treatment Session   Treatment Assessment Patient participated in Skilled OT session this date with interventions consisting of ADL re training with the use of correct body mechnaics   Patient agreeable to OT treatment session, upon arrival patient was found supine in bed  Treatment session as follows: Pt required Min A for supine to sit EOB  Pt able to perform UB ADL with setup/VCs and LB ADL with Min A  Max A for toilet hygiene  Transfer Min A to sit OOB in recliner chair for meal   Patient continues to be functioning below baseline level, occupational performance remains limited secondary to factors listed above and increased risk for falls and injury  The patient's raw score on the AM-PAC Daily Activity inpatient short form is 16, standardized score is 35 96, less than 39 4  Patients at this level are likely to benefit from DC to post-acute rehabilitation services  From OT standpoint, recommendation at time of d/c would be return to [facility] with OT services  Patient to benefit from continued Occupational Therapy treatment while in the hospital to address deficits as defined above and maximize level of functional independence with ADLs and functional mobility  Mario Alberto Durán  Pt left with call bell in reach, tray table in reach, needs met, chair alarm activated, RN & PCA present  Recommendation   OT Discharge Recommendation Return to facility with rehabilitation services   AM-PAC Daily Activity Inpatient   Lower Body Dressing 2   Bathing 2   Toileting 2   Upper Body Dressing 3   Grooming 3   Eating 4   Daily Activity Raw Score 16   Daily Activity Standardized Score (Calc for Raw Score >=11) 35 96   AM-PAC Applied Cognition Inpatient   Following a Speech/Presentation 1   Understanding Ordinary Conversation 3   Taking Medications 2   Remembering Where Things Are Placed or Put Away 2   Remembering List of 4-5 Errands 2   Taking Care of Complicated Tasks 1   Applied Cognition Raw Score 11   Applied Cognition Standardized Score 27 03     Pt will achieve the following goals within 10 days  *Pt will complete grooming with independence  *Pt will complete UB bathing and dressing with setup  *Pt will complete LB bathing and dressing with setup/supervision   *Pt will complete toileting (hygiene and clothing management) with supervision  *Pt will complete bed mobility with supervision, with bed flat and no side rail to prep for purposeful tasks    *Pt will perform functional transfers with supervision in order to complete ADL routine  *Pt will increase standing tolerance to 3+ minutes in order to complete ADL routine  *Pt will demonstrate increased activity tolerance in order to complete ADL routine      [employer], MS, OTR/L

## 2022-07-26 NOTE — ASSESSMENT & PLAN NOTE
· Chronic bilateral lower extremity edema  No DVT on ultrasound  · Trial compression stockings   · Could consider low dose diuretic, but may increase fall risk with known orthostatic hypotension  Would defer to PCP

## 2022-07-26 NOTE — ASSESSMENT & PLAN NOTE
·  recently positive for covid  · No oxygen requirements; continue to monitor   · No covid directed therapy at this time

## 2022-07-26 NOTE — PLAN OF CARE
Problem: MOBILITY - ADULT  Goal: Maintain or return to baseline ADL function  Description: INTERVENTIONS:  -  Assess patient's ability to carry out ADLs; assess patient's baseline for ADL function and identify physical deficits which impact ability to perform ADLs (bathing, care of mouth/teeth, toileting, grooming, dressing, etc )  - Assess/evaluate cause of self-care deficits   - Assess range of motion  - Assess patient's mobility; develop plan if impaired  - Assess patient's need for assistive devices and provide as appropriate  - Encourage maximum independence but intervene and supervise when necessary  - Involve family in performance of ADLs  - Assess for home care needs following discharge   - Consider OT consult to assist with ADL evaluation and planning for discharge  - Provide patient education as appropriate  Outcome: Progressing  Goal: Maintains/Returns to pre admission functional level  Description: INTERVENTIONS:  - Perform BMAT or MOVE assessment daily    - Set and communicate daily mobility goal to care team and patient/family/caregiver  - Collaborate with rehabilitation services on mobility goals if consulted  - Perform Range of Motion  times a day  - Reposition patient every  hours  - Dangle patient  times a day  - Stand patient  times a day  - Ambulate patient  times a day  - Out of bed to chair  times a day   - Out of bed for meals  times a day  - Out of bed for toileting  - Record patient progress and toleration of activity level   Outcome: Progressing     Problem: MOBILITY - ADULT  Goal: Maintains/Returns to pre admission functional level  Description: INTERVENTIONS:  - Perform BMAT or MOVE assessment daily    - Set and communicate daily mobility goal to care team and patient/family/caregiver  - Collaborate with rehabilitation services on mobility goals if consulted  - Perform Range of Motion  times a day  - Reposition patient every  hours    - Dangle patient  times a day  - Stand patient times a day  - Ambulate patient  times a day  - Out of bed to chair  times a day   - Out of bed for meals times a day  - Out of bed for toileting  - Record patient progress and toleration of activity level   Outcome: Progressing     Problem: Potential for Falls  Goal: Patient will remain free of falls  Description: INTERVENTIONS:  - Educate patient/family on patient safety including physical limitations  - Instruct patient to call for assistance with activity   - Consult OT/PT to assist with strengthening/mobility   - Keep Call bell within reach  - Keep bed low and locked with side rails adjusted as appropriate  - Keep care items and personal belongings within reach  - Initiate and maintain comfort rounds  - Make Fall Risk Sign visible to staff  - Offer Toileting every  Hours, in advance of need  - Initiate/Maintain alarm  - Obtain necessary fall risk management equipment:   - Apply yellow socks and bracelet for high fall risk patients  - Consider moving patient to room near nurses station  Outcome: Progressing

## 2022-07-26 NOTE — PHYSICAL THERAPY NOTE
PHYSICAL THERAPY EVALUATION  NAME:  Gio Dominguez  DATE: 07/26/22    AGE:   80 y o  Mrn:   [id number]  ADMIT DX:  Hyponatremia [E87 1]  Altered mental status [R41 82]  Lethargy [R53 83]  COVID-19 [U07 1]  Problem List:   Patient Active Problem List   Diagnosis    Aortic regurgitation    Bilateral carotid artery disease (HCC)    History of breast cancer    Hypertension, essential    Lumbar compression fracture (HCC)    Mixed hyperlipidemia    Osteoporosis    Thoracic compression fracture (HCC)    Sacroiliitis (HCC)    Primary osteoarthritis of right hip    Acute metabolic encephalopathy    Orthostatic hypotension    COVID-19    Hyponatremia    Unilateral inguinal hernia without obstruction or gangrene    Bilateral leg edema     Vitals:    07/25/22 1945 07/25/22 2029 07/25/22 2041 07/26/22 0700   BP: 143/69 155/71 134/66 127/74   BP Location:  Right arm     Pulse: 88 80 88 77   Resp: 18 18 18 16   Temp:   98 °F (36 7 °C) 98 6 °F (37 °C)   TempSrc:    Oral   SpO2: 95% 96% 91% 94%   Weight:   61 4 kg (135 lb 5 8 oz) 61 1 kg (134 lb 11 2 oz)       Length Of Stay: 0  Performed at least 2 patient identifiers during session: Name and ID bracelet  PHYSICAL THERAPY EVALUATION :    07/26/22 1145   PT Last Visit   PT Visit Date 07/26/22   Note Type   Note type Evaluation   Pain Assessment   Pain Assessment Tool 0-10   Pain Score No Pain   Restrictions/Precautions   Weight Bearing Precautions Per Order No   Other Precautions Contact/isolation; Airborne/isolation;Cognitive; Chair Alarm; Bed Alarm; Fall Risk;Hard of hearing   Home Living   Type of Home Assisted living  [facility])   Home Equipment Walker   Prior Function   Level of [place] Needs assistance with ADLs and functional mobility; Needs assistance with IADLs   Lives With Facility staff   Receives Help From Personal care attendant   ADL Assistance Needs assistance   IADLs Needs assistance   Falls in the last 6 months 0  (As per patient reports, however, documentation in H&P states patient had at least 1 fall and was in the ED recently)   General   Family/Caregiver Present No   Cognition   Overall Cognitive Status Impaired   Arousal/Participation Alert   Attention Attends with cues to redirect   Orientation Level Oriented to person;Oriented to place; Disoriented to time   Memory Decreased short term memory   Following Commands Follows one step commands with increased time or repetition   Comments Patient with severe [place] despite hearing aids  Eye hear stefania used during assessment and treatment   Subjective   Subjective Patient reports feeling off balance during ambulation  RUE Assessment   RUE Assessment WFL   LUE Assessment   LUE Assessment WFL   RLE Assessment   RLE Assessment   (Difficulty following MMT over pressure commands due to [facility])   Strength RLE   R Hip Flexion 3+/5   R Knee Extension 3+/5   R Ankle Dorsiflexion 3+/5  (Lower leg and pedal edema noted)   LLE Assessment   LLE Assessment   (Difficulty following MMT over pressure commands due to [facility])   Strength LLE   L Hip Flexion 3+/5   L Knee Extension 3+/5   L Ankle Dorsiflexion 3+/5  (Lower leg and pedal edema noted)   Vision-Basic Assessment   Current Vision Wears glasses all the time  (States her other glasses are "downstairs")   Patient Visual Report   (Able to read my phone with the "eye here" stefania words)   Light Touch   RLE Light Touch Grossly intact   LLE Light Touch Grossly intact   Bed Mobility   Supine to Sit Unable to assess  (Since patient was out of bed and sitting in chair upon entering room)   Transfers   Sit to Stand 4  Minimal assistance  (Retropulsive with change of position, limited forward weight shift)   Additional items Assist x 1; Increased time required;Verbal cues;Armrests   Stand to Sit 4  Minimal assistance   Additional items Assist x 1; Increased time required;Verbal cues  (For completion of turn)   Ambulation/Elevation   Gait pattern Retropulsion  (Loss of balance backwards x 1) Gait Assistance 4  Minimal assist   Additional items Assist x 1;Verbal cues  (Intermittent assistance with walker management, but Min assist primarily due to retropulsion)   Assistive Device Rolling walker   Distance 20'   Stair Management Assistance Not tested   Balance   Static Sitting Good   Static Standing Poor +   Ambulatory Poor +   Endurance Deficit   Endurance Deficit Yes   Endurance Deficit Description Degradation of gait quality with increased ambulation distance  SpO2 at rest and during activity greater than 92% throughout   Activity Tolerance   Activity Tolerance Patient tolerated treatment well   Medical Staff Made Aware Spoke to [facility] from case management   Nurse Made Aware Spoke to RN prior to session, and PCA post session   Assessment   Prognosis Fair   Problem List Decreased strength;Decreased range of motion;Decreased endurance; Impaired balance;Decreased mobility; Decreased cognition;Decreased safety awareness; Impaired judgement; Impaired hearing;Decreased skin integrity  (Gait deviations)   Assessment Assessment: Pt is a 80 y o  female seen for PT evaluation s/p admit to [facility] on 9/21/6197 w/ Acute metabolic encephalopathy  Order placed for PT  Prior to admission: Pt lived at [facility] and used a walker for primary means of mobility, however, patient recently in the ED status post fall  Upon evaluation: Pt requires Min assist for transfers and ambulation for short distances within the room using a walker  Pt's clinical presentation is currently unstable/unpredictable given the functional mobility deficits above, especially (but not limited to) gait deviations and decreased functional mobility tolerance, coupled with fall risks including hx of falls, impaired balance, decreased safety awareness, decreased cognition and Limited hearing, and combined with medical complications of multiple readmissions and abnormal sodium values     Recommend continued trials with rolling walker over next 1-3 sessions, TherEx, high-level standing activities  If patient continues to have to have retropulsion after medical optimization, would recommend formal workup for same from a medicine standpoint if this is new to patient's signs  Barriers to Discharge Inaccessible home environment   Barriers to Discharge Comments Uncertain how independent patient needs to be in how much assistance facility can provide in order to return to assisted living   Goals   Patient Goals None stated   STG Expiration Date 08/05/22   Short Term Goal #1 Goals: Pt will: Perform bed mobility tasks with modified I to prepare for transfers and reposition in bed  Perform transfers with modified I to improve ease of transfers and With proper hand placement  Perform ambulation with L RAD for greater than 50 ft with supervision to increase Indep in prior living environment  Patient to perform timed up and go with rolling walker and improved baseline score to demonstrate less risk for falls   PT Treatment Day 1  (Treatment documented in note)   Plan   Treatment/Interventions Functional transfer training;LE strengthening/ROM; Therapeutic exercise; Endurance training;Cognitive reorientation;Patient/family training;Equipment eval/education; Bed mobility;Gait training;Spoke to nursing;Spoke to case management;OT   PT Frequency 3-5x/wk   Recommendation   PT Discharge Recommendation Post acute rehabilitation services  (Unless facility can provide physical assistance and continued PT upon discharge)   Equipment Recommended Natiuth walker   Lydia Hinton 435   Turning in Bed Without Bedrails 3   Lying on Back to Sitting on Edge of Flat Bed 3   Moving Bed to Chair 3   Standing Up From Chair 3   Walk in Room 3   Climb 3-5 Stairs 1   Basic Mobility Inpatient Raw Score 16   Basic Mobility Standardized Score 38 32   Highest Level Of Mobility   -[facility] Goal 5: Stand one or more mins   -[facility] Achieved 7: Walk 25 feet or more   Additional Treatment Session   Start Time 1130   End Time 0304   Treatment Assessment Patient was able to ambulate further distances using rolling walker along with standing tolerance  However, patient requires several Min assist levels of assistance as she was retropulsive during gait despite use of walker  Skilled PT recommended to progress patient towards treatment goals   Equipment Use rolling walker   Additional Treatment Day 1   Exercises   Neuro re-ed Additional treatment including transfers with intermittent Min assist   Ambulation with rolling walker for 5 ft + 20 ft with standing rest in between trials--patient requiring Min assist with 3 uncorrected posterior losses of balance  Standing tolerance with intermittent BUE support of walker versus sink times 3 minutes  Patient able to perform squat to pick item up off the floor with an assist and BUE support  End of Consult   Patient Position at End of Consult All needs within reach;Bed/Chair alarm activated; Bedside chair   (Please find full objective findings from PT assessment regarding body systems outlined above)  Pt to benefit from continued skilled PT tx while in hospital and upon DC to address deficits as defined above and maximize level of functional mobility  Personal factors affecting pt at time of IE include: advanced age, past experience, behavioral pattern, inability to perform ADLs, inability to navigate community distances, limited insight into impairments and recent fall(s)  The patient's AM-PAC Basic Mobility Inpatient Short Form Raw Score is 16, Standardized Score is 38 32  A standardized score greater than 40 78 or Raw Score of 16 suggests the patient may benefit from discharge to home   However please refer to therapist recommendation for discharge planning given other factors that may influence destination as patient is from an [place] and uncertain at this time as to how much additional assistance can be provided for patient upon discharge--did reach out to case management    Adapted from Howard Guitar, Lila Schwab  [facility] 6-Clicks Basic Mobility and Daily Activity Scores With Discharge Destination  Physical Therapy, 2021;101:1-9  DOI: 10 [id number]      Portions of the record may have been created with voice recognition software  Occasional wrong word or "sound a like" substitutions may have occurred due to the inherent limitations of voice recognition software    Read the chart carefully and recognize, using context, where substitutions have occurred

## 2022-07-27 VITALS
HEART RATE: 91 BPM | BODY MASS INDEX: 21.74 KG/M2 | WEIGHT: 134.7 LBS | OXYGEN SATURATION: 94 % | TEMPERATURE: 97.7 F | DIASTOLIC BLOOD PRESSURE: 81 MMHG | SYSTOLIC BLOOD PRESSURE: 132 MMHG | RESPIRATION RATE: 20 BRPM

## 2022-07-27 LAB
ANION GAP SERPL CALCULATED.3IONS-SCNC: 5 MMOL/L (ref 4–13)
BUN SERPL-MCNC: 10 MG/DL (ref 5–25)
CALCIUM SERPL-MCNC: 8 MG/DL (ref 8.4–10.2)
CHLORIDE SERPL-SCNC: 99 MMOL/L (ref 96–108)
CO2 SERPL-SCNC: 29 MMOL/L (ref 21–32)
CREAT SERPL-MCNC: 0.6 MG/DL (ref 0.6–1.3)
GFR SERPL CREATININE-BSD FRML MDRD: 79 ML/MIN/1.73SQ M
GLUCOSE SERPL-MCNC: 89 MG/DL (ref 65–140)
POTASSIUM SERPL-SCNC: 3.4 MMOL/L (ref 3.5–5.3)
SODIUM SERPL-SCNC: 133 MMOL/L (ref 135–147)

## 2022-07-27 PROCEDURE — 97110 THERAPEUTIC EXERCISES: CPT

## 2022-07-27 PROCEDURE — 80048 BASIC METABOLIC PNL TOTAL CA: CPT | Performed by: PHYSICIAN ASSISTANT

## 2022-07-27 PROCEDURE — 99239 HOSP IP/OBS DSCHRG MGMT >30: CPT | Performed by: PHYSICIAN ASSISTANT

## 2022-07-27 PROCEDURE — 97116 GAIT TRAINING THERAPY: CPT

## 2022-07-27 RX ORDER — GUAIFENESIN 1200 MG/1
1200 TABLET, EXTENDED RELEASE ORAL EVERY 12 HOURS SCHEDULED
Refills: 0
Start: 2022-07-27

## 2022-07-27 RX ORDER — BENZONATATE 100 MG/1
100 CAPSULE ORAL 3 TIMES DAILY PRN
Qty: 20 CAPSULE | Refills: 0
Start: 2022-07-27

## 2022-07-27 RX ADMIN — SENNOSIDES AND DOCUSATE SODIUM 1 TABLET: 50; 8.6 TABLET ORAL at 08:09

## 2022-07-27 RX ADMIN — Medication 1 TABLET: at 08:09

## 2022-07-27 RX ADMIN — HEPARIN SODIUM 5000 UNITS: 5000 INJECTION INTRAVENOUS; SUBCUTANEOUS at 13:14

## 2022-07-27 RX ADMIN — HEPARIN SODIUM 5000 UNITS: 5000 INJECTION INTRAVENOUS; SUBCUTANEOUS at 05:07

## 2022-07-27 RX ADMIN — GUAIFENESIN 1200 MG: 600 TABLET ORAL at 08:09

## 2022-07-27 RX ADMIN — Medication 1000 UNITS: at 08:09

## 2022-07-27 NOTE — PHYSICAL THERAPY NOTE
PHYSICAL THERAPY TREATMENT NOTE    Patient Name: Cornel Swanson  QNYTM'C Date: 7/27/2022 07/27/22 0926   PT Last Visit   PT Visit Date 07/27/22   Pain Assessment   Pain Assessment Tool 0-10   Pain Score No Pain   Restrictions/Precautions   Other Precautions Contact/isolation; Airborne/isolation;Cognitive; Chair Alarm; Bed Alarm; Fall Risk;Hard of hearing  ([place])   General   Chart Reviewed Yes   Additional Pertinent History room air resting pulse ox 94% and 81 BPM, active 94% and 88 BPM    Family/Caregiver Present No   Cognition   Arousal/Participation Cooperative   Attention Attends with cues to redirect   Orientation Level Oriented to person; Other (Comment)  (pt was identified w/ full name, birth date)   Following Commands Follows one step commands inconsistently   Subjective   Subjective pt seen sitting in chair  agreed to PT session  denied pain or dizziness  Transfers   Sit to Stand 4  Minimal assistance   Additional items Assist x 1; Increased time required;Verbal cues  (for hand placement)   Stand to Sit 4  Minimal assistance   Additional items Assist x 1; Increased time required;Verbal cues  (for body positioning, hand placement)   Ambulation/Elevation   Gait pattern Narrow [place]; Foward flexed;Retropulsion; Inconsistent miryam   Gait Assistance 4  Minimal assist  (w/ chair follow)   Additional items Assist x 1;Verbal cues; Tactile cues  (for walker positioning, full step length)   Assistive Device Rolling walker   Distance 20 feet x2 w/ seated rest break  (additional not possible due to fatigue)   Balance   Static Sitting Good   Static Standing Poor +  (w/ roller walker)   Ambulatory Poor +  (w/ roller walker)   Activity Tolerance   Activity Tolerance Patient limited by fatigue   Nurse Made Aware spoke to [address] [place] [address]   Equipment Use   Comments ankle pumps 30  quad sets 20  heel slides and hip abduction 10 each     Assessment Prognosis Fair   Problem List Decreased strength;Decreased range of motion;Decreased endurance; Impaired balance;Decreased mobility; Decreased cognition;Decreased safety awareness; Impaired judgement; Impaired hearing;Decreased skin integrity   Assessment pt shows similar level of mobility as to when seen for initial session yesterday  pt continues to need assistance w/ all phases of mobility including cuing for technique and chair follow w/ ambulation  pt remains at risk for falling due to physical and safety awareness limitations  continued inpatient PT is needed to reduce fall risk factors and progress mobility training as appropriate  inpatient rehab is recommended following discharge from hospital to maximize level of functional independence  Goals   Patient Goals pt did not state goals when asked   STG Expiration Date 08/05/22   Short Term Goal #1 Goals: Pt will: Perform bed mobility tasks with modified I to prepare for transfers and reposition in bed  Perform transfers with modified I to improve ease of transfers and With proper hand placement  Perform ambulation with L RAD for greater than 50 ft with supervision to increase Indep in prior living environment  Patient to perform timed up and go with rolling walker and improved baseline score to demonstrate less risk for falls   PT Treatment Day 2   Plan   Treatment/Interventions Functional transfer training;LE strengthening/ROM; Therapeutic exercise; Endurance training;Cognitive reorientation;Patient/family training;Equipment eval/education; Bed mobility;Gait training   Progress Slow progress, decreased activity tolerance   PT Frequency 3-5x/wk   Recommendation   PT Discharge Recommendation Post acute rehabilitation services   Equipment Recommended [facility] Recommended Wheeled walker   Change/add to MyFitnessPal?  No   AM-PAC Basic Mobility Inpatient   Turning in Bed Without Bedrails 3   Lying on Back to Sitting on Edge of Flat Bed 3   Moving Bed to Chair 3   Standing Up From Chair 3   Walk in Room 3   Climb 3-5 Stairs 1   Basic Mobility Inpatient Raw Score 16   Basic Mobility Standardized Score 38 32   Highest Level Of Mobility   -[facility] Goal 5: Stand one or more mins   End of Consult   Patient Position at End of Consult Bedside chair;Bed/Chair alarm activated; All needs within reach     The patient's AM-PAC Basic Mobility Inpatient Short Form Raw Score is 16  A Raw score of less than or equal to 16 suggests the patient may benefit from discharge to post-acute rehabilitation services  Please also refer to the recommendation of the Physical Therapist for safe discharge planning  Skilled inpatient PT recommended while in hospital to progress pt toward treatment goals      Debbi Westbrook, PT

## 2022-07-27 NOTE — DISCHARGE SUMMARY
[facility]  Discharge- [facility] 12/11/1930, 80 y o  female MRN: [id number]  Unit/Bed#: S -01 Encounter: [id number]  Primary Care Provider: Carlitos Vazquez MD   Date and time admitted to hospital: 7/25/2022 10:59 AM    Bilateral leg edema  Assessment & Plan  · Chronic bilateral lower extremity edema  No DVT on ultrasound  · Trial compression stockings   · Could consider low dose diuretic, but may increase fall risk with known orthostatic hypotension  Would defer to PCP  Unilateral inguinal hernia without obstruction or gangrene  Assessment & Plan  · CT AP: "uncomplicated small bowel containing right inguinal hernia not causing bowel obstruction "   · Serial abdominal exams  · Denies abdominal pain  No further episodes of nausea or vomiting  COVID-19  Assessment & Plan  ·  recently positive for covid  · No oxygen requirements; continue to monitor   · No covid directed therapy at this time       Orthostatic hypotension  Assessment & Plan  · Noted  · Fall precautions  · Up with assist and supervise at all times  · Trial compression stockings    Acute metabolic encephalopathy  Assessment & Plan  · Likely secondary to infection, poor PO intake; seems resolved at this time   · CT head, C spine, CXR, CT CAP without acute findings   · Currently alert and oriented without complaints  · Neuro checks    Hypertension, essential  Assessment & Plan  · BP stable on current regimen    * Hyponatremia  Assessment & Plan  · 2/2 poor PO intake, nausea, vomiting, COVID  · Gentle fluids--> improved  · Encourage oral intake      Medical Problems             Resolved Problems  Date Reviewed: 7/27/2022   None               Discharging Physician / Practitioner: Manuela May PA-C  PCP: Carlitos Vazquez MD  Admission Date:   Admission Orders (From admission, onward)     Ordered        07/26/22 1636  Inpatient Admission  Once            07/25/22 1812  Place in Observation  Once Discharge Date: 07/27/22    Consultations During Hospital Stay:  · none    Procedures Performed:   · none    Significant Findings / Test Results:   · As above    Incidental Findings:   · none     Test Results Pending at Discharge (will require follow up):   · none     Outpatient Tests Requested:  · none    Complications:  none    Reason for Admission: weakness, covid infection    Hospital Course:   Tracy Hwang is a 80 y o  female patient who originally presented to the hospital on 7/25/2022 due to weakness, nausea, secondary to COVID-19 infection resulting in hyponatremia  She was admitted for supportive care did require any COVID directed therapy as she was on room air and very stable from a respiratory standpoint  She received gentle IV hydration with improvement of her sodium levels  She was okay to return back to assisted living today  She had an uncomplicated hospitalization    Please see above list of diagnoses and related plan for additional information  Condition at Discharge: stable    Discharge Day Visit / Exam:   Subjective:  Patient is doing well  No complaints  Appetite improved  Stable for discharge today back to assisted living  Vitals: Blood Pressure: 144/79 (07/27/22 0800)  Pulse: 89 (07/27/22 0800)  Temperature: 98 1 °F (36 7 °C) (07/27/22 0800)  Temp Source: Oral (07/27/22 0800)  Respirations: 16 (07/27/22 0800)  Weight - Scale: 61 1 kg (134 lb 11 2 oz) (07/26/22 0700)  SpO2: 94 % (07/27/22 0800)  Exam:   Physical Exam  Vitals and nursing note reviewed  Constitutional:       General: She is not in acute distress  Appearance: Normal appearance  HENT:      Head: Normocephalic  Mouth/Throat:      Mouth: Mucous membranes are moist    Eyes:      General: No scleral icterus  Pupils: Pupils are equal, round, and reactive to light  Cardiovascular:      Rate and Rhythm: Normal rate and regular rhythm  Heart sounds: No murmur heard    Pulmonary:      Effort: Pulmonary effort is normal  No respiratory distress  Breath sounds: Normal breath sounds  No wheezing, rhonchi or rales  Abdominal:      General: Bowel sounds are normal  There is no distension  Palpations: Abdomen is soft  Tenderness: There is no abdominal tenderness  Musculoskeletal:         General: No swelling  Right lower leg: Edema present  Left lower leg: Edema present  Skin:     Capillary Refill: Capillary refill takes less than 2 seconds  Neurological:      General: No focal deficit present  Mental Status: She is alert and oriented to person, place, and time  Mental status is at baseline  Discussion with Family: Patient declined call to   Discharge instructions/Information to patient and family:   See after visit summary for information provided to patient and family  Provisions for Follow-Up Care:  See after visit summary for information related to follow-up care and any pertinent home health orders  Disposition:   [address] [facility] Readmission: no     Discharge Statement:  I spent 45 minutes discharging the patient  This time was spent on the day of discharge  I had direct contact with the patient on the day of discharge  Greater than 50% of the total time was spent examining patient, answering all patient questions, arranging and discussing plan of care with patient as well as directly providing post-discharge instructions  Additional time then spent on discharge activities  Discharge Medications:  See after visit summary for reconciled discharge medications provided to patient and/or family        **Please Note: This note may have been constructed using a voice recognition system**

## 2022-07-27 NOTE — CASE MANAGEMENT
Case Management Discharge Planning Note    Patient name Sherryle Perna  Location S /S -01 MRN [id number]  : 1930 Date 2022       Current Admission Date: 2022  Current Admission Diagnosis:Hyponatremia   Patient Active Problem List    Diagnosis Date Noted    Acute metabolic encephalopathy     Orthostatic hypotension 2022    COVID-19 2022    Hyponatremia 2022    Unilateral inguinal hernia without obstruction or gangrene 2022    Bilateral leg edema 2022    Primary osteoarthritis of right hip     [facility]     Lumbar compression fracture ([facility] ) 2018    Thoracic compression fracture ([facility] ) 2018    Hypertension, essential 2018    Mixed hyperlipidemia 2016    History of breast cancer 2016    Aortic regurgitation 2015    Bilateral carotid artery disease ([facility] ) 2015    Osteoporosis 10/03/2012      LOS (days): 1  Geometric Mean LOS (GMLOS) (days): 5 40  Days to GMLOS:4 5     OBJECTIVE:  Risk of Unplanned Readmission Score: 13 02         Current admission status: Inpatient   Preferred Pharmacy:   [facility] [address] JLU[address] REBOLLEDO [address] [facility] [address]  [place] [id number]  Phone: [phone] Fax: [phone]    Primary Care Provider: Denise Hernandez MD    Primary Insurance: MEDICARE  Secondary Insurance: AARP    DISCHARGE DETAILS:    Discharge planning discussed with[de-identified] CM spoke to [facility] Cady from [facility] and son  Comments - Freedom of Choice: CM discussed patients progress with therapy this morning  with Idalmis from [facility] stated she will accept patient back but concerned she may return back to hospital  CM explained to [facility] that CM will discuss concerns for rehospitalization and best recommendations is to go to SNF  Cm spoke to son and he understands the concern for rehospitalization but request she returns back to [facility] at [facility] aware she'll return today  CM contacted family/caregiver?: Yes  Were Treatment Team discharge recommendations reviewed with patient/caregiver?: Yes  Did patient/caregiver verbalize understanding of patient care needs?: N/A- going to facility  Were patient/caregiver advised of the risks associated with not following Treatment Team discharge recommendations?: Yes    Contacts  Patient Contacts: Allison Lopez, son  Relationship to Patient[de-identified] Family  Contact Method: Phone  Reason/Outcome: Discharge [address]         Is the patient interested in [facility] at discharge?: No    DME Referral Provided  Referral made for DME?: No    Other Referral/Resources/Interventions Provided:  Interventions: Assisted Living  Referral Comments: CM discussed patients progress with therapy this morning  with Idalmis from [facility]  Americo Shrestha stated she will accept patient back but concerned she may return back to hospital  CM explained to Americo Shrestha that CM will discuss concerns for rehospitalization and best recommendations is to go to SNF  Cm spoke to son and he understands the concern for rehospitalization but request she returns back to [facility]  Americo Shrestha at [facility] aware she'll return today           Treatment Team Recommendation: Assisted Living, Facility Return  Discharge Destination Plan[de-identified] Facility Return, Assisted Living  Transport at Discharge : [facility]  Dispatcher Contacted: Yes  Number/Name of Dispatcher: Denis Fails (Date): 07/27/22  ETA of Transport (Time): Prince Trotter Name, [address] : [address]  Receiving Facility/Agency Phone Number: [phone]  Facility/Agency Fax Number: [phone]

## 2022-07-27 NOTE — PLAN OF CARE
Problem: PHYSICAL THERAPY ADULT  Goal: Performs mobility at highest level of function for planned discharge setting  See evaluation for individualized goals  Description: Treatment/Interventions: Functional transfer training, LE strengthening/ROM, Therapeutic exercise, Endurance training, Cognitive reorientation, Patient/family training, Equipment eval/education, Bed mobility, Gait training, Spoke to nursing, Spoke to case management, OT  Equipment Recommended: Oralia Luis       See flowsheet documentation for full assessment, interventions and recommendations  Outcome: Progressing  Note: Prognosis: Fair  Problem List: Decreased strength, Decreased range of motion, Decreased endurance, Impaired balance, Decreased mobility, Decreased cognition, Decreased safety awareness, Impaired judgement, Impaired hearing, Decreased skin integrity  Assessment: pt shows similar level of mobility as to when seen for initial session yesterday  pt continues to need assistance w/ all phases of mobility including cuing for technique and chair follow w/ ambulation  pt remains at risk for falling due to physical and safety awareness limitations  continued inpatient PT is needed to reduce fall risk factors and progress mobility training as appropriate  inpatient rehab is recommended following discharge from hospital to maximize level of functional independence  Barriers to Discharge: Inaccessible home environment  Barriers to Discharge Comments: Uncertain how independent patient needs to be in how much assistance facility can provide in order to return to assisted living  PT Discharge Recommendation: Post acute rehabilitation services    See flowsheet documentation for full assessment

## 2022-07-29 LAB — IL6 SERPL-MCNC: 22.5 PG/ML (ref 0–13)

## 2022-07-31 NOTE — PHYSICIAN ADVISOR
Current patient class: Inpatient  The patient is currently on Hospital Day: 3 at [facility]      This patient was originally admitted to the hospital under observation class  After admission the patient was reevaluated and determined to require further hospitalization  The patient was then documented to require at least a 2nd midnight in the hospital  As such the patient was then expected to satisfy the 2 midnight benchmark and was therefore eligible for inpatient admission  After review of the relevant documentation, labs, vital signs and test results, the patient is appropriate for INPATIENT ADMISSION  Admission to the hospital as an inpatient is a complex decision making process which requires the practitioner to consider the patients presenting complaint, history and physical examination and all relevant testing  With this in mind, in this case, the patient was deemed appropriate for INPATIENT ADMISSION  After review of the documentation and testing available at the time of the admission I concur with this clinical determination of medical necessity  Rationale is as follows: The patient is a 70-year-old female who presented to the hospital with altered mental status and lethargy  Her huband recently tested positive for COVID  The patient also tested positive for COVID  band recently tested positive for COVID  She had a recent fall  She was diagnosed with acute metabolic encephalopathy likely secondary to infection and poor oral intake  The patient was initially admitted to observation class  The patient had hyponatremia and was also found to have orthostatic hypotension  Although she improved somewhat after the first midnight, she required further evaluation and management and was changed to an inpatient admission  The patient continued physical therapy assessment and treatment  Recommendation was for her to go to skilled nursing facility    The family understood this recommendation although prefer the patient to return to [facility]  The patients vitals on arrival were   ED Triage Vitals [07/25/22 1104]   Temperature Pulse Respirations Blood Pressure SpO2   (!) 97 4 °F (36 3 °C) 89 18 170/72 96 %      Temp Source Heart Rate Source Patient Position - Orthostatic VS BP Location FiO2 (%)   Oral Monitor Lying Left arm --      Pain Score       No Pain           Past Medical History:   Diagnosis Date    Chronic pain disorder     Low back pain      History reviewed  No pertinent surgical history  The patient was admitted to the hospital at  4:36 PM on 7/26/22 for the following diagnosis:  Hyponatremia [E87 1]  Altered mental status [R41 82]  Lethargy [R53 83]  COVID-19 [U07 1]    Consults have been placed to:   None    Vitals:    07/26/22 1500 07/26/22 2101 07/27/22 0800 07/27/22 1538   BP: 145/75 141/83 144/79 132/81   BP Location: Right arm Right arm     Pulse: 74 81 89 91   Resp: 18 18 16 20   Temp: 98 3 °F (36 8 °C) 98 5 °F (36 9 °C) 98 1 °F (36 7 °C) 97 7 °F (36 5 °C)   TempSrc: Oral Oral Oral Oral   SpO2: 98% 92% 94% 94%   Weight:           Most recent labs:    No results for input(s): WBC, HGB, HCT, PLT, K, NA, CALCIUM, BUN, CREATININE, LIPASE, AMYLASE, INR, TROPONINI, CKTOTAL, AST, ALT, ALKPHOS, BILITOT in the last 72 hours  Scheduled Meds:  Continuous Infusions:No current facility-administered medications for this encounter  PRN Meds:      Surgical procedures (if appropriate):

## 2022-08-22 ENCOUNTER — HOSPITAL ENCOUNTER (INPATIENT)
Facility: HOSPITAL | Age: 87
LOS: 1 days | Discharge: NURSING, REHAB OR LONG-TERM CARE | DRG: 536 | End: 2022-08-24
Attending: EMERGENCY MEDICINE | Admitting: SURGERY
Payer: MEDICARE

## 2022-08-22 ENCOUNTER — APPOINTMENT (OUTPATIENT)
Dept: RADIOLOGY | Facility: HOSPITAL | Age: 87
DRG: 536 | End: 2022-08-22
Payer: MEDICARE

## 2022-08-22 ENCOUNTER — APPOINTMENT (EMERGENCY)
Dept: CT IMAGING | Facility: HOSPITAL | Age: 87
DRG: 536 | End: 2022-08-22
Payer: MEDICARE

## 2022-08-22 DIAGNOSIS — S32.692A: Primary | ICD-10-CM

## 2022-08-22 LAB
ANION GAP SERPL CALCULATED.3IONS-SCNC: 5 MMOL/L (ref 4–13)
APTT PPP: 26 SECONDS (ref 23–37)
BASOPHILS # BLD AUTO: 0.03 THOUSANDS/ΜL (ref 0–0.1)
BASOPHILS NFR BLD AUTO: 1 % (ref 0–1)
BUN SERPL-MCNC: 14 MG/DL (ref 5–25)
CALCIUM SERPL-MCNC: 8.9 MG/DL (ref 8.4–10.2)
CHLORIDE SERPL-SCNC: 105 MMOL/L (ref 96–108)
CO2 SERPL-SCNC: 26 MMOL/L (ref 21–32)
CREAT SERPL-MCNC: 0.56 MG/DL (ref 0.6–1.3)
EOSINOPHIL # BLD AUTO: 0.19 THOUSAND/ΜL (ref 0–0.61)
EOSINOPHIL NFR BLD AUTO: 3 % (ref 0–6)
ERYTHROCYTE [DISTWIDTH] IN BLOOD BY AUTOMATED COUNT: 14.6 % (ref 11.6–15.1)
GFR SERPL CREATININE-BSD FRML MDRD: 81 ML/MIN/1.73SQ M
GLUCOSE SERPL-MCNC: 132 MG/DL (ref 65–140)
HCT VFR BLD AUTO: 37.3 % (ref 34.8–46.1)
HGB BLD-MCNC: 11.7 G/DL (ref 11.5–15.4)
IMM GRANULOCYTES # BLD AUTO: 0.09 THOUSAND/UL (ref 0–0.2)
IMM GRANULOCYTES NFR BLD AUTO: 1 % (ref 0–2)
INR PPP: 1.02 (ref 0.84–1.19)
LYMPHOCYTES # BLD AUTO: 1 THOUSANDS/ΜL (ref 0.6–4.47)
LYMPHOCYTES NFR BLD AUTO: 16 % (ref 14–44)
MCH RBC QN AUTO: 29.2 PG (ref 26.8–34.3)
MCHC RBC AUTO-ENTMCNC: 31.4 G/DL (ref 31.4–37.4)
MCV RBC AUTO: 93 FL (ref 82–98)
MONOCYTES # BLD AUTO: 0.99 THOUSAND/ΜL (ref 0.17–1.22)
MONOCYTES NFR BLD AUTO: 15 % (ref 4–12)
NEUTROPHILS # BLD AUTO: 4.13 THOUSANDS/ΜL (ref 1.85–7.62)
NEUTS SEG NFR BLD AUTO: 64 % (ref 43–75)
NRBC BLD AUTO-RTO: 0 /100 WBCS
PLATELET # BLD AUTO: 236 THOUSANDS/UL (ref 149–390)
PMV BLD AUTO: 9.6 FL (ref 8.9–12.7)
POTASSIUM SERPL-SCNC: 4.2 MMOL/L (ref 3.5–5.3)
PROTHROMBIN TIME: 13.6 SECONDS (ref 11.6–14.5)
RBC # BLD AUTO: 4.01 MILLION/UL (ref 3.81–5.12)
SODIUM SERPL-SCNC: 136 MMOL/L (ref 135–147)
WBC # BLD AUTO: 6.43 THOUSAND/UL (ref 4.31–10.16)

## 2022-08-22 PROCEDURE — 80048 BASIC METABOLIC PNL TOTAL CA: CPT | Performed by: EMERGENCY MEDICINE

## 2022-08-22 PROCEDURE — 85610 PROTHROMBIN TIME: CPT | Performed by: EMERGENCY MEDICINE

## 2022-08-22 PROCEDURE — 85730 THROMBOPLASTIN TIME PARTIAL: CPT | Performed by: EMERGENCY MEDICINE

## 2022-08-22 PROCEDURE — 99285 EMERGENCY DEPT VISIT HI MDM: CPT

## 2022-08-22 PROCEDURE — 96374 THER/PROPH/DIAG INJ IV PUSH: CPT

## 2022-08-22 PROCEDURE — 36415 COLL VENOUS BLD VENIPUNCTURE: CPT | Performed by: EMERGENCY MEDICINE

## 2022-08-22 PROCEDURE — 71045 X-RAY EXAM CHEST 1 VIEW: CPT

## 2022-08-22 PROCEDURE — 72192 CT PELVIS W/O DYE: CPT

## 2022-08-22 PROCEDURE — 85025 COMPLETE CBC W/AUTO DIFF WBC: CPT | Performed by: EMERGENCY MEDICINE

## 2022-08-22 PROCEDURE — 76705 ECHO EXAM OF ABDOMEN: CPT | Performed by: PHYSICIAN ASSISTANT

## 2022-08-22 PROCEDURE — 73521 X-RAY EXAM HIPS BI 2 VIEWS: CPT

## 2022-08-22 PROCEDURE — 70450 CT HEAD/BRAIN W/O DYE: CPT

## 2022-08-22 PROCEDURE — 72125 CT NECK SPINE W/O DYE: CPT

## 2022-08-22 PROCEDURE — G1004 CDSM NDSC: HCPCS

## 2022-08-22 PROCEDURE — 76705 ECHO EXAM OF ABDOMEN: CPT | Performed by: EMERGENCY MEDICINE

## 2022-08-22 PROCEDURE — 72131 CT LUMBAR SPINE W/O DYE: CPT

## 2022-08-22 PROCEDURE — 99223 1ST HOSP IP/OBS HIGH 75: CPT | Performed by: PHYSICIAN ASSISTANT

## 2022-08-22 PROCEDURE — 99285 EMERGENCY DEPT VISIT HI MDM: CPT | Performed by: EMERGENCY MEDICINE

## 2022-08-22 RX ORDER — ACETAMINOPHEN 325 MG/1
975 TABLET ORAL EVERY 8 HOURS SCHEDULED
Status: DISCONTINUED | OUTPATIENT
Start: 2022-08-22 | End: 2022-08-24 | Stop reason: HOSPADM

## 2022-08-22 RX ORDER — POLYETHYLENE GLYCOL 3350 17 G/17G
17 POWDER, FOR SOLUTION ORAL DAILY
Status: DISCONTINUED | OUTPATIENT
Start: 2022-08-23 | End: 2022-08-24 | Stop reason: HOSPADM

## 2022-08-22 RX ORDER — HYDROMORPHONE HCL IN WATER/PF 6 MG/30 ML
0.2 PATIENT CONTROLLED ANALGESIA SYRINGE INTRAVENOUS EVERY 4 HOURS PRN
Status: DISCONTINUED | OUTPATIENT
Start: 2022-08-22 | End: 2022-08-24 | Stop reason: HOSPADM

## 2022-08-22 RX ORDER — FENTANYL CITRATE 50 UG/ML
25 INJECTION, SOLUTION INTRAMUSCULAR; INTRAVENOUS ONCE
Status: COMPLETED | OUTPATIENT
Start: 2022-08-22 | End: 2022-08-22

## 2022-08-22 RX ORDER — OXYCODONE HYDROCHLORIDE 5 MG/1
2.5 TABLET ORAL EVERY 4 HOURS PRN
Status: DISCONTINUED | OUTPATIENT
Start: 2022-08-22 | End: 2022-08-24 | Stop reason: HOSPADM

## 2022-08-22 RX ORDER — ENOXAPARIN SODIUM 100 MG/ML
30 INJECTION SUBCUTANEOUS DAILY
Status: DISCONTINUED | OUTPATIENT
Start: 2022-08-23 | End: 2022-08-23

## 2022-08-22 RX ORDER — ONDANSETRON 2 MG/ML
4 INJECTION INTRAMUSCULAR; INTRAVENOUS EVERY 4 HOURS PRN
Status: DISCONTINUED | OUTPATIENT
Start: 2022-08-22 | End: 2022-08-24 | Stop reason: HOSPADM

## 2022-08-22 RX ORDER — OXYCODONE HYDROCHLORIDE 5 MG/1
5 TABLET ORAL EVERY 4 HOURS PRN
Status: DISCONTINUED | OUTPATIENT
Start: 2022-08-22 | End: 2022-08-24 | Stop reason: HOSPADM

## 2022-08-22 RX ORDER — POLYETHYLENE GLYCOL 3350 17 G/17G
17 POWDER, FOR SOLUTION ORAL DAILY PRN
Status: DISCONTINUED | OUTPATIENT
Start: 2022-08-22 | End: 2022-08-24 | Stop reason: HOSPADM

## 2022-08-22 RX ORDER — LIDOCAINE 50 MG/G
1 PATCH TOPICAL DAILY
Status: DISCONTINUED | OUTPATIENT
Start: 2022-08-23 | End: 2022-08-24 | Stop reason: HOSPADM

## 2022-08-22 RX ADMIN — FENTANYL CITRATE 25 MCG: 50 INJECTION INTRAMUSCULAR; INTRAVENOUS at 20:06

## 2022-08-23 PROBLEM — R26.2 AMBULATORY DYSFUNCTION: Status: ACTIVE | Noted: 2022-08-23

## 2022-08-23 PROBLEM — S32.692A: Status: ACTIVE | Noted: 2022-08-23

## 2022-08-23 PROCEDURE — 97530 THERAPEUTIC ACTIVITIES: CPT

## 2022-08-23 PROCEDURE — 99221 1ST HOSP IP/OBS SF/LOW 40: CPT | Performed by: PHYSICIAN ASSISTANT

## 2022-08-23 PROCEDURE — 99204 OFFICE O/P NEW MOD 45 MIN: CPT | Performed by: NURSE PRACTITIONER

## 2022-08-23 PROCEDURE — 99232 SBSQ HOSP IP/OBS MODERATE 35: CPT | Performed by: SURGERY

## 2022-08-23 PROCEDURE — 97167 OT EVAL HIGH COMPLEX 60 MIN: CPT

## 2022-08-23 PROCEDURE — 97163 PT EVAL HIGH COMPLEX 45 MIN: CPT

## 2022-08-23 RX ORDER — ATORVASTATIN CALCIUM 10 MG/1
10 TABLET, FILM COATED ORAL
Status: DISCONTINUED | OUTPATIENT
Start: 2022-08-23 | End: 2022-08-24 | Stop reason: HOSPADM

## 2022-08-23 RX ORDER — ENOXAPARIN SODIUM 100 MG/ML
30 INJECTION SUBCUTANEOUS EVERY 12 HOURS SCHEDULED
Status: DISCONTINUED | OUTPATIENT
Start: 2022-08-23 | End: 2022-08-24 | Stop reason: HOSPADM

## 2022-08-23 RX ORDER — SODIUM CHLORIDE, SODIUM GLUCONATE, SODIUM ACETATE, POTASSIUM CHLORIDE, MAGNESIUM CHLORIDE, SODIUM PHOSPHATE, DIBASIC, AND POTASSIUM PHOSPHATE .53; .5; .37; .037; .03; .012; .00082 G/100ML; G/100ML; G/100ML; G/100ML; G/100ML; G/100ML; G/100ML
50 INJECTION, SOLUTION INTRAVENOUS CONTINUOUS
Status: DISCONTINUED | OUTPATIENT
Start: 2022-08-23 | End: 2022-08-23

## 2022-08-23 RX ORDER — GUAIFENESIN 600 MG/1
1200 TABLET, EXTENDED RELEASE ORAL EVERY 12 HOURS SCHEDULED
Status: DISCONTINUED | OUTPATIENT
Start: 2022-08-23 | End: 2022-08-24 | Stop reason: HOSPADM

## 2022-08-23 RX ORDER — DOCUSATE SODIUM 100 MG/1
100 CAPSULE, LIQUID FILLED ORAL DAILY PRN
Status: DISCONTINUED | OUTPATIENT
Start: 2022-08-23 | End: 2022-08-24 | Stop reason: HOSPADM

## 2022-08-23 RX ORDER — MELATONIN
1000 DAILY
Status: DISCONTINUED | OUTPATIENT
Start: 2022-08-23 | End: 2022-08-24 | Stop reason: HOSPADM

## 2022-08-23 RX ADMIN — ACETAMINOPHEN 975 MG: 325 TABLET ORAL at 00:26

## 2022-08-23 RX ADMIN — Medication 1 TABLET: at 17:03

## 2022-08-23 RX ADMIN — ENOXAPARIN SODIUM 30 MG: 30 INJECTION SUBCUTANEOUS at 08:29

## 2022-08-23 RX ADMIN — GUAIFENESIN 1200 MG: 600 TABLET ORAL at 08:28

## 2022-08-23 RX ADMIN — GUAIFENESIN 1200 MG: 600 TABLET ORAL at 21:02

## 2022-08-23 RX ADMIN — Medication 1000 UNITS: at 08:29

## 2022-08-23 RX ADMIN — LIDOCAINE 5% 1 PATCH: 700 PATCH TOPICAL at 08:29

## 2022-08-23 RX ADMIN — CYANOCOBALAMIN TAB 500 MCG 1000 MCG: 500 TAB at 08:28

## 2022-08-23 RX ADMIN — SODIUM CHLORIDE, SODIUM GLUCONATE, SODIUM ACETATE, POTASSIUM CHLORIDE, MAGNESIUM CHLORIDE, SODIUM PHOSPHATE, DIBASIC, AND POTASSIUM PHOSPHATE 50 ML/HR: .53; .5; .37; .037; .03; .012; .00082 INJECTION, SOLUTION INTRAVENOUS at 08:29

## 2022-08-23 RX ADMIN — ACETAMINOPHEN 975 MG: 325 TABLET ORAL at 21:02

## 2022-08-23 RX ADMIN — ATORVASTATIN CALCIUM 10 MG: 10 TABLET, FILM COATED ORAL at 17:03

## 2022-08-23 RX ADMIN — ACETAMINOPHEN 975 MG: 325 TABLET ORAL at 14:52

## 2022-08-23 RX ADMIN — POLYETHYLENE GLYCOL 3350 17 G: 17 POWDER, FOR SOLUTION ORAL at 08:29

## 2022-08-23 RX ADMIN — ACETAMINOPHEN 975 MG: 325 TABLET ORAL at 05:30

## 2022-08-23 RX ADMIN — ENOXAPARIN SODIUM 30 MG: 30 INJECTION SUBCUTANEOUS at 21:02

## 2022-08-23 RX ADMIN — Medication 1 TABLET: at 08:29

## 2022-08-23 NOTE — CONSULTS
Lola Schrader 80 y o  female MRN: [id number]  Unit/Bed#: W -01      Chief Complaint:   Left ischial tuberosity fracture extending into the inferior ramus    HPI:   80 y o  female status post unwitnessed fall at her nursing facility  Orthopedics has been called for evaluation of a left ischial tuberosity fracture extending into the inferior ramus  At time of consultation, patient states that she does not recall the fall, and is unable to provide history surrounding event  She notes that she currently has no pelvic pain, leg pain, and feels in her regular state of health  History is obtained from chart review  She has no family at bedside  Review Of Systems:   · Skin: Normal  · Neuro: See HPI  · Musculoskeletal: See HPI  · 14 point review of systems negative except as stated above     Past Medical History:   Past Medical History:   Diagnosis Date    Chronic pain disorder     Low back pain        Past Surgical History:   History reviewed  No pertinent surgical history  Family History:  Family history reviewed and non-contributory  History reviewed  No pertinent family history      Social History:  Social History     Socioeconomic History    Marital status: /Civil Union     Spouse name: None    Number of children: None    Years of education: None    Highest education level: None   Occupational History    Occupation: retired   Tobacco Use    Smoking status: Never Smoker    Smokeless tobacco: Never Used   Vaping Use    Vaping Use: Never used   Substance and Sexual Activity    Alcohol use: Never    Drug use: Never    Sexual activity: Never   Other Topics Concern    None   Social History Narrative    Lives with spouse     Is safe at home     Social Determinants of Health     Financial Resource Strain: Not on file   Food Insecurity: Not on file   Transportation Needs: Not on file   Physical Activity: Not on file   Stress: Not on file   Social Connections: Not on file   Intimate Partner Violence: Not on file   Housing Stability: Not on file       Allergies:    Allergies   Allergen Reactions    Celecoxib Other (See Comments)           Labs:  0   Lab Value Date/Time    HCT 37 3 08/22/2022 2009    HCT 37 0 07/26/2022 0546    HCT 37 9 07/25/2022 0000    HGB 11 7 08/22/2022 2009    HGB 12 0 07/26/2022 0546    HGB 12 5 07/25/2022 0000    INR 1 02 08/22/2022 2009    WBC 6 43 08/22/2022 2009    WBC 7 18 07/26/2022 0546    WBC 8 69 07/25/2022 0000    CRP 18 4 (H) 07/25/2022 0000       Meds:    Current Facility-Administered Medications:     acetaminophen (TYLENOL) tablet 975 mg, 975 mg, Oral, Q8H [address], Jaya Sandifer, PA-C, 975 mg at 08/23/22 0530    atorvastatin (LIPITOR) tablet 10 mg, 10 mg, Oral, Daily With Parthenia Grain, PA-C    calcium carbonate-vitamin D (OSCAL-D) 500 mg-200 units per tablet 1 tablet, 1 tablet, Oral, BID With Meals, Jaya Sandifer, PA-C, 1 tablet at 08/23/22 1998    cholecalciferol (VITAMIN D3) tablet 1,000 Units, 1,000 Units, Oral, Daily, Jaya Sandifer, PA-C, 1,000 Units at 08/23/22 3242    cyanocobalamin (VITAMIN B-12) tablet 1,000 mcg, 1,000 mcg, Oral, Daily, Jaya Sandifer, PA-C, 1,000 mcg at 08/23/22 6830    enoxaparin (LOVENOX) subcutaneous injection 30 mg, 30 mg, Subcutaneous, Q12H [address], Jaya Sandifer, PA-C, 30 mg at 08/23/22 2082    guaiFENesin (MUCINEX) 12 hr tablet 1,200 mg, 1,200 mg, Oral, Q12H [address], Jaya Sandifer, PA-C, 1,200 mg at 08/23/22 8053    HYDROmorphone HCl (DILAUDID) injection 0 2 mg, 0 2 mg, Intravenous, Q4H PRN, Jaya Sandifer, PA-C    lidocaine (LIDODERM) 5 % patch 1 patch, 1 patch, Topical, Daily, Jaya Sandifer, PA-C, 1 patch at 08/23/22 0829    multi-electrolyte (PLASMALYTE-A/ISOLYTE-S PH 7 4) IV solution, 50 mL/hr, Intravenous, Continuous, EDI Caba, Last Rate: 50 mL/hr at 08/23/22 0829, 50 mL/hr at 08/23/22 0829    naloxone (NARCAN) 0 04 mg/mL syringe 0 04 mg, 0 04 mg, Intravenous, Q1MIN PRN, Jaya Sandifer, PA-C    ondansetron [facility] injection 4 mg, 4 mg, Intravenous, Q4H PRN, Maria De Jesus Perdue PA-C    oxyCODONE (ROXICODONE) IR tablet 2 5 mg, 2 5 mg, Oral, Q4H PRN, GERARDO Hernandez-CHAYO    oxyCODONE (ROXICODONE) IR tablet 5 mg, 5 mg, Oral, Q4H PRN, GERARDO Hernandez-CHAYO    polyethylene glycol (MIRALAX) packet 17 g, 17 g, Oral, Daily, GERARDO Hernandez-C    polyethylene glycol (MIRALAX) packet 17 g, 17 g, Oral, Daily PRN, Maria De Jesus Perdue PA-C, 17 g at 08/23/22 6480    Blood Culture:   No results found for: BLOODCX    Wound Culture:   No results found for: WOUNDCULT    Ins and Outs:  I/O last 24 hours: In: 240 [P O :240]  Out: -           Physical Exam:   /66 (BP Location: Right arm)   Pulse 90   Temp 97 7 °F (36 5 °C) (Oral)   Resp 17   Ht 5' 6" (1 676 m)   Wt 70 kg (154 lb 5 2 oz)   SpO2 95%   BMI 24 91 kg/m²   Gen: No acute distress, resting comfortably in bed  HEENT: Eyes clear, moist mucus membranes, hearing intact  Respiratory: No audible wheezing or stridor  Cardiovascular: Well Perfused peripherally, 2+ distal pulse  Abdomen: nondistended, no peritoneal signs  Musculoskeletal: bilateral lower extremity  · Skin intact  · No tenderness to palpation over the pelvis  · Leg lengths equal  · Sensation intact L3-S1  · Positive knee flexion/extension, ankle dorsi/plantar flexion, EHL/FHL  No pain with any movement of the lower extremities  · 2+ DP/PT pulse    Radiology:   I personally reviewed the films  CT pelvis: left ischial tuberosity fracture extending into the inferior ramus  History of left femoral neck ORIF    _*_*_*_*_*_*_*_*_*_*_*_*_*_*_*_*_*_*_*_*_*_*_*_*_*_*_*_*_*_*_*_*_*_*_*_*_*_*_*_*_*    Assessment:  80 y o  female S/P unwitnessed fall with left ischial tuberosity fracture extending into the inferior ramus  Plan:   · Weight bearing as tolerated  · Analgesics for pain per primary team    · DVT ppx per primary team    · PT/OT  · Body mass index is 24 91 kg/m²     · No immediate orthopedic intervention indicated at this time    · Dispo: Ortho signing off  Please call with questions or concerns     · Follow up as outpatient in 6 weeks with Dr Mitchel Lopez PA-C

## 2022-08-23 NOTE — PHYSICAL THERAPY NOTE
PHYSICAL THERAPY EVALUATION NOTE    Patient Name: Naomie DAVISON Date: 2022    AGE:   80 y o  Mrn:   [id number]  ADMIT DX:  Hip pain [M25 559]  Neck discomfort [M54 2]  Closed fracture of left ischial tuberosity, initial encounter ([facility] ) [I11 589W]    Past Medical History:   Diagnosis Date    Chronic pain disorder     Low back pain      Length Of Stay: 0  PHYSICAL THERAPY EVALUATION :   Patient's identity confirmed via 2 patient identifiers (full name and ) at start of session       22 1127   PT Last Visit   PT Visit Date 22   Note Type   Note type Evaluation   Additional Comments Pt significantly benefits from use of SuperEar due to being [facility]   Pain Assessment   Pain Assessment Tool FLACC   Pain Score No Pain   Pain Location/Orientation Orientation: Left; Location: Hip   Pain Rating: FLACC (Rest) - Face 0   Pain Rating: FLACC (Rest) - Legs 0   Pain Rating: FLACC (Rest) - Activity 0   Pain Rating: FLACC (Rest) - Cry 0   Pain Rating: FLACC (Rest) - Consolability 0   Score: FLACC (Rest) 0   Pain Rating: FLACC (Activity) - Face 1   Pain Rating: FLACC (Activity) - Legs 0   Pain Rating: FLACC (Activity) - Activity 0   Pain Rating: FLACC (Activity) - Cry 0   Pain Rating: FLACC (Activity) - Consolability 0   Score: FLACC (Activity) 1   Restrictions/Precautions   Weight Bearing Precautions Per Order Yes   RLE Weight Bearing Per Order WBAT   LLE Weight Bearing Per Order WBAT   Other Precautions Cognitive; Chair Alarm; Bed Alarm;WBS;Fall Risk;Pain;Hard of hearing   Home Living   Type of Home Assisted living  [facility])   Home Layout One level   [address] [place]   Additional Comments Pt reports using RW to ambulate around [facility] PTA   Prior Function   Lives With Facility staff   Receives Help From Personal care attendant   ADL Assistance Needs assistance   IADLs Needs assistance Falls in the last 6 months 1 to 4  (at least 1, reason for admission)   Comments Per CM note via JULIAN: "Patient was ambulatory with a walker  Able to dress self aside from shoes/socks (min assist) and required min assist with showering (lower extremities)  Patient is continent of bowel/bladder and did not need assistance with toileting  "   General   Family/Caregiver Present No   Cognition   Overall Cognitive Status Impaired   Arousal/Participation Alert   Attention Attends with cues to redirect   Orientation Level Oriented to person;Oriented to place; Disoriented to time;Oriented to situation   Memory Decreased recall of precautions;Decreased recall of recent events   Following Commands Follows one step commands with increased time or repetition   Comments Pt pleasantly confused  She completely lights up when introduced use of SuperEar  Subjective   Subjective "I'll eat anything you put in front of me  I'm not fussy"   RLE Assessment   RLE Assessment WFL   Strength RLE   RLE Overall Strength 3+/5   LLE Assessment   LLE Assessment WFL   Strength LLE   LLE Overall Strength 3+/5   Vision-Basic Assessment   Current Vision Wears glasses all the time   Bed Mobility   Supine to Sit 4  Minimal assistance   Additional items Assist x 1; Increased time required;Verbal cues;LE management   Sit to Supine Unable to assess   Additional Comments Pt seated OOB in recliner chair at end of eval   Transfers   Sit to Stand 4  Minimal assistance   Additional items Assist x 1; Increased time required;Verbal cues   Stand to Sit 4  Minimal assistance   Additional items Assist x 1; Increased time required;Verbal cues   Ambulation/Elevation   Gait pattern Decreased foot clearance; Short stride   Gait Assistance 4  Minimal assist   Additional items Assist x 1;Verbal cues   Assistive Device Rolling walker   Distance 6 ft  (pt declined further due to fatigue ("that's fine   I don't need to do any more"))   Balance   Static Sitting Fair +   Dynamic Sitting Fair   Static Standing Fair -   Dynamic Standing Poor +   Ambulatory Poor +   Activity Tolerance   Activity Tolerance Patient limited by fatigue;Patient limited by pain  (impaired cognition)   Medical Staff Made Aware Care coordination w/ OT Anuja Lew, Spoke to [facility] [address] to KERA Waters   Assessment   Problem List Decreased strength;Decreased endurance; Impaired balance;Decreased mobility; Decreased cognition; Impaired judgement; Impaired hearing;Orthopedic restrictions;Pain   Assessment Karl Larson is a 80 y o  Female who [facility]22[facility]20[facility] from [facility] s/p fall  Pt w/ dx of L ischial tuberosity fracture and L inferior pubic rami fx  Orders for PT eval and treat received, w/ activity orders of WBAT BLEs    Pt presents w/ comorbidities of HTN, osteoporosis, B leg edema, HTN, hx of breast cancer, bilateral CAD  At baseline, pt mobilizes w/ RW, and reports at least 1 falls in the last 6 months  Upon evaluation, pt presents w/ the following deficits: weakness, edema of extremities, impaired balance, impaired hearing, decreased endurance and pain limiting functional mobility  Upon eval, pt requires min A x 1 for bed mobility, min A x 1 for transfers, and min A x 1 for gait  Pt's clinical presentation is unstable/unpredictable due to need for assist w/ all phases of mobility when usually mobilizing independently, pain impacting overall mobility status, need for input for mobility technique/safety, recent drastic decline in mobility compared to baseline and recent history of falls  Patient is at an increased risk of falls due to physical and cognitive deficits  Given the above findings, discharge recommendation is for Return to facility w/ skilled PT needs (vs STR pending level of A facility can provide and continued mobility progress)   During this admission, pt would benefit from continued skilled inpatient PT in the acute care setting in order to address the abovementioned deficits to maximize function and mobility before DC from acute care  Goals   Patient Goals to get better   STG Expiration Date 09/02/22   Short Term Goal #1 Patient will: Increase bilateral LE strength 1/2 grade to facilitate independent mobility, Perform all bed mobility tasks modified independent to decrease fall risk factors, Perform all transfers w/ supervision to improve independence, Ambulate at least 100 ft  with roller walker w/ supervision w/o LOB, Increase all balance 1/2 grade to decrease risk for falls, Complete exercise program independently and Tolerate 3 hr OOB to faciliate upright tolerance   PT Treatment Day 0   Plan   Treatment/Interventions Functional transfer training;LE strengthening/ROM; Therapeutic exercise; Endurance training;Cognitive reorientation;Patient/family training;Equipment eval/education; Bed mobility;Gait training   PT Frequency 3-5x/wk   Recommendation   PT Discharge Recommendation Return to facility with rehabilitation services  (vs STR)   Equipment Recommended [facility] Recommended Wheeled walker   Change/add to Newser? No   AM-PAC Basic Mobility Inpatient   Turning in Bed Without Bedrails 3   Lying on Back to Sitting on Edge of Flat Bed 3   Moving Bed to Chair 3   Standing Up From Chair 3   Walk in Room 3   Climb 3-5 Stairs 2   Basic Mobility Inpatient Raw Score 17   Basic Mobility Standardized Score 39 67   Highest Level Of Mobility   JH-HLM Goal 5: Stand one or more mins   JH-HLM Achieved 6: Walk 10 steps or more   Additional Treatment Session   Start Time 1137   End Time 1145   Treatment Assessment Pt seated OOB in recliner chair  Once OOB in recliner chair, pt is able to perform STS w/ supervision w/ use of armrests  Pt is able to stand for 2 min w/ intermittent UE support of RW while washing self up  Pt declined further ambulation, jokes to therapist "don't get old"   Pt seated OOB in recliner chair at end of session   Equipment Use RW   Additional Treatment Day 1   End of Consult   Patient Position at End of Consult Bedside chair;Bed/Chair alarm activated; All needs within reach         The patient's AM-PAC Basic Mobility Inpatient Short Form Raw Score is 17, Standardized Score is 39 67  A standardized score greater than 38 32 (raw score of 16) suggests the patient may benefit from discharge to home which may not coincide with above PT recommendations  However please refer to therapist recommendation for discharge planning given other factors that may influence destination      Pt would benefit from skilled inpatient PT during this admission in order to facilitate progress towards goals to maximize functional independence    Laura Blunt PT

## 2022-08-23 NOTE — PLAN OF CARE
Problem: PHYSICAL THERAPY ADULT  Goal: Performs mobility at highest level of function for planned discharge setting  See evaluation for individualized goals  Description: Treatment/Interventions: Functional transfer training, LE strengthening/ROM, Therapeutic exercise, Endurance training, Cognitive reorientation, Patient/family training, Equipment eval/education, Bed mobility, Gait training  Equipment Recommended: Kerri Dodd       See flowsheet documentation for full assessment, interventions and recommendations  Note:    Problem List: Decreased strength, Decreased endurance, Impaired balance, Decreased mobility, Decreased cognition, Impaired judgement, Impaired hearing, Orthopedic restrictions, Pain  Assessment: Xiao West is a 80 y o  Female who presents to [facility] on 8/22/2022 from [facility] s/p fall  Pt w/ dx of L ischial tuberosity fracture and L inferior pubic rami fx  Orders for PT eval and treat received, w/ activity orders of WBAT BLEs    Pt presents w/ comorbidities of HTN, osteoporosis, B leg edema, HTN, hx of breast cancer, bilateral CAD  At baseline, pt mobilizes w/ RW, and reports at least 1 falls in the last 6 months  Upon evaluation, pt presents w/ the following deficits: weakness, edema of extremities, impaired balance, impaired hearing, decreased endurance and pain limiting functional mobility  Upon eval, pt requires min A x 1 for bed mobility, min A x 1 for transfers, and min A x 1 for gait  Pt's clinical presentation is unstable/unpredictable due to need for assist w/ all phases of mobility when usually mobilizing independently, pain impacting overall mobility status, need for input for mobility technique/safety, recent drastic decline in mobility compared to baseline and recent history of falls  Patient is at an increased risk of falls due to physical and cognitive deficits   Given the above findings, discharge recommendation is for Return to facility w/ skilled PT needs (vs STR pending level of A facility can provide and continued mobility progress)  During this admission, pt would benefit from continued skilled inpatient PT in the acute care setting in order to address the abovementioned deficits to maximize function and mobility before DC from acute care  PT Discharge Recommendation: Return to facility with rehabilitation services (vs STR)    See flowsheet documentation for full assessment

## 2022-08-23 NOTE — CONSULTS
Consultation - Delaney Maycol Schrader 80 y o  female MRN: [id number]  Unit/Bed#: W -01 Encounter: [id number]      Assessment/Plan   Fall  Mechanical fall patient slipped and fell  Denies any dizziness, lightheadedness, denies any dizziness or lightheadedness prior to fall  Found to have left ischial tuberosity fracture extending to the inferior ramus  Recommend geriatric pain protocol  Fall precautions  Out of bed as tolerated  PT/OT consulted    Left ischial tuberosity fracture extending into the inferior ramus  CT of the pelvis impression: 1  Left ischial tuberosity fracture extending into the inferior ramus  Orthopedics consult pending  Patient currently NPO until seen by ortho  Recommend geriatric pain protocol, including scheduled Tylenol and lidocaine patch  Is also on p r n   Oxycodone 2 5/5 mg per monitor severe pain and Dilaudid for breakthrough pain  Wean narcotics as able to  Initiate good bowel regimen to avoid constipation  PT/OT consulted     [facility]  Severely [place]  Uses hearing aids, but not available bedside  Examination complete with pocket talker  Speak slowly and clearly   Try to obtain hearing aids from facility     Frailty  Clinical Frail Scale: 6 moderately frail   Total protein 5 7 and albumin 3 2  Encourage adequate hydration and nutrition, including protein in meals  OOB as tolerated  PT/OT consulted  Encourage early and frequent moblization    Ambulatory dysfunction  At a baseline ambulates with rolling walker  PT/OT following  Fall precautions  Out of bed as tolerated  Encourage early and frequent mobilization  Encourage adequate hydration and nutrition  Provide adequate pain management   Continue with PT/OT for continued strength and balance training     Home medication review  liptor 10mg daily  Vitamin b complex 1 tablet daily  Vitamin d3 1000u daily  Calcium 600 + vitamin d bid  Senakot s 1 tablet bid  gabapentin 100mg daily in am  vitamin b12 100mcg daily  Tessalon pearls prn  Icy hot tid prn  miralax 17gm prn    Personally confirmed with medication tech at [address]    History of Present Illness   Physician Requesting Consult: Marcial Floyd MD  Reason for Consult / Principal Problem: Fall  Hx and PE limited by: NOELLE [facility] with some confusion  Additional history obtained from: Chart review and patient evaluation      HPI: Lawyer Villa is a 80y o  year old female who presents from [facility] after she had slipped and fell  She is on her left issue to rest today fracture extending into the inferior ramus  Head CT, cervical spine CT, lumbar CT unremarkable for traumatic findings  Her comorbidities include carotid artery stenosis, hypertension, hyperlipidemia, osteoporosis, aortic valve insufficiency, and ambulatory dysfunction  She currently lives at [facility] assisted living  She ambulates with rolling walker  Did have 1 other fall  She does wear hearing aids, dentures, and eyeglasses  Denies any issues with bowel or bladder incontinence  Denies any issues with anxiety or depression  Says she sleeps well at night, denies any insomnia  She has grab bars in the bathroom  She no longer drives  She requires assistance for all ADLs  Information provided by patient and daughter in-law  Upon examination patient was out of bed in chair, resting  She appeared comfortable and was in no acute distress  She denies any pain  She has slept okay last night  Per nursing no acute concerns or issues at this time  Inpatient consult to Gerontology  Consult performed by: EDI Ennis  Consult ordered by: Colby Tafoya PA-C          Review of Systems   Reason unable to perform ROS: limited by hearing  Constitutional: Positive for activity change  Negative for appetite change, chills, fatigue and fever  HENT: Positive for hearing loss  Respiratory: Negative for cough and shortness of breath      Cardiovascular: Negative for chest pain and palpitations  Gastrointestinal: Negative for abdominal distention, nausea and vomiting  Genitourinary: Negative for difficulty urinating and frequency  Musculoskeletal: Positive for arthralgias and gait problem  Neurological: Positive for weakness  Negative for dizziness, light-headedness and headaches  Psychiatric/Behavioral: Negative for confusion, dysphoric mood and sleep disturbance  The patient is not nervous/anxious  Historical Information   Past Medical History:   Diagnosis Date    Chronic pain disorder     Low back pain      History reviewed  No pertinent surgical history  Social History   Social History     Substance and Sexual Activity   Alcohol Use Never     Social History     Substance and Sexual Activity   Drug Use Never     Social History     Tobacco Use   Smoking Status Never Smoker   Smokeless Tobacco Never Used     Family History: History reviewed  No pertinent family history      Meds/Allergies   current meds:   Current Facility-Administered Medications   Medication Dose Route Frequency    acetaminophen (TYLENOL) tablet 975 mg  975 mg Oral Q8H [address]    atorvastatin (LIPITOR) tablet 10 mg  10 mg Oral Daily With Dinner    calcium carbonate-vitamin D (OSCAL-D) 500 mg-200 units per tablet 1 tablet  1 tablet Oral BID With Meals    cholecalciferol (VITAMIN D3) tablet 1,000 Units  1,000 Units Oral Daily    cyanocobalamin (VITAMIN B-12) tablet 1,000 mcg  1,000 mcg Oral Daily    enoxaparin (LOVENOX) subcutaneous injection 30 mg  30 mg Subcutaneous Q12H [address]    guaiFENesin (MUCINEX) 12 hr tablet 1,200 mg  1,200 mg Oral Q12H [address]    HYDROmorphone HCl (DILAUDID) injection 0 2 mg  0 2 mg Intravenous Q4H PRN    lidocaine (LIDODERM) 5 % patch 1 patch  1 patch Topical Daily    multi-electrolyte (PLASMALYTE-A/ISOLYTE-S PH 7 4) IV solution  50 mL/hr Intravenous Continuous    naloxone (NARCAN) 0 04 mg/mL syringe 0 04 mg  0 04 mg Intravenous Q1MIN PRN    ondansetron (ZOFRAN) injection 4 mg 4 mg Intravenous Q4H PRN    oxyCODONE (ROXICODONE) IR tablet 2 5 mg  2 5 mg Oral Q4H PRN    oxyCODONE (ROXICODONE) IR tablet 5 mg  5 mg Oral Q4H PRN    polyethylene glycol (MIRALAX) packet 17 g  17 g Oral Daily    polyethylene glycol (MIRALAX) packet 17 g  17 g Oral Daily PRN       Allergies   Allergen Reactions    Celecoxib Other (See Comments)       Objective     Intake/Output Summary (Last 24 hours) at 8/23/2022 0946  Last data filed at 8/23/2022 0532  Gross per 24 hour   Intake 240 ml   Output --   Net 240 ml     Invasive Devices  Report    Peripheral Intravenous Line  Duration           Peripheral IV 08/22/22 Dorsal (posterior); Left Hand 1 day    Peripheral IV 08/22/22 Right;Ventral (anterior) Forearm <1 day                Physical Exam  Vitals reviewed  Constitutional:       General: She is not in acute distress  Appearance: She is well-developed  She is not ill-appearing  Comments: Frail appearing   HENT:      Head: Normocephalic and atraumatic  Ears:      Comments: [place]     Mouth/Throat:      Mouth: Mucous membranes are dry  Pharynx: No oropharyngeal exudate or posterior oropharyngeal erythema  Cardiovascular:      Rate and Rhythm: Normal rate and regular rhythm  Heart sounds: No murmur heard  Pulmonary:      Effort: Pulmonary effort is normal  No respiratory distress  Breath sounds: Normal breath sounds  Abdominal:      General: There is no distension  Palpations: Abdomen is soft  Tenderness: There is no abdominal tenderness  Musculoskeletal:         General: Swelling and tenderness present  Right lower leg: Edema present  Left lower leg: Edema present  Skin:     General: Skin is warm and dry  Coloration: Skin is pale  Neurological:      General: No focal deficit present  Mental Status: She is alert  Mental status is at baseline  Cranial Nerves: No cranial nerve deficit  Motor: Weakness present        Gait: Gait abnormal  Comments: Alert and oriented x2, forgetful to situation and time         Lab Results:   I have personally reviewed pertinent lab results including the following:  -CBC, BMP, PTT,    I have personally reviewed the following imaging study reports in PACS:  -CT of spine cervical and lumbar, CT head, CT pelvis      Therapies:   PT: Consulted  OT: Consulted    VTE Prophylaxis: Enoxaparin (Lovenox)    Code Status: Level 1 - Full Code  Advance Directive and Living Will:    yes, not on file  Power of :   yes, son- not on file  POLST:  no    Family and Social Support:   Living Arrangements: Lives Alone  Support Systems: Son  Assistance Needed: None  Type of Current Residence: Nursing home  [address]: [address]: No      Goals of Care: Return to [facility]    Please note:  Voice-recognition software may have been used in the preparation of this document  Occasional wrong word or "sound-alike" substitutions may have occurred due to the inherent limitations of voice recognition software  Interpretation should be guided by context

## 2022-08-23 NOTE — ASSESSMENT & PLAN NOTE
· History of ambulatory dysfunction, unwitnessed fall at [facility] with injuries as listed  · PT and OT evaluation and treatment, recommending discharge to rehab  · Case management for disposition planning to higher level of care than the independent living facility that patient was living in prior

## 2022-08-23 NOTE — ED PROVIDER NOTES
History  Chief Complaint   Patient presents with    Fall     Patient is from [facility] and had an unwitnessed fall in the bathroom  Patient recalls all events and reports no head pain  Patient complaining of left hip pain and neck discomfort on palpation  Per EMS no thinners/asa     77-year-old female presents the emergency department by EMS for evaluation after having an unwitnessed fall at [facility]  Patient states that she was in the bathroom when she lost her balance  She fell backwards and did strike her head but denies loss of consciousness  She reports mild neck pain, low back pain and bilateral hip pain, left greater than right  Patient also has a small skin tear to left elbow  She denies pain in the upper extremities  Patient does not take blood thinners or aspirin  Of note patient was hospitalized last month for altered mental status and was found to be positive for COVID  History provided by:  Patient, medical records and EMS personnel  History limited by: Patient is hard of hearing     used: No    Fall  Mechanism of injury: fall    Injury location:  Head/neck, pelvis and torso  Head/neck injury location:  Head (Midline cervical spine tenderness)  Torso injury location:  Back  Pelvic injury location:  R hip and L hip  Incident location:  Nursing home  Arrived directly from scene: yes    Fall:     Height of fall:  From standing    Impact surface:  Hard floor    Point of impact:  Head and buttocks  Suspicion of alcohol use: no    Suspicion of drug use: no    Tetanus status:  Unknown  Prior to arrival data:     Patient ambulatory at scene: no      Blood loss:  Minimal (Small skin tear left elbow)    Responsiveness at scene:  Alert    Orientation at scene:  Person, place and situation    Loss of consciousness: no      Amnesic to event: no    Associated symptoms: back pain and neck pain    Associated symptoms: no abdominal pain, no headaches, no loss of consciousness, no seizures and no vomiting    Risk factors: no anticoagulation therapy and no diabetes        Prior to Admission Medications   Prescriptions Last Dose Informant Patient Reported? Taking? B Complex Vitamins (VITAMIN-B COMPLEX PO)   Yes No   Sig: Take 1 tablet by mouth   Calcium Carb-Cholecalciferol (OSCAL-D) 500 mg-200 units per tablet   Yes No   Sig: Take 1 tablet by mouth 2 (two) times a day with meals   atorvastatin (LIPITOR) 10 mg tablet   Yes No   Sig: Take 10 mg by mouth   benzonatate (TESSALON PERLES) 100 mg capsule   No No   Sig: Take 1 capsule (100 mg total) by mouth 3 (three) times a day as needed for cough   cholecalciferol (VITAMIN D3) 1,000 units tablet   Yes No   Sig: Take 1,000 Units by mouth daily   fluocinolone (SYNALAR) 0 01 % cream   Yes No   Sig: Apply topically in the morning Apply to scalpe for 2-4 hours daily  Protect with shower cap and shampoo out    gabapentin (NEURONTIN) 100 mg capsule   Yes No   Sig: Take 100 mg by mouth   guaiFENesin 1200 MG TB12   No No   Sig: Take 1 tablet (1,200 mg total) by mouth every 12 (twelve) hours   polyethylene glycol (MIRALAX) 17 g packet   Yes No   Sig: Take 17 g by mouth daily as needed   senna-docusate sodium (SENOKOT-S) 8 6-50 mg per tablet   Yes No   Sig: Take 1 tablet by mouth 2 (two) times a day   vitamin B-12 (CYANOCOBALAMIN) 100 MCG tablet   Yes No   Sig: Take 1,000 mcg by mouth      Facility-Administered Medications: None       Past Medical History:   Diagnosis Date    Chronic pain disorder     Low back pain        History reviewed  No pertinent surgical history  History reviewed  No pertinent family history  I have reviewed and agree with the history as documented      E-Cigarette/Vaping    E-Cigarette Use Never User      E-Cigarette/Vaping Substances     Social History     Tobacco Use    Smoking status: Never Smoker    Smokeless tobacco: Never Used   Vaping Use    Vaping Use: Never used   Substance Use Topics    Alcohol use: Never    Drug use: Never       Review of Systems   Constitutional: Negative for fever  Respiratory: Negative for cough and shortness of breath  Cardiovascular: Positive for leg swelling  Gastrointestinal: Negative for abdominal pain and vomiting  Genitourinary: Negative for dysuria  Musculoskeletal: Positive for back pain and neck pain  Skin: Positive for wound  Neurological: Negative for seizures, loss of consciousness, weakness and headaches  All other systems reviewed and are negative  Physical Exam  Physical Exam  Vitals reviewed  Constitutional:       General: She is in acute distress  Appearance: She is well-developed  HENT:      Head: Normocephalic  Nose: Nose normal       Mouth/Throat:      Pharynx: No oropharyngeal exudate  Eyes:      General: No scleral icterus  Conjunctiva/sclera: Conjunctivae normal       Pupils: Pupils are equal, round, and reactive to light  Cardiovascular:      Rate and Rhythm: Normal rate and regular rhythm  Heart sounds: Normal heart sounds  Pulmonary:      Effort: Pulmonary effort is normal       Breath sounds: Normal breath sounds  Abdominal:      General: Bowel sounds are normal  There is no distension  Palpations: Abdomen is soft  Tenderness: There is no abdominal tenderness  There is no guarding or rebound  Musculoskeletal:         General: No deformity  Normal range of motion  Cervical back: Normal range of motion and neck supple  Tenderness and bony tenderness present  No deformity or crepitus  Thoracic back: Normal       Lumbar back: Tenderness and bony tenderness present  No swelling or deformity  Back:       Right hip: Tenderness present  No bony tenderness  Left hip: Tenderness and bony tenderness present  Right knee: Normal       Left knee: Normal       Right ankle: Normal       Left ankle: Normal         Legs:    Lymphadenopathy:      Cervical: No cervical adenopathy  Skin:     General: Skin is warm and dry  Findings: No rash  Neurological:      General: No focal deficit present  Mental Status: She is alert and oriented to person, place, and time  Mental status is at baseline  Cranial Nerves: No cranial nerve deficit  Sensory: No sensory deficit  Motor: No abnormal muscle tone  Coordination: Coordination normal       Gait: Gait normal       Deep Tendon Reflexes: Reflexes are normal and symmetric  Psychiatric:         Behavior: Behavior normal          Thought Content:  Thought content normal          Judgment: Judgment normal          Vital Signs  ED Triage Vitals [08/22/22 1945]   Temperature Pulse Respirations Blood Pressure SpO2   97 8 °F (36 6 °C) (!) 111 16 170/77 97 %      Temp Source Heart Rate Source Patient Position - Orthostatic VS BP Location FiO2 (%)   Oral Monitor Sitting Right arm --      Pain Score       6           Vitals:    08/22/22 1945 08/23/22 0015 08/23/22 0724 08/23/22 1555   BP: 170/77 170/87 133/66 127/66   Pulse: (!) 111 89 90    Patient Position - Orthostatic VS: Sitting  Lying          Visual Acuity  Visual Acuity    Flowsheet Row Most Recent Value   L Pupil Size (mm) 3   R Pupil Size (mm) 3          ED Medications  Medications   polyethylene glycol (MIRALAX) packet 17 g ( Oral Canceled Entry 8/23/22 0830)   lidocaine (LIDODERM) 5 % patch 1 patch (1 patch Topical Medication Applied 8/23/22 0829)   oxyCODONE (ROXICODONE) IR tablet 2 5 mg (has no administration in time range)   oxyCODONE (ROXICODONE) IR tablet 5 mg (has no administration in time range)   HYDROmorphone HCl (DILAUDID) injection 0 2 mg (has no administration in time range)   polyethylene glycol (MIRALAX) packet 17 g (17 g Oral Given 8/23/22 0829)   ondansetron (ZOFRAN) injection 4 mg (has no administration in time range)   naloxone (NARCAN) 0 04 mg/mL syringe 0 04 mg (has no administration in time range)   acetaminophen (TYLENOL) tablet 975 mg (975 mg Oral Given 8/23/22 1452)   atorvastatin (LIPITOR) tablet 10 mg (has no administration in time range)   calcium carbonate-vitamin D (OSCAL-D) 500 mg-200 units per tablet 1 tablet (1 tablet Oral Given 8/23/22 0829)   cholecalciferol (VITAMIN D3) tablet 1,000 Units (1,000 Units Oral Given 8/23/22 0829)   guaiFENesin (MUCINEX) 12 hr tablet 1,200 mg (1,200 mg Oral Given 8/23/22 0828)   cyanocobalamin (VITAMIN B-12) tablet 1,000 mcg (1,000 mcg Oral Given 8/23/22 0828)   enoxaparin (LOVENOX) subcutaneous injection 30 mg (30 mg Subcutaneous Given 8/23/22 0829)   fentanyl citrate (PF) 100 MCG/2ML 25 mcg (25 mcg Intravenous Given 8/22/22 2006)       Diagnostic Studies  Results Reviewed     Procedure Component Value Units Date/Time    Basic metabolic panel [[id number]]  (Abnormal) Collected: 08/22/22 2009    Lab Status: Final result Specimen: Blood from Arm, Right Updated: 08/22/22 2035     Sodium 136 mmol/L      Potassium 4 2 mmol/L      Chloride 105 mmol/L      CO2 26 mmol/L      ANION GAP 5 mmol/L      BUN 14 mg/dL      Creatinine 0 56 mg/dL      Glucose 132 mg/dL      Calcium 8 9 mg/dL      eGFR 81 ml/min/1 73sq m     Narrative:      [place] guidelines for Chronic Kidney Disease (CKD):     Stage 1 with normal or high GFR (GFR > 90 mL/min/1 73 square meters)    Stage 2 Mild CKD (GFR = 60-89 mL/min/1 73 square meters)    Stage 3A Moderate CKD (GFR = 45-59 mL/min/1 73 square meters)    Stage 3B Moderate CKD (GFR = 30-44 mL/min/1 73 square meters)    Stage 4 Severe CKD (GFR = 15-29 mL/min/1 73 square meters)    Stage 5 End Stage CKD (GFR <15 mL/min/1 73 square meters)  Note: GFR calculation is accurate only with a steady state creatinine    Protime-INR [[id number]]  (Normal) Collected: 08/22/22 2009    Lab Status: Final result Specimen: Blood from Arm, Right Updated: 08/22/22 2027     Protime 13 6 seconds      INR 1 02    APTT [[id number]]  (Normal) Collected: 08/22/22 2009    Lab Status: Final result Specimen: Blood from Arm, Right Updated: 08/22/22 2027     PTT 26 seconds     CBC and differential [[id number]]  (Abnormal) Collected: 08/22/22 2009    Lab Status: Final result Specimen: Blood from Arm, Right Updated: 08/22/22 2015     WBC 6 43 Thousand/uL      RBC 4 01 Million/uL      Hemoglobin 11 7 g/dL      Hematocrit 37 3 %      MCV 93 fL      MCH 29 2 pg      MCHC 31 4 g/dL      RDW 14 6 %      MPV 9 6 fL      Platelets 274 Thousands/uL      nRBC 0 /100 WBCs      Neutrophils Relative 64 %      Immat GRANS % 1 %      Lymphocytes Relative 16 %      Monocytes Relative 15 %      Eosinophils Relative 3 %      Basophils Relative 1 %      Neutrophils Absolute 4 13 Thousands/µL      Immature Grans Absolute 0 09 Thousand/uL      Lymphocytes Absolute 1 00 Thousands/µL      Monocytes Absolute 0 99 Thousand/µL      Eosinophils Absolute 0 19 Thousand/µL      Basophils Absolute 0 03 Thousands/µL                  XR chest portable   Final Result by Davey Abbott MD (08/23 1030)      No acute cardiopulmonary disease  Workstation performed: [id number]         CT cervical spine without contrast   Final Result by Sana Lee MD (08/22 2139)      1  No cervical spine fracture or traumatic malalignment   2  Mediastinal lesion, likely represents a pedunculated thyroid nodule, this can be confirmed with thyroid ultrasound  Workstation performed: [id number]         CT lumbar spine without contrast   Final Result by Sana Lee MD (08/22 2144)      1  No acute fracture   2  Stable compression deformities         Workstation performed: [id number]         CT head without contrast   Final Result by Sana Lee MD (08/22 2136)      No acute intracranial abnormality  Workstation performed: [id number]         CT pelvis wo contrast   Final Result by Sana Lee MD (08/22 2151)      1  Left ischial tuberosity fracture extending into the inferior ramus   2    Additional chronic findings as above      The study was marked in [facility] for immediate notification  Workstation performed: [id number]         XR hips bilateral 2 vw w pelvis if performed   Final Result by Javier Henderson DO (08/23 1055)      Suspect nondisplaced fracture left inferior pubic ramus  Stable appearance of left hip ORIF without evidence for acute fracture  Workstation performed: [id number]                    Procedures  Procedures         ED Course                               SBIRT 20yo+    Flowsheet Row Most Recent Value   SBIRT (25 yo +)    In order to provide better care to our patients, we are screening all of our patients for alcohol and drug use  Would it be okay to ask you these screening questions? Yes Filed at: 08/22/2022 2026   Initial Alcohol Screen: US AUDIT-C     1  How often do you have a drink containing alcohol? 0 Filed at: 08/22/2022 2026   2  How many drinks containing alcohol do you have on a typical day you are drinking? 0 Filed at: 08/22/2022 2026   3a  Male UNDER 65: How often do you have five or more drinks on one occasion? 0 Filed at: 08/22/2022 2026   3b  FEMALE Any Age, or MALE 65+: How often do you have 4 or more drinks on one occassion? 0 Filed at: 08/22/2022 2026   Audit-C Score 0 Filed at: 08/22/2022 2026   SELENA: How many times in the past year have you    Used an illegal drug or used a prescription medication for non-medical reasons?  Never Filed at: 08/22/2022 2026                    MDM  Number of Diagnoses or Management Options  Closed fracture of left ischial tuberosity, initial encounter [facility]): new and requires workup     Amount and/or Complexity of Data Reviewed  Clinical lab tests: ordered and reviewed  Tests in the radiology section of CPT®: ordered and reviewed  Decide to obtain previous medical records or to obtain history from someone other than the patient: yes  Discuss the patient with other providers: yes  Independent visualization of images, tracings, or specimens: yes    Patient Progress  Patient progress: stable      Disposition  Final diagnoses:   Closed fracture of left ischial tuberosity, initial encounter ([facility] [address] )     Time reflects when diagnosis was documented in both MDM as applicable and the Disposition within this note     Time User Action Codes Description Comment    8/22/2022 10:57 PM Yash Cruz Add [[id number]] Closed fracture of left ischial tuberosity, initial encounter ([facility] [address] )     8/22/2022 11:11 PM Rafal Hodge Fall from ground level     8/22/2022 11:28 PM Daniel Howard Fall from ground level       ED Disposition     None      Follow-up Information     Follow up With Specialties Details Why Contact Info Additional 6090 Fidencio Mcgill MD Internal Medicine Schedule an appointment as soon as possible for a visit in 1 week Arrange outpatient thyroid ultrasound per pain CT recommendations   [address]  [address] [address] Emergency Department Emergency Medicine Go to  As needed [address] Emergency Department,  [address], [place], [place], [facility], DO Orthopedic Surgery Follow up in 6 week(s)  Matthew Willams [address] [place], [place] [address]y [address] [phone]             Current Discharge Medication List      CONTINUE these medications which have NOT CHANGED    Details   atorvastatin (LIPITOR) 10 mg tablet Take 10 mg by mouth      B Complex Vitamins (VITAMIN-B COMPLEX PO) Take 1 tablet by mouth      benzonatate (TESSALON PERLES) 100 mg capsule Take 1 capsule (100 mg total) by mouth 3 (three) times a day as needed for cough  Qty: 20 capsule, Refills: 0    Associated Diagnoses: COVID-19      Calcium Carb-Cholecalciferol (OSCAL-D) 500 mg-200 units per tablet Take 1 tablet by mouth 2 (two) times a day with meals cholecalciferol (VITAMIN D3) 1,000 units tablet Take 1,000 Units by mouth daily      fluocinolone (SYNALAR) 0 01 % cream Apply topically in the morning Apply to scalpe for 2-4 hours daily  Protect with shower cap and shampoo out       gabapentin (NEURONTIN) 100 mg capsule Take 100 mg by mouth      guaiFENesin 1200 MG TB12 Take 1 tablet (1,200 mg total) by mouth every 12 (twelve) hours  Refills: 0    Associated Diagnoses: COVID-19      polyethylene glycol (MIRALAX) 17 g packet Take 17 g by mouth daily as needed      senna-docusate sodium (SENOKOT-S) 8 6-50 mg per tablet Take 1 tablet by mouth 2 (two) times a day      vitamin B-12 (CYANOCOBALAMIN) 100 MCG tablet Take 1,000 mcg by mouth             No discharge procedures on file      PDMP Review       Value Time User    PDMP Reviewed  Yes 7/25/2022  8:59 PM Karen Cortez, [facility] Provider  Electronically Signed by           Herlinda Stewart DO  08/23/22 2830

## 2022-08-23 NOTE — H&P
H&P - Trauma   Michelle Serrato 80 y o  female MRN: [id number]  Unit/Bed#: ED-31 Encounter: [id number]    Trauma Alert: Evaluation; trauma team notified at [phone] via in person   Model of Arrival: Ambulance    Trauma Team: Attending [facility] [address]  Consultants:     Orthopedics: routine consult; Epic consult order placed; Assessment/Plan   Active Problems / Assessment:   Left hip pain status post ground level fall  Left ischial tuberosity fracture extending into the inferior ramus     Plan:   Head CT, cervical spine CT, portable chest x-ray, CT pelvis without contrast, CT spine lumbar without contrast, CBC and differential, BMP, fast    History of Present Illness     Chief Complaint:  Left hip pain  Mechanism:Fall     HPI:    Michelle Serrato is a 80 y o  female who presents with unwitnessed fall at nursing [place]; [facility]  Patient was brought in by EMS initially and evaluated by emergency department physician with CT pelvis without contrast denoting left ischial tuberosity fracture extending into the inferior ramus "  Head CT, cervical spine CT, lumbar CT with unremarkable traumatic findings  Patient extremely hard of hearing and with hearing aids not on patient person at this time  Patient patient reports that she had slipped and fell ", and denies dizziness or lightheadedness prior to aforementioned fall on left side  Patient was unaware of head strike  Patient denies LOC, patient is not amnesic to aforementioned events  Patient denies [address]  Patient's skin is intact with patient alert and oriented x3 with GCS 15  Patient agrees to inpatient admission at this time for evaluation by Orthopedics and subsequent PT OT evaluation and treatment  Review of Systems   Constitutional: Negative for activity change and appetite change  HENT: Negative for facial swelling, sinus pain, sore throat and tinnitus  Eyes: Negative for photophobia and visual disturbance     Respiratory: Negative for apnea, cough, choking and wheezing  Cardiovascular: Negative for chest pain and palpitations  Gastrointestinal: Negative for constipation, diarrhea, nausea and vomiting  Musculoskeletal: Positive for arthralgias  Negative for back pain, myalgias, neck pain and neck stiffness  Skin: Negative for wound  Neurological: Negative for tremors, speech difficulty and headaches  All other systems reviewed and are negative  12-point, complete review of systems was reviewed and negative except as stated above  Historical Information     Past Medical History:   Diagnosis Date    Chronic pain disorder     Low back pain      History reviewed  No pertinent surgical history  Social History     Tobacco Use    Smoking status: Never Smoker    Smokeless tobacco: Never Used   Vaping Use    Vaping Use: Never used   Substance Use Topics    Alcohol use: No    Drug use: No     Immunization History   Administered Date(s) Administered    COVID-19 PFIZER VACCINE 0 3 ML IM 02/25/2021, 03/18/2021, 10/08/2021     Last Tetanus: 7/11/2018    Family History: Non-contributory    1  Before the illness or injury that brought you to the Emergency, did you need someone to help you on a regular basis? 1=Yes   2  Since the illness or injury that brought you to the Emergency, have you needed more help than usual to take care of yourself? 1=Yes   3  Have you been hospitalized for one or more nights during the past 6 months (excluding a stay in the Emergency Department)? 0=No   4  In general, do you see well? 1=No   5  In general, do you have serious problems with your memory? 1=Yes   6  Do you take more than three different medications everyday? 1=Yes   TOTAL   5     Did you order a geriatric consult if the score was 2 or greater?: yes     Meds/Allergies   all current active meds have been reviewed  Allergies have not been reviewed;     Allergies   Allergen Reactions    Celecoxib Other (See Comments)       Objective   Initial Vitals: Temperature: 97 8 °F (36 6 °C) (08/22/22 1945)  Pulse: (!) 111 (08/22/22 1945)  Respirations: 16 (08/22/22 1945)  Blood Pressure: 170/77 (08/22/22 1945)    Primary Survey:   Airway:        Status: patent;        Pre-hospital Interventions: none        Hospital Interventions: none  Breathing:        Pre-hospital Interventions: none       Effort: normal       Right breath sounds: normal       Left breath sounds: normal  Circulation:        Rhythm: regular       Rate: regular   Right Pulses Left Pulses    R radial: 2+    R pedal: 2+     L radial: 2+    L pedal: 2+       Disability:        GCS: Eye: 4; Verbal: 5 Motor: 6 Total: 15       Right Pupil: 3 mm;  round;  reactive         Left Pupil:  3 mm;  round;  reactive      R Motor Strength L Motor Strength    R : 4/5  R dorsiflex: 4/5  R plantarflex: 4/5 L : 4/5  L dorsiflex: 4/5  L plantarflex: 4/5        Sensory:  No sensory deficit  Exposure:       Completed: Yes      Secondary Survey:  Physical Exam  Vitals and nursing note reviewed  Constitutional:       General: She is awake  Appearance: Normal appearance  She is normal weight  Comments: /77 (BP Location: Right arm)   Pulse (!) 111   Temp 97 8 °F (36 6 °C) (Oral)   Resp 16   Wt 70 kg (154 lb 5 2 oz)   SpO2 97%   BMI 24 91 kg/m²      HENT:      Head: Normocephalic and atraumatic  Jaw: There is normal jaw occlusion  Right Ear: Hearing, tympanic membrane, ear canal and external ear normal  No hemotympanum  Left Ear: Hearing, tympanic membrane, ear canal and external ear normal  No hemotympanum  Nose: Nose normal       Right Sinus: No maxillary sinus tenderness or frontal sinus tenderness  Left Sinus: No maxillary sinus tenderness or frontal sinus tenderness  Mouth/Throat:      Lips: Pink  Mouth: Mucous membranes are moist       Pharynx: Oropharynx is clear  Eyes:      General: Lids are normal  Vision grossly intact        Extraocular Movements: Extraocular movements intact  Conjunctiva/sclera: Conjunctivae normal       Pupils: Pupils are equal, round, and reactive to light  Neck:      Thyroid: No thyroid mass  Trachea: Trachea and phonation normal    Cardiovascular:      Rate and Rhythm: Normal rate  Pulses: Normal pulses  Radial pulses are 2+ on the right side and 2+ on the left side  Dorsalis pedis pulses are 2+ on the right side and 2+ on the left side  Heart sounds: Normal heart sounds  Pulmonary:      Effort: Pulmonary effort is normal       Breath sounds: Normal breath sounds  Abdominal:      General: Abdomen is flat  Bowel sounds are normal       Palpations: Abdomen is soft  Genitourinary:     Rectum: Normal    Musculoskeletal:         General: Normal range of motion  Cervical back: Full passive range of motion without pain, normal range of motion and neck supple  No tenderness  Right hip: Normal       Left hip: Tenderness and bony tenderness present  Right upper leg: Normal       Left upper leg: Normal       Right knee: Normal       Left knee: Normal       Comments: Passive ROM intact  Bilateral upper and lower extremities 4/5   Neurovascularly intact  No grinding or clicking of joints           Lymphadenopathy:      Head:      Right side of head: No submental, submandibular, tonsillar, preauricular, posterior auricular or occipital adenopathy  Left side of head: No submental, submandibular, tonsillar, preauricular, posterior auricular or occipital adenopathy  Cervical: No cervical adenopathy  Right cervical: No superficial, deep or posterior cervical adenopathy  Left cervical: No superficial, deep or posterior cervical adenopathy  Skin:     General: Skin is warm  Capillary Refill: Capillary refill takes less than 2 seconds  Neurological:      General: No focal deficit present  Mental Status: She is alert and oriented to person, place, and time   Mental status is at baseline  GCS: GCS eye subscore is 4  GCS verbal subscore is 5  GCS motor subscore is 6  Cranial Nerves: Cranial nerves are intact  Sensory: Sensation is intact  Motor: Motor function is intact  Deep Tendon Reflexes: Reflexes are normal and symmetric  Reflex Scores:       Patellar reflexes are 2+ on the right side and 2+ on the left side  Comments: Pt reports hx of left lower extremity weakness s/p hx of stroke   Psychiatric:         Attention and Perception: Attention normal          Mood and Affect: Mood and affect normal          Speech: Speech normal          Behavior: Behavior normal  Behavior is cooperative  Invasive Devices  Report    Peripheral Intravenous Line  Duration           Peripheral IV 08/22/22 Dorsal (posterior); Left Hand <1 day    Peripheral IV 08/22/22 Right;Ventral (anterior) Forearm <1 day              Lab Results:   BMP/CMP:   Lab Results   Component Value Date    SODIUM 136 08/22/2022    K 4 2 08/22/2022     08/22/2022    CO2 26 08/22/2022    BUN 14 08/22/2022    CREATININE 0 56 (L) 08/22/2022    CALCIUM 8 9 08/22/2022    EGFR 81 08/22/2022   , CBC:   Lab Results   Component Value Date    WBC 6 43 08/22/2022    HGB 11 7 08/22/2022    HCT 37 3 08/22/2022    MCV 93 08/22/2022     08/22/2022    MCH 29 2 08/22/2022    MCHC 31 4 08/22/2022    RDW 14 6 08/22/2022    MPV 9 6 08/22/2022    NRBC 0 08/22/2022    and Coagulation:   Lab Results   Component Value Date    INR 1 02 08/22/2022       Imaging Results: I have personally reviewed pertinent reports      Chest Xray(s): pending, negative for acute findings   FAST exam(s): negative for acute findings   CT Scan(s): positive for acute findings: Left ischial tuberosity fracture extending into the inferior ramus   Additional Xray(s): N/A     Other Studies: n/a    Code Status: Level 1 - Full Code  Advance Directive and Living Will:      Power of :    POLST:    I have spent 28 minutes with Patient  today in which greater than 50% of this time was spent in counseling/coordination of care regarding Diagnostic results, Prognosis, Risks and benefits of tx options, Intructions for management, Patient and family education, Importance of tx compliance, Risk factor reductions and Impressions

## 2022-08-23 NOTE — PROCEDURES
POC FAST US    Date/Time: 8/22/2022 11:28 PM  Performed by: Sun Fink PA-C  Authorized by: Sun Fink PA-C     Patient location:  ED  Other Assisting Provider: No    Procedure details:     Exam Type:  Diagnostic    Indications: blunt abdominal trauma and blunt chest trauma      Assess for:  Hemothorax, intra-abdominal fluid and pericardial effusion    Technique: FAST      Views obtained:  Heart - Pericardial sac, RUQ - Brewster's Pouch, LUQ - Splenorenal space and Suprapubic - Pouch of Jose    Image quality: diagnostic      Image availability:  Video obtained  FAST Findings:     RUQ (Hepatorenal) free fluid: absent      LUQ (Splenorenal) free fluid: absent      Suprapubic free fluid: absent      Cardiac wall motion: identified      Pericardial effusion: absent    Interpretation:     Impressions: negative

## 2022-08-23 NOTE — CASE MANAGEMENT
Case Management Progress Note    Patient name Naomie Barreto  Location W /W -32 MRN [id number]  : 1930 Date 2022       LOS (days): 0  Geometric Mean LOS (GMLOS) (days):   Days to [address]:        OBJECTIVE:        Current admission status: Observation  Preferred Pharmacy:   [facility] [address] [facility] [address]  [address] [facility] [address]  Phone: [phone] Fax: [phone]    Primary Care Provider: Evaristo Bradford MD    Primary Insurance: MEDICARE  Secondary Insurance: AARP    PROGRESS NOTE:  CM called Rishi Camilos ([phone]) and ([phone]), to discuss potential patient return to facility after PT/OT recommendation of rehab vs return to [facility]  Voicemail left with Rishi Estrada with call back number

## 2022-08-23 NOTE — PLAN OF CARE
Problem: Potential for Falls  Goal: Patient will remain free of falls  Description: INTERVENTIONS:  - Educate patient/family on patient safety including physical limitations  - Instruct patient to call for assistance with activity   - Consult OT/PT to assist with strengthening/mobility   - Keep Call bell within reach  - Keep bed low and locked with side rails adjusted as appropriate  - Keep care items and personal belongings within reach  - Initiate and maintain comfort rounds  - Make Fall Risk Sign visible to staff  - Offer Toileting every  Hours, in advance of need  - Initiate/Maintain alarm  - Obtain necessary fall risk management equipment:   - Apply yellow socks and bracelet for high fall risk patients  - Consider moving patient to room near nurses station  Outcome: Progressing     Problem: MOBILITY - ADULT  Goal: Maintain or return to baseline ADL function  Description: INTERVENTIONS:  -  Assess patient's ability to carry out ADLs; assess patient's baseline for ADL function and identify physical deficits which impact ability to perform ADLs (bathing, care of mouth/teeth, toileting, grooming, dressing, etc )  - Assess/evaluate cause of self-care deficits   - Assess range of motion  - Assess patient's mobility; develop plan if impaired  - Assess patient's need for assistive devices and provide as appropriate  - Encourage maximum independence but intervene and supervise when necessary  - Involve family in performance of ADLs  - Assess for home care needs following discharge   - Consider OT consult to assist with ADL evaluation and planning for discharge  - Provide patient education as appropriate  Outcome: Progressing  Goal: Maintains/Returns to pre admission functional level  Description: INTERVENTIONS:  - Perform BMAT or MOVE assessment daily    - Set and communicate daily mobility goal to care team and patient/family/caregiver     - Collaborate with rehabilitation services on mobility goals if consulted  - Perform Range of Motion  times a day  - Reposition patient every  hours    - Dangle patient  times a day  - Stand patient  times a day  - Ambulate patient  times a day  - Out of bed to chair  times a day   - Out of bed for meals  times a day  - Out of bed for toileting  - Record patient progress and toleration of activity level   Outcome: Progressing     Problem: Prexisting or High Potential for Compromised Skin Integrity  Goal: Skin integrity is maintained or improved  Description: INTERVENTIONS:  - Identify patients at risk for skin breakdown  - Assess and monitor skin integrity  - Assess and monitor nutrition and hydration status  - Monitor labs   - Assess for incontinence   - Turn and reposition patient  - Assist with mobility/ambulation  - Relieve pressure over bony prominences  - Avoid friction and shearing  - Provide appropriate hygiene as needed including keeping skin clean and dry  - Evaluate need for skin moisturizer/barrier cream  - Collaborate with interdisciplinary team   - Patient/family teaching  - Consider wound care consult   Outcome: Progressing     Problem: PAIN - ADULT  Goal: Verbalizes/displays adequate comfort level or baseline comfort level  Description: Interventions:  - Encourage patient to monitor pain and request assistance  - Assess pain using appropriate pain scale  - Administer analgesics based on type and severity of pain and evaluate response  - Implement non-pharmacological measures as appropriate and evaluate response  - Consider cultural and social influences on pain and pain management  - Notify physician/advanced practitioner if interventions unsuccessful or patient reports new pain  Outcome: Progressing     Problem: INFECTION - ADULT  Goal: Absence or prevention of progression during hospitalization  Description: INTERVENTIONS:  - Assess and monitor for signs and symptoms of infection  - Monitor lab/diagnostic results  - Monitor all insertion sites, i e  indwelling lines, tubes, and drains  - Monitor endotracheal if appropriate and nasal secretions for changes in amount and color  - [place] appropriate cooling/warming therapies per order  - Administer medications as ordered  - Instruct and encourage patient and family to use good hand hygiene technique  - Identify and instruct in appropriate isolation precautions for identified infection/condition  Outcome: Progressing  Goal: Absence of fever/infection during neutropenic period  Description: INTERVENTIONS:  - Monitor WBC    Outcome: Progressing     Problem: DISCHARGE PLANNING  Goal: Discharge to home or other facility with appropriate resources  Description: INTERVENTIONS:  - Identify barriers to discharge w/patient and caregiver  - Arrange for needed discharge resources and transportation as appropriate  - Identify discharge learning needs (meds, wound care, etc )  - Arrange for interpretive services to assist at discharge as needed  - Refer to Case Management Department for coordinating discharge planning if the patient needs post-hospital services based on physician/advanced practitioner order or complex needs related to functional status, cognitive ability, or social support system  Outcome: Progressing     Problem: Knowledge Deficit  Goal: Patient/family/caregiver demonstrates understanding of disease process, treatment plan, medications, and discharge instructions  Description: Complete learning assessment and assess knowledge base    Interventions:  - Provide teaching at level of understanding  - Provide teaching via preferred learning methods  Outcome: Progressing

## 2022-08-23 NOTE — PLAN OF CARE
Problem: Potential for Falls  Goal: Patient will remain free of falls  Description: INTERVENTIONS:  - Educate patient/family on patient safety including physical limitations  - Instruct patient to call for assistance with activity   - Consult OT/PT to assist with strengthening/mobility   - Keep Call bell within reach  - Keep bed low and locked with side rails adjusted as appropriate  - Keep care items and personal belongings within reach  - Initiate and maintain comfort rounds  - Make Fall Risk Sign visible to staff  - Apply yellow socks and bracelet for high fall risk patients  - Consider moving patient to room near nurses station  Outcome: Progressing     Problem: MOBILITY - ADULT  Goal: Maintain or return to baseline ADL function  Description: INTERVENTIONS:  -  Assess patient's ability to carry out ADLs; assess patient's baseline for ADL function and identify physical deficits which impact ability to perform ADLs (bathing, care of mouth/teeth, toileting, grooming, dressing, etc )  - Assess/evaluate cause of self-care deficits   - Assess range of motion  - Assess patient's mobility; develop plan if impaired  - Assess patient's need for assistive devices and provide as appropriate  - Encourage maximum independence but intervene and supervise when necessary  - Involve family in performance of ADLs  - Assess for home care needs following discharge   - Consider OT consult to assist with ADL evaluation and planning for discharge  - Provide patient education as appropriate  Outcome: Progressing  Goal: Maintains/Returns to pre admission functional level  Description: INTERVENTIONS:  - Perform BMAT or MOVE assessment daily    - Set and communicate daily mobility goal to care team and patient/family/caregiver     - Collaborate with rehabilitation services on mobility goals if consulted  - Out of bed for toileting  - Record patient progress and toleration of activity level   Outcome: Progressing     Problem: Prexisting or High Potential for Compromised Skin Integrity  Goal: Skin integrity is maintained or improved  Description: INTERVENTIONS:  - Identify patients at risk for skin breakdown  - Assess and monitor skin integrity  - Assess and monitor nutrition and hydration status  - Monitor labs   - Assess for incontinence   - Turn and reposition patient  - Assist with mobility/ambulation  - Relieve pressure over bony prominences  - Avoid friction and shearing  - Provide appropriate hygiene as needed including keeping skin clean and dry  - Evaluate need for skin moisturizer/barrier cream  - Collaborate with interdisciplinary team   - Patient/family teaching  - Consider wound care consult   Outcome: Progressing     Problem: PAIN - ADULT  Goal: Verbalizes/displays adequate comfort level or baseline comfort level  Description: Interventions:  - Encourage patient to monitor pain and request assistance  - Assess pain using appropriate pain scale  - Administer analgesics based on type and severity of pain and evaluate response  - Implement non-pharmacological measures as appropriate and evaluate response  - Consider cultural and social influences on pain and pain management  - Notify physician/advanced practitioner if interventions unsuccessful or patient reports new pain  Outcome: Progressing     Problem: INFECTION - ADULT  Goal: Absence or prevention of progression during hospitalization  Description: INTERVENTIONS:  - Assess and monitor for signs and symptoms of infection  - Monitor lab/diagnostic results  - Monitor all insertion sites, i e  indwelling lines, tubes, and drains  - Monitor endotracheal if appropriate and nasal secretions for changes in amount and color  - [place] appropriate cooling/warming therapies per order  - Administer medications as ordered  - Instruct and encourage patient and family to use good hand hygiene technique  - Identify and instruct in appropriate isolation precautions for identified infection/condition  Outcome: Progressing  Goal: Absence of fever/infection during neutropenic period  Description: INTERVENTIONS:  - Monitor WBC    Outcome: Progressing     Problem: DISCHARGE PLANNING  Goal: Discharge to home or other facility with appropriate resources  Description: INTERVENTIONS:  - Identify barriers to discharge w/patient and caregiver  - Arrange for needed discharge resources and transportation as appropriate  - Identify discharge learning needs (meds, wound care, etc )  - Arrange for interpretive services to assist at discharge as needed  - Refer to Case Management Department for coordinating discharge planning if the patient needs post-hospital services based on physician/advanced practitioner order or complex needs related to functional status, cognitive ability, or social support system  Outcome: Progressing     Problem: Knowledge Deficit  Goal: Patient/family/caregiver demonstrates understanding of disease process, treatment plan, medications, and discharge instructions  Description: Complete learning assessment and assess knowledge base    Interventions:  - Provide teaching at level of understanding  - Provide teaching via preferred learning methods  Outcome: Progressing

## 2022-08-23 NOTE — CASE MANAGEMENT
Case Management Assessment & Discharge Planning Note    Patient name [facility]  Location W /W -70 MRN [id number]  : 1930 Date 2022       Current Admission Date: 2022  Current Admission Diagnosis:Fracture of left ischial tuberosity [facility])   Patient Active Problem List    Diagnosis Date Noted    Fracture of left ischial tuberosity ([facility] [address] [facility] ) 2022    Acute metabolic encephalopathy     Orthostatic hypotension 2022    COVID-19 2022    Hyponatremia 2022    Unilateral inguinal hernia without obstruction or gangrene 2022    Bilateral leg edema 2022    Primary osteoarthritis of right hip     Sacroiliitis ([address] )     Lumbar compression fracture ([address] ) 2018    Thoracic compression fracture ([address] ) 2018    Hypertension, essential 2018    Mixed hyperlipidemia 2016    History of breast cancer 2016    Aortic regurgitation 2015    Bilateral carotid artery disease ([address] ) 2015    Osteoporosis 10/03/2012      LOS (days): 0  Geometric Mean LOS (GMLOS) (days):   Days to GMLOS:     OBJECTIVE:  PATIENT READMITTED [address]            Current admission status: Inpatient       Preferred Pharmacy:   [facility] [address] JLUIS REBOLLEDO [address] [facility], [address] [place] [address] Road  [address] [place] [address]  Phone: [phone] Fax: [phone]    Primary Care Provider: Cathie Olivares MD    Primary Insurance: MEDICARE  Secondary Insurance: AARP    ASSESSMENT:  Active Health Care Proxies    There are no active Health Care Proxies on file  Patient Information  Admitted from[de-identified] Facility  Mental Status: Other (Comment) (very hard of hearing; agreed for family to be contacted)  Support Systems: Family members  [place] of Residence: [address] [facility] do you live in?: [address] entry access options   Select all that apply : No steps to enter home  Type of Current Residence: Facility [facility])  Upon entering residence, is there a bedroom on the main floor (no further steps)?: Yes  Upon entering residence, is there a bathroom on the main floor (no further steps)?: Yes  In the last 12 months, was there a time when you were not able to pay the mortgage or rent on time?: No  In the last 12 months, how many places have you lived?: 1  In the last 12 months, was there a time when you did not have a steady place to sleep or slept in a shelter (including now)?: No  Living Arrangements: Other (Comment) ([facility])    Activities of Daily Living Prior to Admission  Functional Status: Independent (with minimal assistance)  Completes ADLs independently?: No  Level of ADL dependence: Assistance  Ambulates independently?: Yes  Does patient use assisted devices?: Yes  Assisted Devices (DME) used: Manda Bearded  Does patient currently own DME?: Yes  What DME does the patient currently own?: Manda Bearded  Does patient have a history of Outpatient Therapy (PT/OT)?: No  Does patient currently have [facility]?: No         Patient Information Continued  Does patient have prescription coverage?: Yes  Within the past 12 months, you worried that your food would run out before you got the money to buy more : Never true  Within the past 12 months, the food you bought just didn't last and you didn't have money to get more : Never true  Food insecurity resource given?: N/A  Does patient receive dialysis treatments?: No  Does patient have a history of substance abuse?: No  Does patient have a history of Mental Health Diagnosis?: No         Means of Transportation  [facility]] Family transport  In the past 12 months, has lack of transportation kept you from medical appointments or from getting medications?: No  In the past 12 months, has lack of transportation kept you from meetings, work, or from getting things needed for daily living?: No  Was application for public transport provided?: N/A        DISCHARGE DETAILS:    Discharge planning discussed with[de-identified] Joaquin Kennedy at [address] [place] [address] Drive of Choice: Yes (re: STR)                   Contacts  Patient Contacts: son, Juan Arriaza  Relationship to Patient[de-identified] Family  Contact Method: Phone  Reason/Outcome: Continuity of Care, Emergency Contact, Discharge Planning              Other Referral/Resources/Interventions Provided:  Interventions: SNF, Short Term Rehab  Referral Comments: Patient admitted under observation due to fracture of left ischial tuberosity  PT/OT eval recommending return to [facility], pending facilities ability to accommodate  Trauma PA reports patient is medically ready for discharge  [facility] anticipated d/c for today and facility's ability to return  Spoke with Joaquin Kennedy, , and reviewed hospitalization and PT/OT evaluations  Jostin relays preference for rehab prior to patient's return  States that prior to hospitalization, patient only required minimal assistance with care  Patient was ambulatory with a walker  Able to dress self aside from shoes/socks (min assist) and required min assist with showering (lower extremities)  Patient is continent of bowel/bladder and did not need assistance with toileting  Will follow-up with family re: rehab recommendation  Trauma team updated via TT that [facility] requesting rehab prior to return      Would you like to participate in our [address] service program?  : No - Declined    Treatment Team Recommendation: Short Term Rehab, SNF

## 2022-08-23 NOTE — ASSESSMENT & PLAN NOTE
· Non-operative management  · WBAT  · Ortho consulted and following  · Geriatrics consulted - recs appreciated  · Pain conrol  · DVT prophylaxis  · PT/OT: rehab  · Follow up with Ortho in 6 weeks

## 2022-08-23 NOTE — CASE MANAGEMENT
Case Management Progress Note    Patient name Cornel Swanson  Location W /W -87 MRN [id number]  : 1930 Date 2022       LOS (days): 0  Geometric Mean LOS (GMLOS) (days):   Days to GMLOS:        OBJECTIVE:        Current admission status: Inpatient  Preferred Pharmacy:   [address] [facility] [address], [address]  [address]  [place] [address] [address]  Phone: [phone] Fax: [phone]    Primary Care Provider: Alek Mccoy MD    Primary Insurance: MEDICARE  Secondary Insurance: AARP    PROGRESS NOTE:  CM called patients son Inocente's phone at [phone], daughter-in-law Mimi Lew picked up and notified CM that son was unavailable to speak on the phone for the next few days  Nellie to be point of contact for the time being  CM notified daughter-in-law that due to patients current needs [facility] can not take her back at this time, and since they can't take her back the recommendation is rehab  Daughter-in-law agreeable to sending referrals to SNF's- preferably [facility] as the patient had been there in the past  Daughter-in-law requested a referral not be sent to [facility]  Referrals sent in [facility]  Observation notice reviewed over phone with daughter-in-law, denied emailed copy  CM provided daughter-in-law with call back number, and told her they will follow up with updates

## 2022-08-23 NOTE — OCCUPATIONAL THERAPY NOTE
Occupational Therapy Evaluation       Se    8/23/2022    Patient Active Problem List   Diagnosis    Aortic regurgitation    Bilateral [facility] artery disease ([facility] [address] [facility] )    History of breast cancer    Hypertension, essential    Lumbar compression fracture ([facility] [address] [facility] )    Mixed hyperlipidemia    Osteoporosis    Thoracic compression fracture (HCC)    Sacroiliitis (HCC)    Primary osteoarthritis of right hip    Acute metabolic encephalopathy    Orthostatic hypotension    COVID-19    Hyponatremia    Unilateral inguinal hernia without obstruction or gangrene    Bilateral leg edema       Past Medical History:   Diagnosis Date    Chronic pain disorder     Low back pain        History reviewed  No pertinent surgical history  08/23/22 1126   OT Last Visit   OT Visit Date 08/23/22   Note Type   Note type Evaluation   Additional Comments Pt benefits from use of pocket talker due to severe [place]  Restrictions/Precautions   Weight Bearing Precautions Per Order Yes   RLE Weight Bearing Per Order WBAT   LLE Weight Bearing Per Order WBAT   Other Precautions Cognitive; Bed Alarm; Chair Alarm;Multiple lines; Fall Risk;Pain;Hard of hearing   Pain Assessment   Pain Assessment Tool 0-10   Pain Score No Pain   Home Living   Type of Home Assisted living  [facility])   Home Layout One level;Performs ADLs on one level; Able to live on main level with bedroom/bathroom   [address]   Prior Function   Level of [place] Independent with ADLs and functional mobility   Lives With Facility staff   Receives Help From Personal care attendant   ADL Assistance Independent   IADLs Needs assistance  (- )   Falls in the last 6 months 1 per admission   Vocational Retired  (seamstress)   Comments Pt (I) with ADLs PTA  RW for mobility baseline  Facility provides meals and pt walks to dining narayanan     Lifestyle   Autonomy Pt (I) with ADLs PTA living at [facility] Relationships Supportive facility staff   Service to Others Retired   Deirdre Renee Enjoys cooking, walking   Subjective   Subjective "I come from [place]" "You are nice looking ladies"   ADL   Eating Assistance 5  Supervision/Setup   Eating Deficit Setup; Beverage management   UB Dressing Assistance 4  Minimal Assistance   UB Dressing Deficit Setup;Verbal cueing; Increased time to complete;Supervision/safety; Thread RUE; Thread LUE;Pull around back  (VC for orientation)   LB Dressing Assistance 1  Total Assistance   LB Dressing Deficit Don/doff R sock; Don/doff L sock  (sitting EOB)   Toileting Assistance  4  Minimal Assistance   Toileting Deficit Perineal hygiene  (pt noted to be incontinent of bladder upon arrival; vishal care in stance with min A for balance)   Bed Mobility   Supine to Sit 4  Minimal assistance   Additional items Assist x 1;HOB elevated; Increased time required;Verbal cues   Sit to Supine Unable to assess   Additional Comments Pt OOB to recliner at end of session   Transfers   Sit to Stand 4  Minimal assistance   Additional items Assist x 1; Increased time required;Verbal cues  (initially min A x 1, improving to supervision upon further trials)   Stand to Sit 4  Minimal assistance   Additional items Assist x 1; Increased time required;Verbal cues  (initially min A x 1, improving to supervision upon further trials)   Additional Comments x 2 sit<>stand transfers performed this session  Initially pt required min A x 1 and VC for safe hand placement  Pt inproving to supervsion from recliner  Functional Mobility   Functional Mobility 4  Minimal assistance   Additional Comments Few steps EOB to recliner with min A x 1 and RW  Pt declined further mobility trials at this time due to pain and fatigue     Additional items Rolling walker   Balance   Static Sitting Fair +   Dynamic Sitting Fair   Static Standing Fair -   Dynamic Standing Poor +   Activity Tolerance   Activity Tolerance Patient limited by fatigue;Patient limited by pain   Medical Staff Rashel coordinated with PT Marisa   Nurse Made Aware yes, RN Declan Horan ok to see pt   RUE Assessment   RUE Assessment WFL   LUE Assessment   LUE Assessment WFL   Hand Function   Gross Motor Coordination Functional   Fine Motor Coordination Functional   Sensation   Light Touch No apparent deficits   Vision-Basic Assessment   Current Vision Wears glasses all the time   Cognition   Overall Cognitive Status Impaired   Arousal/Participation Alert; Cooperative   Attention Attends with cues to redirect   Orientation Level Oriented to person;Oriented to place; Disoriented to time;Oriented to situation   Memory Decreased recall of precautions;Decreased recall of recent events   Following Commands Follows one step commands with increased time or repetition   Comments Pt pleasant and agreeable to OT session  Pt extremely [place] and requried frequent repetition of instruction  Limited insight into deficits  Assessment   Limitation Decreased ADL status; Decreased Safe judgement during ADL;Decreased cognition;Decreased endurance;Decreased self-care trans;Decreased high-level ADLs   Prognosis Good   Assessment Patient is a 80 y o  female seen for OT evaluation s/p admit to [address] [place] [address] on 8/22/2022 w/ L ischial tuberosity fx  Comorbidities affecting patient's functional performance at time of assessment include: [place], ambulatory dysfunction, and falls  Orders received for OT evaluation and treatment  Patient identified during session through name and wristband  PTA, patient was independent with functional mobility with RW, independent with ADLS, requiring assist for IADLS, an assisted living resident and retired  Personal factors affecting patient at time of initial evaluation include: limited insight into deficits, decreased initiation and engagement, difficulty performing ADLs and difficulty performing IADLs    Patient is alert, oriented to name,, oriented to place,, oriented to situation, and disoriented to time, and presents with ability to recognize a problem, define a problem, identify alternative plan, select a plan, organize steps in a plan, implement plan and evaluate outcome (problem solving)  The evaluation identifies the following performance deficits: weakness, impaired balance, decreased endurance, increased fall risk, new onset of impairment of functional mobility, decreased ADLS, decreased IADLS, pain, decreased activity tolerance, decreased safety awareness, ortheopedic restrictions and decreased cognition, that result in activity limitations  Based on the OT evaluation outcomes, functional performance deficits, and assessment findings, pt has been identified as high complexity, because the patient presents with comorbidites that affect occupational performance and required significant modification of tasks or assistance with consideration of multiple treatment options  The patient's raw score on the AM-PAC Daily Activity inpatient short form is 18, standardized score is 38 66, less than 39 4  Patient to benefit from continued Occupational Therapy treatment to address above deficits and maximize level of independence with ADLs and functional mobility  Occupational Performance areas to address include: bathing/ shower, dressing, toilet hygiene, transfer to all surfaces and functional ambulation  From OT standpoint, recommendation at time of d/c would be Post acute rehabilitation services vs return to [facility] pending level of support/assistance they can provide  Goals   Patient Goals to get better   LTG Time Frame 10-14   Long Term Goal #1 see goals below   Plan   Treatment Interventions ADL retraining;Functional transfer training; Endurance training;Cognitive reorientation;Patient/family training;Equipment evaluation/education; Compensatory technique education;Continued evaluation; Energy conservation; Activityengagement   Goal Expiration Date 09/02/22   OT Treatment Day 0   OT Frequency 3-5x/wk   Recommendation   OT Discharge Recommendation Post acute rehabilitation services  (vs return to [facility] pending level of support/assist they can provide)   Additional Comments  Pt seen as a co-eval with PT due to the patient's co-morbidities, clinically unstable presentation, and present impairments which are a regression from the patient's baseline  Additional Comments 2 At end of session, pt OOB to recliner with all needs met and call bell within reach  AM-PAC Daily Activity Inpatient   Lower Body Dressing 2   Bathing 2   Toileting 3   Upper Body Dressing 3   Grooming 4   Eating 4   Daily Activity Raw Score 18   Daily Activity Standardized Score (Calc for Raw Score >=11) 38 66   AM-PAC Applied Cognition Inpatient   Following a Speech/Presentation 2   Understanding Ordinary Conversation 4   Taking Medications 2   Remembering Where Things Are Placed or Put Away 3   Remembering List of 4-5 Errands 2   Taking Care of Complicated Tasks 2   Applied Cognition Raw Score 15   Applied Cognition Standardized Score 33 54     GOALS:    *Goals established to promote patient goal of to get stronger:      *ADL transfers with (S) for inc'd independence with ADLs/purposeful tasks    *LB ADL with (S) using AE prn for inc'd independence with self cares    *Toileting with (S) for clothing management and hygiene for return to PLOF with personal care    *Increase stand tolerance x 3  m for inc'd tolerance with standing purposeful tasks    *Bed mobility- (S) for inc'd independence to manage own comfort and initiate EOB & OOB purposeful tasks    *Patient will verbalize 3 safety awareness/ principles to prevent falls in the home setting  *Patient will increase OOB/sitting tolerance to 2-4 hours per day to increase participation in self-care and leisure tasks with no s/s of exertion       *Patient will engage in ongoing cognitive assessment to assist with safe discharge planning/recommendations  *Patient will increase functional mobility to and from bathroom with rolling walker with supervision to increase performance with ADLS and to use a toilet  *Patient will improve functional activity tolerance to 10 minutes of sustained functional tasks to increase participation in basic self-care and decrease assistance level       Palma Hernandez OTR/L

## 2022-08-23 NOTE — DISCHARGE INSTRUCTIONS
On your trauma workup CT scan of the cervical spine you had the following incidental finding:  Mediastinal lesion, likely represents a pedunculated thyroid nodule, this can be confirmed with thyroid ultrasound non urgently as an outpatient  Please follow-up with your primary care provider to review this finding and to arrange non urgent thyroid ultrasound for further evaluation  Discharge Instructions - Orthopedics      Weight Bearing Status:                                           - Weightbearing as tolerated on the bilateral lower extremities  DVT prophylaxis:  - Continue Lovenox for a total of 28 days  Pain:  - Continue analgesics as directed  Appt Instructions:   - If you do not have your appointment, please call the Orthopedic Surgery Clinic at [phone] to schedule an appointment as instructed  - Otherwise, followup as scheduled  - Contact the office sooner if you experience any increased numbness/tingling in the extremities  Miscellaneous:  - Activity as tolerated with assistance and per rehab recommendations  - Continue PT and OT evaluation and treatment as indicated

## 2022-08-23 NOTE — ED CARE HANDOFF
Emergency Department Sign Out Note        Sign out and transfer of care from Dr Lamni Shultz at 2100  See Separate Emergency Department note  The patient, Michell White, was evaluated by the previous provider for fall  Workup Completed:  Labs unremarkable  ED Course / Workup Pending (followup):  CT head/pelvis/c-spine/l-spine pending  Plan to follow-up on CT scan results, disposition accordingly  CT with left ischial tuberosity fracture extending into the inferior ramus  Discussed with Trauma, plan admit to their service          CT cervical spine without contrast   Final Result by Javan Knott MD (08/22 2139)      1  No cervical spine fracture or traumatic malalignment   2  Mediastinal lesion, likely represents a pedunculated thyroid nodule, this can be confirmed with thyroid ultrasound  Workstation performed: [id number]         CT lumbar spine without contrast   Final Result by Javan Knott MD (08/22 2144)      1  No acute fracture   2  Stable compression deformities         Workstation performed: [id number]         CT head without contrast   Final Result by Javan Knott MD (08/22 2136)      No acute intracranial abnormality  Workstation performed: [id number]         CT pelvis wo contrast   Final Result by Javan Knott MD (08/22 2151)      1  Left ischial tuberosity fracture extending into the inferior ramus   2  Additional chronic findings as above      The study was marked in [facility] for immediate notification        Workstation performed: [id number]         XR hips bilateral 2 vw w pelvis if performed    (Results Pending)                                    Procedures  MDM        Disposition  Final diagnoses:   None     ED Disposition     None      Follow-up Information     Follow up With Specialties Details Why Contact Info Additional 5670 Fidencio Mcgill MD Internal Medicine Schedule an appointment as soon as possible for a visit in 1 week Arrange outpatient thyroid ultrasound per pain CT recommendations  [address] [place] [address]  [address] [facility] [address]  [address] [facility] Emergency Department Emergency Medicine Go to  As needed [address] [facility] [address] [facility] Emergency Department, [address], [place], [place], [address]        Patient's Medications   Discharge Prescriptions    No medications on file     No discharge procedures on file         ED Provider  Electronically Signed by     Kathe Garcia MD  08/22/22 6372

## 2022-08-23 NOTE — PROGRESS NOTES
[facility]  Progress Note Angelica Gutierrez 12/11/1930, 80 y o  female MRN: [id number]  Unit/Bed#:  W -01 Encounter: [id number]  Primary Care Provider: Evaristo Bradford MD   Date and time admitted to hospital: 8/22/2022  7:35 PM    Ambulatory dysfunction  Assessment & Plan  · History of ambulatory dysfunction, unwitnessed fall at [facility] with injuries as listed  · PT and OT evaluation and treatment, recommending discharge to rehab  · Case management for disposition planning to higher level of care than the independent living facility that patient was living in prior    * Fracture of left ischial tuberosity ([facility] [address] )  Assessment & Plan  · Non-operative management  · WBAT  · Ortho consulted and following  · Geriatrics consulted - recs appreciated  · Pain conrol  · DVT prophylaxis  · PT/OT: rehab  · Follow up with Ortho in 6 weeks      TERTIARY TRAUMA SURVEY NOTE    Prophylaxis: Sequential compression device (Venodyne)  and Enoxaparin (Lovenox)    Disposition: rehab    Code status:  Level 1 - Full Code    Consultants: Ortho and Geriatrics      SUBJECTIVE:     Transfer from: site of injury  Mechanism of Injury:Fall    Chief Complaint: left hip pain    HPI/Last 24 hour events: admitted to trauma, Ortho consulted as well as Geriatrics    Active medications:           Current Facility-Administered Medications:     acetaminophen (TYLENOL) tablet 975 mg, 975 mg, Oral, Q8H [address], 975 mg at 08/23/22 2102    atorvastatin (LIPITOR) tablet 10 mg, 10 mg, Oral, Daily With Dinner, 10 mg at 08/23/22 1703    calcium carbonate-vitamin D (OSCAL-D) 500 mg-200 units per tablet 1 tablet, 1 tablet, Oral, BID With Meals, 1 tablet at 08/23/22 1703    cholecalciferol (VITAMIN D3) tablet 1,000 Units, 1,000 Units, Oral, Daily, 1,000 Units at 08/23/22 0829    cyanocobalamin (VITAMIN B-12) tablet 1,000 mcg, 1,000 mcg, Oral, Daily, 1,000 mcg at 08/23/22 8426    docusate sodium (COLACE) capsule 100 mg, 100 mg, Oral, Daily PRN    enoxaparin (LOVENOX) subcutaneous injection 30 mg, 30 mg, Subcutaneous, Q12H MAGDA, 30 mg at 08/23/22 2102    guaiFENesin (MUCINEX) 12 hr tablet 1,200 mg, 1,200 mg, Oral, Q12H MAGDA, 1,200 mg at 08/23/22 2102    HYDROmorphone HCl (DILAUDID) injection 0 2 mg, 0 2 mg, Intravenous, Q4H PRN    lidocaine (LIDODERM) 5 % patch 1 patch, 1 patch, Topical, Daily, 1 patch at 08/23/22 0829    naloxone (NARCAN) 0 04 mg/mL syringe 0 04 mg, 0 04 mg, Intravenous, Q1MIN PRN    ondansetron (ZOFRAN) injection 4 mg, 4 mg, Intravenous, Q4H PRN    oxyCODONE (ROXICODONE) IR tablet 2 5 mg, 2 5 mg, Oral, Q4H PRN    oxyCODONE (ROXICODONE) IR tablet 5 mg, 5 mg, Oral, Q4H PRN    polyethylene glycol (MIRALAX) packet 17 g, 17 g, Oral, Daily    polyethylene glycol (MIRALAX) packet 17 g, 17 g, Oral, Daily PRN, 17 g at 08/23/22 0829      OBJECTIVE:     Vitals:   Vitals:    08/23/22 2059   BP: 131/79   Pulse: 97   Resp:    Temp: 97 7 °F (36 5 °C)   SpO2: 94%       Physical Exam:   GENERAL APPEARANCE: Patient in no acute distress  HEENT: NCAT; PERRL, EOMs intact; Mucous membranes moist  CV: Regular rate and rhythm; no murmur/gallops/rubs appreciated  CHEST / LUNGS: Clear to auscultation; no wheezes/rales/rhonci  ABD: NABS; soft; non-distended; non-tender  : Voiding  EXT: +2 pulses bilaterally upper & lower extremities; no edema  NEURO: GCS 15; no focal neurologic deficits; neurovascularly intact  SKIN: Warm, dry and well perfused; no rash; no jaundice  1  Before the illness or injury that brought you to the Emergency, did you need someone to help you on a regular basis? 1=Yes   2  Since the illness or injury that brought you to the Emergency, have you needed more help than usual to take care of yourself? 1=Yes   3  Have you been hospitalized for one or more nights during the past 6 months (excluding a stay in the Emergency Department)? 0=No   4  In general, do you see well? 1=No   5   In general, do you have serious problems with your memory? 0=No   6  Do you take more than three different medications everyday? 1=Yes   TOTAL   4     Did you order a geriatric consult if the score was 2 or greater?: yes                I/O:   I/O       08/21 0701 08/22 0700 08/22 0701  08/23 0700 08/23 0701 08/24 0700    P  O   240     Total Intake(mL/kg)  240 (3 4)     Net  +240            Unmeasured Stool Occurrence  0 x           Invasive Devices: Invasive Devices  Report    Peripheral Intravenous Line  Duration           Peripheral IV 08/22/22 Dorsal (posterior); Left Hand 2 days    Peripheral IV 08/22/22 Right;Ventral (anterior) Forearm 1 day                  Imaging:   XR chest portable    Result Date: 8/23/2022  Impression: No acute cardiopulmonary disease  Workstation performed: [id number]     XR hips bilateral 2 vw w pelvis if performed    Result Date: 8/23/2022  Impression: Suspect nondisplaced fracture left inferior pubic ramus  Stable appearance of left hip ORIF without evidence for acute fracture  Workstation performed: [id number]     CT head without contrast    Result Date: 8/22/2022  Impression: No acute intracranial abnormality  Workstation performed: [id number]     CT cervical spine without contrast    Result Date: 8/22/2022  Impression: 1  No cervical spine fracture or traumatic malalignment 2  Mediastinal lesion, likely represents a pedunculated thyroid nodule, this can be confirmed with thyroid ultrasound  Workstation performed: [id number]     CT lumbar spine without contrast    Result Date: 8/22/2022  Impression: 1  No acute fracture 2  Stable compression deformities Workstation performed: [id number]     CT pelvis wo contrast    Result Date: 8/22/2022  Impression: 1  Left ischial tuberosity fracture extending into the inferior ramus 2  Additional chronic findings as above The study was marked in [facility] for immediate notification   Workstation performed: [id number]       Labs: none

## 2022-08-23 NOTE — PLAN OF CARE
Problem: OCCUPATIONAL THERAPY ADULT  Goal: Performs self-care activities at highest level of function for planned discharge setting  See evaluation for individualized goals  Description: Treatment Interventions: ADL retraining, Functional transfer training, Endurance training, Cognitive reorientation, Patient/family training, Equipment evaluation/education, Compensatory technique education, Continued evaluation, Energy conservation, Activityengagement          See flowsheet documentation for full assessment, interventions and recommendations  Note: Limitation: Decreased ADL status, Decreased Safe judgement during ADL, Decreased cognition, Decreased endurance, Decreased self-care trans, Decreased high-level ADLs  Prognosis: Good  Assessment: Patient is a 80 y o  female seen for OT evaluation s/p admit to [address] [place] [address] on 8/22/2022 w/ L ischial tuberosity fx  Comorbidities affecting patient's functional performance at time of assessment include: [place], ambulatory dysfunction, and falls  Orders received for OT evaluation and treatment  Patient identified during session through name and wristband  PTA, patient was independent with functional mobility with RW, independent with ADLS, requiring assist for IADLS, an assisted living resident and retired  Personal factors affecting patient at time of initial evaluation include: limited insight into deficits, decreased initiation and engagement, difficulty performing ADLs and difficulty performing IADLs  Patient is alert, oriented to name,, oriented to place,, oriented to situation, and disoriented to time, and presents with ability to recognize a problem, define a problem, identify alternative plan, select a plan, organize steps in a plan, implement plan and evaluate outcome (problem solving)   The evaluation identifies the following performance deficits: weakness, impaired balance, decreased endurance, increased fall risk, new onset of impairment of functional mobility, decreased ADLS, decreased IADLS, pain, decreased activity tolerance, decreased safety awareness, ortheopedic restrictions and decreased cognition, that result in activity limitations  Based on the OT evaluation outcomes, functional performance deficits, and assessment findings, pt has been identified as high complexity, because the patient presents with comorbidites that affect occupational performance and required significant modification of tasks or assistance with consideration of multiple treatment options  The patient's raw score on the -PAC Daily Activity inpatient short form is 18, standardized score is 38 66, less than 39 4  Patient to benefit from continued Occupational Therapy treatment to address above deficits and maximize level of independence with ADLs and functional mobility  Occupational Performance areas to address include: bathing/ shower, dressing, toilet hygiene, transfer to all surfaces and functional ambulation  From OT standpoint, recommendation at time of d/c would be Post acute rehabilitation services vs return to [facility] [address] pending level of support/assistance they can provide       OT Discharge Recommendation: Post acute rehabilitation services (vs return to [facility] [address] pending level of support/assist they can provide)     Omega Mason, OT

## 2022-08-24 ENCOUNTER — TELEPHONE (OUTPATIENT)
Dept: OTHER | Facility: OTHER | Age: 87
End: 2022-08-24

## 2022-08-24 ENCOUNTER — EPISODE CHANGES (OUTPATIENT)
Dept: CASE MANAGEMENT | Facility: OTHER | Age: 87
End: 2022-08-24

## 2022-08-24 VITALS
BODY MASS INDEX: 30.3 KG/M2 | SYSTOLIC BLOOD PRESSURE: 144 MMHG | RESPIRATION RATE: 17 BRPM | WEIGHT: 154.32 LBS | TEMPERATURE: 97.6 F | HEIGHT: 60 IN | DIASTOLIC BLOOD PRESSURE: 80 MMHG | OXYGEN SATURATION: 95 % | HEART RATE: 97 BPM

## 2022-08-24 PROBLEM — R35.0 URINARY FREQUENCY: Status: ACTIVE | Noted: 2022-08-24

## 2022-08-24 PROBLEM — E07.9 THYROID LESION: Status: ACTIVE | Noted: 2022-08-24

## 2022-08-24 LAB
ANION GAP SERPL CALCULATED.3IONS-SCNC: 5 MMOL/L (ref 4–13)
BASOPHILS # BLD AUTO: 0.03 THOUSANDS/ΜL (ref 0–0.1)
BASOPHILS NFR BLD AUTO: 1 % (ref 0–1)
BILIRUB UR QL STRIP: NEGATIVE
BUN SERPL-MCNC: 11 MG/DL (ref 5–25)
CALCIUM SERPL-MCNC: 8.5 MG/DL (ref 8.4–10.2)
CHLORIDE SERPL-SCNC: 106 MMOL/L (ref 96–108)
CLARITY UR: NORMAL
CO2 SERPL-SCNC: 26 MMOL/L (ref 21–32)
COLOR UR: YELLOW
CREAT SERPL-MCNC: 0.48 MG/DL (ref 0.6–1.3)
EOSINOPHIL # BLD AUTO: 0.54 THOUSAND/ΜL (ref 0–0.61)
EOSINOPHIL NFR BLD AUTO: 8 % (ref 0–6)
ERYTHROCYTE [DISTWIDTH] IN BLOOD BY AUTOMATED COUNT: 14.5 % (ref 11.6–15.1)
GFR SERPL CREATININE-BSD FRML MDRD: 86 ML/MIN/1.73SQ M
GLUCOSE SERPL-MCNC: 94 MG/DL (ref 65–140)
GLUCOSE UR STRIP-MCNC: NEGATIVE MG/DL
HCT VFR BLD AUTO: 36.8 % (ref 34.8–46.1)
HGB BLD-MCNC: 12 G/DL (ref 11.5–15.4)
HGB UR QL STRIP.AUTO: NEGATIVE
IMM GRANULOCYTES # BLD AUTO: 0.04 THOUSAND/UL (ref 0–0.2)
IMM GRANULOCYTES NFR BLD AUTO: 1 % (ref 0–2)
KETONES UR STRIP-MCNC: NEGATIVE MG/DL
LEUKOCYTE ESTERASE UR QL STRIP: NEGATIVE
LYMPHOCYTES # BLD AUTO: 1.05 THOUSANDS/ΜL (ref 0.6–4.47)
LYMPHOCYTES NFR BLD AUTO: 16 % (ref 14–44)
MCH RBC QN AUTO: 29.3 PG (ref 26.8–34.3)
MCHC RBC AUTO-ENTMCNC: 32.6 G/DL (ref 31.4–37.4)
MCV RBC AUTO: 90 FL (ref 82–98)
MONOCYTES # BLD AUTO: 0.9 THOUSAND/ΜL (ref 0.17–1.22)
MONOCYTES NFR BLD AUTO: 14 % (ref 4–12)
NEUTROPHILS # BLD AUTO: 4.05 THOUSANDS/ΜL (ref 1.85–7.62)
NEUTS SEG NFR BLD AUTO: 60 % (ref 43–75)
NITRITE UR QL STRIP: NEGATIVE
NRBC BLD AUTO-RTO: 0 /100 WBCS
PH UR STRIP.AUTO: 8 [PH]
PLATELET # BLD AUTO: 235 THOUSANDS/UL (ref 149–390)
PMV BLD AUTO: 9.6 FL (ref 8.9–12.7)
POTASSIUM SERPL-SCNC: 4.1 MMOL/L (ref 3.5–5.3)
PROT UR STRIP-MCNC: NEGATIVE MG/DL
RBC # BLD AUTO: 4.09 MILLION/UL (ref 3.81–5.12)
SODIUM SERPL-SCNC: 137 MMOL/L (ref 135–147)
SP GR UR STRIP.AUTO: 1.01 (ref 1–1.03)
UROBILINOGEN UR STRIP-ACNC: <2 MG/DL
WBC # BLD AUTO: 6.61 THOUSAND/UL (ref 4.31–10.16)

## 2022-08-24 PROCEDURE — 85025 COMPLETE CBC W/AUTO DIFF WBC: CPT | Performed by: NURSE PRACTITIONER

## 2022-08-24 PROCEDURE — 99238 HOSP IP/OBS DSCHRG MGMT 30/<: CPT | Performed by: SURGERY

## 2022-08-24 PROCEDURE — 80048 BASIC METABOLIC PNL TOTAL CA: CPT | Performed by: NURSE PRACTITIONER

## 2022-08-24 PROCEDURE — NC001 PR NO CHARGE: Performed by: SURGERY

## 2022-08-24 PROCEDURE — 81003 URINALYSIS AUTO W/O SCOPE: CPT | Performed by: SURGERY

## 2022-08-24 RX ORDER — LIDOCAINE 50 MG/G
1 PATCH TOPICAL DAILY
Refills: 0
Start: 2022-08-25

## 2022-08-24 RX ORDER — ACETAMINOPHEN 325 MG/1
650 TABLET ORAL EVERY 4 HOURS PRN
Refills: 0
Start: 2022-08-24 | End: 2022-08-29 | Stop reason: DRUGHIGH

## 2022-08-24 RX ORDER — ENOXAPARIN SODIUM 100 MG/ML
40 INJECTION SUBCUTANEOUS DAILY
Refills: 0
Start: 2022-08-24 | End: 2022-09-21

## 2022-08-24 RX ORDER — DOCUSATE SODIUM 100 MG/1
100 CAPSULE, LIQUID FILLED ORAL DAILY PRN
Refills: 0
Start: 2022-08-24

## 2022-08-24 RX ADMIN — ATORVASTATIN CALCIUM 10 MG: 10 TABLET, FILM COATED ORAL at 16:50

## 2022-08-24 RX ADMIN — ENOXAPARIN SODIUM 30 MG: 30 INJECTION SUBCUTANEOUS at 07:52

## 2022-08-24 RX ADMIN — Medication 1 TABLET: at 16:50

## 2022-08-24 RX ADMIN — POLYETHYLENE GLYCOL 3350 17 G: 17 POWDER, FOR SOLUTION ORAL at 07:53

## 2022-08-24 RX ADMIN — ACETAMINOPHEN 975 MG: 325 TABLET ORAL at 05:44

## 2022-08-24 RX ADMIN — Medication 1 TABLET: at 07:53

## 2022-08-24 RX ADMIN — CYANOCOBALAMIN TAB 500 MCG 1000 MCG: 500 TAB at 07:52

## 2022-08-24 RX ADMIN — LIDOCAINE 5% 1 PATCH: 700 PATCH TOPICAL at 07:56

## 2022-08-24 RX ADMIN — GUAIFENESIN 1200 MG: 600 TABLET ORAL at 07:53

## 2022-08-24 RX ADMIN — Medication 1000 UNITS: at 07:52

## 2022-08-24 RX ADMIN — ACETAMINOPHEN 975 MG: 325 TABLET ORAL at 14:10

## 2022-08-24 NOTE — PLAN OF CARE
Problem: Potential for Falls  Goal: Patient will remain free of falls  Description: INTERVENTIONS:  - Educate patient/family on patient safety including physical limitations  - Instruct patient to call for assistance with activity   - Consult OT/PT to assist with strengthening/mobility   - Keep Call bell within reach  - Keep bed low and locked with side rails adjusted as appropriate  - Keep care items and personal belongings within reach  - Initiate and maintain comfort rounds  - Make Fall Risk Sign visible to staff  - Offer Toileting every  Hours, in advance of need  - Initiate/Maintain alarm  - Obtain necessary fall risk management equipment:   - Apply yellow socks and bracelet for high fall risk patients  - Consider moving patient to room near nurses station  Outcome: Progressing     Problem: MOBILITY - ADULT  Goal: Maintain or return to baseline ADL function  Description: INTERVENTIONS:  -  Assess patient's ability to carry out ADLs; assess patient's baseline for ADL function and identify physical deficits which impact ability to perform ADLs (bathing, care of mouth/teeth, toileting, grooming, dressing, etc )  - Assess/evaluate cause of self-care deficits   - Assess range of motion  - Assess patient's mobility; develop plan if impaired  - Assess patient's need for assistive devices and provide as appropriate  - Encourage maximum independence but intervene and supervise when necessary  - Involve family in performance of ADLs  - Assess for home care needs following discharge   - Consider OT consult to assist with ADL evaluation and planning for discharge  - Provide patient education as appropriate  Outcome: Progressing  Goal: Maintains/Returns to pre admission functional level  Description: INTERVENTIONS:  - Perform BMAT or MOVE assessment daily    - Set and communicate daily mobility goal to care team and patient/family/caregiver     - Collaborate with rehabilitation services on mobility goals if consulted  - Perform Range of Motion  times a day  - Reposition patient every  hours    - Dangle patient  times a day  - Stand patient  times a day  - Ambulate patient times a day  - Out of bed to chair  times a day   - Out of bed for meals  times a day  - Out of bed for toileting  - Record patient progress and toleration of activity level   Outcome: Progressing     Problem: Prexisting or High Potential for Compromised Skin Integrity  Goal: Skin integrity is maintained or improved  Description: INTERVENTIONS:  - Identify patients at risk for skin breakdown  - Assess and monitor skin integrity  - Assess and monitor nutrition and hydration status  - Monitor labs   - Assess for incontinence   - Turn and reposition patient  - Assist with mobility/ambulation  - Relieve pressure over bony prominences  - Avoid friction and shearing  - Provide appropriate hygiene as needed including keeping skin clean and dry  - Evaluate need for skin moisturizer/barrier cream  - Collaborate with interdisciplinary team   - Patient/family teaching  - Consider wound care consult   Outcome: Progressing     Problem: PAIN - ADULT  Goal: Verbalizes/displays adequate comfort level or baseline comfort level  Description: Interventions:  - Encourage patient to monitor pain and request assistance  - Assess pain using appropriate pain scale  - Administer analgesics based on type and severity of pain and evaluate response  - Implement non-pharmacological measures as appropriate and evaluate response  - Consider cultural and social influences on pain and pain management  - Notify physician/advanced practitioner if interventions unsuccessful or patient reports new pain  Outcome: Progressing     Problem: INFECTION - ADULT  Goal: Absence or prevention of progression during hospitalization  Description: INTERVENTIONS:  - Assess and monitor for signs and symptoms of infection  - Monitor lab/diagnostic results  - Monitor all insertion sites, i e  indwelling lines, tubes, and drains  - Monitor endotracheal if appropriate and nasal secretions for changes in amount and color  - [place] appropriate cooling/warming therapies per order  - Administer medications as ordered  - Instruct and encourage patient and family to use good hand hygiene technique  - Identify and instruct in appropriate isolation precautions for identified infection/condition  Outcome: Progressing  Goal: Absence of fever/infection during neutropenic period  Description: INTERVENTIONS:  - Monitor WBC    Outcome: Progressing     Problem: DISCHARGE PLANNING  Goal: Discharge to home or other facility with appropriate resources  Description: INTERVENTIONS:  - Identify barriers to discharge w/patient and caregiver  - Arrange for needed discharge resources and transportation as appropriate  - Identify discharge learning needs (meds, wound care, etc )  - Arrange for interpretive services to assist at discharge as needed  - Refer to Case Management Department for coordinating discharge planning if the patient needs post-hospital services based on physician/advanced practitioner order or complex needs related to functional status, cognitive ability, or social support system  Outcome: Progressing     Problem: Knowledge Deficit  Goal: Patient/family/caregiver demonstrates understanding of disease process, treatment plan, medications, and discharge instructions  Description: Complete learning assessment and assess knowledge base    Interventions:  - Provide teaching at level of understanding  - Provide teaching via preferred learning methods  Outcome: Progressing

## 2022-08-24 NOTE — ASSESSMENT & PLAN NOTE
- Incidentally identified thyroid lesion noted on initial trauma workup   - Incidental finding form completed inpatient notified   - Recommendations for non urgent outpatient thyroid ultrasound and follow-up with PCP to make arrangements for this and for further workup per surveillance as indicated

## 2022-08-24 NOTE — ASSESSMENT & PLAN NOTE
- Patient has a history of ambulatory dysfunction  She had an unwitnessed fall at [facility] with injuries as listed  - Maintain fall precautions  - Continue PT and OT evaluation treatment as indicated  Activity as tolerated with assistance and or per rehab recommendations  - Case Management assisted with disposition planning

## 2022-08-24 NOTE — INCIDENTAL FINDINGS
The following findings require follow up:  Radiographic finding     Finding: Mediastinal lesion, likely represents a pedunculated thyroid nodule, this can be confirmed with thyroid ultrasound  Follow up required:  Please follow-up with primary care provider to review this incidental finding  A malignancy (cancer) cannot be completely excluded based on trauma imaging alone  A non urgent outpatient thyroid ultrasound is recommended for further evaluation       Follow up should be done within 2 week(s)    Please notify the following clinician to assist with the follow up:   Dr Alek Mccoy

## 2022-08-24 NOTE — ASSESSMENT & PLAN NOTE
- Increased urinary frequency and incontinence  - Urine is cloudy in bedside cannister with a foul odor  - UA pending  - Straight cath if needed

## 2022-08-24 NOTE — DISCHARGE SUMMARY
[facility]  Discharge- Lawyer Villa 12/11/1930, 80 y o  female MRN: [id number]  Unit/Bed#: W -01 Encounter: [id number]  Primary Care Provider: Rosemarie Jo MD   Date and time admitted to hospital: 8/22/2022  7:35 PM    Ambulatory dysfunction  Assessment & Plan  - Patient has a history of ambulatory dysfunction  She had an unwitnessed fall at [facility] with injuries as listed  - Maintain fall precautions  - Continue PT and OT evaluation treatment as indicated  Activity as tolerated with assistance and or per rehab recommendations  - Case Management assisted with disposition planning  * Fracture of left ischial tuberosity (HCC)  Assessment & Plan  - Left ischial tuberosity fracture extending into the inferior ramus, present on admission   - Appreciate Orthopedic surgery evaluation and recommendations  Non operative management recommended  - May remain weightbearing as tolerated on the bilateral lower extremities  - Continue multimodal analgesic regimen   - Continue DVT prophylaxis  - PT and OT evaluation and treatment as indicated  - Outpatient follow up with Orthopedic surgery for re-evaluation  Urinary frequency  Assessment & Plan  - Increased urinary frequency and incontinence, present on presentation   - No other evidence of UTI with normal/unremarkable UA, no leukocytosis, and no fevers  - No indication for antibiotics at this time   - Recommend outpatient follow-up with PCP  Thyroid lesion  Assessment & Plan  - Incidentally identified thyroid lesion noted on initial trauma workup   - Incidental finding form completed inpatient notified   - Recommendations for non urgent outpatient thyroid ultrasound and follow-up with PCP to make arrangements for this and for further workup per surveillance as indicated  Discharge Summary - Trauma Service   Lawyer Villa 80 y o  female MRN: [id number]  Unit/Bed#:  W -01 Encounter: [id number]    Admission Date: 8/22/2022     Discharge Date: 8/24/2022    Admitting Diagnosis: Hip pain [M25 559]  Neck discomfort [M54 2]  Closed fracture of left ischial tuberosity, initial encounter [facility]) [S38 952W]    Discharge Diagnosis: See above  Attending and Service: Dr Lyla Lilly, Acute Care Surgical Services  Consulting Physician(s):     1  Orthopedic surgery  2  Geriatric Medicine  Imaging and Procedures Performed:   Orders Placed This Encounter   Procedures    Fast Ultrasound       XR chest portable    Result Date: 8/23/2022  Impression: No acute cardiopulmonary disease  Workstation performed: [id number]     XR hips bilateral 2 vw w pelvis if performed    Result Date: 8/23/2022  Impression: Suspect nondisplaced fracture left inferior pubic ramus  Stable appearance of left hip ORIF without evidence for acute fracture  Workstation performed: [id number]     CT head without contrast    Result Date: 8/22/2022  Impression: No acute intracranial abnormality  Workstation performed: [id number]     CT cervical spine without contrast    Result Date: 8/22/2022  Impression: 1  No cervical spine fracture or traumatic malalignment 2  Mediastinal lesion, likely represents a pedunculated thyroid nodule, this can be confirmed with thyroid ultrasound  Workstation performed: [id number]     CT lumbar spine without contrast    Result Date: 8/22/2022  Impression: 1  No acute fracture 2  Stable compression deformities Workstation performed: [id number]     CT pelvis wo contrast    Result Date: 8/22/2022  Impression: 1  Left ischial tuberosity fracture extending into the inferior ramus 2  Additional chronic findings as above The study was marked in [facility] for immediate notification  Workstation performed: [id number]       Hospital Course: Willian Machado is a 51-year-old female who presented to the emergency department after an unwitnessed fall at her nursing facility  She was extremely hard of hearing, but acknowledge that she slipped and fell  She denied preceding symptoms and does not believe she struck her head; she also denied loss of consciousness per the H&P  During her ER workup, she was found have a left ischial tuberosity fracture and Trauma was consulted  On her initial trauma evaluation, her primary survey was unremarkable  On secondary survey, she was hypertensive and tachycardic with normal vital signs otherwise; her left hip was tender and there was pain with range of motion; she had some left lower extremity weakness due to prior stroke and reported this was at baseline; the remainder of her exam was unremarkable  She was admitted to the trauma service status post fall with a left ischial tuberosity fracture extending into the pubic ramus  Orthopedic surgery was consulted and recommended conservative management with weight-bearing as tolerated  She did have urinary frequency, but her workup was otherwise unremarkable and no antibiotics were deemed necessary  PT and OT evaluated her and recommended rehab  Case Management assisted with disposition planning, the patient was deemed stable for discharge with available placement on 08/24/2022  On discharge, the patient is instructed to follow-up with the patient's primary care provider to review the events of the patient's recent hospitalization  The patient is instructed to follow-up in the Trauma Clinic as needed  The patient is instructed to follow up with Orthopedic surgery in 6 weeks for re-evaluation of her pelvic injury  The patient should follow the provided discharge instructions  Condition at Discharge: good     Discharge instructions/Information to patient and family:   See after visit summary for information provided to patient and family  Provisions for Follow-Up Care:  See after visit summary for information related to follow-up care and any pertinent home health orders  Disposition: See After Visit Summary for discharge disposition information      Planned Readmission: No    Discharge Statement   I spent 20 minutes discharging the patient  This time was spent on the day of discharge  I had direct contact with the patient on the day of discharge  Additional documentation is required if more than 30 minutes were spent on discharge  Discharge Medications:  See after visit summary for reconciled discharge medications provided to patient and family        Ruslan Andres PA-C  8/24/2022  5:39 PM

## 2022-08-24 NOTE — CASE MANAGEMENT
Case Management Progress Note    Patient name Naomie Barreto  Location W /W -42 MRN [id number]  : 1930 Date 2022       LOS (days): 1  Geometric Mean LOS (GMLOS) (days): 2 90  Days to GMLOS:1 9        OBJECTIVE:        Current admission status: Inpatient  Preferred Pharmacy:   [facility] [address] JLUIS REBOLLEDO [address] [facility], [address] [place] [address] Road  [address] [place] [address]  Phone: [phone] Fax: [phone]    Primary Care Provider: Evaristo Bradford MD    Primary Insurance: MEDICARE  Secondary Insurance: AARP    PROGRESS NOTE:    Bed offers received from [address] [place] [address] and [facility]  Call made to patient's son, [employer], and VM left requesting return call to discuss bed options

## 2022-08-24 NOTE — PROGRESS NOTES
[facility]  Progress Note Zack Guthrie 12/11/1930, 80 y o  female MRN: [id number]  Unit/Bed#: W -01 Encounter: [id number]  Primary Care Provider: Tamara Nair MD   Date and time admitted to hospital: 8/22/2022  7:35 PM    Urinary frequency  Assessment & Plan  - Increased urinary frequency and incontinence  - Urine is cloudy in bedside cannister with a foul odor  - UA pending  - Straight cath if needed    Ambulatory dysfunction  Assessment & Plan  · History of ambulatory dysfunction, unwitnessed fall at [facility] with injuries as listed  · PT and OT evaluation and treatment, recommending discharge to rehab  · Case management for disposition planning to higher level of care than the independent living facility that patient was living in prior    * Fracture of left ischial tuberosity (HCC)  Assessment & Plan  · Non-operative management  · WBAT  · Ortho consulted and following  · Geriatrics consulted - recs appreciated  · Pain conrol  · DVT prophylaxis  · PT/OT: rehab  · Follow up with Ortho in 6 weeks        Disposition: Continue [facility] level of care  Patient is medically stable for discharge to rehab pending UA    SUBJECTIVE:  Chief Complaint: "I'm all wet"    Subjective: Patient reports that she has saturated in urine and she is uncomfortable  She reports that the pain in her pelvis is there, the pain medications helps minimally  OBJECTIVE:   Vitals:   Temp:  [97 6 °F (36 4 °C)-97 7 °F (36 5 °C)] 97 7 °F (36 5 °C)  HR:  [90-98] 97  Resp:  [17] 17  BP: (127-133)/(66-79) 131/79    Intake/Output:  I/O       08/22 0701  08/23 0700 08/23 0701  08/24 0700    P  O  240 120    Total Intake(mL/kg) 240 (3 4) 120 (1 7)    Net +240 +120          Unmeasured Stool Occurrence 0 x          Nutrition: Diet Regular; Regular House  GI Proph/Bowel Reg: Colace  VTE Prophylaxis:Sequential compression device (Venodyne)  and Enoxaparin (Lovenox)     Physical Exam:   GENERAL APPEARANCE: Patient in no acute distress  HEENT: NCAT; PERRL, EOMs intact; Mucous membranes moist  CV: Regular rate and rhythm; no murmur/gallops/rubs appreciated  CHEST / LUNGS: Clear to auscultation; no wheezes/rales/rhonci  ABD: NABS; soft; non-distended; non-tender  : Voiding, incontinent, foul smelling  EXT: +2 pulses bilaterally upper & lower extremities; no edema  NEURO: GCS 15; no focal neurologic deficits; neurovascularly intact  SKIN: Warm, dry and well perfused; no rash; no jaundice  Invasive Devices  Report    Peripheral Intravenous Line  Duration           Peripheral IV 08/22/22 Dorsal (posterior); Left Hand 2 days    Peripheral IV 08/22/22 Right;Ventral (anterior) Forearm 1 day                      Lab Results: Results: I have personally reviewed all pertinent laboratory/tests results, BMP/CMP: No results found for: SODIUM, K, CL, CO2, ANIONGAP, BUN, CREATININE, GLUCOSE, CALCIUM, AST, ALT, ALKPHOS, PROT, BILITOT, EGFR and CBC: No results found for: WBC, HGB, HCT, MCV, PLT, ADJUSTEDWBC, MCH, MCHC, RDW, MPV, NRBC  Imaging/EKG Studies: I have personally reviewed pertinent reports  see below  Other Studies:   XR chest portable   Final Result by Vielka Reilly MD (08/23 1030)      No acute cardiopulmonary disease  Workstation performed: [id number]         CT cervical spine without contrast   Final Result by Marquez Yates MD (08/22 2139)      1  No cervical spine fracture or traumatic malalignment   2  Mediastinal lesion, likely represents a pedunculated thyroid nodule, this can be confirmed with thyroid ultrasound  Workstation performed: [id number]         CT lumbar spine without contrast   Final Result by Marquez Yates MD (08/22 2144)      1  No acute fracture   2  Stable compression deformities         Workstation performed: [id number]         CT head without contrast   Final Result by Marquez Yates MD (08/22 2136)      No acute intracranial abnormality  Workstation performed: [id number]         CT pelvis wo contrast   Final Result by Jaime Camargo MD (08/22 2151)      1  Left ischial tuberosity fracture extending into the inferior ramus   2  Additional chronic findings as above      The study was marked in [facility] for immediate notification  Workstation performed: [id number]         XR hips bilateral 2 vw w pelvis if performed   Final Result by Army Sicard, DO (08/23 1595)      Suspect nondisplaced fracture left inferior pubic ramus  Stable appearance of left hip ORIF without evidence for acute fracture              Workstation performed: [id number]

## 2022-08-24 NOTE — PLAN OF CARE
Problem: Potential for Falls  Goal: Patient will remain free of falls  Description: INTERVENTIONS:  - Educate patient/family on patient safety including physical limitations  - Instruct patient to call for assistance with activity   - Consult OT/PT to assist with strengthening/mobility   - Keep Call bell within reach  - Keep bed low and locked with side rails adjusted as appropriate  - Keep care items and personal belongings within reach  - Initiate and maintain comfort rounds  - Make Fall Risk Sign visible to staff  - Offer Toileting every  Hours, in advance of need  - Initiate/Maintain alarm  - Obtain necessary fall risk management equipment:   - Apply yellow socks and bracelet for high fall risk patients  - Consider moving patient to room near nurses station  8/24/2022 1758 by Randi Barnes RN  Outcome: Adequate for Discharge  8/24/2022 1143 by Randi Barnes RN  Outcome: Progressing     Problem: MOBILITY - ADULT  Goal: Maintain or return to baseline ADL function  Description: INTERVENTIONS:  -  Assess patient's ability to carry out ADLs; assess patient's baseline for ADL function and identify physical deficits which impact ability to perform ADLs (bathing, care of mouth/teeth, toileting, grooming, dressing, etc )  - Assess/evaluate cause of self-care deficits   - Assess range of motion  - Assess patient's mobility; develop plan if impaired  - Assess patient's need for assistive devices and provide as appropriate  - Encourage maximum independence but intervene and supervise when necessary  - Involve family in performance of ADLs  - Assess for home care needs following discharge   - Consider OT consult to assist with ADL evaluation and planning for discharge  - Provide patient education as appropriate  8/24/2022 1758 by Randi Barnes RN  Outcome: Adequate for Discharge  8/24/2022 1143 by Randi Barnes RN  Outcome: Progressing  Goal: Maintains/Returns to pre admission functional level  Description: INTERVENTIONS:  - Perform BMAT or MOVE assessment daily    - Set and communicate daily mobility goal to care team and patient/family/caregiver  - Collaborate with rehabilitation services on mobility goals if consulted  - Perform Range of Motion  times a day  - Reposition patient every hours    - Dangle patient  times a day  - Stand patient  times a day  - Ambulate patient  times a day  - Out of bed to chair  times a day   - Out of bed for meal times a day  - Out of bed for toileting  - Record patient progress and toleration of activity level   8/24/2022 1758 by Lynsey Romo RN  Outcome: Adequate for Discharge  8/24/2022 1143 by Lynsey Romo RN  Outcome: Progressing     Problem: Prexisting or High Potential for Compromised Skin Integrity  Goal: Skin integrity is maintained or improved  Description: INTERVENTIONS:  - Identify patients at risk for skin breakdown  - Assess and monitor skin integrity  - Assess and monitor nutrition and hydration status  - Monitor labs   - Assess for incontinence   - Turn and reposition patient  - Assist with mobility/ambulation  - Relieve pressure over bony prominences  - Avoid friction and shearing  - Provide appropriate hygiene as needed including keeping skin clean and dry  - Evaluate need for skin moisturizer/barrier cream  - Collaborate with interdisciplinary team   - Patient/family teaching  - Consider wound care consult   8/24/2022 1758 by Lynsey Romo RN  Outcome: Adequate for Discharge  8/24/2022 1143 by Lynsey Romo RN  Outcome: Progressing     Problem: PAIN - ADULT  Goal: Verbalizes/displays adequate comfort level or baseline comfort level  Description: Interventions:  - Encourage patient to monitor pain and request assistance  - Assess pain using appropriate pain scale  - Administer analgesics based on type and severity of pain and evaluate response  - Implement non-pharmacological measures as appropriate and evaluate response  - Consider cultural and social influences on pain and pain management  - Notify physician/advanced practitioner if interventions unsuccessful or patient reports new pain  8/24/2022 1758 by Rachel Dubois RN  Outcome: Adequate for Discharge  8/24/2022 1143 by Rachel Dubois RN  Outcome: Progressing     Problem: INFECTION - ADULT  Goal: Absence or prevention of progression during hospitalization  Description: INTERVENTIONS:  - Assess and monitor for signs and symptoms of infection  - Monitor lab/diagnostic results  - Monitor all insertion sites, i e  indwelling lines, tubes, and drains  - Monitor endotracheal if appropriate and nasal secretions for changes in amount and color  - Ramey appropriate cooling/warming therapies per order  - Administer medications as ordered  - Instruct and encourage patient and family to use good hand hygiene technique  - Identify and instruct in appropriate isolation precautions for identified infection/condition  8/24/2022 1758 by Rachel Dubois RN  Outcome: Adequate for Discharge  8/24/2022 1143 by Rachel Dubois RN  Outcome: Progressing  Goal: Absence of fever/infection during neutropenic period  Description: INTERVENTIONS:  - Monitor WBC    8/24/2022 1758 by Rachel Dubois RN  Outcome: Adequate for Discharge  8/24/2022 1143 by Rachel Dubois RN  Outcome: Progressing     Problem: DISCHARGE PLANNING  Goal: Discharge to home or other facility with appropriate resources  Description: INTERVENTIONS:  - Identify barriers to discharge w/patient and caregiver  - Arrange for needed discharge resources and transportation as appropriate  - Identify discharge learning needs (meds, wound care, etc )  - Arrange for interpretive services to assist at discharge as needed  - Refer to Case Management Department for coordinating discharge planning if the patient needs post-hospital services based on physician/advanced practitioner order or complex needs related to functional status, cognitive ability, or social support system  8/24/2022 1758 by Nancy Olvera RN  Outcome: Adequate for Discharge  8/24/2022 1143 by Nancy Olvera RN  Outcome: Progressing     Problem: Knowledge Deficit  Goal: Patient/family/caregiver demonstrates understanding of disease process, treatment plan, medications, and discharge instructions  Description: Complete learning assessment and assess knowledge base    Interventions:  - Provide teaching at level of understanding  - Provide teaching via preferred learning methods  8/24/2022 1758 by Nancy Olvera RN  Outcome: Adequate for Discharge  8/24/2022 1143 by Nancy Olvera RN  Outcome: Progressing

## 2022-08-24 NOTE — CASE MANAGEMENT
Case Management Discharge Planning Note    Patient name Andrea Villanueva  Location W /W -73 MRN [id number]  : 1930 Date 2022       Current Admission Date: 2022  Current Admission Diagnosis:Fracture of left ischial tuberosity [facility])   Patient Active Problem List    Diagnosis Date Noted    Urinary frequency 2022    Fracture of left ischial tuberosity ([address] ) 2022    Ambulatory dysfunction 2022    Acute metabolic encephalopathy     Orthostatic hypotension 2022    COVID-19 2022    Hyponatremia 2022    Unilateral inguinal hernia without obstruction or gangrene 2022    Bilateral leg edema 2022    Primary osteoarthritis of right hip     Sacroiliitis ([address] )     Lumbar compression fracture ([address] ) 2018    Thoracic compression fracture ([address] ) 2018    Hypertension, essential 2018    Mixed hyperlipidemia 2016    History of breast cancer 2016    Aortic regurgitation 2015    Bilateral carotid artery disease ([address] ) 2015    Osteoporosis 10/03/2012      LOS (days): 1  Geometric Mean LOS (GMLOS) (days): 2 90  Days to GMLOS:1 9     OBJECTIVE:  Risk of Unplanned Readmission Score: 14 64      BPCI Notification Given?: Yes (reviewed with patient; copy provided )  Current admission status: Inpatient   Preferred Pharmacy:   [facility] [address] [address]  Phone: [phone] Fax: [phone]    Primary Care Provider: Stacie Garcia MD    Primary Insurance: MEDICARE  Secondary Insurance: AARP    DISCHARGE DETAILS:    Discharge planning discussed with[de-identified] patient at bedside; patient's son, Lore Arriaga, by phone  Freedom of Choice: Yes (re: STR)  Comments - Freedom of Choice: discussed options and son accepted bed at [address] [place] [address] contacted family/caregiver?: Yes  Were Treatment Team discharge recommendations reviewed with patient/caregiver?: Yes  Did patient/caregiver verbalize understanding of patient care needs?: N/A- going to facility  Were patient/caregiver advised of the risks associated with not following Treatment Team discharge recommendations?: Yes    Contacts  Patient Contacts: sonKeny  Relationship to Patient[de-identified] Family  Contact Method: Phone  Reason/Outcome: Continuity of Care, Emergency Contact, Referral, Discharge Planning              Other Referral/Resources/Interventions Provided:  Interventions: SNF, Short Term Rehab  Referral Comments: Return call received from patient's son, Keny Boyle, and bed options [facility] and [address] [place] [address]) discussed  Son elected for patient to go to [address] [place] [address] and aware that she's medically ready for d/c today  TT with Solange at [address] [place] [address] and she confirmed ability to accept patient today  No booster or test required (patient COVID positive last month - 7/25)  Met with patient at bedside and reviewed above; also in agreement for d/c to rehab this afternoon  Transport requested for BLS via [facility] for 4:45pm; awaiting confirmation of same  Will follow-up with family to relay confirmed pick-up time once received  Bundle letter provided to patient for her record; facility also aware patient is a bundle  Call made to [facility] and spoke with Hima Moreno; informed of plan for patient to d/c to [address] [place] [address] for [address]      Would you like to participate in our [facility] service program?  : No - Declined    Treatment Team Recommendation: SNF, Short Term Rehab  Discharge Destination Plan[de-identified] SNF, Short Term Rehab ([facility])  Transport at Discharge : [facility]  Dispatcher Contacted: Yes  Number/Name of Dispatcher: Roundtrip     ETA of Transport (Date): 08/24/22  ETA of Transport (Time): [phone] (requested; awaiting confirmation)              IMM Given (Date):: 08/24/22             Accepting Facility Name, [facility] : [address] [place] [address]  Receiving Facility/Agency Phone Number: [phone]  Facility/Agency Fax Number: [phone]

## 2022-08-24 NOTE — ASSESSMENT & PLAN NOTE
- Left ischial tuberosity fracture extending into the inferior ramus, present on admission   - Appreciate Orthopedic surgery evaluation and recommendations  Non operative management recommended  - May remain weightbearing as tolerated on the bilateral lower extremities  - Continue multimodal analgesic regimen   - Continue DVT prophylaxis  - PT and OT evaluation and treatment as indicated  - Outpatient follow up with Orthopedic surgery for re-evaluation

## 2022-08-24 NOTE — CASE MANAGEMENT
Case Management Discharge Planning Note    Patient name Tracy Hughes  Location W /W -43 MRN [id number]  : 1930 Date 2022       Current Admission Date: 2022  Current Admission Diagnosis:Fracture of left ischial tuberosity [facility])   Patient Active Problem List    Diagnosis Date Noted    Urinary frequency 2022    Fracture of left ischial tuberosity ([address] ) 2022    Ambulatory dysfunction 2022    Acute metabolic encephalopathy 4978    Orthostatic hypotension 2022    COVID-19 2022    Hyponatremia 2022    Unilateral inguinal hernia without obstruction or gangrene 2022    Bilateral leg edema 2022    Primary osteoarthritis of right hip     Sacroiliitis ([address] )     Lumbar compression fracture ([address] ) 2018    Thoracic compression fracture ([address] ) 2018    Hypertension, essential 2018    Mixed hyperlipidemia 2016    History of breast cancer 2016    Aortic regurgitation 2015    Bilateral carotid artery disease ([address] ) 2015    Osteoporosis 10/03/2012      LOS (days): 1  Geometric Mean LOS (GMLOS) (days): 2 90  Days to GMLOS:1 8     OBJECTIVE:  Risk of Unplanned Readmission Score: 14 68      BPCI Notification Given?: Yes (reviewed with patient and sonLos, by phone)  Current admission status: Inpatient   Preferred Pharmacy:   [facility] [address] [address]  Phone: [phone] Fax: [phone]    Primary Care Provider: Desmond Abernathy MD    Primary Insurance: MEDICARE  Secondary Insurance: [facility]    DISCHARGE DETAILS:    Discharge planning discussed with[de-identified] patient at bedside; sonLos, by phone  Freedom of Choice: Yes (re: STR)  Comments - Freedom of Choice: accepted bed at [address] [place] [address] contacted family/caregiver?: Yes  Were Treatment Team discharge recommendations reviewed with patient/caregiver?: Yes  Did patient/caregiver verbalize understanding of patient care needs?: N/A- going to facility  Were patient/caregiver advised of the risks associated with not following Treatment Team discharge recommendations?: Yes    Contacts  Patient Contacts: sonCasa  Relationship to Patient[de-identified] Family  Contact Method: Phone  Reason/Outcome: Continuity of Care, Emergency Contact, Referral, Discharge Planning              Other Referral/Resources/Interventions Provided:  Interventions: Short Term Rehab  Referral Comments: Pick-up time confirmed with [facility] for 8:00pm  Alec Hoang at [facility] informed of same and confirmed ability to accept patient at that time  TT sent to [facility] to notify of pick-up time  Call made to son, Casa Bowen, and relayed pick-up time and provided contact number for facility; also in agreement for transfer this evening  Spoke with patient at bedside and informed of pick-up; confirmed understanding of same  [facility] aware of transfer to [facility] and to follow for anticipated return      Would you like to participate in our [address] service program?  : No - Declined    Treatment Team Recommendation: Short Term Rehab, SNF  Discharge Destination Plan[de-identified] SNF, Short Term Rehab ([place])  Transport at Discharge : [facility]  Dispatcher Contacted: Yes  Number/Name of Dispatcher: Roundtrip  Transported by [employer] and Unit #): Sheron  ETA of Transport (Date): 08/24/22  ETA of Transport (Time): 2000 (confirmed)                            Accepting Facility Name, [facility] : [address] [facility]  Receiving Facility/Agency Phone Number: [phone]  Facility/Agency Fax Number: [phone]

## 2022-08-24 NOTE — CASE MANAGEMENT
Case Management Progress Note    Patient name Michell White  Location W /W -56 MRN [id number]  : 1930 Date 2022       LOS (days): 1  Geometric Mean LOS (GMLOS) (days): 2 90  Days to GMLOS:2        OBJECTIVE:        Current admission status: Inpatient  Preferred Pharmacy:   [facility] [address] [facility] [address] [address] [address]  [facility] [address]  Phone: [phone] Fax: [phone]    Primary Care Provider: Sky Sierar MD    Primary Insurance: MEDICARE  Secondary Insurance: AARP    PROGRESS NOTE:  CM called sons phone to speak with either son or daughter-in-law Kaia Phi, to discuss choosing a SNF placement for patient  Voicemail left and call back number provided

## 2022-08-25 ENCOUNTER — NURSING HOME VISIT (OUTPATIENT)
Dept: GERIATRICS | Facility: OTHER | Age: 87
End: 2022-08-25
Payer: MEDICARE

## 2022-08-25 VITALS
TEMPERATURE: 97.4 F | SYSTOLIC BLOOD PRESSURE: 181 MMHG | HEART RATE: 92 BPM | WEIGHT: 138.2 LBS | BODY MASS INDEX: 26.99 KG/M2 | OXYGEN SATURATION: 95 % | RESPIRATION RATE: 16 BRPM | DIASTOLIC BLOOD PRESSURE: 87 MMHG

## 2022-08-25 DIAGNOSIS — Z85.3 HISTORY OF BREAST CANCER: ICD-10-CM

## 2022-08-25 DIAGNOSIS — R26.2 AMBULATORY DYSFUNCTION: Primary | ICD-10-CM

## 2022-08-25 DIAGNOSIS — S32.692S: ICD-10-CM

## 2022-08-25 DIAGNOSIS — E07.9 THYROID LESION: ICD-10-CM

## 2022-08-25 PROCEDURE — 99306 1ST NF CARE HIGH MDM 50: CPT | Performed by: FAMILY MEDICINE

## 2022-08-25 NOTE — TELEPHONE ENCOUNTER
Winter Tavarez [facility] calling to verify new admission orders with on call provider  Paged on call via TC

## 2022-08-25 NOTE — PROGRESS NOTES
Muriel [address]  [address] [facility] 27 [facility], [address] [place] [address] 31  History and Physical    NAME: Talisha Lawrence  AGE: 80 y o  SEX: female [id number]    DATE OF ENCOUNTER: 8/25/2022    Code status:  Full Code    Assessment and Plan     1  Ambulatory dysfunction  - Ambulatory dysfunction with fall  - injuries as outlined below  -Requires use of walker for ambulation at baseline  --hx recurrent falls with at least 4 in past six months  -high risk future falls due to age, hx fall, deconditioning/debility and unfamiliar environment   -encourage good body mechanics and assist with all transfers  -keep personal items and call bell close to prevent reaching  -maintain environment free of fall hazards  -encourage appropriate footwear and adequate lighting at all times when out of bed  -PT and OT    2  Closed fracture of left ischial tuberosity, sequela  - s/p unwitnessed fall at [facility]  - non-operative management as per Orthopedic surgery  - pain controlled with Tylenol  - OT/PT    3  Thyroid lesion  - Thyroid lesion noted incidentally on trauma workup in the hospital  - recommendations for non-urgent outpatient thyroid US and follow-up with PCP  Per chart review, patient has an appointment with [facility] endocrinology on 10/27/2022     4  History of breast cancer  - Breast cancer documented in 2016, but no record found in [facility]  All medications and routine orders were reviewed and updated as needed  Plan discussed with: staff    Chief Complaint     Seen for admission at [facility] [address]    History of Present Illness     80 y o  female with PMHx of HTN, osteoporosis who was admitted to [address] [place] [address] from 8/22 - 8/24 for left ischial tuberosity fracture after an unwitnessed fall at [facility]  Orthopedic surgery evaluated and recommended non-operative management  She was then discharged to [facility] [address] for subacute rehab  Today, she denies any pain   She seems confused and depressed  She complains that someone took her walker and she can't walk to the bathroom  She reports her  and son passed away and now "it's my turn"  No concerns from nursing staff  HISTORY:  Past Medical History:   Diagnosis Date    Chronic pain disorder     Low back pain      History reviewed  No pertinent family history  Social History     Socioeconomic History    Marital status: /Civil Union     Spouse name: None    Number of children: None    Years of education: None    Highest education level: None   Occupational History    Occupation: retired   Tobacco Use    Smoking status: Never Smoker    Smokeless tobacco: Never Used   Vaping Use    Vaping Use: Never used   Substance and Sexual Activity    Alcohol use: Never    Drug use: Never    Sexual activity: Never   Other Topics Concern    None   Social History Narrative    Lives with spouse     Is safe at home     Social Determinants of Health     Financial Resource Strain: Not on file   Food Insecurity: No Food Insecurity    Worried About Running Out of Food in the Last Year: Never true    [address] in the Last Year: Never true   Transportation Needs: No Transportation Needs    Lack of Transportation (Medical): No    Lack of Transportation (Non-Medical): No   Physical Activity: Not on file   Stress: Not on file   Social Connections: Not on file   Intimate Partner Violence: Not on file   Housing Stability: Low Risk     Unable to Pay for Housing in the Last Year: No    Number of Places Lived in the Last Year: 1    Unstable Housing in the Last Year: No       Allergies: Allergies   Allergen Reactions    Celecoxib Other (See Comments)       Review of Systems     Review of Systems   Unable to perform ROS: Mental status change   Cardiovascular: Negative for chest pain  Gastrointestinal: Negative for abdominal pain  Musculoskeletal: Negative for back pain         Medications and orders     All medications reviewed and updated in Nursing Home EMR  Objective     Vitals:    08/25/22 0741   BP: (!) 181/87   Pulse: 92   Resp: 16   Temp: (!) 97 4 °F (36 3 °C)   SpO2: 95%   Weight: 62 7 kg (138 lb 3 2 oz)        Physical Exam  Vitals and nursing note reviewed  Constitutional:       General: She is not in acute distress  HENT:      Head: Atraumatic  Nose: Nose normal       Mouth/Throat:      Mouth: Mucous membranes are moist    Eyes:      General:         Right eye: No discharge  Left eye: No discharge  Conjunctiva/sclera: Conjunctivae normal    Cardiovascular:      Rate and Rhythm: Normal rate and regular rhythm  Heart sounds: No murmur heard  Pulmonary:      Effort: Pulmonary effort is normal    Abdominal:      General: Bowel sounds are normal  There is no distension  Palpations: Abdomen is soft  Tenderness: There is no abdominal tenderness  Musculoskeletal:         General: No deformity  Cervical back: Neck supple  Skin:     General: Skin is warm  Neurological:      Mental Status: She is alert  Comments: Pleasantly confused  Knows her first name only  Follow simple commands  Strong hand   Unsteady gait when attempting to get out of bed  Pertinent Laboratory/Diagnostic Studies: The following labs/studies were reviewed please see chart or hospital paperwork for details      Results from last 7 days   Lab Units 08/24/22  0620 08/22/22 2009   WBC Thousand/uL 6 61 6 43   HEMOGLOBIN g/dL 12 0 11 7   HEMATOCRIT % 36 8 37 3   PLATELETS Thousands/uL 235 236   NEUTROS PCT % 60 64   MONOS PCT % 14* 15*     Results from last 7 days   Lab Units 08/24/22  0620 08/22/22 2009   POTASSIUM mmol/L 4 1 4 2   CHLORIDE mmol/L 106 105   CO2 mmol/L 26 26   BUN mg/dL 11 14   CREATININE mg/dL 0 48* 0 56*   CALCIUM mg/dL 8 5 8 9     Results from last 7 days   Lab Units 08/22/22 2009   INR  1 02   PTT seconds 26

## 2022-08-29 ENCOUNTER — NURSING HOME VISIT (OUTPATIENT)
Dept: GERIATRICS | Facility: OTHER | Age: 87
End: 2022-08-29
Payer: MEDICARE

## 2022-08-29 VITALS
RESPIRATION RATE: 18 BRPM | DIASTOLIC BLOOD PRESSURE: 65 MMHG | HEART RATE: 80 BPM | TEMPERATURE: 98 F | SYSTOLIC BLOOD PRESSURE: 112 MMHG | OXYGEN SATURATION: 97 % | BODY MASS INDEX: 26.99 KG/M2 | WEIGHT: 138.2 LBS

## 2022-08-29 DIAGNOSIS — R35.0 URINARY FREQUENCY: ICD-10-CM

## 2022-08-29 DIAGNOSIS — S32.692S: Primary | ICD-10-CM

## 2022-08-29 DIAGNOSIS — E07.9 THYROID LESION: ICD-10-CM

## 2022-08-29 DIAGNOSIS — K59.01 SLOW TRANSIT CONSTIPATION: ICD-10-CM

## 2022-08-29 DIAGNOSIS — R26.2 AMBULATORY DYSFUNCTION: ICD-10-CM

## 2022-08-29 DIAGNOSIS — I10 HYPERTENSION, ESSENTIAL: ICD-10-CM

## 2022-08-29 PROCEDURE — 99309 SBSQ NF CARE MODERATE MDM 30: CPT | Performed by: NURSE PRACTITIONER

## 2022-08-29 RX ORDER — ACETAMINOPHEN 325 MG/1
975 TABLET ORAL EVERY 6 HOURS PRN
COMMUNITY

## 2022-08-29 NOTE — ASSESSMENT & PLAN NOTE
· S/p unwitnessed fall at [facility]  · Left ischial tuberosity fx extending into the inferior ramus on imagine  · Ortho recs: Nonoperative Mgmt, WBAT, Lovenox for DVT ppx x 28 days ( ends 9/21/22)  · Tylenol and Lidocaine patch for pain control   · PT/OT  · Ortho f/u 10/7/22

## 2022-08-29 NOTE — ASSESSMENT & PLAN NOTE
· POA to Hospital- noted to have Normal/unremarkable UA without leukocytosis , afebrilie  · Continue to encourage fluids  · Provide toilet schedule - offer every 2-3 hours

## 2022-08-29 NOTE — ASSESSMENT & PLAN NOTE
· No documented BM x 4 days at SNF  · Colace and MiraLax ordered daily PRN  · Nursing to monitor BM's and offer PRN's as indicated

## 2022-08-29 NOTE — ASSESSMENT & PLAN NOTE
· Hx of recurrent falls   Multifactorial  Continue PT/OT  Fall Precautions  Ensure adequate nutrition/hydration   Monitor CBC/BMP - will obtain on Wed 8/31   following for d/c planning

## 2022-08-29 NOTE — ASSESSMENT & PLAN NOTE
· Thyroid lesion was incidentally found on initial trauma work up s/p fall  · Recommended non urgent outpatient thyroid ultrasound and OP f/u with PCP to make arrangements

## 2022-08-29 NOTE — ASSESSMENT & PLAN NOTE
· Bp stable at [facility] - not currently on any Antihypertensives  · Noted to have hx of Orthostatic BPs and Aortic regurg  · Denies any dizziness/sob/cp on exam   · Continue to monitor BP/HR at [facility]

## 2022-08-29 NOTE — PROGRESS NOTES
[facility]  Antoine Gomez 79  [phone]  [place]  Code 31 (STR)      NAME:  Se  AGE: 80 y o  SEX: female CODE STATUS: CPR    DATE OF ENCOUNTER: 8/29/2022    Assessment and Plan     1  Closed fracture of left ischial tuberosity, sequela  Assessment & Plan:  · S/p unwitnessed fall at [facility]  · Left ischial tuberosity fx extending into the inferior ramus on imagine  · Ortho recs: Nonoperative Mgmt, WBAT, Lovenox for DVT ppx x 28 days ( ends 9/21/22)  · Tylenol and Lidocaine patch for pain control   · PT/OT  · Ortho f/u 10/7/22      2  Ambulatory dysfunction  Assessment & Plan:  · Hx of recurrent falls   Multifactorial  Continue PT/OT  Fall Precautions  Ensure adequate nutrition/hydration   Monitor CBC/BMP - will obtain on Wed 8/31   following for d/c planning         3  Urinary frequency  Assessment & Plan:  · POA to Hospital- noted to have Normal/unremarkable UA without leukocytosis , afebrilie  · Continue to encourage fluids  · Provide toilet schedule - offer every 2-3 hours       4  Hypertension, essential  Assessment & Plan:  · Bp stable at [facility] - not currently on any Antihypertensives  · Noted to have hx of Orthostatic BPs and Aortic regurg  · Denies any dizziness/sob/cp on exam   · Continue to monitor BP/HR at [facility]      5  Thyroid lesion  Assessment & Plan:  · Thyroid lesion was incidentally found on initial trauma work up s/p fall  · Recommended non urgent outpatient thyroid ultrasound and OP f/u with PCP to make arrangements       6  Slow transit constipation  Assessment & Plan:  · No documented BM x 4 days at [facility]  · Colace and MiraLax ordered daily PRN  · Nursing to monitor BM's and offer PRN's as indicated          All medications and routine orders were reviewed and updated as needed      Chief Complaint     STR follow up visit    Past Medical and Surgica History      Past Medical History:   Diagnosis Date    Chronic pain disorder     Low back pain      History reviewed  No pertinent surgical history  Allergies   Allergen Reactions    Celecoxib Other (See Comments)          History of Present Illness     Nikki Manning is a 80year old female admitted to [facility] on 8/24/22 for STR  Past Medical Hx including but not limited to HTN, Orthostatic hypotension, Carotid artery disease, Chronic low back pain, Ambulatory dysfunction seen in collaboration with nursing for medical mgmt and STR follow up  Hospital Course:   Presented to [facility] 8/22/2022 from [facility] s/p unwitnessed fall  Patient sustained a left ischial tuberosity fracture extending into the inferior ramus  Orthopedics consulted recommended non operative management, patient allowed to weightbear as tolerated to bilateral lower extremities  Incidentally during trauma workup a thyroid lesion was found  She was recommended for non Urgent outpatient thyroid ultrasound and Outpatient follow-up with PCP for surveillance  She will need to follow-up with orthopedics in about 6 weeks for re-evaluation of her pelvic injury  Rehab:   Seen and examined at bedside today  Patient is able to provide a limited reliable history  Patient is alert and oriented times 2-3 on exam, forgetful and hard of hearing  Patient states she has some pain in her hip at times however feeling that it is tolerable  Patient denies any CP/SOB/N/V/D  Denies lightheadedness, dizziness, headaches, vision changes  Patient states they are eating well and staying hydrated  Denies any bowel or bladder issues  Per review of SNF records, Patient is eating 3 meals per day, consuming  %  Last documented BM was 8/25/22  Patient is actively participating in therapy  No concerns from nursing at this time  The patient's allergies, past medical, surgical, social and family history were reviewed and unchanged      Review of Systems     Review of Systems   Constitutional: Negative for chills and fever  HENT: Positive for hearing loss  Negative for congestion, rhinorrhea and sore throat  Eyes: Negative for pain and visual disturbance  Respiratory: Negative for cough, shortness of breath and wheezing  Cardiovascular: Negative for chest pain and palpitations  Gastrointestinal: Negative for abdominal pain, constipation, diarrhea and nausea  Genitourinary: Negative for decreased urine volume, difficulty urinating, dysuria and hematuria  Musculoskeletal: Positive for arthralgias, back pain and gait problem  Skin: Negative for color change and rash  Neurological: Negative for dizziness, syncope, weakness, numbness and headaches  Psychiatric/Behavioral: Negative for dysphoric mood and sleep disturbance  The patient is not nervous/anxious  Objective     Vitals:   Vitals:    08/29/22 0749   BP: 112/65   Pulse: 80   Resp: 18   Temp: 98 °F (36 7 °C)   SpO2: 97%         Physical Exam  Vitals and nursing note reviewed  Constitutional:       General: She is not in acute distress  Appearance: Normal appearance  HENT:      Head: Normocephalic and atraumatic  Ears:      Comments: Hard of hearing     Nose: No congestion or rhinorrhea  Mouth/Throat:      Mouth: Mucous membranes are moist    Eyes:      General: No scleral icterus  Conjunctiva/sclera: Conjunctivae normal       Pupils: Pupils are equal, round, and reactive to light  Cardiovascular:      Rate and Rhythm: Normal rate and regular rhythm  Pulses: Normal pulses  Heart sounds: Normal heart sounds  No murmur heard  Pulmonary:      Effort: Pulmonary effort is normal  No respiratory distress  Breath sounds: Normal breath sounds  No wheezing, rhonchi or rales  Abdominal:      General: Bowel sounds are normal  There is no distension  Palpations: Abdomen is soft  There is no mass  Tenderness: There is no abdominal tenderness  Hernia: No hernia is present     Musculoskeletal: General: No swelling or tenderness  Lymphadenopathy:      Cervical: No cervical adenopathy  Skin:     General: Skin is warm and dry  Coloration: Skin is not pale  Findings: No rash  Neurological:      General: No focal deficit present  Mental Status: She is alert  Mental status is at baseline  She is disoriented  Motor: Weakness present  Gait: Gait abnormal    Psychiatric:         Mood and Affect: Mood normal          Behavior: Behavior normal          Pertinent Laboratory/Diagnostic Studies:   Reviewed in facility chart-stable      Current Medications   Medications reviewed and updated see facility [facility] for details  Current Outpatient Medications:     acetaminophen (TYLENOL) 325 mg tablet, Take 975 mg by mouth every 6 (six) hours as needed, Disp: , Rfl:     atorvastatin (LIPITOR) 10 mg tablet, Take 10 mg by mouth, Disp: , Rfl:     B Complex Vitamins (VITAMIN-B COMPLEX PO), Take 1 tablet by mouth, Disp: , Rfl:     benzonatate (TESSALON PERLES) 100 mg capsule, Take 1 capsule (100 mg total) by mouth 3 (three) times a day as needed for cough, Disp: 20 capsule, Rfl: 0    Calcium Carb-Cholecalciferol (OSCAL-D) 500 mg-200 units per tablet, Take 1 tablet by mouth 2 (two) times a day with meals, Disp: , Rfl:     cholecalciferol (VITAMIN D3) 1,000 units tablet, Take 1,000 Units by mouth daily, Disp: , Rfl:     docusate sodium (COLACE) 100 mg capsule, Take 1 capsule (100 mg total) by mouth daily as needed (constipation unrelieved by Miralax), Disp: , Rfl: 0    enoxaparin (Lovenox) 40 mg/0 4 mL, Inject 0 4 mL (40 mg total) under the skin in the morning for 28 days, Disp: , Rfl: 0    fluocinolone (SYNALAR) 0 01 % cream, Apply topically in the morning Apply to scalpe for 2-4 hours daily    Protect with shower cap and shampoo out , Disp: , Rfl:     guaiFENesin 1200 MG TB12, Take 1 tablet (1,200 mg total) by mouth every 12 (twelve) hours, Disp: , Rfl: 0    lidocaine (LIDODERM) 5 %, Apply 1 patch topically daily Remove & Discard patch within 12 hours or as directed by MD, Disp: , Rfl: 0    polyethylene glycol (MIRALAX) 17 g packet, Take 17 g by mouth daily as needed, Disp: , Rfl:     vitamin B-12 (CYANOCOBALAMIN) 100 MCG tablet, Take 1,000 mcg by mouth, Disp: , Rfl:      Plan discussed with Dr Zehra Hebert noted agreement with assessment and plan  Please note:  Voice-recognition software may have been used in the preparation of this document  Occasional wrong word or "sound-alike" substitutions may have occurred due to the inherent limitations of voice recognition software  Interpretation should be guided by context           EDI Hoffman  8/29/2022  7:51 AM

## 2022-09-01 ENCOUNTER — PATIENT OUTREACH (OUTPATIENT)
Dept: CASE MANAGEMENT | Facility: HOSPITAL | Age: 87
End: 2022-09-01

## 2022-09-06 ENCOUNTER — NURSING HOME VISIT (OUTPATIENT)
Dept: GERIATRICS | Facility: OTHER | Age: 87
End: 2022-09-06
Payer: MEDICARE

## 2022-09-06 VITALS
TEMPERATURE: 97.3 F | DIASTOLIC BLOOD PRESSURE: 59 MMHG | SYSTOLIC BLOOD PRESSURE: 124 MMHG | HEART RATE: 88 BPM | WEIGHT: 138 LBS | OXYGEN SATURATION: 96 % | RESPIRATION RATE: 16 BRPM | BODY MASS INDEX: 26.95 KG/M2

## 2022-09-06 DIAGNOSIS — E07.9 THYROID LESION: ICD-10-CM

## 2022-09-06 DIAGNOSIS — S32.692S: Primary | ICD-10-CM

## 2022-09-06 DIAGNOSIS — K59.01 SLOW TRANSIT CONSTIPATION: ICD-10-CM

## 2022-09-06 DIAGNOSIS — I10 HYPERTENSION, ESSENTIAL: ICD-10-CM

## 2022-09-06 DIAGNOSIS — R26.2 AMBULATORY DYSFUNCTION: ICD-10-CM

## 2022-09-06 DIAGNOSIS — R35.0 URINARY FREQUENCY: ICD-10-CM

## 2022-09-06 PROCEDURE — 99316 NF DSCHRG MGMT 30 MIN+: CPT | Performed by: NURSE PRACTITIONER

## 2022-09-06 NOTE — ASSESSMENT & PLAN NOTE
· S/p unwitnessed fall at [facility]  · Left ischial tuberosity fx extending into the inferior ramus on imagine  · Ortho recs: Nonoperative Mgmt, WBAT, Lovenox for DVT ppx x 28 days ( ends 9/21/22)  · Tylenol and Lidocaine patch for pain control   · PT/OT  · Ortho f/u 10/7/22

## 2022-09-06 NOTE — ASSESSMENT & PLAN NOTE
· Bp stable at SNF - not currently on any Antihypertensives  · Noted to have hx of Orthostatic BPs and Aortic regurg  · Denies any dizziness/sob/cp on exam   · Continue to monitor BP/HR   · Compression stockings to assist with orthostatics

## 2022-09-06 NOTE — PROGRESS NOTES
[facility]  [address]  [phone]  [address]  Code 32    NAME: Daryn Jimenez  AGE: 80 y o  SEX: female   CODE STATUS: CPR    DATE OF ADMISSION: 8/24/2022   DATE OF DISCHARGE: 9/8/22   DISCHARGE DISPOSITION: Stable for discharge to home with family support and home health PT/OT/SN services  Reason for Admission: Patient was admitted to [facility] for rehabilitation after hospitalization for Mechanical fall with fracture  Past Medical and Surgical History:   Past Medical History:   Diagnosis Date    Chronic pain disorder     Low back pain       No past surgical history on file  Course of stay:   Naye Koenig is a 80year old female admitted to [facility] on 8/24/22 for STR  Past Medical Hx including but not limited to HTN, Orthostatic hypotension, Carotid artery disease, Chronic low back pain, Ambulatory dysfunction seen in collaboration with nursing for medical mgmt and discharge visit  Hospital Course:   Presented to [facility] 8/22/2022 from [facility] s/p unwitnessed fall  Patient sustained a left ischial tuberosity fracture extending into the inferior ramus  Orthopedics consulted recommended non operative management, patient allowed to weightbear as tolerated to bilateral lower extremities  Incidentally during trauma workup a thyroid lesion was found  She was recommended for non Urgent outpatient thyroid ultrasound and Outpatient follow-up with PCP for surveillance  She will need to follow-up with orthopedics in about 6 weeks for re-evaluation of her pelvic injury  Rehab:   Seen and examined at bedside today  Patient is able to provide a limited reliable history  Patient is alert and oriented times 2-3 on exam, forgetful and hard of hearing  Patient denies pain on exam, states the pain "comes and goes" in her left hip/buttock area   She states she does not feel she needs any pain medications currently, ok with Tylenol  Patient denies any CP/SOB/N/V/D  Denies lightheadedness, dizziness, headaches, vision changes  Patient states she is eating well and staying hydrated  Denies any bowel or bladder issues  Per review of SNF records, Patient is eating 3 meals per day, consuming  %  Last documented BM was 9/6/22  Patient is actively participating in therapy, needs assist x 1 for ADLs and transfers, she uses a RW and W/C for mobility  No concerns from nursing at this time  ROS:  Review of Systems   Constitutional: Negative for chills and fever  HENT: Positive for hearing loss  Negative for congestion, rhinorrhea and sore throat  Eyes: Negative for pain and visual disturbance  Respiratory: Negative for cough, shortness of breath and wheezing  Cardiovascular: Negative for chest pain and palpitations  Gastrointestinal: Negative for abdominal pain, constipation, diarrhea and nausea  Genitourinary: Negative for decreased urine volume, difficulty urinating, dysuria and hematuria  Musculoskeletal: Positive for arthralgias, back pain and gait problem  Skin: Negative for color change and rash  Neurological: Negative for dizziness, syncope, weakness, numbness and headaches  Psychiatric/Behavioral: Negative for dysphoric mood and sleep disturbance  The patient is not nervous/anxious  PHYSICAL EXAM:  VITALS:   Vitals:    09/06/22 1644   BP: 124/59   Pulse: 88   Resp: 16   Temp: (!) 97 3 °F (36 3 °C)   SpO2: 96%        Physical Exam  Vitals and nursing note reviewed  Constitutional:       General: She is not in acute distress  Appearance: Normal appearance  HENT:      Head: Normocephalic and atraumatic  Ears:      Comments: Hard of hearing     Nose: No congestion or rhinorrhea  Mouth/Throat:      Mouth: Mucous membranes are moist    Eyes:      General: No scleral icterus       Conjunctiva/sclera: Conjunctivae normal       Pupils: Pupils are equal, round, and reactive to light  Cardiovascular:      Rate and Rhythm: Normal rate and regular rhythm  Pulses: Normal pulses  Heart sounds: Normal heart sounds  No murmur heard  Pulmonary:      Effort: Pulmonary effort is normal  No respiratory distress  Breath sounds: Normal breath sounds  No wheezing, rhonchi or rales  Abdominal:      General: Bowel sounds are normal  There is no distension  Palpations: Abdomen is soft  There is no mass  Tenderness: There is no abdominal tenderness  Hernia: No hernia is present  Musculoskeletal:         General: No swelling or tenderness  Lymphadenopathy:      Cervical: No cervical adenopathy  Skin:     General: Skin is warm and dry  Coloration: Skin is not pale  Findings: No rash  Neurological:      General: No focal deficit present  Mental Status: She is alert  Mental status is at baseline  She is disoriented  Motor: Weakness present  Gait: Gait abnormal    Psychiatric:         Mood and Affect: Mood normal          Behavior: Behavior normal          Admission Diagnoses:   1  Closed fracture of left ischial tuberosity, sequela  Assessment & Plan:  · S/p unwitnessed fall at [facility]  · Left ischial tuberosity fx extending into the inferior ramus on imagine  · Ortho recs: Nonoperative Mgmt, WBAT, Lovenox for DVT ppx x 28 days ( ends 9/21/22)  · Tylenol and Lidocaine patch for pain control   · PT/OT  · Ortho f/u 10/7/22      2  Ambulatory dysfunction  Assessment & Plan:  · Hx of recurrent falls   Multifactorial  Continue PT/OT  Fall Precautions  Ensure adequate nutrition/hydration    following for d/c planning - per last note, patient will d/c back to her [facility] on 9/8/22         3   Hypertension, essential  Assessment & Plan:  · Bp stable at SNF - not currently on any Antihypertensives  · Noted to have hx of Orthostatic BPs and Aortic regurg  · Denies any dizziness/sob/cp on exam   · Continue to monitor BP/HR · Compression stockings to assist with orthostatics       4  Slow transit constipation  Assessment & Plan:  · Having daily or every other day BMs per review of BM log at [facility]  · Continue Colace and MiraLax daily PRN        5  Thyroid lesion  Assessment & Plan:  · Thyroid lesion was incidentally found on initial trauma work up s/p fall  · Recommended non urgent outpatient thyroid ultrasound and OP f/u with PCP to make arrangements       6  Urinary frequency  Assessment & Plan:  · POA to Hospital- noted to have Normal/unremarkable UA without leukocytosis , afebrilie  · Continue to encourage fluids  · Provide toilet schedule - offer every 2-3 hours          Follow-up Recommendations:     Outpatient Follow up with PCP in the next 2 weeks  [facility] PT/OT/SN services     Labs and testing performed during stay:    Collection Date:08/31/2022 00:00  CBC NO DIFF  HEMOGLOBIN 11 8 g/dL 11 5-14 5 Final  HEMATOCRIT 34 3 % 35 0-43 0 L Final  WBC 6 1 thou/cmm 4 0-10 0 Final  RBC 3 91 mill/cmm 3 70-4 70 Final  PLATELET COUNT 408 thou/cmm 140-350 Final  MPV 8 1 fL 7 5-11 3 Final  MCV 88 fL  Final  MCH 30 3 pg 26 0-34 0 Final  MCHC 34 5 g/dL 32 0-37 0 Final  RDW 15 0 % 12 0-16 0 Final  BASIC METABOLIC PNL  GLUCOSE 87 mg/dL 65-99 Final  BUN 13 mg/dL 7-25 Final  CREATININE 0 45 mg/dL 0 40-1 10 Final  SODIUM 138 mmol/L 135-145 Final  POTASSIUM 4 6 mmol/L 3 5-5 2 Final  CHLORIDE 107 mmol/L 100-109 Final  CARBON DIOXIDE 25 mmol/L 23-31 Final  CALCIUM 8 5 mg/dL 8 5-10 1 Final  ANION GAP 6 3-11 Final  eGFRcr 91 >59 Final    Discharge Medications: See discharge medication list which was reviewed and signed        Current Outpatient Medications:     acetaminophen (TYLENOL) 325 mg tablet, Take 975 mg by mouth every 6 (six) hours as needed, Disp: , Rfl:     atorvastatin (LIPITOR) 10 mg tablet, Take 10 mg by mouth, Disp: , Rfl:     B Complex Vitamins (VITAMIN-B COMPLEX PO), Take 1 tablet by mouth, Disp: , Rfl:     benzonatate (TESSALON PERLES) 100 mg capsule, Take 1 capsule (100 mg total) by mouth 3 (three) times a day as needed for cough, Disp: 20 capsule, Rfl: 0    Calcium Carb-Cholecalciferol (OSCAL-D) 500 mg-200 units per tablet, Take 1 tablet by mouth 2 (two) times a day with meals, Disp: , Rfl:     cholecalciferol (VITAMIN D3) 1,000 units tablet, Take 1,000 Units by mouth daily, Disp: , Rfl:     docusate sodium (COLACE) 100 mg capsule, Take 1 capsule (100 mg total) by mouth daily as needed (constipation unrelieved by Miralax), Disp: , Rfl: 0    enoxaparin (Lovenox) 40 mg/0 4 mL, Inject 0 4 mL (40 mg total) under the skin in the morning for 28 days, Disp: , Rfl: 0    fluocinolone (SYNALAR) 0 01 % cream, Apply topically in the morning Apply to scalpe for 2-4 hours daily  Protect with shower cap and shampoo out , Disp: , Rfl:     guaiFENesin 1200 MG TB12, Take 1 tablet (1,200 mg total) by mouth every 12 (twelve) hours, Disp: , Rfl: 0    lidocaine (LIDODERM) 5 %, Apply 1 patch topically daily Remove & Discard patch within 12 hours or as directed by MD, Disp: , Rfl: 0    polyethylene glycol (MIRALAX) 17 g packet, Take 17 g by mouth daily as needed, Disp: , Rfl:     vitamin B-12 (CYANOCOBALAMIN) 100 MCG tablet, Take 1,000 mcg by mouth, Disp: , Rfl:      Discussion with patient/family and further instructions:  -Fall precautions  -Aspiration precautions  -Bleeding precautions  -Monitor for signs/symptoms of infection  -Medication list was reviewed and signed  -DME form was completed    Status at time of discharge: Stable    Plan discussed with Dr Jeffrey Esquivel noted agreement with assessment and plan  Billing based on time  Time spent on unit, 40 minutes  Time spent counseling pt on debility/condition, 30 minutes  Please note:  Voice-recognition software may have been used in the preparation of this document    Occasional wrong word or "sound-alike" substitutions may have occurred due to the inherent limitations of voice recognition software  Interpretation should be guided by context          EDI Serna  9/6/2022

## 2022-09-06 NOTE — ASSESSMENT & PLAN NOTE
· Hx of recurrent falls   Multifactorial  Continue PT/OT  Fall Precautions  Ensure adequate nutrition/hydration    following for d/c planning - per last note, patient will d/c back to her [facility] on 9/8/22

## 2022-09-06 NOTE — ASSESSMENT & PLAN NOTE
· Having daily or every other day BMs per review of BM log at [facility]  · Continue Colace and MiraLax daily PRN

## 2022-09-06 NOTE — PHYSICIAN ADVISOR
Current patient class: Inpatient  The patient is currently on Hospital Day: 3 at [address] [facility]      The patient was admitted to the hospital at  3:26 PM on 8/23/22 for the following diagnosis:  Hip pain [M25 559]  Neck discomfort [M54 2]  Closed fracture of left ischial tuberosity, initial encounter ([facility] [address] ) [S34 234X]       There is documentation in the medical record of an expected length of stay of at least 2 midnights  The patient is therefore expected to satisfy the 2 midnight benchmark and given the 2 midnight presumption is appropriate for INPATIENT ADMISSION  Given this expectation of a satisfying stay, CMS instructs us that the patient is most often appropriate for inpatient admission under part A provided medical necessity is documented in the chart  After review of the relevant documentation, labs, vital signs and test results, the patient is appropriate for INPATIENT ADMISSION  Admission to the hospital as an inpatient is a complex decision making process which requires the practitioner to consider the patients presenting complaint, history and physical examination and all relevant testing  With this in mind, in this case, the patient was deemed appropriate for INPATIENT ADMISSION  After review of the documentation and testing available at the time of the admission I concur with this clinical determination of medical necessity  Rationale is as follows: The patient is a 80 yrs old Female who presented to the ED at 8/22/2022  7:35 PM with a chief complaint of Fall (Patient is from [facility] and had an unwitnessed fall in the bathroom  Patient recalls all events and reports no head pain  Patient complaining of left hip pain and neck discomfort on palpation  Per EMS no thinners/asa)  She sustained a Left ischial tuberosity fracture extending into the inferior ramus  She was seen by ortho and nonoperative management recommended     Pain was managed by multimodal pain regimen and she was seen by PT/OT and kory for discharge to [facility] on hospital day 3  IP seems appropriate based on her hospital course  The patients vitals on arrival were   ED Triage Vitals [08/22/22 1945]   Temperature Pulse Respirations Blood Pressure SpO2   97 8 °F (36 6 °C) (!) 111 16 170/77 97 %      Temp Source Heart Rate Source Patient Position - Orthostatic VS BP Location FiO2 (%)   Oral Monitor Sitting Right arm --      Pain Score       6           Past Medical History:   Diagnosis Date    Chronic pain disorder     Low back pain      History reviewed  No pertinent surgical history  Consults have been placed to:   IP CONSULT TO ORTHOPEDIC SURGERY  IP CONSULT TO GERONTOLOGY    Vitals:    08/24/22 0749 08/24/22 1100 08/24/22 1521 08/24/22 1530   BP: 144/79   144/80   BP Location:    Left arm   Pulse: 93   97   Resp:    17   Temp: (!) 97 3 °F (36 3 °C)   97 6 °F (36 4 °C)   TempSrc:    Oral   SpO2: 96% 96%  95%   Weight:       Height:   5' (1 524 m)        Most recent labs:    No results for input(s): WBC, HGB, HCT, PLT, K, NA, CALCIUM, BUN, CREATININE, LIPASE, AMYLASE, INR, TROPONINI, CKTOTAL, AST, ALT, ALKPHOS, BILITOT in the last 72 hours  Scheduled Meds:  Continuous Infusions:No current facility-administered medications for this encounter  PRN Meds:      Surgical procedures (if appropriate):

## 2022-09-08 ENCOUNTER — PATIENT OUTREACH (OUTPATIENT)
Dept: CASE MANAGEMENT | Facility: HOSPITAL | Age: 87
End: 2022-09-08

## 2022-09-13 ENCOUNTER — PATIENT OUTREACH (OUTPATIENT)
Dept: CASE MANAGEMENT | Facility: OTHER | Age: 87
End: 2022-09-13

## 2022-09-13 NOTE — PROGRESS NOTES
In basket message received with patient handoff  Chart reviewed  Patient was admitted to [facility] on 8/22 with a fracture of left ischial tuberosity, ambulatory dysfunction after a fall at [facility]  She discharged to [facility] on 8/24 for short term rehab  She discharged home with [facility]alob[facility] on 9/8  I left a message on son's voicemail with my contact information

## 2022-09-14 ENCOUNTER — PATIENT OUTREACH (OUTPATIENT)
Dept: CASE MANAGEMENT | Facility: OTHER | Age: 87
End: 2022-09-14

## 2022-09-14 NOTE — PROGRESS NOTES
Patient's son returned my call  He reports patient is receiving physical therapy and OT with [facility]  Patient is doing well with non surgical management of pelvic fracture  I reviewed her appointment with orthopedic surgeon with him on 10/7  I advised him to call me if I can be of assistance

## 2022-09-14 NOTE — PROGRESS NOTES
I left a message on son's voicemail with my contact information  Patient is hard of hearing per not in chart

## 2022-09-19 ENCOUNTER — PATIENT OUTREACH (OUTPATIENT)
Dept: CASE MANAGEMENT | Facility: OTHER | Age: 87
End: 2022-09-19

## 2022-09-19 NOTE — LETTER
Date: 09/19/22    Dear Shmuel Ricardo,   My name is Hardik Barrera; I am a registered nurse care manager working with  [facility]  I have not been able to reach you and would like to set a time that I can talk with you over the phone  My work is to help patients that have complex medical conditions get the care they need  This includes patients who may have been in the hospital or emergency room  Please call me with any questions you may have     Sincerely,  Devonte Seen RN  [phone]  Outpatient Care Manager

## 2022-10-03 ENCOUNTER — PATIENT OUTREACH (OUTPATIENT)
Dept: CASE MANAGEMENT | Facility: OTHER | Age: 87
End: 2022-10-03

## 2022-10-03 NOTE — PROGRESS NOTES
No response to outreach attempts or unable to reach letter  Chart reviewed  Patient cancelled her follow up appointment with orthopedics on 10/7

## 2022-11-01 ENCOUNTER — APPOINTMENT (EMERGENCY)
Dept: CT IMAGING | Facility: HOSPITAL | Age: 87
End: 2022-11-01

## 2022-11-01 ENCOUNTER — APPOINTMENT (EMERGENCY)
Dept: RADIOLOGY | Facility: HOSPITAL | Age: 87
End: 2022-11-01

## 2022-11-01 ENCOUNTER — HOSPITAL ENCOUNTER (EMERGENCY)
Facility: HOSPITAL | Age: 87
Discharge: HOME | End: 2022-11-02
Attending: EMERGENCY MEDICINE

## 2022-11-01 DIAGNOSIS — S09.90XA CHI (CLOSED HEAD INJURY): Primary | ICD-10-CM

## 2022-11-01 DIAGNOSIS — S00.81XA FOREHEAD ABRASION: ICD-10-CM

## 2022-11-01 DIAGNOSIS — R93.6 ABNORMAL X-RAY OF FEMUR: ICD-10-CM

## 2022-11-02 ENCOUNTER — PATIENT OUTREACH (OUTPATIENT)
Dept: CASE MANAGEMENT | Facility: OTHER | Age: 87
End: 2022-11-02

## 2022-11-02 VITALS
WEIGHT: 147.49 LBS | RESPIRATION RATE: 20 BRPM | SYSTOLIC BLOOD PRESSURE: 151 MMHG | TEMPERATURE: 97.8 F | OXYGEN SATURATION: 97 % | DIASTOLIC BLOOD PRESSURE: 68 MMHG | BODY MASS INDEX: 28.8 KG/M2 | HEART RATE: 93 BPM

## 2022-11-02 NOTE — ED NOTES
Called to nursing [address], called to confirm patient is a resident at that facility, aware of updated dc instructions faxed to [phone]     Rivas Stock RN  11/02/22 0196

## 2022-11-02 NOTE — ED NOTES
Patient resting between care in no distress, tolerated breakfast  Transported back to SNF via EMS       Eva Flores RN  11/02/22 5851

## 2022-11-02 NOTE — ED NOTES
Contacted [facility], spoke with Russell Lou, and informed that patient will be transported in AM  Will call ER and inform of  time once available        Chelsy Greene RN  11/02/22 5838

## 2022-11-02 NOTE — ED PROVIDER NOTES
History  Chief Complaint   Patient presents with   • Fall     From [employer], unwitnessed fall, two abrasions on each temple, no thinners, unknown loc, alert to person unknown baseline, no physical complaints     80-year-old female presents to the emergency department after having an unwitnessed fall at her nursing facility  Patient states that she lost her balance  She does not believe that she had an episode of fainting  She has had previous falls with a recent admission to the Trauma Service (8/22) with ischial tuberosity fracture  Patient presents with abrasion to the right and left forehead  She denies headache  She denies loss of consciousness after falling  She presents with a cervical collar in place though denies neck pain  She offers no complaints  Per review of medical record she does have a history of ambulatory dysfunction  Patient does have significant bilateral lower extremity edema, she reports that this is chronic and unchanged  History provided by:  Patient and medical records   used: No    Fall  Mechanism of injury: fall    Injury location:  Head/neck, pelvis and torso  Head/neck injury location:  Scalp  Torso injury location:  Back  Pelvic injury location:  L hip and R hip  Incident location:  Nursing home  Fall:     Fall occurred:  Standing    Impact surface:  Hard floor    Entrapped after fall: no    Suspicion of alcohol use: no    Suspicion of drug use: no    Prior to arrival data:     Bystander interventions:  None    Patient ambulatory at scene: no      Blood loss:  Minimal    Loss of consciousness: no      Cardiac interventions:  None    Medications administered:  None    Immobilization:  None  Associated symptoms: back pain    Associated symptoms: no headaches    Risk factors: no anticoagulation therapy        Prior to Admission Medications   Prescriptions Last Dose Informant Patient Reported? Taking?    B Complex Vitamins (VITAMIN-B COMPLEX PO) 11/1/2022 at Unknown time  Yes Yes   Sig: Take 1 tablet by mouth   Calcium Carb-Cholecalciferol (OSCAL-D) 500 mg-200 units per tablet 11/1/2022 at Unknown time  Yes Yes   Sig: Take 1 tablet by mouth 2 (two) times a day with meals   acetaminophen (TYLENOL) 325 mg tablet Past Week at Unknown time  Yes Yes   Sig: Take 975 mg by mouth every 6 (six) hours as needed   atorvastatin (LIPITOR) 10 mg tablet 11/1/2022 at Unknown time  Yes Yes   Sig: Take 10 mg by mouth   benzonatate (TESSALON PERLES) 100 mg capsule 11/1/2022 at Unknown time  No Yes   Sig: Take 1 capsule (100 mg total) by mouth 3 (three) times a day as needed for cough   cholecalciferol (VITAMIN D3) 1,000 units tablet Unknown at Unknown time  Yes No   Sig: Take 1,000 Units by mouth daily   docusate sodium (COLACE) 100 mg capsule 11/1/2022 at Unknown time  No Yes   Sig: Take 1 capsule (100 mg total) by mouth daily as needed (constipation unrelieved by Miralax)   enoxaparin (Lovenox) 40 mg/0 4 mL   No No   Sig: Inject 0 4 mL (40 mg total) under the skin in the morning for 28 days   fluocinolone (SYNALAR) 0 01 % cream 11/1/2022 at Unknown time  Yes Yes   Sig: Apply topically in the morning Apply to scalpe for 2-4 hours daily  Protect with shower cap and shampoo out    guaiFENesin 1200 MG TB12 11/1/2022 at Unknown time  No Yes   Sig: Take 1 tablet (1,200 mg total) by mouth every 12 (twelve) hours   lidocaine (LIDODERM) 5 % 11/1/2022 at Unknown time  No Yes   Sig: Apply 1 patch topically daily Remove & Discard patch within 12 hours or as directed by MD   polyethylene glycol (MIRALAX) 17 g packet 11/1/2022 at Unknown time  Yes Yes   Sig: Take 17 g by mouth daily as needed   vitamin B-12 (CYANOCOBALAMIN) 100 MCG tablet 11/1/2022 at Unknown time  Yes Yes   Sig: Take 1,000 mcg by mouth      Facility-Administered Medications: None       Past Medical History:   Diagnosis Date   • Chronic pain disorder    • Low back pain        History reviewed   No pertinent surgical history  History reviewed  No pertinent family history  I have reviewed and agree with the history as documented  E-Cigarette/Vaping   • E-Cigarette Use Never User      E-Cigarette/Vaping Substances     Social History     Tobacco Use   • Smoking status: Never Smoker   • Smokeless tobacco: Never Used   Vaping Use   • Vaping Use: Never used   Substance Use Topics   • Alcohol use: Never   • Drug use: Never       Review of Systems   Constitutional: Negative for appetite change  Respiratory: Negative for cough, choking and shortness of breath  Genitourinary: Negative for dysuria, flank pain and urgency  Musculoskeletal: Positive for back pain and gait problem  Skin: Positive for wound  Neurological: Negative for dizziness, weakness and headaches  All other systems reviewed and are negative  Physical Exam  Physical Exam  Constitutional:       Appearance: She is well-developed  She is not ill-appearing  HENT:      Head: Normocephalic  Abrasion present  Right Ear: External ear normal       Left Ear: External ear normal       Nose: Nose normal       Mouth/Throat:      Pharynx: No oropharyngeal exudate  Eyes:      General: No scleral icterus  Conjunctiva/sclera: Conjunctivae normal       Pupils: Pupils are equal, round, and reactive to light  Cardiovascular:      Rate and Rhythm: Normal rate and regular rhythm  Heart sounds: Normal heart sounds  Pulmonary:      Effort: Pulmonary effort is normal       Breath sounds: Normal breath sounds  Abdominal:      General: Bowel sounds are normal  There is no distension  Palpations: Abdomen is soft  Tenderness: There is no abdominal tenderness  There is no right CVA tenderness, left CVA tenderness, guarding or rebound  Musculoskeletal:         General: No deformity  Normal range of motion  Cervical back: Full passive range of motion without pain, normal range of motion and neck supple  No torticollis   No spinous process tenderness or muscular tenderness  Back:       Right hip: Tenderness and bony tenderness present  Left hip: Tenderness and bony tenderness present  Legs:    Lymphadenopathy:      Cervical: No cervical adenopathy  Skin:     General: Skin is warm and dry  Findings: No rash  Neurological:      General: No focal deficit present  Mental Status: She is alert and oriented to person, place, and time  Cranial Nerves: No cranial nerve deficit  Sensory: No sensory deficit  Motor: No abnormal muscle tone  Coordination: Coordination normal       Gait: Gait normal       Deep Tendon Reflexes: Reflexes are normal and symmetric  Psychiatric:         Behavior: Behavior normal          Thought Content: Thought content normal          Judgment: Judgment normal          Vital Signs  ED Triage Vitals [11/01/22 1955]   Temperature Pulse Respirations Blood Pressure SpO2   97 8 °F (36 6 °C) 96 18 (!) 175/87 99 %      Temp Source Heart Rate Source Patient Position - Orthostatic VS BP Location FiO2 (%)   Oral Monitor Lying Left arm --      Pain Score       No Pain           Vitals:    11/01/22 1955 11/01/22 2145 11/02/22 0215   BP: (!) 175/87 (!) 188/88 151/68   Pulse: 96 (!) 114 93   Patient Position - Orthostatic VS: Lying Lying Lying         Visual Acuity      ED Medications  Medications - No data to display    Diagnostic Studies  Results Reviewed     None                 XR hips bilateral 2 vw w pelvis if performed   Final Result by Yoni Sandhu MD (11/02 0071)      1  No acute osseous abnormality  2   Retraction of left distal femoral screw approximately 2 cm  This has been described on previous x-rays though images are not available for comparison to determine stability  Follow-up with orthopedics recommended  The study was marked in [facility] for immediate notification           Workstation performed: [id number]         CT head without contrast   Final Result by Delmis Musa Hoang Peguero MD (11/01 2150)      Forehead soft tissue injury without an acute intracranial injury  Workstation performed: [id number]         CT cervical spine without contrast   Final Result by Toan Naranjo MD (11/01 2150)      No cervical spine fracture or traumatic malalignment  Multilevel degenerative change of the cervical spine  Workstation performed: [id number]         CT lumbar spine without contrast   Final Result by Toan Naranjo MD (11/01 2157)      No acute osseous abnormality  No significant change in the compression deformities and degenerative changes as described above  Workstation performed: [id number]                    Procedures  Procedures         ED Course  ED Course as of 11/05/22 1112   Tue Nov 01, 2022   2224 CT cervical spine without contrast                               SBIRT 20yo+    Flowsheet Row Most Recent Value   SBIRT (25 yo +)    In order to provide better care to our patients, we are screening all of our patients for alcohol and drug use  Would it be okay to ask you these screening questions? Yes Filed at: 11/01/2022 2008   Initial Alcohol Screen: US AUDIT-C     1  How often do you have a drink containing alcohol? 0 Filed at: 11/01/2022 2008   2  How many drinks containing alcohol do you have on a typical day you are drinking? 0 Filed at: 11/01/2022 2008   3a  Male UNDER 65: How often do you have five or more drinks on one occasion? 0 Filed at: 11/01/2022 2008   3b  FEMALE Any Age, or MALE 65+: How often do you have 4 or more drinks on one occassion? 0 Filed at: 11/01/2022 2008   Audit-C Score 0 Filed at: 11/01/2022 2008   SELENA: How many times in the past year have you    Used an illegal drug or used a prescription medication for non-medical reasons?  Never Filed at: 11/01/2022 2008                    MDM  Number of Diagnoses or Management Options  Abnormal x-ray of femur: new and requires workup  CHI (closed head injury): new and requires workup  Forehead abrasion: new and requires workup     Amount and/or Complexity of Data Reviewed  Clinical lab tests: ordered and reviewed  Tests in the radiology section of CPT®: reviewed and ordered  Tests in the medicine section of CPT®: reviewed and ordered  Decide to obtain previous medical records or to obtain history from someone other than the patient: yes  Discuss the patient with other providers: yes  Independent visualization of images, tracings, or specimens: yes    Risk of Complications, Morbidity, and/or Mortality  General comments: 79-year-old female presents after having an unwitnessed fall the nursing facility  Patient's imaging is negative for acute pathology of the head and neck  There has been loosening of the screw in 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 however this was communicated with the patient's nursing facility so that they will arrange a follow-up appointment with Orthopedics  Patient does not localize discomfort to this location  Discussed signs and symptoms return to the emergency department      Patient Progress  Patient progress: stable      Disposition  Final diagnoses:   CHI (closed head injury)   Forehead abrasion - (2)   Abnormal x-ray of femur - retraction of left disrta femoral screw     Time reflects when diagnosis was documented in both MDM as applicable and the Disposition within this note     Time User Action Codes Description Comment    11/1/2022 10:32 PM Elle Jauregui Add [I67 93GS] CHI (closed head injury)     11/1/2022 10:33 PM Elle Jauregui Add [S00 81XA] Forehead abrasion     11/1/2022 10:33 PM Elle Jauregui Modify [S00 81XA] Forehead abrasion (2)    11/2/2022  9:42 AM Elle Jauregui Add [R93 6] Abnormal x-ray of femur     11/2/2022  9:42 AM Elle Jauregui Modify [R93 6] Abnormal x-ray of femur retraction of left disrta femoral screw      ED Disposition     ED Disposition   Discharge    Condition   Stable    Date/Time   Tue Nov 1, 2022 10:32 PM    Comment   Ebb Factor JASS Schrader discharge to home/self care  Follow-up Information     Follow up With Specialties Details Why Contact Info Additional [address] Fidencio Mcgill MD Internal Medicine Schedule an appointment as soon as possible for a visit  As needed [address] [facility]  Lydia Vinay Ramos [address] [place] [id number]  [phone] [address] [facility] [place] [address] Orthopedic Surgery  for revaluation of left femoral hardware [address] [facility] [address] [facility] [address], [address], [address] [place], [place], [facility]          Discharge Medication List as of 11/2/2022  9:47 AM      CONTINUE these medications which have NOT CHANGED    Details   acetaminophen (TYLENOL) 325 mg tablet Take 975 mg by mouth every 6 (six) hours as needed, Historical Med      atorvastatin (LIPITOR) 10 mg tablet Take 10 mg by mouth, Starting Thu 1/25/2018, Historical Med      B Complex Vitamins (VITAMIN-B COMPLEX PO) Take 1 tablet by mouth, Starting Mon 8/18/2014, Historical Med      benzonatate (TESSALON PERLES) 100 mg capsule Take 1 capsule (100 mg total) by mouth 3 (three) times a day as needed for cough, Starting Wed 7/27/2022, No Print      Calcium Carb-Cholecalciferol (OSCAL-D) 500 mg-200 units per tablet Take 1 tablet by mouth 2 (two) times a day with meals, Historical Med      docusate sodium (COLACE) 100 mg capsule Take 1 capsule (100 mg total) by mouth daily as needed (constipation unrelieved by Miralax), Starting Wed 8/24/2022, No Print      fluocinolone (SYNALAR) 0 01 % cream Apply topically in the morning Apply to scalpe for 2-4 hours daily    Protect with shower cap and shampoo out , Historical Med      guaiFENesin 1200 MG TB12 Take 1 tablet (1,200 mg total) by mouth every 12 (twelve) hours, Starting Wed 7/27/2022, No Print      lidocaine (LIDODERM) 5 % Apply 1 patch topically daily Remove & Discard patch within 12 hours or as directed by MD, Starting Thu 8/25/2022, No Print      polyethylene glycol (MIRALAX) 17 g packet Take 17 g by mouth daily as needed, Historical Med      vitamin B-12 (CYANOCOBALAMIN) 100 MCG tablet Take 1,000 mcg by mouth, Starting Fri 9/20/2013, Historical Med      cholecalciferol (VITAMIN D3) 1,000 units tablet Take 1,000 Units by mouth daily, Historical Med      enoxaparin (Lovenox) 40 mg/0 4 mL Inject 0 4 mL (40 mg total) under the skin in the morning for 28 days, Starting Wed 8/24/2022, Until Wed 9/21/2022, No Print             No discharge procedures on file      PDMP Review       Value Time User    PDMP Reviewed  Yes 7/25/2022  8:59 PM Juan Jose Roldan           Provider  Electronically Signed by           Errol Lin DO  11/05/22 961 [facility],   11/05/22 4642

## 2022-11-02 NOTE — ED NOTES
Call placed to [facility] for transport back to facility, will call back with pick-up time       Kim Valdez RN  11/01/22 8272

## 2022-11-02 NOTE — PROGRESS NOTES
ADT alert received  Patient was seen in ED at [facility] after a fall at her nursing home  She had a work up completed and discharged back home

## 2022-11-02 NOTE — ED CARE HANDOFF
Emergency Department Sign Out Note        Sign out and transfer of care from   [facility]  See Separate Emergency Department note  The patient, Lexi Hastings, was evaluated by the previous provider for fall, CHF, forehead abrasion  Workup Completed:  yes    ED Course / Workup Pending (followup):  Pending discharge back to nursing facility  Signed out to Dr Troy Samuel this AM                                       Procedures  MDM        Disposition  Final diagnoses:   CHI (closed head injury)   Forehead abrasion - (2)     Time reflects when diagnosis was documented in both MDM as applicable and the Disposition within this note     Time User Action Codes Description Comment    11/1/2022 10:32 PM Elle Jauregui Add [W25 41AO] CHI (closed head injury)     11/1/2022 10:33 PM Elle Jauregui Add [S00 81XA] Forehead abrasion     11/1/2022 10:33 PM Elle Jauregui Modify [S00 81XA] Forehead abrasion (2)      ED Disposition     ED Disposition   Discharge    Condition   Stable    Date/Time   Tue Nov 1, 2022 10:32 PM    Comment   Lexi Hastings discharge to home/self care  Follow-up Information     Follow up With Specialties Details Why [address], MD Internal Medicine Schedule an appointment as soon as possible for a visit  As needed [facility] [address] [id number] [phone]          Patient's Medications   Discharge Prescriptions    No medications on file     No discharge procedures on file         ED Provider  Electronically Signed by     Kevin Nur MD  11/02/22 3632

## 2022-11-22 ENCOUNTER — PATIENT OUTREACH (OUTPATIENT)
Dept: CASE MANAGEMENT | Facility: OTHER | Age: 87
End: 2022-11-22

## 2023-07-05 ENCOUNTER — APPOINTMENT (EMERGENCY)
Dept: CT IMAGING | Facility: HOSPITAL | Age: 88
End: 2023-07-05
Payer: MEDICARE

## 2023-07-05 ENCOUNTER — HOSPITAL ENCOUNTER (EMERGENCY)
Facility: HOSPITAL | Age: 88
Discharge: NURSING, REHAB OR LONG-TERM CARE | End: 2023-07-05
Attending: EMERGENCY MEDICINE
Payer: MEDICARE

## 2023-07-05 VITALS
WEIGHT: 154.1 LBS | HEART RATE: 104 BPM | SYSTOLIC BLOOD PRESSURE: 164 MMHG | OXYGEN SATURATION: 92 % | RESPIRATION RATE: 16 BRPM | BODY MASS INDEX: 30.1 KG/M2 | DIASTOLIC BLOOD PRESSURE: 72 MMHG | TEMPERATURE: 97.6 F

## 2023-07-05 DIAGNOSIS — S22.39XA CLOSED RIB FRACTURE: Primary | ICD-10-CM

## 2023-07-05 LAB
ANION GAP SERPL CALCULATED.3IONS-SCNC: 6 MMOL/L
BASOPHILS # BLD AUTO: 0.04 THOUSANDS/ÂΜL (ref 0–0.1)
BASOPHILS NFR BLD AUTO: 0 % (ref 0–1)
BUN SERPL-MCNC: 19 MG/DL (ref 5–25)
CALCIUM SERPL-MCNC: 9.4 MG/DL (ref 8.4–10.2)
CHLORIDE SERPL-SCNC: 105 MMOL/L (ref 96–108)
CO2 SERPL-SCNC: 26 MMOL/L (ref 21–32)
CREAT SERPL-MCNC: 0.64 MG/DL (ref 0.6–1.3)
EOSINOPHIL # BLD AUTO: 0.06 THOUSAND/ÂΜL (ref 0–0.61)
EOSINOPHIL NFR BLD AUTO: 1 % (ref 0–6)
ERYTHROCYTE [DISTWIDTH] IN BLOOD BY AUTOMATED COUNT: 14.5 % (ref 11.6–15.1)
GFR SERPL CREATININE-BSD FRML MDRD: 77 ML/MIN/1.73SQ M
GLUCOSE SERPL-MCNC: 105 MG/DL (ref 65–140)
HCT VFR BLD AUTO: 39.2 % (ref 34.8–46.1)
HGB BLD-MCNC: 12.8 G/DL (ref 11.5–15.4)
IMM GRANULOCYTES # BLD AUTO: 0.05 THOUSAND/UL (ref 0–0.2)
IMM GRANULOCYTES NFR BLD AUTO: 1 % (ref 0–2)
LYMPHOCYTES # BLD AUTO: 1.22 THOUSANDS/ÂΜL (ref 0.6–4.47)
LYMPHOCYTES NFR BLD AUTO: 11 % (ref 14–44)
MCH RBC QN AUTO: 30 PG (ref 26.8–34.3)
MCHC RBC AUTO-ENTMCNC: 32.7 G/DL (ref 31.4–37.4)
MCV RBC AUTO: 92 FL (ref 82–98)
MONOCYTES # BLD AUTO: 1.18 THOUSAND/ÂΜL (ref 0.17–1.22)
MONOCYTES NFR BLD AUTO: 11 % (ref 4–12)
NEUTROPHILS # BLD AUTO: 8.35 THOUSANDS/ÂΜL (ref 1.85–7.62)
NEUTS SEG NFR BLD AUTO: 76 % (ref 43–75)
NRBC BLD AUTO-RTO: 0 /100 WBCS
PLATELET # BLD AUTO: 245 THOUSANDS/UL (ref 149–390)
PMV BLD AUTO: 9.8 FL (ref 8.9–12.7)
POTASSIUM SERPL-SCNC: 4.1 MMOL/L (ref 3.5–5.3)
RBC # BLD AUTO: 4.27 MILLION/UL (ref 3.81–5.12)
SODIUM SERPL-SCNC: 137 MMOL/L (ref 135–147)
WBC # BLD AUTO: 10.9 THOUSAND/UL (ref 4.31–10.16)

## 2023-07-05 PROCEDURE — 36415 COLL VENOUS BLD VENIPUNCTURE: CPT | Performed by: EMERGENCY MEDICINE

## 2023-07-05 PROCEDURE — 71250 CT THORAX DX C-: CPT

## 2023-07-05 PROCEDURE — G1004 CDSM NDSC: HCPCS

## 2023-07-05 PROCEDURE — 70450 CT HEAD/BRAIN W/O DYE: CPT

## 2023-07-05 PROCEDURE — 80048 BASIC METABOLIC PNL TOTAL CA: CPT | Performed by: EMERGENCY MEDICINE

## 2023-07-05 PROCEDURE — 85025 COMPLETE CBC W/AUTO DIFF WBC: CPT | Performed by: EMERGENCY MEDICINE

## 2023-07-05 PROCEDURE — 99285 EMERGENCY DEPT VISIT HI MDM: CPT | Performed by: EMERGENCY MEDICINE

## 2023-07-05 PROCEDURE — 99284 EMERGENCY DEPT VISIT MOD MDM: CPT

## 2023-07-05 PROCEDURE — 93005 ELECTROCARDIOGRAM TRACING: CPT

## 2023-07-05 RX ORDER — OXYCODONE HYDROCHLORIDE 5 MG/1
5 TABLET ORAL EVERY 6 HOURS PRN
Status: DISCONTINUED | OUTPATIENT
Start: 2023-07-05 | End: 2023-07-05

## 2023-07-05 RX ORDER — OXYCODONE HYDROCHLORIDE AND ACETAMINOPHEN 5; 325 MG/1; MG/1
1 TABLET ORAL EVERY 4 HOURS PRN
Qty: 15 TABLET | Refills: 0 | Status: SHIPPED | OUTPATIENT
Start: 2023-07-05 | End: 2023-07-15

## 2023-07-05 RX ORDER — OXYCODONE HYDROCHLORIDE AND ACETAMINOPHEN 5; 325 MG/1; MG/1
1 TABLET ORAL ONCE
Status: COMPLETED | OUTPATIENT
Start: 2023-07-05 | End: 2023-07-05

## 2023-07-05 RX ADMIN — OXYCODONE HYDROCHLORIDE AND ACETAMINOPHEN 1 TABLET: 5; 325 TABLET ORAL at 20:16

## 2023-07-05 NOTE — ED PROVIDER NOTES
History  Chief Complaint   Patient presents with   • Fall     Patient arrived via EMS from [facility]. Per staff, patient fell earlier today while ambulating to bathroom with walker. Staff initially reported no complaints, at change of shift patient reporting right sided rib pain. Facility gave 650mg of tylenol at 15:30     HPI    Prior to Admission Medications   Prescriptions Last Dose Informant Patient Reported? Taking? B Complex Vitamins (VITAMIN-B COMPLEX PO)   Yes No   Sig: Take 1 tablet by mouth   Calcium Carb-Cholecalciferol (OSCAL-D) 500 mg-200 units per tablet   Yes No   Sig: Take 1 tablet by mouth 2 (two) times a day with meals   acetaminophen (TYLENOL) 325 mg tablet   Yes No   Sig: Take 975 mg by mouth every 6 (six) hours as needed   atorvastatin (LIPITOR) 10 mg tablet   Yes No   Sig: Take 10 mg by mouth   benzonatate (TESSALON PERLES) 100 mg capsule   No No   Sig: Take 1 capsule (100 mg total) by mouth 3 (three) times a day as needed for cough   cholecalciferol (VITAMIN D3) 1,000 units tablet   Yes No   Sig: Take 1,000 Units by mouth daily   docusate sodium (COLACE) 100 mg capsule   No No   Sig: Take 1 capsule (100 mg total) by mouth daily as needed (constipation unrelieved by Miralax)   enoxaparin (Lovenox) 40 mg/0.4 mL   No No   Sig: Inject 0.4 mL (40 mg total) under the skin in the morning for 28 days   fluocinolone (SYNALAR) 0.01 % cream   Yes No   Sig: Apply topically in the morning Apply to scalpe for 2-4 hours daily.   Protect with shower cap and shampoo out.   guaiFENesin 1200 MG TB12   No No   Sig: Take 1 tablet (1,200 mg total) by mouth every 12 (twelve) hours   lidocaine (LIDODERM) 5 %   No No   Sig: Apply 1 patch topically daily Remove & Discard patch within 12 hours or as directed by MD   polyethylene glycol (MIRALAX) 17 g packet   Yes No   Sig: Take 17 g by mouth daily as needed   vitamin B-12 (CYANOCOBALAMIN) 100 MCG tablet   Yes No   Sig: Take 1,000 mcg by mouth Facility-Administered Medications: None       Past Medical History:   Diagnosis Date   • Chronic pain disorder    • Low back pain        History reviewed. No pertinent surgical history. History reviewed. No pertinent family history. I have reviewed and agree with the history as documented.     E-Cigarette/Vaping   • E-Cigarette Use Never User      E-Cigarette/Vaping Substances     Social History     Tobacco Use   • Smoking status: Never   • Smokeless tobacco: Never   Vaping Use   • Vaping Use: Never used   Substance Use Topics   • Alcohol use: Never   • Drug use: Never       Review of Systems    Physical Exam  Physical Exam    Vital Signs  ED Triage Vitals [07/05/23 1629]   Temperature Pulse Respirations Blood Pressure SpO2   97.6 °F (36.4 °C) (!) 112 18 153/68 96 %      Temp Source Heart Rate Source Patient Position - Orthostatic VS BP Location FiO2 (%)   Oral Monitor Lying Left arm --      Pain Score       --           Vitals:    07/05/23 1629 07/05/23 1837   BP: 153/68 (!) 174/79   Pulse: (!) 112 (!) 111   Patient Position - Orthostatic VS: Lying Lying         Visual Acuity      ED Medications  Medications - No data to display    Diagnostic Studies  Results Reviewed     Procedure Component Value Units Date/Time    Basic metabolic panel [[id number]] Collected: 07/05/23 1721    Lab Status: Final result Specimen: Blood from Arm, Left Updated: 07/05/23 1740     Sodium 137 mmol/L      Potassium 4.1 mmol/L      Chloride 105 mmol/L      CO2 26 mmol/L      ANION GAP 6 mmol/L      BUN 19 mg/dL      Creatinine 0.64 mg/dL      Glucose 105 mg/dL      Calcium 9.4 mg/dL      eGFR 77 ml/min/1.73sq m     Narrative:      [place] guidelines for Chronic Kidney Disease (CKD):   •  Stage 1 with normal or high GFR (GFR > 90 mL/min/1.73 square meters)  •  Stage 2 Mild CKD (GFR = 60-89 mL/min/1.73 square meters)  •  Stage 3A Moderate CKD (GFR = 45-59 mL/min/1.73 square meters)  •  Stage 3B Moderate CKD (GFR = 30-44 mL/min/1.73 square meters)  •  Stage 4 Severe CKD (GFR = 15-29 mL/min/1.73 square meters)  •  Stage 5 End Stage CKD (GFR <15 mL/min/1.73 square meters)  Note: GFR calculation is accurate only with a steady state creatinine    CBC and differential [[id number]]  (Abnormal) Collected: 07/05/23 1721    Lab Status: Final result Specimen: Blood from Arm, Left Updated: 07/05/23 1727     WBC 10.90 Thousand/uL      RBC 4.27 Million/uL      Hemoglobin 12.8 g/dL      Hematocrit 39.2 %      MCV 92 fL      MCH 30.0 pg      MCHC 32.7 g/dL      RDW 14.5 %      MPV 9.8 fL      Platelets 555 Thousands/uL      nRBC 0 /100 WBCs      Neutrophils Relative 76 %      Immat GRANS % 1 %      Lymphocytes Relative 11 %      Monocytes Relative 11 %      Eosinophils Relative 1 %      Basophils Relative 0 %      Neutrophils Absolute 8.35 Thousands/µL      Immature Grans Absolute 0.05 Thousand/uL      Lymphocytes Absolute 1.22 Thousands/µL      Monocytes Absolute 1.18 Thousand/µL      Eosinophils Absolute 0.06 Thousand/µL      Basophils Absolute 0.04 Thousands/µL                  CT chest without contrast    (Results Pending)   CT head without contrast    (Results Pending)              Procedures  ECG 12 Lead Documentation Only    Date/Time: 7/5/2023 7:10 PM    Performed by: Odilon Vazquez DO  Authorized by: Odilon Vazquez DO    Indications / Diagnosis:  Fall, chest wall trauma  ECG reviewed by me, the ED Provider: yes    Patient location:  ED  Previous ECG:     Previous ECG:  Compared to current    Comparison ECG info:  7/25/2022    Similarity:  No change  Interpretation:     Interpretation: non-specific    Quality:     Tracing quality:  Limited by artifact  Rate:     ECG rate:  107    ECG rate assessment: tachycardic    Rhythm:     Rhythm: sinus tachycardia    Ectopy:     Ectopy: PAC               ED Course                               SBIRT 20yo+    Flowsheet Row Most Recent Value   Initial Alcohol Screen: US AUDIT-C     1.  How often do you have a drink containing alcohol? 0 Filed at: 07/05/2023 1632   Audit-C Score 0 Filed at: 07/05/2023 1632                    MDM    Disposition  Final diagnoses:   None     ED Disposition     None      Follow-up Information    None         Patient's Medications   Discharge Prescriptions    No medications on file       No discharge procedures on file.     PDMP Review       Value Time User    PDMP Reviewed  Yes 7/25/2022  8:59 PM Luli Vaughn, [address] [place] [address]          ED Provider  Electronically Signed by Updated: 07/05/23 1727     WBC 10.90 Thousand/uL      RBC 4.27 Million/uL      Hemoglobin 12.8 g/dL      Hematocrit 39.2 %      MCV 92 fL      MCH 30.0 pg      MCHC 32.7 g/dL      RDW 14.5 %      MPV 9.8 fL      Platelets 288 Thousands/uL      nRBC 0 /100 WBCs      Neutrophils Relative 76 %      Immat GRANS % 1 %      Lymphocytes Relative 11 %      Monocytes Relative 11 %      Eosinophils Relative 1 %      Basophils Relative 0 %      Neutrophils Absolute 8.35 Thousands/µL      Immature Grans Absolute 0.05 Thousand/uL      Lymphocytes Absolute 1.22 Thousands/µL      Monocytes Absolute 1.18 Thousand/µL      Eosinophils Absolute 0.06 Thousand/µL      Basophils Absolute 0.04 Thousands/µL                  CT chest without contrast   Final Result by Curtis Arce MD (07/05 1911)      Evaluation of the ribs compromised by motion with acute nondisplaced right lateral seventh rib fracture. Irregularity of several additional right ribs and multiple left ribs are likely due to old fractures. No hemothorax or pneumothorax. Mild pulmonary fibrosis. The study was marked in [facility] for immediate notification. Workstation performed: [id number]         CT head without contrast   Final Result by Gabriela Chase MD (07/05 1938)      No acute intracranial abnormality.                   Workstation performed: [id number]                    Procedures  ECG 12 Lead Documentation Only    Date/Time: 7/5/2023 7:10 PM    Performed by: Wesley Mei DO  Authorized by: Wesley Mei DO    Indications / Diagnosis:  Fall, chest wall trauma  ECG reviewed by me, the ED Provider: yes    Patient location:  ED  Previous ECG:     Previous ECG:  Compared to current    Comparison ECG info:  7/25/2022    Similarity:  No change  Interpretation:     Interpretation: non-specific    Quality:     Tracing quality:  Limited by artifact  Rate:     ECG rate:  107    ECG rate assessment: tachycardic    Rhythm:     Rhythm: sinus tachycardia Ectopy:     Ectopy: [facility]               ED Course                               SBIRT 20yo+    Flowsheet Row Most Recent Value   Initial Alcohol Screen: US AUDIT-C     1. How often do you have a drink containing alcohol? 0 Filed at: 07/05/2023 1632   Audit-C Score 0 Filed at: 07/05/2023 4611                    Medical Decision Making  60-year-old female presents with right-sided chest wall pain after falling at the nursing facility. Differential diagnosis includes but is not limited to acute rib fracture, pulmonary contusion, chest wall contusion, pneumothorax. Closed rib fracture: acute illness or injury  Amount and/or Complexity of Data Reviewed  Labs: ordered. Details: Labs ordered and independently interpreted by me, no acute abnormality noted, mild leukocytosis may be secondary to increased stress/pain  Radiology: ordered. Details: CT head and chest ordered by me and interpreted by radiology. ECG/medicine tests: ordered and independent interpretation performed. Decision-making details documented in ED Course. Risk  Prescription drug management. Risk Details: 60-year-old female presents with right-sided chest wall pain after falling. Patient was found to have an isolated rib fracture. Her pain was well controlled with oxycodone while in the department. Plan to discharge with oral pain medication, instructions for incentive spirometry. Discussed signs and symptoms to return to the emergency department. Patient felt comfortable with discharge plan.         Disposition  Final diagnoses:   Closed rib fracture     Time reflects when diagnosis was documented in both MDM as applicable and the Disposition within this note     Time User Action Codes Description Comment    7/5/2023  8:16 PM Susy Vernon Add [S22.39XA] Closed rib fracture       ED Disposition     ED Disposition   Discharge    Condition   Stable    Date/Time   Wed Jul 5, 2023  8:16 PM    Comment   [facility] discharge to home/self care.               Follow-up Information     Follow up With Specialties Details Why [id number] David Kevin MD Internal Medicine Schedule an appointment as soon as possible for a visit in 2 days As needed Prince PAVON [address]  [phone]            Discharge Medication List as of 7/5/2023  8:24 PM      START taking these medications    Details   oxyCODONE-acetaminophen (PERCOCET) 5-325 mg per tablet Take 1 tablet by mouth every 4 (four) hours as needed for moderate pain for up to 10 days Max Daily Amount: 6 tablets, Starting Wed 7/5/2023, Until Sat 7/15/2023 at 2359, Normal         CONTINUE these medications which have NOT CHANGED    Details   acetaminophen (TYLENOL) 325 mg tablet Take 975 mg by mouth every 6 (six) hours as needed, Historical Med      atorvastatin (LIPITOR) 10 mg tablet Take 10 mg by mouth, Starting Thu 1/25/2018, Historical Med      B Complex Vitamins (VITAMIN-B COMPLEX PO) Take 1 tablet by mouth, Starting Mon 8/18/2014, Historical Med      benzonatate (TESSALON PERLES) 100 mg capsule Take 1 capsule (100 mg total) by mouth 3 (three) times a day as needed for cough, Starting Wed 7/27/2022, No Print      Calcium Carb-Cholecalciferol (OSCAL-D) 500 mg-200 units per tablet Take 1 tablet by mouth 2 (two) times a day with meals, Historical Med      cholecalciferol (VITAMIN D3) 1,000 units tablet Take 1,000 Units by mouth daily, Historical Med      docusate sodium (COLACE) 100 mg capsule Take 1 capsule (100 mg total) by mouth daily as needed (constipation unrelieved by Miralax), Starting Wed 8/24/2022, No Print      enoxaparin (Lovenox) 40 mg/0.4 mL Inject 0.4 mL (40 mg total) under the skin in the morning for 28 days, Starting Wed 8/24/2022, Until Wed 9/21/2022, No Print      fluocinolone (SYNALAR) 0.01 % cream Apply topically in the morning Apply to scalpe for 2-4 hours daily.   Protect with shower cap and shampoo out., Historical Med      guaiFENesin 1200 MG TB12 Take 1 tablet (1,200 mg total) by mouth every 12 (twelve) hours, Starting Wed 7/27/2022, No Print      lidocaine (LIDODERM) 5 % Apply 1 patch topically daily Remove & Discard patch within 12 hours or as directed by MD, Starting Thu 8/25/2022, No Print      polyethylene glycol (MIRALAX) 17 g packet Take 17 g by mouth daily as needed, Historical Med      vitamin B-12 (CYANOCOBALAMIN) 100 MCG tablet Take 1,000 mcg by mouth, Starting Fri 9/20/2013, Historical Med             No discharge procedures on file.     PDMP Review       Value Time User    PDMP Reviewed  Yes 7/25/2022  8:59 PM Chapin Lema, [address] [place] [address]          ED Provider  Electronically Signed by           Nupur Infante DO  07/16/23 9807

## 2023-07-06 LAB
ATRIAL RATE: 107 BPM
P AXIS: 69 DEGREES
PR INTERVAL: 188 MS
QRS AXIS: 6 DEGREES
QRSD INTERVAL: 86 MS
QT INTERVAL: 344 MS
QTC INTERVAL: 459 MS
T WAVE AXIS: 36 DEGREES
VENTRICULAR RATE: 107 BPM

## 2023-07-06 PROCEDURE — 93010 ELECTROCARDIOGRAM REPORT: CPT | Performed by: INTERNAL MEDICINE

## 2023-07-06 NOTE — ED NOTES
Transportation booked through round trip for patient discharge to [facility], awaiting on transport update    Attempted to call report to facility, no answer and mailbox is full, unable to leave a message      Flakita Lawson RN  07/05/23 2040

## 2023-07-06 NOTE — ED NOTES
Patient will be picked up by [employer] for transportation back to [facility] at [employer]     Pauline Nair RN  07/05/23 6635

## 2023-07-31 ENCOUNTER — APPOINTMENT (INPATIENT)
Dept: RADIOLOGY | Facility: HOSPITAL | Age: 88
DRG: 085 | End: 2023-07-31
Payer: MEDICARE

## 2023-07-31 ENCOUNTER — HOSPITAL ENCOUNTER (INPATIENT)
Facility: HOSPITAL | Age: 88
LOS: 3 days | Discharge: NURSING, REHAB OR LONG-TERM CARE | DRG: 085 | End: 2023-08-03
Attending: STUDENT IN AN ORGANIZED HEALTH CARE EDUCATION/TRAINING PROGRAM | Admitting: STUDENT IN AN ORGANIZED HEALTH CARE EDUCATION/TRAINING PROGRAM
Payer: MEDICARE

## 2023-07-31 ENCOUNTER — APPOINTMENT (INPATIENT)
Dept: CT IMAGING | Facility: HOSPITAL | Age: 88
DRG: 085 | End: 2023-07-31
Payer: MEDICARE

## 2023-07-31 ENCOUNTER — APPOINTMENT (EMERGENCY)
Dept: CT IMAGING | Facility: HOSPITAL | Age: 88
DRG: 085 | End: 2023-07-31
Payer: MEDICARE

## 2023-07-31 DIAGNOSIS — S01.112A LACERATION OF LEFT EYEBROW, INITIAL ENCOUNTER: Primary | ICD-10-CM

## 2023-07-31 DIAGNOSIS — I60.9 SAH (SUBARACHNOID HEMORRHAGE) (HCC): ICD-10-CM

## 2023-07-31 DIAGNOSIS — S52.121A CLOSED DISPLACED FRACTURE OF HEAD OF RIGHT RADIUS, INITIAL ENCOUNTER: ICD-10-CM

## 2023-07-31 DIAGNOSIS — I65.03: ICD-10-CM

## 2023-07-31 DIAGNOSIS — I77.74 VERTEBRAL ARTERY DISSECTION (HCC): ICD-10-CM

## 2023-07-31 DIAGNOSIS — S12.100A C2 CERVICAL FRACTURE (HCC): ICD-10-CM
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
## 2023-07-31 PROBLEM — I65.02 VERTEBRAL ARTERY OCCLUSION, LEFT: Status: ACTIVE | Noted: 2023-07-31

## 2023-07-31 PROBLEM — S52.90XA RADIAL FRACTURE: Status: ACTIVE | Noted: 2023-07-31

## 2023-07-31 PROBLEM — S22.49XA MULTIPLE RIB FRACTURES: Status: ACTIVE | Noted: 2023-07-31

## 2023-07-31 PROBLEM — S42.101A CLOSED FRACTURE OF RIGHT SCAPULA: Status: ACTIVE | Noted: 2023-07-31

## 2023-07-31 LAB
4HR DELTA HS TROPONIN: 13 NG/L
4HR DELTA HS TROPONIN: 13 NG/L
AAASAAGGREGATION: 91.2 % (ref 89–100)
AAASAAGGREGATION: 91.2 % (ref 89–100)
AAASAINHIBITION: 8.8 % (ref 0–11)
AAASAINHIBITION: 8.8 % (ref 0–11)
AAMA (MAX AMPLITUDE AA): 64.4 MM (ref 51–71)
AAMA (MAX AMPLITUDE AA): 64.4 MM (ref 51–71)
ACTFMA(MAX AMPLITUDE ACTF): 20.7 MM (ref 2–19)
ACTFMA(MAX AMPLITUDE ACTF): 20.7 MM (ref 2–19)
ADPADPAGGREGATION: 83.7 % (ref 83–100)
ADPADPAGGREGATION: 83.7 % (ref 83–100)
ADPADPINHIBITION: 16.3 % (ref 0–17)
ADPADPINHIBITION: 16.3 % (ref 0–17)
ADPMA(MAX AMPLITUDE ADP): 60.8 MM (ref 45–69)
ADPMA(MAX AMPLITUDE ADP): 60.8 MM (ref 45–69)
ALBUMIN SERPL BCP-MCNC: 3.7 G/DL (ref 3.5–5)
ALBUMIN SERPL BCP-MCNC: 3.7 G/DL (ref 3.5–5)
ALP SERPL-CCNC: 119 U/L (ref 34–104)
ALP SERPL-CCNC: 119 U/L (ref 34–104)
ALT SERPL W P-5'-P-CCNC: 13 U/L (ref 7–52)
ALT SERPL W P-5'-P-CCNC: 13 U/L (ref 7–52)
ANION GAP SERPL CALCULATED.3IONS-SCNC: 7 MMOL/L
ANION GAP SERPL CALCULATED.3IONS-SCNC: 7 MMOL/L
APTT PPP: 25 SECONDS (ref 23–37)
APTT PPP: 25 SECONDS (ref 23–37)
AST SERPL W P-5'-P-CCNC: 22 U/L (ref 13–39)
AST SERPL W P-5'-P-CCNC: 22 U/L (ref 13–39)
ATRIAL RATE: 113 BPM
ATRIAL RATE: 113 BPM
BACTERIA UR QL AUTO: ABNORMAL /HPF
BACTERIA UR QL AUTO: ABNORMAL /HPF
BASE EXCESS BLDA CALC-SCNC: 2 MMOL/L (ref -2–3)
BASE EXCESS BLDA CALC-SCNC: 2 MMOL/L (ref -2–3)
BASOPHILS # BLD AUTO: 0.06 THOUSANDS/ÂΜL (ref 0–0.1)
BASOPHILS # BLD AUTO: 0.06 THOUSANDS/ÂΜL (ref 0–0.1)
BASOPHILS NFR BLD AUTO: 1 % (ref 0–1)
BASOPHILS NFR BLD AUTO: 1 % (ref 0–1)
BILIRUB SERPL-MCNC: 0.67 MG/DL (ref 0.2–1)
BILIRUB SERPL-MCNC: 0.67 MG/DL (ref 0.2–1)
BILIRUB UR QL STRIP: NEGATIVE
BILIRUB UR QL STRIP: NEGATIVE
BUN SERPL-MCNC: 15 MG/DL (ref 5–25)
BUN SERPL-MCNC: 15 MG/DL (ref 5–25)
CA-I BLD-SCNC: 1.17 MMOL/L (ref 1.12–1.32)
CA-I BLD-SCNC: 1.17 MMOL/L (ref 1.12–1.32)
CALCIUM SERPL-MCNC: 9.4 MG/DL (ref 8.4–10.2)
CALCIUM SERPL-MCNC: 9.4 MG/DL (ref 8.4–10.2)
CARDIAC TROPONIN I PNL SERPL HS: 18 NG/L
CARDIAC TROPONIN I PNL SERPL HS: 18 NG/L
CARDIAC TROPONIN I PNL SERPL HS: 5 NG/L
CARDIAC TROPONIN I PNL SERPL HS: 5 NG/L
CHLORIDE SERPL-SCNC: 105 MMOL/L (ref 96–108)
CHLORIDE SERPL-SCNC: 105 MMOL/L (ref 96–108)
CK SERPL-CCNC: 66 U/L (ref 26–192)
CK SERPL-CCNC: 66 U/L (ref 26–192)
CLARITY UR: CLEAR
CLARITY UR: CLEAR
CO2 SERPL-SCNC: 25 MMOL/L (ref 21–32)
CO2 SERPL-SCNC: 25 MMOL/L (ref 21–32)
COLOR UR: COLORLESS
COLOR UR: COLORLESS
CREAT SERPL-MCNC: 0.64 MG/DL (ref 0.6–1.3)
CREAT SERPL-MCNC: 0.64 MG/DL (ref 0.6–1.3)
EOSINOPHIL # BLD AUTO: 0.46 THOUSAND/ÂΜL (ref 0–0.61)
EOSINOPHIL # BLD AUTO: 0.46 THOUSAND/ÂΜL (ref 0–0.61)
EOSINOPHIL NFR BLD AUTO: 6 % (ref 0–6)
EOSINOPHIL NFR BLD AUTO: 6 % (ref 0–6)
ERYTHROCYTE [DISTWIDTH] IN BLOOD BY AUTOMATED COUNT: 13.8 % (ref 11.6–15.1)
ERYTHROCYTE [DISTWIDTH] IN BLOOD BY AUTOMATED COUNT: 13.8 % (ref 11.6–15.1)
GFR SERPL CREATININE-BSD FRML MDRD: 77 ML/MIN/1.73SQ M
GFR SERPL CREATININE-BSD FRML MDRD: 77 ML/MIN/1.73SQ M
GLUCOSE SERPL-MCNC: 142 MG/DL (ref 65–140)
GLUCOSE SERPL-MCNC: 142 MG/DL (ref 65–140)
GLUCOSE SERPL-MCNC: 149 MG/DL (ref 65–140)
GLUCOSE SERPL-MCNC: 149 MG/DL (ref 65–140)
GLUCOSE UR STRIP-MCNC: NEGATIVE MG/DL
GLUCOSE UR STRIP-MCNC: NEGATIVE MG/DL
HCO3 BLDA-SCNC: 25.5 MMOL/L (ref 24–30)
HCO3 BLDA-SCNC: 25.5 MMOL/L (ref 24–30)
HCT VFR BLD AUTO: 40.3 % (ref 34.8–46.1)
HCT VFR BLD AUTO: 40.3 % (ref 34.8–46.1)
HCT VFR BLD CALC: 41 % (ref 34.8–46.1)
HCT VFR BLD CALC: 41 % (ref 34.8–46.1)
HGB BLD-MCNC: 12.9 G/DL (ref 11.5–15.4)
HGB BLD-MCNC: 12.9 G/DL (ref 11.5–15.4)
HGB BLDA-MCNC: 13.9 G/DL (ref 11.5–15.4)
HGB BLDA-MCNC: 13.9 G/DL (ref 11.5–15.4)
HGB UR QL STRIP.AUTO: NEGATIVE
HGB UR QL STRIP.AUTO: NEGATIVE
HKHMA(MAX AMPLITUDE KAOLIN): 68.6 MM (ref 53–68)
HKHMA(MAX AMPLITUDE KAOLIN): 68.6 MM (ref 53–68)
HOLD SPECIMEN: NORMAL
IMM GRANULOCYTES # BLD AUTO: 0.17 THOUSAND/UL (ref 0–0.2)
IMM GRANULOCYTES # BLD AUTO: 0.17 THOUSAND/UL (ref 0–0.2)
IMM GRANULOCYTES NFR BLD AUTO: 2 % (ref 0–2)
IMM GRANULOCYTES NFR BLD AUTO: 2 % (ref 0–2)
INR PPP: 1.07 (ref 0.84–1.19)
INR PPP: 1.07 (ref 0.84–1.19)
KETONES UR STRIP-MCNC: NEGATIVE MG/DL
KETONES UR STRIP-MCNC: NEGATIVE MG/DL
LEUKOCYTE ESTERASE UR QL STRIP: NEGATIVE
LEUKOCYTE ESTERASE UR QL STRIP: NEGATIVE
LYMPHOCYTES # BLD AUTO: 2.19 THOUSANDS/ÂΜL (ref 0.6–4.47)
LYMPHOCYTES # BLD AUTO: 2.19 THOUSANDS/ÂΜL (ref 0.6–4.47)
LYMPHOCYTES NFR BLD AUTO: 27 % (ref 14–44)
LYMPHOCYTES NFR BLD AUTO: 27 % (ref 14–44)
MCH RBC QN AUTO: 29.7 PG (ref 26.8–34.3)
MCH RBC QN AUTO: 29.7 PG (ref 26.8–34.3)
MCHC RBC AUTO-ENTMCNC: 32 G/DL (ref 31.4–37.4)
MCHC RBC AUTO-ENTMCNC: 32 G/DL (ref 31.4–37.4)
MCV RBC AUTO: 93 FL (ref 82–98)
MCV RBC AUTO: 93 FL (ref 82–98)
MONOCYTES # BLD AUTO: 0.86 THOUSAND/ÂΜL (ref 0.17–1.22)
MONOCYTES # BLD AUTO: 0.86 THOUSAND/ÂΜL (ref 0.17–1.22)
MONOCYTES NFR BLD AUTO: 10 % (ref 4–12)
MONOCYTES NFR BLD AUTO: 10 % (ref 4–12)
NEUTROPHILS # BLD AUTO: 4.5 THOUSANDS/ÂΜL (ref 1.85–7.62)
NEUTROPHILS # BLD AUTO: 4.5 THOUSANDS/ÂΜL (ref 1.85–7.62)
NEUTS SEG NFR BLD AUTO: 54 % (ref 43–75)
NEUTS SEG NFR BLD AUTO: 54 % (ref 43–75)
NITRITE UR QL STRIP: POSITIVE
NITRITE UR QL STRIP: POSITIVE
NON-SQ EPI CELLS URNS QL MICRO: ABNORMAL /HPF
NON-SQ EPI CELLS URNS QL MICRO: ABNORMAL /HPF
NRBC BLD AUTO-RTO: 0 /100 WBCS
NRBC BLD AUTO-RTO: 0 /100 WBCS
P AXIS: 76 DEGREES
P AXIS: 76 DEGREES
PCO2 BLD: 27 MMOL/L (ref 21–32)
PCO2 BLD: 27 MMOL/L (ref 21–32)
PCO2 BLD: 34.8 MM HG (ref 42–50)
PCO2 BLD: 34.8 MM HG (ref 42–50)
PH BLD: 7.47 [PH] (ref 7.3–7.4)
PH BLD: 7.47 [PH] (ref 7.3–7.4)
PH UR STRIP.AUTO: 8 [PH]
PH UR STRIP.AUTO: 8 [PH]
PLATELET # BLD AUTO: 344 THOUSANDS/UL (ref 149–390)
PLATELET # BLD AUTO: 344 THOUSANDS/UL (ref 149–390)
PMV BLD AUTO: 9.6 FL (ref 8.9–12.7)
PMV BLD AUTO: 9.6 FL (ref 8.9–12.7)
PO2 BLD: 26 MM HG (ref 35–45)
PO2 BLD: 26 MM HG (ref 35–45)
POTASSIUM BLD-SCNC: 4.3 MMOL/L (ref 3.5–5.3)
POTASSIUM BLD-SCNC: 4.3 MMOL/L (ref 3.5–5.3)
POTASSIUM SERPL-SCNC: 4 MMOL/L (ref 3.5–5.3)
POTASSIUM SERPL-SCNC: 4 MMOL/L (ref 3.5–5.3)
PR INTERVAL: 180 MS
PR INTERVAL: 180 MS
PROT SERPL-MCNC: 7.2 G/DL (ref 6.4–8.4)
PROT SERPL-MCNC: 7.2 G/DL (ref 6.4–8.4)
PROT UR STRIP-MCNC: NEGATIVE MG/DL
PROT UR STRIP-MCNC: NEGATIVE MG/DL
PROTHROMBIN TIME: 14.1 SECONDS (ref 11.6–14.5)
PROTHROMBIN TIME: 14.1 SECONDS (ref 11.6–14.5)
QRS AXIS: 7 DEGREES
QRS AXIS: 7 DEGREES
QRSD INTERVAL: 78 MS
QRSD INTERVAL: 78 MS
QT INTERVAL: 300 MS
QT INTERVAL: 300 MS
QTC INTERVAL: 459 MS
QTC INTERVAL: 459 MS
RBC # BLD AUTO: 4.35 MILLION/UL (ref 3.81–5.12)
RBC # BLD AUTO: 4.35 MILLION/UL (ref 3.81–5.12)
RBC #/AREA URNS AUTO: ABNORMAL /HPF
RBC #/AREA URNS AUTO: ABNORMAL /HPF
SAO2 % BLD FROM PO2: 54 % (ref 60–85)
SAO2 % BLD FROM PO2: 54 % (ref 60–85)
SODIUM BLD-SCNC: 140 MMOL/L (ref 136–145)
SODIUM BLD-SCNC: 140 MMOL/L (ref 136–145)
SODIUM SERPL-SCNC: 137 MMOL/L (ref 135–147)
SODIUM SERPL-SCNC: 137 MMOL/L (ref 135–147)
SP GR UR STRIP.AUTO: 1.03 (ref 1–1.03)
SP GR UR STRIP.AUTO: 1.03 (ref 1–1.03)
SPECIMEN SOURCE: ABNORMAL
SPECIMEN SOURCE: ABNORMAL
T WAVE AXIS: 25 DEGREES
T WAVE AXIS: 25 DEGREES
UROBILINOGEN UR STRIP-ACNC: <2 MG/DL
UROBILINOGEN UR STRIP-ACNC: <2 MG/DL
VENTRICULAR RATE: 141 BPM
VENTRICULAR RATE: 141 BPM
WBC # BLD AUTO: 8.24 THOUSAND/UL (ref 4.31–10.16)
WBC # BLD AUTO: 8.24 THOUSAND/UL (ref 4.31–10.16)
WBC #/AREA URNS AUTO: ABNORMAL /HPF
WBC #/AREA URNS AUTO: ABNORMAL /HPF

## 2023-07-31 PROCEDURE — 82330 ASSAY OF CALCIUM: CPT

## 2023-07-31 PROCEDURE — 99222 1ST HOSP IP/OBS MODERATE 55: CPT | Performed by: ORTHOPAEDIC SURGERY

## 2023-07-31 PROCEDURE — 85025 COMPLETE CBC W/AUTO DIFF WBC: CPT | Performed by: STUDENT IN AN ORGANIZED HEALTH CARE EDUCATION/TRAINING PROGRAM

## 2023-07-31 PROCEDURE — 74177 CT ABD & PELVIS W/CONTRAST: CPT

## 2023-07-31 PROCEDURE — 99223 1ST HOSP IP/OBS HIGH 75: CPT | Performed by: PHYSICIAN ASSISTANT

## 2023-07-31 PROCEDURE — 84484 ASSAY OF TROPONIN QUANT: CPT | Performed by: STUDENT IN AN ORGANIZED HEALTH CARE EDUCATION/TRAINING PROGRAM

## 2023-07-31 PROCEDURE — 90471 IMMUNIZATION ADMIN: CPT

## 2023-07-31 PROCEDURE — 99291 CRITICAL CARE FIRST HOUR: CPT

## 2023-07-31 PROCEDURE — 85576 BLOOD PLATELET AGGREGATION: CPT | Performed by: PHYSICIAN ASSISTANT

## 2023-07-31 PROCEDURE — 84295 ASSAY OF SERUM SODIUM: CPT

## 2023-07-31 PROCEDURE — 84132 ASSAY OF SERUM POTASSIUM: CPT

## 2023-07-31 PROCEDURE — 76705 ECHO EXAM OF ABDOMEN: CPT

## 2023-07-31 PROCEDURE — NC001 PR NO CHARGE

## 2023-07-31 PROCEDURE — 93005 ELECTROCARDIOGRAM TRACING: CPT

## 2023-07-31 PROCEDURE — 29125 APPL SHORT ARM SPLINT STATIC: CPT | Performed by: PHYSICIAN ASSISTANT

## 2023-07-31 PROCEDURE — 36415 COLL VENOUS BLD VENIPUNCTURE: CPT | Performed by: STUDENT IN AN ORGANIZED HEALTH CARE EDUCATION/TRAINING PROGRAM

## 2023-07-31 PROCEDURE — 73060 X-RAY EXAM OF HUMERUS: CPT

## 2023-07-31 PROCEDURE — 90715 TDAP VACCINE 7 YRS/> IM: CPT | Performed by: EMERGENCY MEDICINE

## 2023-07-31 PROCEDURE — 73080 X-RAY EXAM OF ELBOW: CPT

## 2023-07-31 PROCEDURE — 70498 CT ANGIOGRAPHY NECK: CPT

## 2023-07-31 PROCEDURE — 80053 COMPREHEN METABOLIC PANEL: CPT | Performed by: STUDENT IN AN ORGANIZED HEALTH CARE EDUCATION/TRAINING PROGRAM

## 2023-07-31 PROCEDURE — 72125 CT NECK SPINE W/O DYE: CPT

## 2023-07-31 PROCEDURE — 93010 ELECTROCARDIOGRAM REPORT: CPT | Performed by: INTERNAL MEDICINE

## 2023-07-31 PROCEDURE — 81001 URINALYSIS AUTO W/SCOPE: CPT | Performed by: STUDENT IN AN ORGANIZED HEALTH CARE EDUCATION/TRAINING PROGRAM

## 2023-07-31 PROCEDURE — 70450 CT HEAD/BRAIN W/O DYE: CPT

## 2023-07-31 PROCEDURE — 70486 CT MAXILLOFACIAL W/O DYE: CPT

## 2023-07-31 PROCEDURE — 85730 THROMBOPLASTIN TIME PARTIAL: CPT | Performed by: STUDENT IN AN ORGANIZED HEALTH CARE EDUCATION/TRAINING PROGRAM

## 2023-07-31 PROCEDURE — 73552 X-RAY EXAM OF FEMUR 2/>: CPT

## 2023-07-31 PROCEDURE — 94664 DEMO&/EVAL PT USE INHALER: CPT

## 2023-07-31 PROCEDURE — 76604 US EXAM CHEST: CPT

## 2023-07-31 PROCEDURE — 12013 RPR F/E/E/N/L/M 2.6-5.0 CM: CPT | Performed by: PHYSICIAN ASSISTANT

## 2023-07-31 PROCEDURE — 99291 CRITICAL CARE FIRST HOUR: CPT | Performed by: STUDENT IN AN ORGANIZED HEALTH CARE EDUCATION/TRAINING PROGRAM

## 2023-07-31 PROCEDURE — 82550 ASSAY OF CK (CPK): CPT | Performed by: STUDENT IN AN ORGANIZED HEALTH CARE EDUCATION/TRAINING PROGRAM

## 2023-07-31 PROCEDURE — 85397 CLOTTING FUNCT ACTIVITY: CPT | Performed by: PHYSICIAN ASSISTANT

## 2023-07-31 PROCEDURE — 85014 HEMATOCRIT: CPT

## 2023-07-31 PROCEDURE — 12013 RPR F/E/E/N/L/M 2.6-5.0 CM: CPT

## 2023-07-31 PROCEDURE — 73590 X-RAY EXAM OF LOWER LEG: CPT

## 2023-07-31 PROCEDURE — 70496 CT ANGIOGRAPHY HEAD: CPT

## 2023-07-31 PROCEDURE — 93308 TTE F-UP OR LMTD: CPT

## 2023-07-31 PROCEDURE — 82947 ASSAY GLUCOSE BLOOD QUANT: CPT

## 2023-07-31 PROCEDURE — 94760 N-INVAS EAR/PLS OXIMETRY 1: CPT

## 2023-07-31 PROCEDURE — G1004 CDSM NDSC: HCPCS

## 2023-07-31 PROCEDURE — 85610 PROTHROMBIN TIME: CPT | Performed by: STUDENT IN AN ORGANIZED HEALTH CARE EDUCATION/TRAINING PROGRAM

## 2023-07-31 PROCEDURE — NC001 PR NO CHARGE: Performed by: EMERGENCY MEDICINE

## 2023-07-31 PROCEDURE — 99285 EMERGENCY DEPT VISIT HI MDM: CPT

## 2023-07-31 PROCEDURE — 82803 BLOOD GASES ANY COMBINATION: CPT

## 2023-07-31 PROCEDURE — 72040 X-RAY EXAM NECK SPINE 2-3 VW: CPT

## 2023-07-31 PROCEDURE — 71260 CT THORAX DX C+: CPT

## 2023-07-31 RX ORDER — CERAMIDE 1,3,6-II/SALICYLIC/B3
CLEANSER (ML) TOPICAL
COMMUNITY

## 2023-07-31 RX ORDER — ATORVASTATIN CALCIUM 10 MG/1
10 TABLET, FILM COATED ORAL DAILY
COMMUNITY

## 2023-07-31 RX ORDER — CHLORHEXIDINE GLUCONATE 0.12 MG/ML
15 RINSE ORAL EVERY 12 HOURS SCHEDULED
Status: DISCONTINUED | OUTPATIENT
Start: 2023-07-31 | End: 2023-08-03 | Stop reason: HOSPADM

## 2023-07-31 RX ORDER — GINGER ROOT/GINGER ROOT EXT 262.5 MG
1 CAPSULE ORAL DAILY
COMMUNITY

## 2023-07-31 RX ORDER — LIDOCAINE 50 MG/G
1 PATCH TOPICAL DAILY
Status: DISCONTINUED | OUTPATIENT
Start: 2023-07-31 | End: 2023-08-03 | Stop reason: HOSPADM

## 2023-07-31 RX ORDER — HYDROMORPHONE HCL IN WATER/PF 6 MG/30 ML
0.2 PATIENT CONTROLLED ANALGESIA SYRINGE INTRAVENOUS EVERY 4 HOURS PRN
Status: CANCELLED | OUTPATIENT
Start: 2023-07-31

## 2023-07-31 RX ORDER — METHOCARBAMOL 500 MG/1
500 TABLET, FILM COATED ORAL EVERY 6 HOURS PRN
Status: CANCELLED | OUTPATIENT
Start: 2023-07-31

## 2023-07-31 RX ORDER — POTASSIUM CHLORIDE 750 MG/1
20 TABLET, FILM COATED, EXTENDED RELEASE ORAL DAILY
COMMUNITY

## 2023-07-31 RX ORDER — DOCUSATE SODIUM 100 MG/1
100 CAPSULE, LIQUID FILLED ORAL 2 TIMES DAILY PRN
COMMUNITY

## 2023-07-31 RX ORDER — HYDROMORPHONE HCL IN WATER/PF 6 MG/30 ML
0.1 PATIENT CONTROLLED ANALGESIA SYRINGE INTRAVENOUS EVERY 4 HOURS PRN
Status: DISCONTINUED | OUTPATIENT
Start: 2023-07-31 | End: 2023-08-03 | Stop reason: HOSPADM

## 2023-07-31 RX ORDER — LIDOCAINE HYDROCHLORIDE 10 MG/ML
10 INJECTION, SOLUTION EPIDURAL; INFILTRATION; INTRACAUDAL; PERINEURAL ONCE
Status: COMPLETED | OUTPATIENT
Start: 2023-07-31 | End: 2023-07-31

## 2023-07-31 RX ORDER — ACETAMINOPHEN 325 MG/1
975 TABLET ORAL EVERY 6 HOURS PRN
COMMUNITY
End: 2023-08-12 | Stop reason: SDUPTHER

## 2023-07-31 RX ORDER — OXYCODONE HYDROCHLORIDE 5 MG/1
5 TABLET ORAL EVERY 4 HOURS PRN
Status: CANCELLED | OUTPATIENT
Start: 2023-07-31

## 2023-07-31 RX ORDER — ACETAMINOPHEN 325 MG/1
975 TABLET ORAL EVERY 8 HOURS SCHEDULED
Status: CANCELLED | OUTPATIENT
Start: 2023-07-31

## 2023-07-31 RX ORDER — VITAMIN B COMPLEX
1 CAPSULE ORAL DAILY
COMMUNITY
End: 2023-08-12 | Stop reason: SDUPTHER

## 2023-07-31 RX ORDER — MELATONIN
1000 DAILY
COMMUNITY

## 2023-07-31 RX ORDER — GABAPENTIN 100 MG/1
100 CAPSULE ORAL 3 TIMES DAILY
Status: CANCELLED | OUTPATIENT
Start: 2023-07-31

## 2023-07-31 RX ORDER — FUROSEMIDE 20 MG/1
30 TABLET ORAL DAILY
COMMUNITY

## 2023-07-31 RX ADMIN — LEVETIRACETAM 500 MG: 100 INJECTION, SOLUTION INTRAVENOUS at 21:51

## 2023-07-31 RX ADMIN — IOHEXOL 90 ML: 350 INJECTION, SOLUTION INTRAVENOUS at 10:46

## 2023-07-31 RX ADMIN — CHLORHEXIDINE GLUCONATE 15 ML: 1.2 SOLUTION ORAL at 21:51

## 2023-07-31 RX ADMIN — TETANUS TOXOID, REDUCED DIPHTHERIA TOXOID AND ACELLULAR PERTUSSIS VACCINE, ADSORBED 0.5 ML: 5; 2.5; 8; 8; 2.5 SUSPENSION INTRAMUSCULAR at 10:21

## 2023-07-31 RX ADMIN — HYDROMORPHONE HYDROCHLORIDE 0.1 MG: 0.2 INJECTION, SOLUTION INTRAMUSCULAR; INTRAVENOUS; SUBCUTANEOUS at 18:23

## 2023-07-31 RX ADMIN — HYDROMORPHONE HYDROCHLORIDE 0.1 MG: 0.2 INJECTION, SOLUTION INTRAMUSCULAR; INTRAVENOUS; SUBCUTANEOUS at 23:06

## 2023-07-31 RX ADMIN — CHLORHEXIDINE GLUCONATE 15 ML: 1.2 SOLUTION ORAL at 12:27

## 2023-07-31 RX ADMIN — LEVETIRACETAM 500 MG: 100 INJECTION, SOLUTION INTRAVENOUS at 12:26

## 2023-07-31 RX ADMIN — LIDOCAINE HYDROCHLORIDE 10 ML: 10 INJECTION, SOLUTION EPIDURAL; INFILTRATION; INTRACAUDAL; PERINEURAL at 13:22

## 2023-07-31 RX ADMIN — LIDOCAINE 1 PATCH: 700 PATCH TOPICAL at 15:09

## 2023-07-31 NOTE — ASSESSMENT & PLAN NOTE
· CTA:  · Right VA - Focal linear filling defect in the V3 segment at the level of fracture at C2 suspicious for focal dissection  · Left VA - Atherosclerotic disease at the origin with moderate stenosis at the region. V1 segment is small in caliber. Severely attenuated opacification of the proximal V2 segment that may be segmentally at the level of C5. Reconstitution at the level of C4. There is segmental narrowing of the V3 segment at the level of C2 with more normal caliber at the level of C1. Findings are suspicious for dissection.   · Neurosurgery consulted  · Patient will need antiplatelet medication, however in setting of [place] will hold until cleared by NSx  · Hold AC/AP for now  · Repeat CT head @ 1640 today and in AM  · If CT head is stable tomorrow, will likely initiate DAPT  · ASA 81mg daily  · Plavix 300mg, then 75mg daily  · P2Y12 Thursday or Friday

## 2023-07-31 NOTE — DISCHARGE INSTR - AVS FIRST PAGE
Discharge Instructions - Neurosurgery    VISTA collar at all times except for showering change to lópez collar. No heavy lifting. No strenuous activities. NO DRIVING. Continue 81 mg and Plavix 75 mg daily for vertebral artery injury  Follow-up on results of pending P2 Y12 lab    **Please notify MD immediately if you have increased neck or arm pain. New numbness and/or weakness in your arm. Difficulty swallowing or breathing especially while lying down. Numbness or weakness in arms or legs. **    Please follow up in 1 week with a repeat CTA head and neck with and without contrast and in 2 weeks with the Neurosurgical group with repeat X-ray of cervical spine 2-3 days prior to your appointment. You may go to any [facility] facility to get your imaging done. Please call [phone] to schedule your imaging appointment. Discharge Instructions - Orthopedics  Chris Nicolas 80 y.o. female MRN: [id number]  Unit/Bed#: AN CT SCAN    Weight Bearing Status:                                           Non weight bearing to the right upper extremity in sugartong splint    Pain:  Continue analgesics as directed    Dressing Instructions:   Please keep clean, dry and intact until follow up     Appt Instructions: If you do not have your appointment, please call the clinic at [phone] to schedule with upper extremity orthopedic surgeon. Contact the office sooner if you experience any increased numbness/tingling in the extremities.

## 2023-07-31 NOTE — CASE MANAGEMENT
Case Management ED Progress Note    Patient name Jayla Morales  Location TR-/TR- MRN [id number]  : 1930 Date 2023        OBJECTIVE:     Chief Complaint: Fall  Patient Class: Emergency  Preferred Pharmacy: No Pharmacies Listed  Primary Care Provider: No primary care provider on file. Primary Insurance:   Secondary Insurance:     ED Progress Note:  CM responded to trauma alert. Patient transported into trauma bay by [facility] for evaluation. Patient responsive to medical team's questions and instructions, did appear confused and [place]. Patient has son Lucie Cabrera listed on facesheet as emergency contact ([phone]). Call made to son, general update provided and confirmed above number is appropriate for updates. Trauma AP aware of above. No current identified CM needs. CM will follow and update screening, assessment, and discharge planning as appropriate.

## 2023-07-31 NOTE — ASSESSMENT & PLAN NOTE
SAH right ambient cistern s/p unwitnessed fall at nursing facility  · No known h/o AC/AP medications  · On exam, GCS 12 (E4, V2, M6) - moaning. Very Elim IRA bilaterally. Imaging personally reviewed with attending:  · CT head w/o, 7/31/23: Hyperdense material seen in right ambient cistern compatible with acute subarachnoid hemorrhage, new from 7/5/2023    Plan:  · Continue frequent neurological checks. · Repeat CT head 6-12 hours  · STAT CT head with any neurological decline including drop GCS of 2pts within 1 hr.  · Recommend SBP <160mmHg. · Keppra per primary team  · Hold all therapeutic antiplatelet and anticoagulation medications at this time  · Pain control per primary team  · Mobilize with PT/OT  · DVT PPX: SCDs only at this time. Would recommend additional CT head for stability prior to initiation of pharmacological DVT PPX. · Ongoing medical management per primary team/trauma. · No neurosurgical intervention indicated at this juncture. Neurosurgery will continue to follow. Please call with questions or concerns.

## 2023-07-31 NOTE — ASSESSMENT & PLAN NOTE
· S/p fall  · CT chest/abd/pelvis - Acute or subacute nondisplaced fractures of the right 3rd - 6th and 8th - 10th ribs laterally, which are new from 7/5/2023.  Healing fracture of the right 7th rib laterally  · Rib fracture protocol  · Multimodal analgesia as above  · Incentive spirometry  · PT/OT eval/treat

## 2023-07-31 NOTE — CONSULTS
[facility]  Consult  Name: Stephany Urban 80 y.o. female I MRN: [id number]  Unit/Bed#: ICU 02 I Date of Admission: 7/31/2023   Date of Service: 7/31/2023 I Hospital Day: 0    Inpatient consult to Neurosurgery  Consult performed by: Gogo Carlisle PA-C  Consult ordered by: Amanda Hummel PA-C      Imaging reviewed personally and with both Dr. Kar Saavedra and Dr. Rick Ricci 7/31/2023 at 11:35am  Patient examined at bedside 7/31/2023 at 11:50 AM    Assessment/Plan   SAH (subarachnoid hemorrhage) ([address])  Assessment & Plan  SAH right ambient cistern s/p unwitnessed fall at nursing facility  · No known h/o AC/AP medications  · On exam, GCS 12 (E4, V2, M6) - moaning. Very [place] bilaterally. Imaging personally reviewed with attending:  · CT head w/o, 7/31/23: Hyperdense material seen in right ambient cistern compatible with acute subarachnoid hemorrhage, new from 7/5/2023    Plan:  · Continue frequent neurological checks. · STAT CT head with any neurological decline including drop GCS of 2pts within 1 hr.  · Recommend SBP <160mmHg. · Keppra per primary team  · Hold all therapeutic antiplatelet and anticoagulation medications at this time  · Pain control per primary team  · Mobilize with PT/OT  · DVT PPX: SCDs only at this time. Would recommend additional CT head for stability prior to initiation of pharmacological DVT PPX. · Ongoing medical management per primary team/trauma. · No neurosurgical intervention indicated at this juncture. Neurosurgery will continue to follow. Please call with questions or concerns. Vertebral artery occlusion, left  Assessment & Plan  Left VA occlusion, right VA dissection    Reviewed imaging with neuro endovascular attending. Patient will need antiplatelet medication. However, given presence of SAH we will hold off at this time.   · If CT head is stable tomorrow, will likely initiate DAPT  · ASA 81mg daily  · Plavix 300mg, then 75mg daily  · P2Y12 Thursday or Friday    C2 cervical fracture [facility])  Assessment & Plan  Type 3 dens fracture (hangman's fracture) s/p unwitnessed fall  · Patient currently following commands, no obvious focal weakness    Imaging:  · CT cervical spine w/o, 7/31/23: Acute, minimally displaced fracture through the base of the C2 vertebral body, extending to the inferior endplate, into both pedicles to the level of the transverse foramina, and into the right transverse process    Plan:  · Continue to monitor neurological exam  · [place] collar at all times, lópez to shower  · Limit bending, twisting, lifting greater than 10 pounds  · No driving until cleared from collar  · Given nature of fracture and patient's age, patient will likely remain in collar for several months to life  · Upright x-ray including open-mouth view when able to mobilize  · Pain control per primary team  · PT OT evaluation, okay to mobilize and collar          History of Present Illness     HPI: Alvaro Shen is a 80y.o. year old female with PMH including bilateral carotid artery disease, hypertension, osteoporosis with multiple thoracic and lumbar compression fractures who presents after an unwitnessed fall at her nursing facility. Per report, she was found on the floor. Currently, she appears to be in pain. She is mumbling and not able to verbalize her name. She is following commands briskly. She is significantly hard of hearing but per last notes from 7/5/2023 she was ambulatory with a walker with normal mental status. Imaging in the ED was significant for right ambient cistern SAH, type III dens fracture, left vertebral artery occlusion, and right vertebral artery dissection. She also has a right radius fracture. She does not take any AC/AP medications at baseline. Given her current mental status and risk for deterioration given SAH, discussion with her family resulted in level 3 CODE STATUS.       Review of Systems   Unable to perform ROS: Mental status change       Historical Information   No past medical history on file. No past surgical history on file. Social History     Substance and Sexual Activity   Alcohol Use Not on file     Social History     Substance and Sexual Activity   Drug Use Not on file     Social History     Tobacco Use   Smoking Status Not on file   Smokeless Tobacco Not on file     No family history on file. Meds/Allergies   all current active meds have been reviewed, current meds:   Current Facility-Administered Medications   Medication Dose Route Frequency   • chlorhexidine (PERIDEX) 0.12 % oral rinse 15 mL  15 mL Mouth/Throat Q12H [facility]   • levETIRAcetam (KEPPRA) 500 mg in sodium chloride 0.9 % 100 mL IVPB  500 mg Intravenous Q12H [facility]    and PTA meds:   Prior to Admission Medications   Prescriptions Last Dose Informant Patient Reported? Taking?    Betamethasone Sodium Phosphate 0.1 % SOLN   Yes Yes   Sig: Apply to eye   Calcium Carb-Cholecalciferol (Calcium 600/Vitamin D3) 600-20 MG-MCG TABS   Yes Yes   Sig: Take 1 tablet by mouth in the morning   Cyanocobalamin (Vitamin B 12) 100 MCG LOZG   Yes Yes   Sig: Take by mouth   Emollient (CeraVe Moisturizing) CREA   Yes Yes   Sig: Apply topically   acetaminophen (TYLENOL) 325 mg tablet   Yes Yes   Sig: Take 975 mg by mouth every 6 (six) hours as needed for mild pain   atorvastatin (LIPITOR) 10 mg tablet   Yes Yes   Sig: Take 10 mg by mouth daily   b complex vitamins capsule   Yes Yes   Sig: Take 1 capsule by mouth daily   cholecalciferol (VITAMIN D3) 1,000 units tablet   Yes Yes   Sig: Take 1,000 Units by mouth daily   docusate sodium (COLACE) 100 mg capsule   Yes Yes   Sig: Take 100 mg by mouth 2 (two) times a day as needed for constipation   furosemide (LASIX) 20 mg tablet   Yes Yes   Sig: Take 30 mg by mouth daily   potassium chloride (Klor-Con) 10 mEq tablet   Yes Yes   Sig: Take 20 mEq by mouth in the morning      Facility-Administered Medications: None     Not on File    Objective   I/O None          Physical Exam  Vitals and nursing note reviewed. Constitutional:       General: She is in acute distress. Appearance: She is well-developed. HENT:      Head:      Comments: Left eyebrow / forehead laceration  Eyes:      Comments: Tracking appropriately around room, not on command   Neck:      Comments: Cervical collar  Cardiovascular:      Rate and Rhythm: Normal rate. Pulmonary:      Effort: Pulmonary effort is normal. No respiratory distress. Abdominal:      Palpations: Abdomen is soft. Musculoskeletal:         General: Normal range of motion. Skin:     General: Skin is warm and dry. Neurological:      Mental Status: She is alert. Cranial Nerves: Cranial nerves 2-12 are intact. Motor: Motor strength is normal.     Coordination: Finger-Nose-Finger Test normal.      Gait: Gait is intact. Tandem walk normal.      Deep Tendon Reflexes:      Reflex Scores:       Tricep reflexes are 2+ on the right side and 2+ on the left side. Bicep reflexes are 2+ on the right side and 2+ on the left side. Brachioradialis reflexes are 2+ on the right side and 2+ on the left side. Patellar reflexes are 2+ on the right side and 2+ on the left side. Achilles reflexes are 2+ on the right side and 2+ on the left side. Neurologic Exam     Mental Status   Follows 1 step commands. Attention: decreased. Concentration: decreased. Level of consciousness: alert  GCS 12 (E4, V2, M6)  Mumbling / moaning in pain  Extremely [place] bilaterally  FC x4 extremities  Not answering questions  CN 2-12 intact  PERRL     Cranial Nerves   Cranial nerves II through XII intact. Motor Exam   Muscle bulk: normal  Overall muscle tone: normal  Right arm pronator drift: absent  Left arm pronator drift: absent    Strength   Strength 5/5 throughout.      Sensory Exam   Light touch normal.   Pinprick normal.   DST and JPS intact bilaterally     Gait, Coordination, and Reflexes     Gait  Gait: normal    Coordination   Finger to nose coordination: normal  Tandem walking coordination: normal    Tremor   Resting tremor: absent    Reflexes   Right brachioradialis: 2+  Left brachioradialis: 2+  Right biceps: 2+  Left biceps: 2+  Right triceps: 2+  Left triceps: 2+  Right patellar: 2+  Left patellar: 2+  Right achilles: 2+  Left achilles: 2+  Right : 2+  Left : 2+  Right Shepard: absent  Left Shepard: absent  Right ankle clonus: absent  Left ankle clonus: absent        Vitals:Blood pressure 170/81, pulse (!) 110, temperature 98.9 °F (37.2 °C), temperature source Oral, resp. rate (!) 30, height 5' 5" (1.651 m), weight 67.6 kg (149 lb 0.5 oz), SpO2 96 %. ,Body mass index is 24.8 kg/m². Lab Results:   Results from last 7 days   Lab Units 07/31/23  1022 07/31/23  1021   WBC Thousand/uL 8.24  --    HEMOGLOBIN g/dL 12.9  --    I STAT HEMOGLOBIN g/dl  --  13.9   HEMATOCRIT % 40.3  --    HEMATOCRIT, ISTAT %  --  41   PLATELETS Thousands/uL 344  --    NEUTROS PCT % 54  --    MONOS PCT % 10  --    EOS PCT % 6  --      Results from last 7 days   Lab Units 07/31/23  1022 07/31/23  1021   POTASSIUM mmol/L 4.0  --    CHLORIDE mmol/L 105  --    CO2 mmol/L 25  --    CO2, I-STAT mmol/L  --  27   BUN mg/dL 15  --    CREATININE mg/dL 0.64  --    CALCIUM mg/dL 9.4  --    ALK PHOS U/L 119*  --    ALT U/L 13  --    AST U/L 22  --    GLUCOSE, ISTAT mg/dl  --  149*             Results from last 7 days   Lab Units 07/31/23  1022   INR  1.07   PTT seconds 25     No results found for: "TROPONINT"  ABG:No results found for: "PHART", "RVK4CLH", "PO2ART", "SYW0YXO", "A9VZDYYC", "BEART", "SOURCE"    Imaging Studies: I have personally reviewed pertinent reports. and I have personally reviewed pertinent films in PACS     TRAUMA - CT chest abdomen pelvis w contrast    Result Date: 7/31/2023  Narrative: CT CHEST, ABDOMEN AND PELVIS WITH IV CONTRAST INDICATION:   TRAUMA.  As per review of electronic medical record, status post unwitnessed fall. COMPARISON: No similar prior studies are available for comparison under this medical record number. Comparison was made to CT of the chest from 7/5/2023, CT of the abdomen and pelvis from 7/25/2022, and lumbar spine CT from 11/1/2022 under the patient's second medical record number ([id number]) although direct [id number] comparison was not available. TECHNIQUE: CT examination of the chest, abdomen and pelvis was performed. Multiplanar 2D reformatted images were created from the source data. 3D reconstructions of the bony thorax were performed in order to improve sensitivity of evaluation for rib fractures. This examination, like all CT scans performed in the [facility], was performed utilizing techniques to minimize radiation dose exposure, including the use of iterative reconstruction and automated exposure control. Radiation dose length product (DLP) for this visit:  489 mGy-cm IV Contrast:  90 mL of iohexol (OMNIPAQUE) Enteric Contrast: Enteric contrast was not administered. FINDINGS: Evaluation is limited by streak artifact from patient's arms, which could not be placed overhead. Evaluation is further limited by motion artifact. CHEST BRONCHOPULMONARY:  Clear central airways. Evaluation lung parenchyma is limited by motion artifact. Mild atelectasis in the right lower lobe. Additional scattered linear opacities likely represent additional areas of atelectasis. PLEURA: Small hyperdense right pleural effusion. No pneumothorax. HEART/GREAT VESSELS: The heart is mildly enlarged. No pericardial effusion. Ascending thoracic aorta at the upper limits of normal. Calcified and noncalcified plaque seen in the thoracic aorta and its major branches. MEDIASTINUM/LYMPH NODES:  No axillary, mediastinal or hilar lymphadenopathy. The patient is status post right axillary lymph node dissection. CHEST WALL: No hyperdense subcutaneous or intramuscular fluid collections to suggest hematoma.  The patient is status post right mastectomy. ABDOMEN LIVER/BILIARY TREE:  Normal liver morphology. A subcentimeter hypodense lesion is seen in the liver, which is too small to accurately characterize with CT technique, however statistically likely represents a cyst. No biliary ductal dilation. No pneumobilia. GALLBLADDER: Layering gallstones are seen within the gallbladder. Intraoperative air is seen in the gallbladder lumen near the fundus. In retrospect this was also present on the prior CT from July 2022. No pericholecystic inflammatory change. SPLEEN: Unremarkable. PANCREAS:  Unremarkable. Normal caliber main pancreatic duct. ADRENAL GLANDS:  Unremarkable. KIDNEYS/URETERS:  Symmetric renal enhancement. No intrarenal or ureteral calculi. No hydronephrosis. Several parapelvic cysts. There is also a simple cyst in the right kidney. STOMACH AND BOWEL: Evaluation of the gastrointestinal tract is somewhat limited by underdistention and lack of oral contrast.  No bowel obstruction or convincing inflammation. Scattered colonic diverticuli, however no evidence of diverticulitis. APPENDIX:  The appendix is not visualized, however there are no secondary findings of appendicitis. ABDOMINOPELVIC CAVITY:  No ascites or pneumoperitoneum. No hyperdense abdominal or pelvic fluid collections to suggest hematoma. LYMPH NODES: No abdominal or pelvic lymphadenopathy. VESSELS: Normal caliber aorta. Patent major branch vessels. Scattered atherosclerotic calcifications in the abdominal aorta and its major branches. PELVIS REPRODUCTIVE ORGANS:  The CT appearance of the uterus is within normal limits. 1.3 x 0.9 cm hypodense lesion in the left ovary (series 304 image 164), in retrospect unchanged from July 2022. URINARY BLADDER:  Unremarkable. ABDOMINAL WALL/INGUINAL REGIONS: Small fat-containing left inguinal hernia. Small right inguinal hernia containing fat and a nonobstructed loop of small bowel.  MUSCULOSKELETAL: There is an acute or subacute minimally displaced fracture of the right scapula. Acute or subacute nondisplaced fractures of the right 3rd - 6th and 8th - 10th ribs laterally, which are new from 7/5/2023. Healing fracture of the right 7th rib laterally. There is a severe compression deformity of the L1 vertebral body with retropulsion into the spinal canal by approximately 7 mm. It was also present on the lumbar spine CT from November 2022. Chronic, moderate compression deformity of the L3 vertebral body with retropulsion into the spinal canal by 6 mm. Chronic mild compression deformities of the T1, T3, T9, T11, L4, and L5 vertebral bodies. There are multiple healed left-sided rib fractures. The patient is status post intramedullary michelle fixation of the left proximal femur. There is a chronic, healed fracture deformity of the bilateral inferior pubic rami. A chronic, healed fracture deformity of the left proximal humerus is seen. No hip or shoulder dislocation. Generalized osteopenia. No focal aggressive osseous lesions. Degenerative changes of the spine. No hyperdense subcutaneous or intramuscular fluid collections to suggest hematoma. Impression: Acute or subacute nondisplaced fractures of the 3rd - 6th and 8th - 10th ribs laterally, new from 7/5/2023. New small hypodense right pleural effusion. No associated pneumothorax. Acute or subacute minimally displaced fracture of the right scapula. No other acute thoracic, abdominal or pelvic trauma. Cholelithiasis with no evidence of acute cholecystitis and a droplet of intraluminal air in the gallbladder near the fundus of unclear etiology, possibly related to prior sphincterotomy, in retrospect also present on prior CT from July 2022. No other pneumobilia. Multiple additional findings as above.  I personally discussed the urgent findings of this study with Dr. Charisse Naranjo on 7/31/2023 at 11:44 AM. Workstation performed: [id number]     CTA head and neck w wo contrast    Result Date: 7/31/2023  Narrative: CTA NECK AND BRAIN WITH CONTRAST INDICATION: Trauma COMPARISON:   Immediate preceding noncontrast CTs of the head and C-spine. Also compared with cervical spine CT from 11/1/2022. TECHNIQUE:  Routine CT imaging of the Brain without contrast.  Post contrast imaging was performed after administration of iodinated contrast through the neck and brain. Post contrast axial 0.625 mm images timed to opacify the arterial system. 3D rendering was performed on an independent workstation. MIP reconstructions performed. Coronal reconstructions were performed of the noncontrast portion of the brain. Radiation dose length product (DLP) for this visit:  1386 mGy-cm . This examination, like all CT scans performed in the [facility], was performed utilizing techniques to minimize radiation dose exposure, including the use of iterative reconstruction and automated exposure control. IV Contrast:  90 mL of iohexol (OMNIPAQUE) IMAGE QUALITY:   Diagnostic FINDINGS: CERVICAL VASCULATURE AORTIC ARCH AND GREAT VESSELS: Mild atherosclerotic disease of the arch. There is atherosclerotic disease in the proximal left subclavian artery causing moderate stenosis. No significant stenosis of the brachiocephalic, right subclavian or great vessel origins. RIGHT VERTEBRAL ARTERY CERVICAL SEGMENT:  Normal origin. . Focal linear filling defect in the V3 segment at the level of fracture at C2 suspicious for focal dissection. (Image 252 series 303) LEFT VERTEBRAL ARTERY CERVICAL SEGMENT: Atherosclerotic disease at the origin with moderate Stenosis at the region. V1 segment is small in caliber. Severely attenuated opacification of the proximal V2 segment that may be segmentally at the level of C5. Reconstitution at the level of C4. There is segmental narrowing of the V3 segment at the level of C2 with more normal caliber at the level of C1. Findings are suspicious for dissection.  RIGHT EXTRACRANIAL CAROTID SEGMENT: Mild atherosclerotic disease of the distal common carotid artery and proximal cervical internal carotid artery without significant stenosis compared to the more distal ICA. No dissection. LEFT EXTRACRANIAL CAROTID SEGMENT: Mild atherosclerotic disease of the distal common carotid artery and proximal cervical internal carotid artery without significant stenosis compared to the more distal ICA. No dissection. NASCET criteria was used to determine the degree of internal carotid artery diameter stenosis. INTRACRANIAL VASCULATURE INTERNAL CAROTID ARTERIES: Intracranial atherosclerotic calcifications with mild to moderate narrowing. ANTERIOR CIRCULATION:  Symmetric A1 segments and anterior cerebral arteries with normal enhancement. Normal anterior communicating artery. MIDDLE CEREBRAL ARTERY CIRCULATION:  M1 segment and middle cerebral artery branches demonstrate normal enhancement bilaterally. DISTAL VERTEBRAL ARTERIES: Atherosclerotic disease with stenosis in the proximal left V4 segment. Left vertebral artery is hypoplastic distal to PICA. Atherosclerotic disease in the right vertebral artery with mild to moderate stenosis. Jacob Baas Posterior inferior cerebellar artery origins are normal. Normal vertebral basilar junction. BASILAR ARTERY:  Basilar artery is normal in caliber. Normal superior cerebellar arteries. POSTERIOR CEREBRAL ARTERIES: Both posterior cerebral arteries arises from the basilar tip. Both arteries demonstrate normal enhancement. Normal posterior communicating arteries. VENOUS STRUCTURES:  Normal. NON VASCULAR ANATOMY BONY STRUCTURES: There is an acute fracture through the posterior body of C2 that extends into the right lateral mass and transverse process through the transverse foramen. There is also subtle fracture line extending to the left left that courses through the left transverse foramen. Mild T1 compression is stable since 2022.  T3 compression with mild bony retropulsion is new compared with chest CT from 7/5/2023. SOFT TISSUES OF THE NECK:  Stable exophytic 3.7 cm right thyroid nodule with substernal extension. . THORACIC INLET: Small right pleural effusion. Please refer to chest CT for further discussion. Impression: Small intimal flap compatible with focal dissection in the V3 segment of the right vertebral body at the level of the C2 fracture. Multifocal narrowing and segmental occlusion of the left vertebral artery concerning for dissection superimposed on atherosclerotic disease. No high-grade intracranial stenosis or large vessel occlusion. Acute C2 fracture. New mild T3 compression fracture with mild bony retropulsion. Please refer to concurrent CTs of the cervical spine and chest. I personally discussed this study with Dhruv Mercado on 7/31/2023 11:25 a.m. AM. Workstation performed: [id number]     TRAUMA - CT spine cervical wo contrast    Result Date: 7/31/2023  Narrative: CT CERVICAL SPINE - WITHOUT CONTRAST INDICATION:   TRAUMA. As per review of electronic medical record, patient status post unwitnessed fall with left-sided eyebrow laceration, initially unresponsive, now with altered mental status. COMPARISON: No similar prior studies are available for comparison under this medical record number. Comparison was made to CT of the cervical spine from 7/24/2022 and 11/1/2022 under the patient's second medical record number ([id number]) although direct  [id number] comparison was not available. TECHNIQUE:  CT examination of the cervical spine was performed without intravenous contrast.  Contiguous axial images were obtained. Multiplanar 2D reformatted images were created from the source data. Radiation dose length product (DLP) for this visit:  373 mGy-cm . This examination, like all CT scans performed in the [facility], was performed utilizing techniques to minimize radiation dose exposure, including the use of iterative reconstruction and automated exposure control. IMAGE QUALITY:  Diagnostic. FINDINGS: ALIGNMENT: Likely degenerative grade 1 anterolisthesis of C3 on C4 by 2 mm. Cervical spine alignment is otherwise maintained. No traumatic subluxation. VERTEBRAE: There is an acute, minimally displaced fracture through the base of the C2 vertebral body, extending to the inferior endplate. The fracture line also extends into both pedicles to the level of the transverse foramina. The fracture also extends  into the right transverse process. Please see separate report for the CT angiogram of the head and neck performed concurrently. The odontoid process is intact. No additional acute fractures. Slight loss of vertebral body height along the superior endplates of the T1 and T3 vertebral bodies, likely chronic. Generalized osteopenia. DEGENERATIVE CHANGES:  Mild multilevel cervical degenerative changes are noted without critical central canal stenosis. PREVERTEBRAL AND PARASPINAL SOFT TISSUES: The left lobe of the thyroid gland is somewhat heterogeneous. The right lobe of the thyroid gland is enlarged and extends posteriorly into the upper mediastinum. This portion of the right thyroid gland is heterogeneous. Likely representing an underlying nodule/conglomerate of nodules. This is essentially unchanged from July 2022. Incidental discovery of one or more thyroid nodule(s) measuring more than 1.5 cm is noted in this patient who is above 28 years  old; according to guidelines published in the February 2015 white paper on incidental thyroid nodules in the Journal of the [employer] of [employer]iology [place]), further characterization with thyroid ultrasound may be considered. However, as the majority of incidental thyroid nodules are benign and because small incidental thyroid malignancies typically demonstrate indolent behavior, consideration of the patient's life expectancy and possible comorbidities should be taken into account prior to a  decision to pursue further imaging.  THORACIC INLET:  Please refer to the concurrent chest CT report for thoracic inlet findings. Impression: Acute, minimally displaced fracture through the base of the C2 vertebral body, extending to the inferior endplate, into both pedicles to the level of the transverse foramina, and into the right transverse process. No additional acute fractures. Intact odontoid process. No evidence of traumatic malalignment. Please see separate report for the CT angiogram of the head and neck performed concurrently. Additional findings as above. I personally discussed this study with Dr. Zeny Logan on 7/31/2023 at 11:44 AM. Workstation performed: [id number]     XR Trauma multiple (SLB/RA trauma bay ONLY)    Result Date: 7/31/2023  Narrative: TRAUMA SERIES INDICATION:  TRAUMA. Status post fall. COMPARISON: No prior similar studies available for direct comparison at time of dictation. VIEWS:  XR TRAUMA MULTIPLE Images: 3 FINDINGS: CHEST - 1 view No discrete airspace consolidations. The right hemidiaphragm is somewhat obscured secondary to a small right pleural effusion. No evidence of pneumothorax. Cardiac silhouette not accurately accessed on this projection. No obvious displaced thoracic fractures within limitation of supine AP chest technique. Chronic appearing fracture deformity of the left 4th rib. Please see separate report for the CT of the chest, abdomen and pelvis performed concurrently. The patient is status post right axillary lymph node dissection. RIGHT WRIST - 2 views. There is an acute, mildly impacted fracture of the distal right radial metaphysis with no intra-articular extension. There is focal cortical irregularity along the lateral aspect of the right ulnar styloid, possibly an additional nondisplaced fracture. No evidence of dislocation. Generalized osteopenia. Impression: Trace right pleural effusion. No obvious thoracic displaced fractures within limitation of supine AP chest technique.  Acute, mildly impacted fracture of the distal right radial metaphysis with no intra-articular extension. Focal cortical irregularity along the lateral aspect of the right ulnar styloid, possibly an additional nondisplaced fracture. No wrist dislocation. I personally discussed this study with Dr. Bindu Garvey on 7/31/2023 at 11:44 AM. Workstation performed: [id number]     TRAUMA - CT facial bones wo contrast    Result Date: 7/31/2023  Narrative: CT FACIAL BONES WITHOUT INTRAVENOUS CONTRAST INDICATION:   TRAUMA. As per review of electronic medical record, patient status post unwitnessed fall with left-sided eyebrow laceration, initially unresponsive, now with altered mental status. COMPARISON: None. TECHNIQUE:  Axial CT images were obtained through the facial bones with additional sagittal and coronal reconstructions. Radiation dose length product (DLP) for this visit:  359 mGy-cm . This examination, like all CT scans performed in the [facility], was performed utilizing techniques to minimize radiation dose exposure, including the use of iterative reconstruction and automated exposure control. IMAGE QUALITY: Evaluation is somewhat limited by suboptimal positioning of the patient's head, resulting in alteration of anatomy on the coronal and sagittal reformation. FINDINGS: FACIAL BONES:  No acute facial bone fracture identified. Normal alignment of the temporomandibular joints. No lytic or sclerotic lesions. ORBITS:  The orbital globes, optic nerves, and extraocular muscles appear symmetric and within normal limits. There is no evidence of a retrobulbar hematoma. The patient is status post bilateral cataract surgery. SINUSES:  The paranasal sinuses are clear. SOFT TISSUES: Soft tissue swelling overlying the left supraorbital region with a focal skin defect. Impression: Soft tissue swelling and focal skin defect in the left supraorbital region Compatible with a laceration.  No acute facial bone fracture or evidence of orbital trauma. I personally discussed this study with Dr. Thornton Comment on 7/31/2023 at 11:44 AM. Workstation performed: [id number]     TRAUMA - CT head wo contrast    Result Date: 7/31/2023  Narrative: CT BRAIN - WITHOUT CONTRAST INDICATION:   TRAUMA. As per review of electronic medical record, patient status post unwitnessed fall with left-sided eyebrow laceration, initially unresponsive, now with altered mental status. COMPARISON: No similar prior studies are available for comparison under this medical record number. Comparison was made to CT of the head from 7/5/2023 under the patient's second medical record number ([id number]) although direct hhva-iu-jens comparison was not available. TECHNIQUE:  CT examination of the brain was performed. Multiplanar 2D reformatted images were created from the source data. Radiation dose length product (DLP) for this visit:  1003 mGy-cm . This examination, like all CT scans performed in the [facility], was performed utilizing techniques to minimize radiation dose exposure, including the use of iterative reconstruction and automated exposure control. IMAGE QUALITY:  Diagnostic. FINDINGS: PARENCHYMA AND EXTRA-AXIAL SPACES:  Decreased attenuation is noted in periventricular and subcortical white,  statistically most likely representing moderate microangiopathic changes. No evidence of acute infarction. No mass effect or midline shift. No acute parenchymal hemorrhage. There is a small amount of hyperdense material seen in the right ambient cistern compatible with hemorrhage. This is new from the prior head CT. No other extra-axial fluid collections. VENTRICLES:  Ventricles and extra-axial CSF spaces are prominent commensurate with the degree of volume loss. No hydrocephalus. VISUALIZED ORBITS AND PARANASAL SINUSES: Please see separate report for the CT of the facial bones performed concurrently.  CALVARIUM AND EXTRACRANIAL SOFT TISSUES: Soft tissue swelling overlying the left supraorbital region as discussed on the concurrent CT of the facial bones. No calvarial fracture. Impression: Hyperdense material seen in right ambient cistern compatible with acute subarachnoid hemorrhage, new from 7/5/2023. No evidence of intracranial hemorrhage elsewhere. No calvarial fracture. Please see separate report for the CT angiogram of the head and neck performed concurrently. I personally discussed this study with Dr. Akbar Singh on 7/31/2023 at 11:44 AM. Workstation performed: [id number]     EKG, Pathology, and Other Studies: I have personally reviewed pertinent reports. VTE Prophylaxis: Sequential compression device Chrissy De León)     Code Status: Level 3 - DNAR and DNI  Advance Directive and Living Will:      Power of :    POLST:      Counseling / Coordination of Care  I spent 30 minutes with the patient.

## 2023-07-31 NOTE — ASSESSMENT & PLAN NOTE
· S/p fall  · CT c-spine (7/31) - Acute, minimally displaced fracture through the base of the C2 vertebral body, extending to the inferior endplate, into both pedicles to the level of the transverse foramina, and into the right transverse process  · Type 3 dens fracture  · Neurosurgery consulted  · Patient currently following commands, no obvious focal weakness    Plan:  · Maintain Vista collar at all times, Shital collar while showering  · Will likely need c-collar for life given patients age and type of fracture  · Continue neuro-checks q1h  · Upright x-ray including open-mouth view when able to mobilize  · Limit bending, twisting, lifting greater than 10 pounds  · Multimodal analgesia as above.   · PT OT evaluation, okay to mobilize and collar

## 2023-07-31 NOTE — ED PROVIDER NOTES
Emergency Department Airway Evaluation and Management Form    History  Obtained from: EMS  Patient has no allergy information on record. Chief Complaint   Patient presents with   • Fall     HPI  26-year-old female status post unwitnessed fall with left-sided eyebrow laceration, unresponsive initially at long-term care facility, currently altered. History is limited. Not on antiplatelet or blood thinners. No past medical history on file. No past surgical history on file. No family history on file. I have reviewed and agree with the history as documented. Review of Systems   Unable to perform ROS: Mental status change       Physical Exam  /80   Pulse (!) 109   Temp 97.9 °F (36.6 °C) (Oral)   Resp 17   SpO2 94%     Physical Exam  Constitutional:       Comments: Groaning, follows commands   HENT:      Head:      Comments: 2 cm linear laceration above the left eyebrow     Right Ear: External ear normal.      Left Ear: External ear normal.      Nose: Nose normal.      Mouth/Throat:      Mouth: Mucous membranes are moist.   Eyes:      Extraocular Movements: Extraocular movements intact. Pupils: Pupils are equal, round, and reactive to light. Neck:      Comments: In c-collar  Cardiovascular:      Rate and Rhythm: Normal rate. Pulses: Normal pulses. Pulmonary:      Effort: Pulmonary effort is normal. No respiratory distress. Breath sounds: Normal breath sounds. Abdominal:      General: Abdomen is flat. Palpations: Abdomen is soft. Musculoskeletal:      Cervical back: Normal range of motion. Right lower leg: Edema present. Left lower leg: Edema present. Skin:     Capillary Refill: Capillary refill takes less than 2 seconds. Neurological:      Mental Status: She is alert.       Comments: Hard of hearing/altered         ED Medications  Medications   tetanus-diphtheria-acellular pertussis (BOOSTRIX) IM injection 0.5 mL (0.5 mL Intramuscular Given 7/31/23 1021) Intubation  Procedures    Notes  Airway intact  C-collar in place    Final Diagnosis  Final diagnoses:   None       ED Provider  Electronically Signed by     Lyssa Higgins MD  07/31/23 1038

## 2023-07-31 NOTE — ASSESSMENT & PLAN NOTE
· S/p fall with headstrike  · CT head - Hyperdense material seen in right ambient cistern compatible with acute subarachnoid hemorrhage, new from 7/5/2023  · Not on any AC/AP as outpatient  · GCS 14 on arrival (E4, V4, M6), notably [place], oriented to self. Plan:  · Neurosurgery consulted, appreciate recommendations  · Continue q1h neuro-checks  · Repeat CT head in 6 hours @ 1630 today  · Obtain STAT CT head with any neuro changes or GCS drop by 2 or more within 1 hour. · Recommended SBP < 160mmHg. · Keppra 500mg BID for Sx prophylaxis  · Continue to hold all AC/AP medications for now, obtain stable CTH before starting  · SCDs for DVT prophylaxis  · Multimodal analgesia as above  · PT/OT eval/treat  · No neurosurgical intervention at this time.

## 2023-07-31 NOTE — CONSULTS
[address]  Consult  Name: Makenzie Sánchez 80 y.o. female I MRN: [id number]  Unit/Bed#: ICU 02 I Date of Admission: 7/31/2023   Date of Service: 7/31/2023 I Hospital Day: 0    Assessment/Plan   Fall  Assessment & Plan  · S/p unwitnessed fall, pt doesn't remember falling, unable to tell me if she had LOC or hit her head. · Per family pt has been having multiple falls and recently started using a wheelchair instead of a walker. · Injuries:  · SAH  · C2 fracture (dens type 3)  · Right scapular fracture  · Right radius fracture  · Multiple rib fractures (right 3rd - 6th, 8th -10th)  · Left eyebrow laceration  · Analgesia:  · Dilaudid q4h as needed while NPO  · Bowel regimen when tolerating PO  · PT/OT  · Fall precautions  · CAM-ICU    SAH (subarachnoid hemorrhage) ([address])  Assessment & Plan  · S/p fall with headstrike  · CT head - Hyperdense material seen in right ambient cistern compatible with acute subarachnoid hemorrhage, new from 7/5/2023  · Not on any AC/AP as outpatient  · GCS 14 on arrival (E4, V4, M6), notably [place], oriented to self. Plan:  · Neurosurgery consulted, appreciate recommendations  · Continue q1h neuro-checks  · Repeat CT head in 6 hours @ 1630 today  · Obtain STAT CT head with any neuro changes or GCS drop by 2 or more within 1 hour. · Recommended SBP < 160mmHg. · Keppra 500mg BID for Sx prophylaxis  · Continue to hold all AC/AP medications for now, obtain stable CTH before starting  · SCDs for DVT prophylaxis  · Multimodal analgesia as above  · PT/OT eval/treat  · No neurosurgical intervention at this time. Vertebral artery occlusion, left  Assessment & Plan  · CTA:  · Right VA - Focal linear filling defect in the V3 segment at the level of fracture at C2 suspicious for focal dissection  · Left VA - Atherosclerotic disease at the origin with moderate stenosis at the region. V1 segment is small in caliber.  Severely attenuated opacification of the proximal V2 segment that may be segmentally at the level of C5. Reconstitution at the level of C4. There is segmental narrowing of the V3 segment at the level of C2 with more normal caliber at the level of C1. Findings are suspicious for dissection. · Neurosurgery consulted  · Patient will need antiplatelet medication, however in setting of [facility] will hold until cleared by NSx  · Hold AC/AP for now  · Repeat CT head @ 1640 today and in AM  · If CT head is stable tomorrow, will likely initiate DAPT  · ASA 81mg daily  · Plavix 300mg, then 75mg daily  · P2Y12 Thursday or Friday    C2 cervical fracture ([address])  Assessment & Plan  · S/p fall  · CT c-spine (7/31) - Acute, minimally displaced fracture through the base of the C2 vertebral body, extending to the inferior endplate, into both pedicles to the level of the transverse foramina, and into the right transverse process  · Type 3 dens fracture  · Neurosurgery consulted  · Patient currently following commands, no obvious focal weakness    Plan:  · Maintain Vista collar at all times, Shital collar while showering  · Will likely need c-collar for life given patients age and type of fracture  · Continue neuro-checks q1h  · Upright x-ray including open-mouth view when able to mobilize  · Limit bending, twisting, lifting greater than 10 pounds  · Multimodal analgesia as above. · PT OT evaluation, okay to mobilize and collar    Laceration of left eyebrow  Assessment & Plan  · Laceration over left eye  · Washout and closed at bedside by Trauma  · Local wound care  · Analgesia as above  · Received Tetanus shot in ED    Closed fracture of right scapula  Assessment & Plan  · CT chest/abd/pelvis - There is an acute or subacute minimally displaced fracture of the right scapula.   · Ortho consulted, appreciate recommendations  · Multimodal analgesia as above    Multiple rib fractures  Assessment & Plan  · S/p fall  · CT chest/abd/pelvis - Acute or subacute nondisplaced fractures of the right 3rd - 6th and 8th - 10th ribs laterally, which are new from 7/5/2023. Healing fracture of the right 7th rib laterally  · Rib fracture protocol  · Multimodal analgesia as above  · Incentive spirometry  · PT/OT eval/treat    Radial fracture  Assessment & Plan  · S/p fall  · XR - Distal right radius fracture  · Orthopedics consults, appreciate recommendations  · Splint to be applied by Ortho  · NWB to RUE  · Multimodal analgesia as above  · No surgical intervention at this time  · Ortho follow up upon discharge. History of Present Illness     HPI: Merlin Angelucci is a 80 y.o. 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 Pt does not take AC or AP, does nto remember the fall or if she had any LOC. In trauma bay, she's awake, oriented to self GCS 12, very [place]. Laceration noted above left eye. Trauma imaging: CT head indicative of SAH, CT c-spine with c2 dens fracture, CT chest with multiple rib fractures right 3-6, 8-10 and 7th being an old fx, right scapula fracture and XR right UE right radius fracture. Labs pending. Neurosurgery consulted, no surgical intervention at this time. She was given Keppra 1000mg IV and transferred to the ICU. History obtained from unobtainable from patient due to age, mental status and lack of cooperation. Review of Systems   Unable to perform ROS: Mental status change     Historical Information   No past medical history on file. No past surgical history on file.    Current Outpatient Medications   Medication Instructions   • acetaminophen (TYLENOL) 975 mg, Oral, Every 6 hours PRN   • atorvastatin (LIPITOR) 10 mg, Oral, Daily   • b complex vitamins capsule 1 capsule, Oral, Daily   • Betamethasone Sodium Phosphate 0.1 % SOLN Ophthalmic   • Calcium Carb-Cholecalciferol (Calcium 600/Vitamin D3) 600-20 MG-MCG TABS 1 tablet, Oral, Daily   • cholecalciferol (VITAMIN D3) 1,000 Units, Oral, Daily   • Cyanocobalamin (Vitamin B 12) 100 MCG LOZG Oral   • docusate sodium (COLACE) 100 mg, Oral, 2 times daily PRN   • Emollient (CeraVe Moisturizing) CREA Apply externally   • furosemide (LASIX) 30 mg, Oral, Daily   • potassium chloride (Klor-Con) 10 mEq tablet 20 mEq, Oral, Daily    Not on File     No family history on file. Objective                            Vitals I/O      Most Recent Min/Max in 24hrs   Temp 98.4 °F (36.9 °C) Temp  Min: 97.9 °F (36.6 °C)  Max: 98.9 °F (37.2 °C)   Pulse (!) 114 Pulse  Min: 109  Max: 116   Resp (!) 32 Resp  Min: 17  Max: 32   /68 BP  Min: 130/80  Max: 184/67   O2 Sat 96 % SpO2  Min: 91 %  Max: 96 %      Intake/Output Summary (Last 24 hours) at 7/31/2023 1641  Last data filed at 7/31/2023 1226  Gross per 24 hour   Intake --   Output 100 ml   Net -100 ml         Diet NPO     Invasive Monitoring Physical exam    Physical Exam  Vitals and nursing note reviewed. Constitutional:       General: She is not in acute distress. Appearance: She is normal weight. She is ill-appearing. She is not toxic-appearing. Interventions: Cervical collar in place. HENT:      Head:        Nose: Nose normal.      Mouth/Throat:      Mouth: Mucous membranes are dry. Pharynx: Oropharynx is clear. Eyes:      Pupils: Pupils are equal, round, and reactive to light. Neck:      Comments: C-collar  Cardiovascular:      Rate and Rhythm: Normal rate and regular rhythm. Pulses: Normal pulses. Radial pulses are 2+ on the right side and 2+ on the left side. Dorsalis pedis pulses are 2+ on the right side and 2+ on the left side. Pulmonary:      Effort: Pulmonary effort is normal.      Breath sounds: Normal breath sounds. Abdominal:      General: Abdomen is flat. There is no distension. Palpations: Abdomen is soft. Tenderness: There is no abdominal tenderness. There is no guarding. Musculoskeletal:      Right lower leg: Edema present. Left lower leg: Edema present.       Comments: RUE splint   Skin:     General: Skin is warm. Capillary Refill: Capillary refill takes 2 to 3 seconds. Neurological:      Mental Status: She is lethargic. GCS: GCS eye subscore is 4. GCS verbal subscore is 4. GCS motor subscore is 6. Motor: Weakness present. Psychiatric:         Attention and Perception: She is inattentive. Cognition and Memory: Cognition is impaired. Judgment: Judgment is impulsive. Comments: CLAUDIA          Diagnostic Studies      EKG: NSR  Imaging: I have personally reviewed pertinent reports. and I have personally reviewed pertinent films in PACS     Medications:  Scheduled PRN   chlorhexidine, 15 mL, Q12H [address]  levETIRAcetam, 500 mg, Q12H NADYA  lidocaine, 1 patch, Daily      HYDROmorphone, 0.1 mg, Q4H PRN       Continuous          Labs:    CBC    Recent Labs     07/31/23  1021 07/31/23  1022   WBC  --  8.24   HGB 13.9 12.9   HCT 41 40.3   PLT  --  344     BMP    Recent Labs     07/31/23  1021 07/31/23  1022   SODIUM  --  137   K  --  4.0   CL  --  105   CO2 27 25   AGAP  --  7   BUN  --  15   CREATININE  --  0.64   CALCIUM  --  9.4       Coags    Recent Labs     07/31/23  1022   INR 1.07   PTT 25        Additional Electrolytes  Recent Labs     07/31/23  1021   CAIONIZED 1.17          Blood Gas    No recent results  No recent results LFTs  Recent Labs     07/31/23  1022   ALT 13   AST 22   ALKPHOS 119*   ALB 3.7   TBILI 0.67       Infectious  No recent results  Glucose  Recent Labs     07/31/23  1022   GLUC 142*        Critical Care Time Delivered: Upon my evaluation, this patient had a high probability of imminent or life-threatening deterioration due to C2 fracture, SAH, which required my direct attention, intervention, and personal management. I have personally provided 60 minutes of critical care time, exclusive of procedures, teaching, family meetings, and any prior time recorded by providers other than myself.       [address] [facility] [place]

## 2023-07-31 NOTE — PROCEDURES
POC FAST US    Date/Time: 7/31/2023 11:46 AM    Performed by: Layne Thornton PA-C  Authorized by: Layne Thornton PA-C    Patient location:  Trauma  Procedure details:     Exam Type:  Diagnostic    Indications: blunt abdominal trauma and blunt chest trauma      Assess for:  Intra-abdominal fluid, pericardial effusion and pneumothorax    Technique: extended FAST      Views obtained:  Heart - Pericardial sac, LUQ - Splenorenal space, Suprapubic - Pouch of [place], RUQ - Sparks's Pouch, Left thorax and Right thorax    Image quality: diagnostic      Image availability:  Images available in PACS and video obtained  FAST Findings:     RUQ (Hepatorenal) free fluid: absent      LUQ (Splenorenal) free fluid: absent      Suprapubic free fluid: absent      Cardiac wall motion: identified      Pericardial effusion: absent    extended FAST (Pulmonary) findings:     Left lung sliding: Present      Right lung sliding: Present    Interpretation:     Impressions: negative

## 2023-07-31 NOTE — ASSESSMENT & PLAN NOTE
· S/p unwitnessed fall, pt doesn't remember falling, unable to tell me if she had LOC or hit her head. · Per family pt has been having multiple falls and recently started using a wheelchair instead of a walker.   · Injuries:  · SAH  · C2 fracture (dens type 3)  · Right scapular fracture  · Right radius fracture  · Multiple rib fractures (right 3rd - 6th, 8th -10th)  · Left eyebrow laceration  · Analgesia:  · Dilaudid q4h as needed while NPO  · Bowel regimen when tolerating PO  · PT/OT  · Fall precautions  · CAM-ICU

## 2023-07-31 NOTE — ASSESSMENT & PLAN NOTE
· Laceration over left eye  · Washout and closed at bedside by Trauma  · Local wound care  · Analgesia as above  · Received Tetanus shot in ED

## 2023-07-31 NOTE — PROCEDURES
Universal Protocol:  Patient identity confirmed: arm band    Laceration repair    Date/Time: 7/31/2023 1:09 PM    Performed by: Armando Pérez PA-C  Authorized by: Armando Pérez PA-C  Body area: head/neck  Location details: left eyelid  Laceration length: 5 cm  Foreign bodies: no foreign bodies  Tendon involvement: none  Nerve involvement: none  Vascular damage: no  Anesthesia: local infiltration    Anesthesia:  Local Anesthetic: lidocaine 1% without epinephrine  Anesthetic total: 4 mL    Sedation:  Patient sedated: no      Wound Dehiscence:  Superficial Wound Dehiscence: simple closure      Procedure Details:  Preparation: Patient was prepped and draped in the usual sterile fashion.   Irrigation solution: saline  Irrigation method: syringe  Amount of cleaning: standard  Debridement: none  Degree of undermining: none  Skin closure: 5-0 Prolene  Number of sutures: 5  Technique: simple  Approximation: close  Approximation difficulty: simple  Patient tolerance: patient tolerated the procedure well with no immediate complications

## 2023-07-31 NOTE — ASSESSMENT & PLAN NOTE
· CT chest/abd/pelvis - There is an acute or subacute minimally displaced fracture of the right scapula.   · Ortho consulted, appreciate recommendations  · Multimodal analgesia as above

## 2023-07-31 NOTE — ASSESSMENT & PLAN NOTE
Left VA occlusion, right VA dissection    Reviewed imaging with neuro endovascular attending. Patient will need antiplatelet medication. However, given presence of SAH we will hold off at this time.   · If CT head is stable tomorrow, will likely initiate DAPT  · ASA 81mg daily  · Plavix 300mg, then 75mg daily  · P2Y12 Thursday or Friday

## 2023-07-31 NOTE — CONSULTS
Orthopedics   Marshall Long 80 y.o. female MRN: [id number]  Unit/Bed#: ICU 02      Chief Complaint:   right distal radius fracture     HPI:   80 y.o.  female status post reported unwitnessed fall at her long-term care facility per chart review, brought in unresponsive initially, found to have left eyebrow laceration, and a right wrist fracture. Orthopedics called for further evaluation. At time of consultation, patient unable to aide in history, she is altered and no family at bedside. History obtained from chart review. Review Of Systems:   · Unable to obtain secondary to current mental status/acuity. Past Medical History:   No past medical history on file. Past Surgical History:   No past surgical history on file. Family History:  Family history reviewed and non-contributory  No family history on file.     Social History:  Social History     Socioeconomic History   • Marital status: /Civil Union     Spouse name: Not on file   • Number of children: Not on file   • Years of education: Not on file   • Highest education level: Not on file   Occupational History   • Not on file   Tobacco Use   • Smoking status: Not on file   • Smokeless tobacco: Not on file   Substance and Sexual Activity   • Alcohol use: Not on file   • Drug use: Not on file   • Sexual activity: Not on file   Other Topics Concern   • Not on file   Social History Narrative   • Not on file     Social Determinants of Health     Financial Resource Strain: Not on file   Food Insecurity: Not on file   Transportation Needs: Not on file   Physical Activity: Not on file   Stress: Not on file   Social Connections: Not on file   Intimate Partner Violence: Not on file   Housing Stability: Not on file       Allergies:   Not on File        Labs:  0   Lab Value Date/Time    HCT 40.3 07/31/2023 1022    HCT 41 07/31/2023 1021    HGB 12.9 07/31/2023 1022    HGB 13.9 07/31/2023 1021    INR 1.07 07/31/2023 1022    WBC 8.24 07/31/2023 1022 Meds:    Current Facility-Administered Medications:   •  chlorhexidine (PERIDEX) 0.12 % oral rinse 15 mL, 15 mL, Mouth/Throat, Q12H [facility], Allan Reyes PA-C, 15 mL at 07/31/23 1227  •  levETIRAcetam (KEPPRA) 500 mg in sodium chloride 0.9 % 100 mL IVPB, 500 mg, Intravenous, Q12H [facility], Ni Negron PA-C, Last Rate: 400 mL/hr at 07/31/23 1226, 500 mg at 07/31/23 1226  •  lidocaine (PF) (XYLOCAINE-MPF) 1 % injection 10 mL, 10 mL, Infiltration, Once, [employer], PA-C    Blood Culture:   No results found for: "[place]"    Wound Culture:   No results found for: "WOUNDCULT"    Ins and Outs:  No intake/output data recorded. Physical Exam:   /81 (BP Location: Left arm)   Pulse (!) 110   Temp 98.9 °F (37.2 °C) (Oral)   Resp (!) 30   Ht 5' 5" (1.651 m)   Wt 67.6 kg (149 lb 0.5 oz)   SpO2 96%   BMI 24.80 kg/m²   Gen: No acute distress, lying in bed, c-collar in place. HEENT: Eyes clear, moist mucus membranes  Respiratory: No audible wheezing or stridor  Cardiovascular: Well Perfused peripherally  Abdomen: nondistended, no peritoneal signs  Musculoskeletal: right upper extremity  · Skin intact, bruising over distal radius and wrist.   · Pain to palpation over the distal forearm and wrist.   · +edema to affected area. · Exam limited by mental acuity, however patient is spontaneously moving all fingers of the hand, and is flex/ex her wrist spontaneously. · Unable to participate in sensory exam.   · Digits are warm and well perfused. · No pain with passive stretch. · No apparent pain over the right clavicle, shoulder, upper arm or elbow. General MSK  · Left upper extremity: No pain to palpation over the clavicle, upper arm, elbow, forearm or wrist. No obvious bony deformity or crepitus. Passive ROM within normal limits. Unable to perform sensory testing due to mental acuity. · Bilateral lower extremities: no obvious pain to palpation or bony deformity over the bilateral hips.  She has +pain over the left femur, bilateral tib/fib, no pain over the ankles. +passive ROM of the knees and ankles without issue. Digits are warm and well perfused. Unable to perform sensory testing due to mental acuity. Radiology:   I personally reviewed the films. Imaging was discussed and reviewed with Dr. Kristel Bhakta right wrist: mildly impacted distal right radius fracture  X-rays right elbow: no acute osseous abnormality  X-rays right humerus: no acute osseous abnormality  Xrays bilateral tib/fib: no acute osseous abnormality  Xrays right femur: no acute osseous abnormality  CT c/a/p: right scapular fracture.     _*_*_*_*_*_*_*_*_*_*_*_*_*_*_*_*_*_*_*_*_*_*_*_*_*_*_*_*_*_*_*_*_*_*_*_*_*_*_*_*_*    Procedure: Distal radius splint application    Patient was placed in a well padded sugartong splint. Pt tolerated the procedure well and was neurovascularly intact both pre and post procedure. Assessment:  80 y. o.female S/P unwitnessed fall with right distal radius fracture, right scapular fracture. Plan:   · Non weight bearing right upper extremity in sugartong splint  · Ok for patient to weight bear through the forearm/humerus  · Scapular fracture to be managed non operatively. · Analgesics for pain per primary team.   · No immediate orthopedic intervention indicated. · Body mass index is 24.8 kg/m². · DVT ppx per primary team.   · Dispo: Ortho signing off  · Case reviewed and discussed with Dr. Frank Kelley. · Patient should follow up with upper extremity orthopedics surgeon upon discharge.      Lori Oropeza PA-C

## 2023-07-31 NOTE — ASSESSMENT & PLAN NOTE
· S/p fall  · XR - Distal right radius fracture  · Orthopedics consults, appreciate recommendations  · Splint to be applied by Ortho  · NWB to RUE  · Multimodal analgesia as above  · No surgical intervention at this time  · Ortho follow up upon discharge.

## 2023-07-31 NOTE — ASSESSMENT & PLAN NOTE
Type 3 dens fracture (hangman's fracture) s/p unwitnessed fall  · Patient currently following commands, no obvious focal weakness    Imaging:  · CT cervical spine w/o, 7/31/23: Acute, minimally displaced fracture through the base of the C2 vertebral body, extending to the inferior endplate, into both pedicles to the level of the transverse foramina, and into the right transverse process    Plan:  · Continue to monitor neurological exam  · [place] collar at all times, lópez to shower  · Limit bending, twisting, lifting greater than 10 pounds  · No driving until cleared from collar  · Given nature of fracture and patient's age, patient will likely remain in collar for several months to life  · Upright x-ray including open-mouth view when able to mobilize  · Pain control per primary team  · PT OT evaluation, okay to mobilize and collar

## 2023-08-01 PROBLEM — S22.030D CLOSED WEDGE COMPRESSION FRACTURE OF T3 VERTEBRA WITH ROUTINE HEALING: Status: ACTIVE | Noted: 2023-08-01

## 2023-08-01 PROBLEM — M43.9 COMPRESSION DEFORMITY OF VERTEBRA: Status: RESOLVED | Noted: 2023-08-01 | Resolved: 2023-08-01

## 2023-08-01 PROBLEM — M43.9 COMPRESSION DEFORMITY OF VERTEBRA: Status: ACTIVE | Noted: 2023-08-01

## 2023-08-01 PROBLEM — I77.74 VERTEBRAL ARTERY DISSECTION (HCC): Status: ACTIVE | Noted: 2023-07-31

## 2023-08-01 LAB
ANION GAP SERPL CALCULATED.3IONS-SCNC: 9 MMOL/L
ANION GAP SERPL CALCULATED.3IONS-SCNC: 9 MMOL/L
BASOPHILS # BLD AUTO: 0.04 THOUSANDS/ÂΜL (ref 0–0.1)
BASOPHILS # BLD AUTO: 0.04 THOUSANDS/ÂΜL (ref 0–0.1)
BASOPHILS NFR BLD AUTO: 0 % (ref 0–1)
BASOPHILS NFR BLD AUTO: 0 % (ref 0–1)
BUN SERPL-MCNC: 10 MG/DL (ref 5–25)
BUN SERPL-MCNC: 10 MG/DL (ref 5–25)
CALCIUM SERPL-MCNC: 8.7 MG/DL (ref 8.4–10.2)
CALCIUM SERPL-MCNC: 8.7 MG/DL (ref 8.4–10.2)
CHLORIDE SERPL-SCNC: 105 MMOL/L (ref 96–108)
CHLORIDE SERPL-SCNC: 105 MMOL/L (ref 96–108)
CO2 SERPL-SCNC: 23 MMOL/L (ref 21–32)
CO2 SERPL-SCNC: 23 MMOL/L (ref 21–32)
CREAT SERPL-MCNC: 0.51 MG/DL (ref 0.6–1.3)
CREAT SERPL-MCNC: 0.51 MG/DL (ref 0.6–1.3)
EOSINOPHIL # BLD AUTO: 0.07 THOUSAND/ÂΜL (ref 0–0.61)
EOSINOPHIL # BLD AUTO: 0.07 THOUSAND/ÂΜL (ref 0–0.61)
EOSINOPHIL NFR BLD AUTO: 1 % (ref 0–6)
EOSINOPHIL NFR BLD AUTO: 1 % (ref 0–6)
ERYTHROCYTE [DISTWIDTH] IN BLOOD BY AUTOMATED COUNT: 13.8 % (ref 11.6–15.1)
ERYTHROCYTE [DISTWIDTH] IN BLOOD BY AUTOMATED COUNT: 13.8 % (ref 11.6–15.1)
GFR SERPL CREATININE-BSD FRML MDRD: 83 ML/MIN/1.73SQ M
GFR SERPL CREATININE-BSD FRML MDRD: 83 ML/MIN/1.73SQ M
GLUCOSE SERPL-MCNC: 97 MG/DL (ref 65–140)
GLUCOSE SERPL-MCNC: 97 MG/DL (ref 65–140)
HCT VFR BLD AUTO: 39 % (ref 34.8–46.1)
HCT VFR BLD AUTO: 39 % (ref 34.8–46.1)
HGB BLD-MCNC: 12.8 G/DL (ref 11.5–15.4)
HGB BLD-MCNC: 12.8 G/DL (ref 11.5–15.4)
IMM GRANULOCYTES # BLD AUTO: 0.04 THOUSAND/UL (ref 0–0.2)
IMM GRANULOCYTES # BLD AUTO: 0.04 THOUSAND/UL (ref 0–0.2)
IMM GRANULOCYTES NFR BLD AUTO: 0 % (ref 0–2)
IMM GRANULOCYTES NFR BLD AUTO: 0 % (ref 0–2)
LYMPHOCYTES # BLD AUTO: 1.12 THOUSANDS/ÂΜL (ref 0.6–4.47)
LYMPHOCYTES # BLD AUTO: 1.12 THOUSANDS/ÂΜL (ref 0.6–4.47)
LYMPHOCYTES NFR BLD AUTO: 12 % (ref 14–44)
LYMPHOCYTES NFR BLD AUTO: 12 % (ref 14–44)
MAGNESIUM SERPL-MCNC: 2.1 MG/DL (ref 1.9–2.7)
MAGNESIUM SERPL-MCNC: 2.1 MG/DL (ref 1.9–2.7)
MCH RBC QN AUTO: 29.8 PG (ref 26.8–34.3)
MCH RBC QN AUTO: 29.8 PG (ref 26.8–34.3)
MCHC RBC AUTO-ENTMCNC: 32.8 G/DL (ref 31.4–37.4)
MCHC RBC AUTO-ENTMCNC: 32.8 G/DL (ref 31.4–37.4)
MCV RBC AUTO: 91 FL (ref 82–98)
MCV RBC AUTO: 91 FL (ref 82–98)
MONOCYTES # BLD AUTO: 1.17 THOUSAND/ÂΜL (ref 0.17–1.22)
MONOCYTES # BLD AUTO: 1.17 THOUSAND/ÂΜL (ref 0.17–1.22)
MONOCYTES NFR BLD AUTO: 12 % (ref 4–12)
MONOCYTES NFR BLD AUTO: 12 % (ref 4–12)
NEUTROPHILS # BLD AUTO: 7.29 THOUSANDS/ÂΜL (ref 1.85–7.62)
NEUTROPHILS # BLD AUTO: 7.29 THOUSANDS/ÂΜL (ref 1.85–7.62)
NEUTS SEG NFR BLD AUTO: 75 % (ref 43–75)
NEUTS SEG NFR BLD AUTO: 75 % (ref 43–75)
NRBC BLD AUTO-RTO: 0 /100 WBCS
NRBC BLD AUTO-RTO: 0 /100 WBCS
PHOSPHATE SERPL-MCNC: 3.2 MG/DL (ref 2.3–4.1)
PHOSPHATE SERPL-MCNC: 3.2 MG/DL (ref 2.3–4.1)
PLATELET # BLD AUTO: 303 THOUSANDS/UL (ref 149–390)
PLATELET # BLD AUTO: 303 THOUSANDS/UL (ref 149–390)
PMV BLD AUTO: 9.3 FL (ref 8.9–12.7)
PMV BLD AUTO: 9.3 FL (ref 8.9–12.7)
POTASSIUM SERPL-SCNC: 3.8 MMOL/L (ref 3.5–5.3)
POTASSIUM SERPL-SCNC: 3.8 MMOL/L (ref 3.5–5.3)
RBC # BLD AUTO: 4.29 MILLION/UL (ref 3.81–5.12)
RBC # BLD AUTO: 4.29 MILLION/UL (ref 3.81–5.12)
SODIUM SERPL-SCNC: 137 MMOL/L (ref 135–147)
SODIUM SERPL-SCNC: 137 MMOL/L (ref 135–147)
WBC # BLD AUTO: 9.73 THOUSAND/UL (ref 4.31–10.16)
WBC # BLD AUTO: 9.73 THOUSAND/UL (ref 4.31–10.16)

## 2023-08-01 PROCEDURE — 99232 SBSQ HOSP IP/OBS MODERATE 35: CPT | Performed by: PHYSICIAN ASSISTANT

## 2023-08-01 PROCEDURE — 97760 ORTHOTIC MGMT&TRAING 1ST ENC: CPT

## 2023-08-01 PROCEDURE — 99223 1ST HOSP IP/OBS HIGH 75: CPT

## 2023-08-01 PROCEDURE — 85025 COMPLETE CBC W/AUTO DIFF WBC: CPT | Performed by: PHYSICIAN ASSISTANT

## 2023-08-01 PROCEDURE — 97167 OT EVAL HIGH COMPLEX 60 MIN: CPT

## 2023-08-01 PROCEDURE — 99233 SBSQ HOSP IP/OBS HIGH 50: CPT | Performed by: STUDENT IN AN ORGANIZED HEALTH CARE EDUCATION/TRAINING PROGRAM

## 2023-08-01 PROCEDURE — 97163 PT EVAL HIGH COMPLEX 45 MIN: CPT

## 2023-08-01 PROCEDURE — NC001 PR NO CHARGE

## 2023-08-01 PROCEDURE — 83735 ASSAY OF MAGNESIUM: CPT | Performed by: PHYSICIAN ASSISTANT

## 2023-08-01 PROCEDURE — 80048 BASIC METABOLIC PNL TOTAL CA: CPT | Performed by: PHYSICIAN ASSISTANT

## 2023-08-01 PROCEDURE — 92610 EVALUATE SWALLOWING FUNCTION: CPT

## 2023-08-01 PROCEDURE — 84100 ASSAY OF PHOSPHORUS: CPT | Performed by: PHYSICIAN ASSISTANT

## 2023-08-01 RX ORDER — LEVETIRACETAM 500 MG/1
500 TABLET ORAL EVERY 12 HOURS SCHEDULED
Status: DISCONTINUED | OUTPATIENT
Start: 2023-08-01 | End: 2023-08-03 | Stop reason: HOSPADM

## 2023-08-01 RX ORDER — ENOXAPARIN SODIUM 100 MG/ML
30 INJECTION SUBCUTANEOUS EVERY 12 HOURS SCHEDULED
Status: DISCONTINUED | OUTPATIENT
Start: 2023-08-01 | End: 2023-08-03 | Stop reason: HOSPADM

## 2023-08-01 RX ORDER — ATORVASTATIN CALCIUM 10 MG/1
10 TABLET, FILM COATED ORAL DAILY
Status: DISCONTINUED | OUTPATIENT
Start: 2023-08-01 | End: 2023-08-03 | Stop reason: HOSPADM

## 2023-08-01 RX ORDER — ASPIRIN 81 MG/1
81 TABLET, CHEWABLE ORAL DAILY
Status: DISCONTINUED | OUTPATIENT
Start: 2023-08-01 | End: 2023-08-03 | Stop reason: HOSPADM

## 2023-08-01 RX ORDER — CLOPIDOGREL BISULFATE 75 MG/1
75 TABLET ORAL DAILY
Status: DISCONTINUED | OUTPATIENT
Start: 2023-08-02 | End: 2023-08-03 | Stop reason: HOSPADM

## 2023-08-01 RX ORDER — CLOPIDOGREL BISULFATE 75 MG/1
300 TABLET ORAL ONCE
Status: COMPLETED | OUTPATIENT
Start: 2023-08-01 | End: 2023-08-01

## 2023-08-01 RX ORDER — ACETAMINOPHEN 325 MG/1
650 TABLET ORAL EVERY 6 HOURS PRN
Status: DISCONTINUED | OUTPATIENT
Start: 2023-08-01 | End: 2023-08-01

## 2023-08-01 RX ORDER — ACETAMINOPHEN 325 MG/1
975 TABLET ORAL EVERY 8 HOURS SCHEDULED
Status: DISCONTINUED | OUTPATIENT
Start: 2023-08-01 | End: 2023-08-03 | Stop reason: HOSPADM

## 2023-08-01 RX ADMIN — ATORVASTATIN CALCIUM 10 MG: 10 TABLET, FILM COATED ORAL at 10:55

## 2023-08-01 RX ADMIN — ENOXAPARIN SODIUM 30 MG: 30 INJECTION SUBCUTANEOUS at 21:14

## 2023-08-01 RX ADMIN — ACETAMINOPHEN 975 MG: 325 TABLET, FILM COATED ORAL at 21:14

## 2023-08-01 RX ADMIN — ENOXAPARIN SODIUM 30 MG: 30 INJECTION SUBCUTANEOUS at 10:54

## 2023-08-01 RX ADMIN — CLOPIDOGREL BISULFATE 300 MG: 75 TABLET ORAL at 10:55

## 2023-08-01 RX ADMIN — CHLORHEXIDINE GLUCONATE 15 ML: 1.2 SOLUTION ORAL at 21:14

## 2023-08-01 RX ADMIN — LEVETIRACETAM 500 MG: 500 TABLET, FILM COATED ORAL at 21:14

## 2023-08-01 RX ADMIN — LIDOCAINE 1 PATCH: 700 PATCH TOPICAL at 15:17

## 2023-08-01 RX ADMIN — LEVETIRACETAM 500 MG: 100 INJECTION, SOLUTION INTRAVENOUS at 08:01

## 2023-08-01 RX ADMIN — CEFTRIAXONE SODIUM 1000 MG: 10 INJECTION, POWDER, FOR SOLUTION INTRAVENOUS at 11:48

## 2023-08-01 RX ADMIN — ASPIRIN 81 MG 81 MG: 81 TABLET ORAL at 10:55

## 2023-08-01 RX ADMIN — CHLORHEXIDINE GLUCONATE 15 ML: 1.2 SOLUTION ORAL at 08:01

## 2023-08-01 RX ADMIN — ACETAMINOPHEN 650 MG: 325 TABLET, FILM COATED ORAL at 15:16

## 2023-08-01 NOTE — ASSESSMENT & PLAN NOTE
· S/p fall with headstrike  · CT head - Hyperdense material seen in right ambient cistern compatible with acute subarachnoid hemorrhage, new from 7/5/2023  · CT head 7/31 - Interval improvement of SAH  · Not on any AC/AP as outpatient  · GCS 14 on arrival (E4, V4, M6), notably [place], oriented to self. Plan:  · Neurosurgery consulted, appreciate recommendations  · Continue q1h neuro-checks  · Repeat CT head in 6 hours @ 1630 today  · Obtain STAT CT head with any neuro changes or GCS drop by 2 or more within 1 hour. · Recommended SBP < 160mmHg.   · Keppra 500mg BID for Sx prophylaxis  · Cleared for pharmacological DVT prophylaxis by neurosurgery  · Cleared for DAPT by neurosurgery  · SCDs for DVT prophylaxis  · Multimodal analgesia as above  · PT/OT eval/treat  · No neurosurgical intervention at this time  · Speech eval prior to initiating diet

## 2023-08-01 NOTE — PLAN OF CARE
Problem: OCCUPATIONAL THERAPY ADULT  Goal: Performs self-care activities at highest level of function for planned discharge setting. See evaluation for individualized goals. Description: Treatment Interventions: ADL retraining, Functional transfer training, Endurance training, Cognitive reorientation, Patient/family training, Equipment evaluation/education, Compensatory technique education, Continued evaluation, Activityengagement, Energy conservation          See flowsheet documentation for full assessment, interventions and recommendations. Note: Limitation: Decreased ADL status, Decreased Safe judgement during ADL, Decreased cognition, Decreased endurance, Decreased self-care trans, Decreased high-level ADLs (pain, balance, fxnl mobility, act caleb, fxnl reach, standing caleb, strength and fxnl sitting balance, safety awareness, insight, orientation, learning new tasks and initiation, response time)  Prognosis: Good  Assessment: Pt is a 80 y.o. female seen for OT evaluation s/p admit to [address] [place] [address] on 7/31/2023 w/SAH (subarachnoid hemorrhage) ([address] Prior to admission, pt was living at [address] [facility], (I) with ADLs, (A) with IADLs, (+) falls. Personal and environmental factors affecting patient at time of evaluation include advanced age, current habits and behavioral patterns, difficulty completing ADLs and difficulty completing IADLs. Personal factors supporting patient at time of evaluation include supportive facility staff at [facility] and accessible home environment. Based upon this evaluation, pt is functioning below baseline. Pt will benefit from continued skilled inpatient OT to maximize safety, level of independence overall performance in ADLs, functional transfers, functional mobility to return to functional baseline/highest level of function.      OT Discharge Recommendation: Post acute rehabilitation services     Landon Mendez, OTR/L  [place] License [id number]  [address] [place], [place] [id number]

## 2023-08-01 NOTE — ASSESSMENT & PLAN NOTE
· S/p fall  · XR - Distal right radius fracture  · Orthopedics consults, appreciate recommendations  · Splint to be applied by Ortho  · NWB to RUE, okay to bear weight through humerus/forearm  · Multimodal analgesia as above  · No surgical intervention at this time  · Ortho follow up upon discharge

## 2023-08-01 NOTE — PROGRESS NOTES
[address]  Progress Note  Name: Ashish So  MRN: [id number]  Unit/Bed#: ICU 02 I Date of Admission: 7/31/2023   Date of Service: 8/1/2023 I Hospital Day: 1    Assessment/Plan   Fall  Assessment & Plan  · S/p unwitnessed fall, pt doesn't remember falling, unable to tell me if she had LOC or hit her head. · Per family pt has been having multiple falls and recently started using a wheelchair instead of a walker. · Injuries:  · SAH  · C2 fracture (dens type 3)  · Right scapular fracture  · Right radius fracture  · Multiple rib fractures (right 3rd - 6th, 8th -10th)  · Left eyebrow laceration  · Analgesia:  · Dilaudid q4h as needed while NPO  · Bowel regimen when tolerating PO  · PT/OT  · Fall precautions  · CAM-ICU. Vertebral artery dissection (HCC)  Assessment & Plan  · CTA:  · Right VA - Focal linear filling defect in the V3 segment at the level of fracture at C2 suspicious for focal dissection  · Left VA - Atherosclerotic disease at the origin with moderate stenosis at the region. V1 segment is small in caliber. Severely attenuated opacification of the proximal V2 segment that may be segmentally at the level of C5. Reconstitution at the level of C4. There is segmental narrowing of the V3 segment at the level of C2 with more normal caliber at the level of C1. Findings are suspicious for dissection.   · Neurosurgery consulted  · Patient will need antiplatelet medication, however in setting of [facility] will hold until cleared by NSx  · NSx cleared for DAPT and pharm DVT prophylaxis today  · Repeat CT head - interval improvement in [facility]  · Plan to start DAPT  · ASA 81mg daily  · Plavix 300mg load, then 75mg daily  · P2Y12 Thursday or Friday    C2 cervical fracture [facility])  Assessment & Plan  · S/p fall  · CT c-spine (7/31) - Acute, minimally displaced fracture through the base of the C2 vertebral body, extending to the inferior endplate, into both pedicles to the level of the transverse foramina, and into the right transverse process  · Type 3 dens fracture  · Neurosurgery consulted  · Patient currently following commands, no obvious focal weakness  · Upright cervical XR obtained    Plan:  · Maintain [place] collar at all times, [place] collar while showering  · Will likely need c-collar for life given patients age and type of fracture  · Continue neuro-checks q1h  · Limit bending, twisting, lifting greater than 10 pounds  · Multimodal analgesia as above. · PT/OT evaluation, okay to mobilize and collar. * SAH (subarachnoid hemorrhage) ([facility])  Assessment & Plan  · S/p fall with headstrike  · CT head - Hyperdense material seen in right ambient cistern compatible with acute subarachnoid hemorrhage, new from 7/5/2023  · CT head 7/31 - Interval improvement of SAH  · Not on any AC/AP as outpatient  · GCS 14 on arrival (E4, V4, M6), notably [place], oriented to self. Plan:  · Neurosurgery consulted, appreciate recommendations  · Continue q1h neuro-checks  · Repeat CT head in 6 hours @ 1630 today  · Obtain STAT CT head with any neuro changes or GCS drop by 2 or more within 1 hour. · Recommended SBP < 160mmHg. · Keppra 500mg BID for Sx prophylaxis  · Cleared for pharmacological DVT prophylaxis by neurosurgery  · Cleared for DAPT by neurosurgery  · SCDs for DVT prophylaxis  · Multimodal analgesia as above  · PT/OT eval/treat  · No neurosurgical intervention at this time  · Speech eval prior to initiating diet    Closed wedge compression fracture of T3 vertebra with routine healing  Assessment & Plan  · CT chest/abd/pelvis - Chronic, moderate compression deformity of the L3 vertebral body with retropulsion into the spinal canal by 6 mm.   · Neurosurgery consulted  · No surgical intervention at this time  · Continue analgesia  · PT/OT eval/treat    Laceration of left eyebrow  Assessment & Plan  · Laceration over left eye  · Washout and closed at bedside by Trauma  · Local wound care  · Analgesia as above  · Received Tetanus shot in ED. Closed fracture of right scapula  Assessment & Plan  · CT chest/abd/pelvis - There is an acute or subacute minimally displaced fracture of the right scapula. · Ortho consulted, appreciate recommendations  · Non-operative   · Multimodal analgesia as above    Multiple rib fractures  Assessment & Plan  · S/p fall  · CT chest/abd/pelvis - Acute or subacute nondisplaced fractures of the right 3rd - 6th and 8th - 10th ribs laterally, which are new from 7/5/2023. Healing fracture of the right 7th rib laterally  · Rib fracture protocol  · Multimodal analgesia as above  · Incentive spirometry  · PT/OT eval/treat. Radial fracture  Assessment & Plan  · S/p fall  · XR - Distal right radius fracture  · Orthopedics consults, appreciate recommendations  · Splint to be applied by Ortho  · NWB to estelle MARIA to bear weight through humerus/forearm  · Multimodal analgesia as above  · No surgical intervention at this time  · Ortho follow up upon discharge    ICU Core Measures     A: Assess, Prevent, and Manage Pain · Has pain been assessed? Yes  · Need for changes to pain regimen? No   B: Both SAT/SAT  · N/A   C: Choice of Sedation · RASS Goal: N/A patient not on sedation  · Need for changes to sedation or analgesia regimen? NA   D: Delirium · CAM-ICU: Unable to perform secondary to Dementia or other chronic cognitive dysfunction   E: Early Mobility  · Plan for early mobility? Yes   F: Family Engagement · Plan for family engagement today? Yes     Prophylaxis:  VTE Contraindicated secondary to: start DVT prophylaxis today   Stress Ulcer  not ordered       Subjective   HPI/24hr events: No acute events overnight.     Review of Systems   Unable to perform ROS: Dementia      Objective                            Vitals I/O      Most Recent Min/Max in 24hrs   Temp 98.7 °F (37.1 °C) Temp  Min: 97.9 °F (36.6 °C)  Max: 99.2 °F (37.3 °C)   Pulse 87 Pulse  Min: 87  Max: 116   Resp (!) 23 Resp  Min: 17 Max: 37   /65 BP  Min: 128/66  Max: 184/67   O2 Sat 94 % SpO2  Min: 91 %  Max: 98 %      Intake/Output Summary (Last 24 hours) at 8/1/2023 0841  Last data filed at 8/1/2023 0400  Gross per 24 hour   Intake 260 ml   Output 1050 ml   Net -790 ml         Diet NPO     Invasive Monitoring Physical exam   None Physical Exam  Vitals and nursing note reviewed. Constitutional:       General: She is awake. She is not in acute distress. Appearance: She is normal weight. She is not ill-appearing. Interventions: Cervical collar in place. HENT:      Head:        Mouth/Throat:      Mouth: Mucous membranes are dry. Pharynx: Oropharynx is clear. Eyes:      Pupils: Pupils are equal, round, and reactive to light. Neck:      Comments: C-collar  Cardiovascular:      Rate and Rhythm: Normal rate and regular rhythm. Pulses: Normal pulses. Radial pulses are 2+ on the right side and 2+ on the left side. Dorsalis pedis pulses are 2+ on the right side and 2+ on the left side. Heart sounds: Normal heart sounds. Pulmonary:      Effort: Pulmonary effort is normal.      Breath sounds: Examination of the right-lower field reveals decreased breath sounds. Examination of the left-lower field reveals decreased breath sounds. Decreased breath sounds present. Comments: Poor inspiratory effort  Abdominal:      General: Bowel sounds are normal. There is no distension. Palpations: Abdomen is soft. Tenderness: There is no abdominal tenderness. There is no guarding. Musculoskeletal:      Right lower leg: Edema present. Left lower leg: Edema present. Comments: RUE splint   Skin:     General: Skin is warm and dry. Capillary Refill: Capillary refill takes less than 2 seconds. Findings: Abrasion present. Neurological:      Mental Status: She is alert. GCS: GCS eye subscore is 4. GCS verbal subscore is 4. GCS motor subscore is 6.       Motor: Motor function is intact. Psychiatric:         Behavior: Behavior is cooperative. Cognition and Memory: Cognition is impaired. Judgment: Judgment is impulsive. Diagnostic Studies      EKG: NSR  Imaging: I have personally reviewed pertinent reports.    and I have personally reviewed pertinent films in PACS     Medications:  Scheduled PRN   chlorhexidine, 15 mL, Q12H [address]  levETIRAcetam, 500 mg, Q12H NADYA  lidocaine, 1 patch, Daily      HYDROmorphone, 0.1 mg, Q4H PRN       Continuous          Labs:    CBC    Recent Labs     07/31/23  1022 08/01/23  0505   WBC 8.24 9.73   HGB 12.9 12.8   HCT 40.3 39.0    303     BMP    Recent Labs     07/31/23  1022 08/01/23  0505   SODIUM 137 137   K 4.0 3.8    105   CO2 25 23   AGAP 7 9   BUN 15 10   CREATININE 0.64 0.51*   CALCIUM 9.4 8.7       Coags    Recent Labs     07/31/23  1022   INR 1.07   PTT 25        Additional Electrolytes  Recent Labs     07/31/23  1021 08/01/23  0505   MG  --  2.1   PHOS  --  3.2   CAIONIZED 1.17  --           Blood Gas    No recent results  No recent results LFTs  Recent Labs     07/31/23  1022   ALT 13   AST 22   ALKPHOS 119*   ALB 3.7   TBILI 0.67       Infectious  No recent results  Glucose  Recent Labs     07/31/23  1022 08/01/23  0505   GLUC 142* [id number] [facility], [address] [place] [address]

## 2023-08-01 NOTE — CONSULTS
Consultation - Geriatrics   Dipti Mckeon 80 y.o. female MRN: [id number]  Unit/Bed#: ICU 02 Encounter: [id number]      Assessment/Plan    Cognitive Screening   · Patient does appear to have a history of memory issues or cognitive impairment   · It does not appear that patient has a formal diagnosis of dementia, however, dementia is mentioned in her chart on a few separate occasions   · Son admits that the patient does have some cognitive impairment   · He states that at baseline she is typically oriented to person and occasionally place but notes that she has been at the facility for about a year and he is not sure that she is aware that is where she resides  · Son notes no behaviors at baseline   · Patient is alert and oriented to person and place on exam today   · Most recent TSH on 7/25/2022 noted to be 0.300  · Most recent vitamin B12 level on 7/25/2022 noted to be 99  · Would recommend checking TSH and vitamin B12 levels on routine labs  · Recommend supplementing with vitamin B12 if level is < 400  · CT of the head on 7/31/2023 revealed moderate microangiopathic changes  · No MoCA noted in epic  · Maintain delirium precautions as discussed below  · Redirect reorient as needed  · Keep physically, mentally, socially active    Delirium   • Baseline mentation: alert and oriented to person and place  • Current mentation: alert and oriented to person and place   • Patient is at high risk secondary to age, underlying cognitive impairment, fall, traumatic injuries as listed below, acute pain, ICU stay, hearing impairment, and hospitalization   • Maintain delirium precautions   · Provide redirection, reorientation, and distraction techniques  · Maintain fall and safety precautions   · Assist with ADLs/IADLs  · Avoid deliriogenic medications such as tramadol, benzodiazepines, anticholinergics, benadryl  · Treat pain using geriatric pain protocol   · Encourage oral hydration and nutrition   · Monitor for constipation and urinary retention   · Implement sleep hygiene and limit night time interuptions  · Maintain sleep-wake cycle   · Encourage early and frequent mobilization   · Encourage participation in group activities  • Most recent EKG on 7/31/2023 revealed a QTc interval of 459  · If all other interventions are unsuccessful for acute agitation and behaviors, can consider Zyprexa 2.5 mg IM Q 8 hours prn   • Would avoid benzodiazepines such as Ativan as these can worsen delirium     Deconditioning   • Baseline function: needs some assistance with ADLs and is dependent with IADLs  • Patient is at increased risk for deconditioning secondary to suspected underlying cognitive impairment, fall, traumatic injuries as discussed below, weakness, gait dysfunction, and hospitalization    • Continue to optimize diet, hydration, and mobility for healing  · GFR 83 on labs today   · Keep hydrated  • Monitor for signs and symptoms of infection, dehydration, DVT, and skin breakdown    Frailty   Clinical Frail Scale: 6- Moderately Frail  · Need help with all outside activities  · Need help with stairs and bathing  · May need assistance with dressing  • Most recent albumin on 7/31/2023 noted to be 3.7  • Consider nutrition consult  • Encourage protein supplementation     Ambulatory Dysfunction/Falls  • Son notes that the patient has frequent falls   • He notes that she had been ambulating with the walker until recently when she had a fall with rib fractures  · He notes that she was noted to be weaker at the facility and they started putting her in the wheelchair   • PT/OT consulted to assist with strengthening/mobility and assist with discharge planning to appropriate level of care  • Assess patient frequently for physical needs, encourage use of assistant devices as needed and directed by PT/OT  • Identify cognitive and physical deficits and behaviors that affect risk of falls  • Consider moving patient closer to nursing station to monitor more closely for impulsive behavior which may increase risk of falls  • [place] fall and safety precautions   • Educate patient/family on patient safety including physical limitations and importance of using call bell for assistance   • Modify environment to reduce risk of injury including disconnecting from pole when not in use, ensuring adequate lighting in room and restroom, ensuring that path to restroom is clear and free of trip hazards  • Out of bed as tolerated    Impaired Vision   • Patient does have vision impairment  · Son notes that she does wear glasses  • Recommend use of corrective lenses at all appropriate times  • Encourage adequate lighting and encourage use of assistance with ambulation  • Keep personal belongings close to avoid reaching  • Encourage appropriate footwear at all times  • Recommend large font for printed materials provided to patient    Impaired Hearing   • Patient does have hearing impairment   · Son notes that she does wear hearing aids but she has been having trouble with them   • Hearing impairment strongly correlated with depression, cognitive impairment, delirium and falls in the older adult  • Use hearing aids or sound amplifier  • Speak face to face  • Use clear dictation and enunciation of words    Dentition/Appetite   • Patient does have partial dentures   • Son notes that she has a good appetite at baseline   • Encourage use of dentures at all appropriate times  • Ensure meal consistency is appropriate for all abilities   • Consider nutrition consult   • Continue aspiration precautions     Elimination   • Patient has been primarily continent of bowel and bladder at baseline  · He states that since being in the wheelchair he is not sure if she has been using the toilet or if the facility had been putting briefs on her   · No bowel movement documented since admission   · She is not currently on a bowel regimen   • Monitor for constipation and urinary retention     Insomnia • Son is not aware of any difficulty with sleep at baseline   • He notes that she typically does sleep in a recliner chair but due to her current injuries he is concerned about her sleeping in the chair while inpatient   • She is not currently ordered anything for sleep  • She is on frequent neurological checks secondary to [facility]   • Can consider melatonin if needed for sleep   • First line is behavioral therapy   • Avoid sedative hypnotics including benzodiazepines and benadryl  • Encourage staying awake during the day   • Encourage daytime activities and morning exercise   • Decrease or eliminate daytime naps   • Avoid caffeine especially during late afternoon and evening hours  • Establish a nighttime routine  • Implement sleep hygiene and limit nighttime interruptions    Anxiety/Depression  • Patient does not appear to have any documented history of anxiety or depression   • She is not currently on any medication for this   • Mood appears stable on exam today   • Continue supportive care     Subarachnoid Hemorrhage   · Patient presented to the hospital status post unwitnessed fall with headstrike at her nursing facility   · Imaging revealed an acute subarachnoid hemorrhage   · Neurosurgery consulted   · Repeat imaging revealed revealed hemorrhage nearly resolved  · No plans for surgery per neurosurgery   · Keppra administered   · Neurosurgery signing off     C2 Fracture   · Status post unwitnessed fall with positive headstrike   · CT of the spine revealed acute minimally displaced fracture through the base of C2 vertebral body extending to the inferior endplate into both pedicles to the level of transverse foramina and into the right transverse process   · Neurosurgery consulted and recommending conservative management   · [place] collar recommended at all times   · Pain control as discussed below   · Neurosurgery recommending outpatient follow-up    Right Scapula Fracture   · Status post unwitnessed fall · Imaging revealed right scapula fracture   · Orthopedics consulted and recommending non-operative management   · Pain control as discussed below   · Management per orthopedics    Right Radius Fracture   · Status post unwitnessed fall   · Imaging revealed right distal radius fracture   · Orthopedics consulted and patient was placed in a padded sugartong splint   · Patient is NWB to RUE   · No further orthopedic intervention warranted at this time   · Patient should follow-up with orthopedics on discharge     Multiple Rib Fractures   · Status post unwitnessed fall   · Imaging revealed acute or subacute nondisplaced fractures of the right 3rd-6th and 8th-10th ribs laterally which are new from 7/5/2023  · Rib fracture protocol ordered   · Pain control as discussed below   · Encourage deep breathing and incentive spirometry   · Management per primary team     Acute Pain due to Injury  · Patient status post fall with injuries as mentioned above   · Patient is currently denying pain but she is grimacing and appears uncomfortable  · She is not on any scheduled medication for pain other than a lidocaine patch   · Will schedule tylenol 975 mg Q 8 hours for pain   · Recommend treating pain using the geriatric pain protocol   Scheduled acetaminophen 975 mg po Q8  Oxycodone 2.5 mg po Q 4 prn moderate pain  Oxycodone 5 mg po Q 4 prn severe pain   Dilaudid 0.2 mg IV Q 4 prn breakthrough pain   · Lidoderm patch  · Can also consider very low dose muscle relaxer if needed as patient has multiple rib fractures (recommend methocarbamol 250 mg Q 8 hours prn to start and can titrate from there)  · Continue nonpharmacological methods of pain management     Home Safety  · Patient resides at [address] [place] [address]  · Level 3 DNR     Home Medication Review   Unable to confirm medication list with facility today.  Will continue to attempt obtaining current list.     I have personally reviewed this medication list with the patients pharmacy listed above. History of Present Illness   Physician Requesting Consult: DO Laura  Reason for Consult / Principal Problem: Fall  Hx and PE limited by: Confusion     HPI: Dipti Mckeon is a 80y.o. year old female who has hypertension, osteoporosis, and gait instability and presented to the hospital status post fall. The Ppatient resides at [facility] presented to the hospital after sustaining an unwitnessed fall at her facility. She was found down on the floor. She initially came in with a GCS of 14 and a bit of confusion. She has been moving her upper and lower extremities and was endorsing pain. She was able to answer simple questions. Imaging was completed and she was noted to have a C2 fracture, subarachnoid hemorrhage, radial fracture, scapular fracture, multiple rib fractures, and a vertebral artery occlusion. Neurosurgery was consulted for the subarachnoid hemorrhage, C2 fracture, and vertebral artery occlusion. Repeat imaging revealed near resolution of the [facility]. Patient was recommended to wear a [place] collar for C2 fracture. Neurosurgery cleared the patient to start DAPT given short interval CT head revealing near resolution of SAH. They also recommended aspirin and loading dose of Plavix. Orthopedics was consulted for the scapular and radial fractures. The patient was placed in a sugartong splint and was recommended nonweightbearing in the right upper extremity. No surgical intervention required for the scapular fracture. PT and OT have been consulted and these are pending. Geriatrics is being consulted for a fall. Care was coordinated with patients son at the bedside. He states that the patient has been residing at her facility for about a year now. He states that she needs some assistance with her ADLs and is dependent for IADLs. He states that at baseline, she is oriented typically to person consistently.   He notes that he does not believe she is typically oriented to place as she has been at her facility for about a year and she still seems as though she does not have a routine. He states he does not believe she associates the facility as her home. He states that she has had several recent falls. He states most recently she was just admitted for rib fractures. He states that she had been ambulating with a roller walker prior to her last fall. After her fall with rib fractures, she returned to her facility and was noted to be weaker by staff. He states that she has since been primarily in the wheelchair. He states that she does have vision impairment and wears glasses. He also notes that she is very hard of hearing and she does wear hearing aids. He notes that she has been having trouble with her hearing aids recently secondary to the batteries. He states that she does have partial dentures and she has a good appetite at baseline. He states that she had been continent of bowel and bladder at baseline. He is unsure since she has been primarily in the wheelchair if she continues to be continent. He is not aware of any difficulty with sleep at baseline. Patient was seen and evaluated today at the bedside for geriatric consult. She is noted to be sitting up in her chair in no acute distress. She is alert and oriented to person and place on exam today. She is currently denying pain but is grimacing and moaning at times. Her son notes that she typically will say that she is not in pain even if she is. She offers no acute complaints on exam today. Care was coordinated with ajd Chris. She notes no acute issues or events overnight. She states she also noticed the patient grimacing in pain but denying she had pain. She notes she just gave her tylenol for the pain. Consults    Review of Systems   Unable to perform ROS: Other (Cognitive Impairment (baseline))   HENT: Positive for hearing loss (wears hearing aids). Eyes: Positive for visual disturbance (wears glasses). Psychiatric/Behavioral: Positive for confusion. Historical Information   Past Medical History:   Diagnosis Date   • Hypertension      History reviewed. No pertinent surgical history. Social History   Social History     Substance and Sexual Activity   Alcohol Use Never     Social History     Substance and Sexual Activity   Drug Use Not on file     Social History     Tobacco Use   Smoking Status Never   Smokeless Tobacco Never     Family History: History reviewed. No pertinent family history. Meds/Allergies   Current meds:   Current Facility-Administered Medications   Medication Dose Route Frequency   • acetaminophen (TYLENOL) tablet 650 mg  650 mg Oral Q6H PRN   • aspirin chewable tablet 81 mg  81 mg Oral Daily   • atorvastatin (LIPITOR) tablet 10 mg  10 mg Oral Daily   • ceftriaxone (ROCEPHIN) 1 g/50 mL in dextrose IVPB  1,000 mg Intravenous Q24H   • chlorhexidine (PERIDEX) 0.12 % oral rinse 15 mL  15 mL Mouth/Throat Q12H [address]   • [START ON 8/2/2023] clopidogrel (PLAVIX) tablet 75 mg  75 mg Oral Daily   • enoxaparin (LOVENOX) subcutaneous injection 30 mg  30 mg Subcutaneous Q12H [address]   • HYDROmorphone HCl (DILAUDID) injection 0.1 mg  0.1 mg Intravenous Q4H PRN   • levETIRAcetam (KEPPRA) tablet 500 mg  500 mg Oral Q12H [address]   • lidocaine (LIDODERM) 5 % patch 1 patch  1 patch Topical Daily      Not on File    Objective   Vitals: Blood pressure 139/66, pulse 87, temperature 98 °F (36.7 °C), temperature source Oral, resp. rate (!) 28, height 5' 5" (1.651 m), weight 68.2 kg (150 lb 5.7 oz), SpO2 96 %. ,Body mass index is 25.02 kg/m². Physical Exam  Vitals and nursing note reviewed. Constitutional:       General: She is not in acute distress. Appearance: She is not ill-appearing. Comments: Frail appearing   HENT:      Head: Normocephalic. Mouth/Throat:      Mouth: Mucous membranes are dry. Eyes:      General: No scleral icterus. Conjunctiva/sclera: Conjunctivae normal.   Neck:      Comments: Cervical collar intact   Cardiovascular:      Rate and Rhythm: Normal rate and regular rhythm. Pulmonary:      Effort: Pulmonary effort is normal. No respiratory distress. Abdominal:      General: Bowel sounds are normal. There is no distension. Palpations: Abdomen is soft. Tenderness: There is no abdominal tenderness. Musculoskeletal:         General: No tenderness. Right lower leg: Edema present. Left lower leg: Edema present. Comments: Right arm ace wrap and splint intact    Skin:     General: Skin is warm and dry. Neurological:      General: No focal deficit present. Mental Status: She is alert. Mental status is at baseline. She is disoriented. Motor: Weakness present. Gait: Gait abnormal.      Comments: Oriented to person and place  Disoriented to time  Hx of cognitive impairment          Lab Results:   Results from last 7 days   Lab Units 08/01/23  0505   WBC Thousand/uL 9.73   HEMOGLOBIN g/dL 12.8   HEMATOCRIT % 39.0   PLATELETS Thousands/uL 303        Results from last 7 days   Lab Units 08/01/23  0505 07/31/23  1022 07/31/23  1022 07/31/23  1021   POTASSIUM mmol/L 3.8   < > 4.0  --    CHLORIDE mmol/L 105   < > 105  --    CO2 mmol/L 23   < > 25  --    CO2, I-STAT mmol/L  --   --   --  27   BUN mg/dL 10   < > 15  --    CREATININE mg/dL 0.51*   < > 0.64  --    CALCIUM mg/dL 8.7   < > 9.4  --    ALK PHOS U/L  --   --  119*  --    ALT U/L  --   --  13  --    AST U/L  --   --  22  --    GLUCOSE, ISTAT mg/dl  --   --   --  149*    < > = values in this interval not displayed. Imaging Studies: I have personally reviewed pertinent reports. EKG, Pathology, and Other Studies: I have personally reviewed pertinent reports. VTE Prophylaxis: Enoxaparin (Lovenox)    Code Status: Level 3 - DNAR and DNI      Please note:  Voice-recognition software may have been used in the preparation of this document. Occasional wrong word or "sound-alike" substitutions may have occurred due to the inherent limitations of voice recognition software. Interpretation should be guided by context.

## 2023-08-01 NOTE — ASSESSMENT & PLAN NOTE
· CTA:  · Right VA - Focal linear filling defect in the V3 segment at the level of fracture at C2 suspicious for focal dissection  · Left VA - Atherosclerotic disease at the origin with moderate stenosis at the region. V1 segment is small in caliber. Severely attenuated opacification of the proximal V2 segment that may be segmentally at the level of C5. Reconstitution at the level of C4. There is segmental narrowing of the V3 segment at the level of C2 with more normal caliber at the level of C1. Findings are suspicious for dissection.   · Neurosurgery consulted  · Patient will need antiplatelet medication, however in setting of [place] will hold until cleared by NSx  · NSx cleared for DAPT and pharm DVT prophylaxis today  · Repeat CT head - interval improvement in [place]  · Plan to start DAPT  · ASA 81mg daily  · Plavix 300mg load, then 75mg daily  · P2Y12 Thursday or Friday

## 2023-08-01 NOTE — QUICK NOTE
Progress Note - Trauma ICU Transfer   and Tertiary [address] [facility]   Chris Nicolas 80 y.o. female [id number]   Unit/Bed#: ICU 02 Encounter: [id number]     Assessment & Plan   Summary of Diagnosed Injuries:  SAH  C2 fracture (dens type 3)  Right scapular fracture  Right radius fracture  Multiple rib fractures (right 3rd - 6th, 8th -10th)  Left eyebrow laceration    PLAN:  Changes today:  - Started CTX for UA + nitrites  - Keep Keppra, change to PO  - Dysphagia 2 nectar thick liquids, meds crushed  - Neuro-checks q4h  - Lovenox for DVT px  - Started ASA 81mg daily, Plavix 300mg load followed by 75mg daily  - NWB to RUE    Follow ups:  - Restart home Lasix  - Repeat CT head w/o contrast in 2 weeks to ensure no delayed hemorrhage while on DAPT  - Follow up with upper extremity orthopedics surgeon upon discharge. VTE Prophylaxis:Enoxaparin (Lovenox)     Disposition: Rehab    Code status:  Level 3 - DNAR and DNI    Consultants: IP CONSULT TO NEUROSURGERY  IP CONSULT TO CASE MANAGEMENT  IP CONSULT TO ORTHOPEDIC SURGERY     Subjective   Mechanism of Injury:Fall     HPI:  80 y.o. 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 Pt does not take AC or AP, does nto remember the fall or if she had any LOC. In trauma bay, she's awake, oriented to self GCS 12, very [place]. Laceration noted above left eye. Trauma imaging: CT head indicative of SAH, CT c-spine with c2 dens fracture, CT chest with multiple rib fractures right 3-6, 8-10 and 7th being an old fx, right scapula fracture and XR right UE right radius fracture. Labs pending. Neurosurgery consulted, no surgical intervention at this time. She was given Keppra 1000mg IV and transferred to the ICU.     24H Events:  Repeat CTH with improvements    Reason for ICU admission: SAH, C2 fracture    Recent or scheduled procedures: None    Outstanding/pending diagnostics: None       Objective   Vitals:   Temp:  [98 °F (36.7 °C)-99.2 °F (37.3 °C)] 98 °F (36.7 °C)  HR:  [] 87  Resp:  [20-37] 28  BP: (128-184)/(61-81) 139/66    I/O       07/30 0701  07/31 0700 07/31 0701  08/01 0700 08/01 0701  08/02 0700    P. O.  0     I.V. (mL/kg)  30 (0.4) 20 (0.3)    IV Piggyback  230 130    Total Intake(mL/kg)  260 (3.8) 150 (2.2)    Urine (mL/kg/hr)  1050     Total Output  1050     Net  -790 +150           Unmeasured Urine Occurrence  1 x            Invasive Devices     Peripheral Intravenous Line  Duration           Peripheral IV 07/31/23 Dorsal (posterior); Left Hand 1 day    Peripheral IV 08/01/23 Left;Ventral (anterior); Medial Forearm <1 day          Drain  Duration           External Urinary Catheter <1 day               Rationale for remaining devices: PIV - medication administration    1. Before the illness or injury that brought you to the Emergency, did you need someone to help you on a regular basis? 1=Yes   2. Since the illness or injury that brought you to the Emergency, have you needed more help than usual to take care of yourself? 1=Yes   3. Have you been hospitalized for one or more nights during the past 6 months (excluding a stay in the Emergency Department)? 0=No   4. In general, do you see well? 1=No   5. In general, do you have serious problems with your memory? 1=Yes   6. Do you take more than three different medications everyday?  1=Yes   TOTAL   5     Did you order a geriatric consult if the score was 2 or greater?: yes     PIC Score  PIC Pain Score: 3 (8/1/2023  8:00 AM)  PIC Incentive Spirometry Score: 2 (8/1/2023  8:00 AM)  PIC Cough Description: 3 (8/1/2023  8:00 AM)  PIC Total Score: 8 (8/1/2023  8:00 AM)       If the Total PIC Score </=5, did you consult APS and evaluate patient for further intervention?: yes      Pain:    Incentive Spirometry  Cough  3 = Controlled  4 = Above goal volume 3 = Strong  2 = Moderate  3 = Goal to alert volume 2 = Weak  1 = Severe  2 = Below alert volume 1 = Absent     1 = Unable to perform IS        Code Status: Level 3 - DNAR and DNI       Patient seen and evaluated by Critical Care today and deemed to be appropriate for transfer to Med Surg. Spoke to Rainer Cotton from Trauma service regarding transfer. Critical Care can be contacted via [employer] with any questions or concerns.

## 2023-08-01 NOTE — ASSESSMENT & PLAN NOTE
· S/p unwitnessed fall, pt doesn't remember falling, unable to tell me if she had LOC or hit her head. · Per family pt has been having multiple falls and recently started using a wheelchair instead of a walker. · Injuries:  · SAH  · C2 fracture (dens type 3)  · Right scapular fracture  · Right radius fracture  · Multiple rib fractures (right 3rd - 6th, 8th -10th)  · Left eyebrow laceration  · Analgesia:  · Dilaudid q4h as needed while NPO  · Bowel regimen when tolerating PO  · PT/OT  · Fall precautions  · CAM-ICU.

## 2023-08-01 NOTE — CASE MANAGEMENT
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 Primary Insurance: MEDICARE  Secondary Insurance: AARP    ASSESSMENT:  Active Health Care Proxies    There are no active Health Care Proxies on file. Patient Information  Admitted from[de-identified] Facility [facility])  Mental Status: Confused    DISCHARGE DETAILS:    Discharge planning discussed with[de-identified] Amber matias of Choice: Yes  Comments - Freedom of Choice: NH Post [facility]     Contacts  Patient Contacts: SonVirginia  Relationship to Patient[de-identified] Family  Contact Method: In Person  Reason/Outcome: Continuity of Care, Emergency Contact, Discharge Planning    Other Referral/Resources/Interventions Provided:  Interventions: Short Term Rehab  Referral Comments: CM left  For [facility] requesting return call to obtain pt baseline. CM met with pt son at bedside. Reviewed therapy recs for STR. Pt son is agreeable and their preference is [facility] [place]. CM reviewed that they are now [facility].  CM offered to send blanket referrals. Son agreeable to additional referrals if [facility] is unable to accept. Referral to [facility].     Treatment Team Recommendation: Short Term Rehab  Discharge Destination Plan[de-identified] Short Term Rehab

## 2023-08-01 NOTE — PLAN OF CARE
Problem: PHYSICAL THERAPY ADULT  Goal: Performs mobility at highest level of function for planned discharge setting. See evaluation for individualized goals. Description: Treatment/Interventions: Functional transfer training, LE strengthening/ROM, Therapeutic exercise, Cognitive reorientation, Patient/family training, Equipment eval/education, Bed mobility, Gait training, Spoke to nursing, Spoke to case management, OT          See flowsheet documentation for full assessment, interventions and recommendations. Note: Prognosis: Fair  Problem List: Decreased strength, Decreased endurance, Impaired balance, Decreased mobility, Decreased cognition, Impaired judgement, Decreased safety awareness, Obesity, Pain, Orthopedic restrictions  Assessment: Pt is a 80 y.o. female seen for PT evaluation s/p admit to [facility] on 7/31/2023. Pt was admitted with a primary dx of: SAh. PT now consulted for assessment of mobility and d/c needs. Pt with OOB to chair orders. Pts current comorbidities and personal factors effecting treatment include: history of falls, BMI, HTN. Pts current clinical presentation is Unstable/Unpredictable (high complexity) due to Ongoing medical management for primary dx, Decreased activity tolerance compared to baseline, Fall risk, Increased assistance needed from caregiver at current time, Ongoing telemetry monitoring, Cog status, Current WBS. Prior to admission, pt was W/C level. Upon evaluation, pt currently is requiring MaxA for bed mobility; [address] for transfers and Steff x2 for ambulation 3 ft w/ B/L HHA.  Pt presents at PT eval functioning below baseline and currently w/ overall mobility deficits 2* to: BLE weakness, impaired balance, decreased endurance, gait deviations, pain, decreased activity tolerance compared to baseline, decreased functional mobility tolerance compared to baseline, decreased safety awareness, impaired judgement, fall risk, orthopedic restrictions, decreased cognition. Pt currently at a fall risk 2* to impairments listed above. Pt will continue to benefit from skilled acute PT interventions to address stated impairments; to maximize functional mobility; for ongoing pt/ family training; and DME needs. At conclusion of PT session chair alarm engaged, all needs in reach, RN notified of session findings/recommendations and pt returned back in recliner chair with phone and call bell within reach. Pt denies any further questions at this time. Recommend IP rehab upon hospital D/C. PT Discharge Recommendation: Post acute rehabilitation services    See flowsheet documentation for full assessment.

## 2023-08-01 NOTE — PHYSICAL THERAPY NOTE
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[address])    SAH (subarachnoid hemorrhage) ([address])    Multiple rib fractures    Closed fracture of right scapula    Laceration of left eyebrow    Closed wedge compression fracture of T3 vertebra with routine healing       Past Medical History  Past Medical History:   Diagnosis Date    Hypertension         08/01/23 1156   Note Type   Note type Evaluation   Pain Assessment   Pain Assessment Tool FLACC   Pain Location/Orientation Orientation: Right;Location: Arm   Pain Rating: FLACC (Rest) - Face 0   Pain Rating: FLACC (Rest) - Legs 0   Pain Rating: FLACC (Rest) - Activity 0   Pain Rating: FLACC (Rest) - Cry 0   Pain Rating: FLACC (Rest) - Consolability 0   Score: FLACC (Rest) 0   Pain Rating: FLACC (Activity) - Face 1   Pain Rating: FLACC (Activity) - Legs 0   Pain Rating: FLACC (Activity) - Activity 0   Pain Rating: FLACC (Activity) - Cry 0   Pain Rating: FLACC (Activity) - Consolability 0   Score: FLACC (Activity) 1   Restrictions/Precautions   Weight Bearing Precautions Per Order Yes   RUE Weight Bearing Per Order NWB  (in sugar tong splint)   Braces or Orthoses Splint;C/S Collar  (sugar tong, [place] on at all times)   Other Precautions WBS; Chair Alarm; Bed Alarm;Cognitive;Hard of hearing;Telemetry;Multiple lines; Fall Risk  (cspine precautions)   Home Living   Type of Home Assisted living;SNF  ([facility])   Home Layout One level   [address] staff   Falls in the last 6 months (S)  1 to 4  (unknown # with merged chart, however pt family admits to multiple falls recently, pt transitioned to wheelchair level because of this)   Comments per CR given history of falls pt has been at a w/c level recently, previously was using a RW at baseline, pt poor historian   General   Family/Caregiver Present No   Cognition   Orientation Level Oriented to person   Following Commands Follows one step commands with increased time or repetition   Comments pt ID by wristband, name and    RLE Assessment   RLE Assessment X  (functionally 3+/5)   LLE Assessment   LLE Assessment X  (functionally 3+/5)   Bed Mobility   Supine to Sit 2  Maximal assistance   Additional items Assist x 1; Increased time required;LE management;HOB elevated   Sit to Supine Unable to assess   Additional Comments flat spin technique used, vc for initiation, pt OOB in recliner at end of session   Transfers   Sit to Stand 3  Moderate assistance   Additional items Increased time required;Verbal cues; Assist x 1   Stand to Sit 3  Moderate assistance   Additional items Assist x 1; Increased time required;Verbal cues;Armrests   Additional Comments vc to maintain NWB status of R UE   Ambulation/Elevation   Gait pattern Decreased foot clearance; Short stride; Step to   Gait Assistance 4  Minimal assist   Additional items Assist x 2   Assistive Device   (bl HHA)   Distance 3'x1   Ambulation/Elevation Additional Comments vc for sequencing, target surface, LE advancement   Balance   Static Sitting Fair   Dynamic Sitting Fair -   Static Standing Poor +   Dynamic Standing Poor   Ambulatory Zero  (ax2)   Activity Tolerance   Activity Tolerance Treatment limited secondary to medical complications (Comment)  (cognition)   Medical Staff Made Aware JOEL morton   Nurse Made Aware Briseida calixto   Assessment   Prognosis Fair   Problem List Decreased strength;Decreased endurance; Impaired balance;Decreased mobility; Decreased cognition; Impaired judgement;Decreased safety awareness; Obesity;Pain;Orthopedic restrictions   Assessment Pt is a 80 y.o. female seen for PT evaluation s/p admit to [facility] on 7/31/2023. Pt was admitted with a primary dx of: SAh. PT now consulted for assessment of mobility and d/c needs. Pt with OOB to chair orders. Pts current comorbidities and personal factors effecting treatment include: history of falls, BMI, HTN. Pts current clinical presentation is Unstable/Unpredictable (high complexity) due to Ongoing medical management for primary dx, Decreased activity tolerance compared to baseline, Fall risk, Increased assistance needed from caregiver at current time, Ongoing telemetry monitoring, Cog status, Current WBS. Prior to admission, pt was W/C level. Upon evaluation, pt currently is requiring MaxA for bed mobility; [address] for transfers and [place] x2 for ambulation 3 ft w/ B/L HHA. Pt presents at PT eval functioning below baseline and currently w/ overall mobility deficits 2* to: BLE weakness, impaired balance, decreased endurance, gait deviations, pain, decreased activity tolerance compared to baseline, decreased functional mobility tolerance compared to baseline, decreased safety awareness, impaired judgement, fall risk, orthopedic restrictions, decreased cognition. Pt currently at a fall risk 2* to impairments listed above. Pt will continue to benefit from skilled acute PT interventions to address stated impairments; to maximize functional mobility; for ongoing pt/ family training; and DME needs. At conclusion of PT session chair alarm engaged, all needs in reach, RN notified of session findings/recommendations and pt returned back in recliner chair with phone and call bell within reach. Pt denies any further questions at this time. Recommend IP rehab upon hospital D/C. Goals   Patient Goals no goals stated by pt   STG Expiration Date 08/11/23   Short Term Goal #1 In 10 days pt will be able to: 1. Demonstrate ability to perform all aspects of bed mobility with Mihai to improve functional safety.   2. Perform functional transfers with Mihai to facilitate safe return to previous living environment. 3.  Ambulate 20 ft with RW and min A with stable vitals to improve safety with household distances and reduce fall risk. 4. Improve LE strength grades by 1 to increase ease of functional mobility with transfers and gait. 5. Pt will demonstrate improved balance by one grade in order to decrease risk of falls. PT Treatment Day 0   Plan   Treatment/Interventions Functional transfer training;LE strengthening/ROM; Therapeutic exercise;Cognitive reorientation;Patient/family training;Equipment eval/education; Bed mobility;Gait training;Spoke to nursing;Spoke to case management;OT   PT Frequency 2-3x/wk   Recommendation   PT Discharge Recommendation Post acute rehabilitation services   Additional Comments (S)  could benefit from trial of R UE platform walker   AM-PAC Basic Mobility Inpatient   Turning in Flat Bed Without Bedrails 1   Lying on Back to Sitting on Edge of Flat Bed Without Bedrails 1   Moving Bed to Chair 2   Standing Up From Chair Using Arms 2   Walk in Room 2   Climb 3-5 Stairs With Railing 1   Basic Mobility Inpatient Raw Score 9   Turning Head Towards Sound 2   Follow Simple Instructions 2   Low Function Basic Mobility Raw Score  13   Low Function Basic Mobility Standardized Score  20.14   Highest Level Of Mobility   -HLM Goal 3: Sit at edge of bed   JH-HLM Achieved 4: Move to chair/commode   End of Consult   Patient Position at End of Consult Bed/Chair alarm activated; All needs within reach; Bedside chair   The patient's AM-PAC Basic Mobility Inpatient Short Form Raw Score is 9. A Raw score of less than or equal to 16 suggests the patient may benefit from discharge to post-acute rehabilitation services. Please also refer to the recommendation of the Physical Therapist for safe discharge planning.     Macey Mauricio, PT

## 2023-08-01 NOTE — ASSESSMENT & PLAN NOTE
Left VA dissection with occlusion, right VA dissection    Imaging:  · CTA head and neck w wo contrast 7/31/2023: Small intimal flap compatible with focal dissection in the V3 segment of the right vertebral body at the level of the C2 fracture. Multifocal narrowing and segmental occlusion of the left vertebral artery concerning for dissection superimposed on atherosclerotic disease. No high-grade intracranial stenosis or large vessel occlusion. Plan:  · Cleared to start DAPT given short interval CT head demonstrates nearly resolved SAH  · Should have asa 81mg and load of plavix 300mg today; will start asa 81mg and plavix 75mg daily tomorrow  · Plan for P2Y12 Thursday or Friday to ensure she is therapeutic on these medications    Neurosurgery will follow peripherally regarding this issue and review labs once completed. Please call with questions or concerns.

## 2023-08-01 NOTE — OCCUPATIONAL THERAPY NOTE
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 No pertinent surgical history. 08/01/23 1200   OT Last Visit   OT Visit Date 08/01/23   Note Type   Note type Evaluation   Pain Assessment   Pain Assessment Tool FLACC   Pain Location/Orientation Orientation: Right;Location: Arm   Pain Onset/Description Onset: Ongoing; Descriptor: Aching   Effect of Pain on Daily Activities limits activity tolerance, comfort, activity tolerance   Pain Rating: FLACC (Rest) - Face 0   Pain Rating: FLACC (Rest) - Legs 0   Pain Rating: FLACC (Rest) - Activity 0   Pain Rating: FLACC (Rest) - Cry 0   Pain Rating: FLACC (Rest) - Consolability 0   Score: FLACC (Rest) 0   Pain Rating: FLACC (Activity) - Face 1   Pain Rating: FLACC (Activity) - Legs 0   Pain Rating: FLACC (Activity) - Activity 0   Pain Rating: FLACC (Activity) - Cry 1   Pain Rating: FLACC (Activity) - Consolability 1   Score: FLACC (Activity) 3   Restrictions/Precautions   Weight Bearing Precautions Per Order Yes   RUE Weight Bearing Per Order (S)  NWB  (sugar tong splint; Per ortho: Ok for patient to weight bear through the forearm/humerus)   Braces or Orthoses Splint;C/S Collar  (sugar tong splint; aspe vista at all times, Shital collar for showering)   Other Precautions Cognitive; Chair Alarm; Bed Alarm;WBS;Multiple lines;Telemetry; Fall Risk;Pain;Hard of hearing  (C-spine precautions; pocket talker utilized)   Home Living   Type of Home Assisted living  [facility])   Home Layout One level;Performs ADLs on one level; Able to live on main level with bedroom/bathroom   Home Equipment Walker;Cane;Wheelchair-manual   Additional Comments Pt poor historian, reports she lives with her family in [place]. Prior Function   Level of [place]   (Per last admission, pt was (I) with ADLs/mobility)   Lives With Facility staff   Receives Help From Other (Comment)  (Facility staff)   IADLs Family/Friend/Other provides transportation; Family/Friend/Other provides meals; Family/Friend/Other provides medication management   Falls in the last 6 months (S)  1 to 4  (Per chart review: family reports pt has been having multiple falls and recently started using a wheelchair instead of a walker.)   Vocational Retired  (Seamstress)   Comments Per chart review pt has transitioned to a w/c due to falls   Lifestyle   Autonomy Pt lives at [facility], requires A for ADLs, uses w/c, (+) falls, (-)    Reciprocal Relationships Supportive facility staff at [facility] to Others Retired [employer] Pt enjoys looking out the window and watching all the cars   General   Additional Pertinent History Pt admitted s/p fall at [facility] resulting in [place], C2 fx, R scapular fx, and R proximal radius fx. Per ortho, pt is NWB to RUE in sugar tong splint. Per ortho: Shell Young for patient to weight bear through the forearm/humerus. Family/Caregiver Present No   Subjective   Subjective "I don't remember falling"   ADL   Eating Assistance 5  Supervision/Setup   Grooming Assistance 4  Minimal Assistance   UB Bathing Assistance 4  Minimal Assistance   LB Bathing Assistance 2  Maximal [facility] 2  Maximal Assistance   LB Dressing Deficit Don/doff R sock; Don/doff L sock   Toileting Assistance  2  Maximal Assistance   Bed Mobility   Supine to Sit 2  Maximal assistance   Additional items Assist x 1;HOB elevated; Bedrails; Increased time required;Verbal cues;LE management   Additional Comments Able to sit with (S). OOB to recliner at end of session   Transfers   Sit to Stand 3  Moderate assistance   Additional items Assist x 1; Increased time required;Verbal cues   Stand to Sit 3  Moderate assistance   Additional items Assist x 1; Increased time required;Verbal cues   Additional Comments VC to maintain WBS as pt attempting to push from EOB   Functional Mobility   Functional Mobility 4  Minimal assistance  (A x 2)   Additional Comments Few steps EOB>recliner with min A x 2 HHA. Unsteady, limited by fatigue, uncontrolled sitting. Additional items Hand hold assistance   Balance   Static Sitting Fair   Dynamic Sitting Fair -   Static Standing Poor +   Dynamic Standing Poor   Activity Tolerance   Activity Tolerance Patient limited by fatigue;Patient limited by pain  (limited by cognition)   Medical Staff [id number] coordianted with PT Annia Needs   Nurse Made Aware yes, ZEKE [place] to see pt   RUE Assessment   RUE Assessment X  (NWB in sugar tong splint)   LUE Assessment   LUE Assessment WFL   Hand Function   Gross Motor Coordination Functional  (impaired RUE due to pain)   Fine Motor Coordination Functional   Sensation   Light Touch No apparent deficits   Vision-Basic Assessment   Current Vision Does not wear glasses   Cognition   Overall Cognitive Status Impaired   Arousal/Participation Cooperative;Lethargic   Attention Attends with cues to redirect   Orientation Level Disoriented to time;Disoriented to situation;Oriented to place; Disoriented to person  (generally oriented to hospital; unable to state  reporting .)   Memory Decreased short term memory;Decreased recall of recent events;Decreased recall of precautions;Decreased recall of biographical information  (does not recall falling)   Following Commands Follows one step commands with increased time or repetition   Comments Pleasantly confused, easily redirectable. Poor short term memory/timeline, does not recall falling. Pocket talker utilized due to [facility], required frequent repetition of commands. Lethargic, falling asleep once in recliner   Assessment   Limitation Decreased ADL status; Decreased Safe judgement during ADL;Decreased cognition;Decreased endurance;Decreased self-care trans;Decreased high-level ADLs  (pain, balance, fxnl mobility, act caleb, fxnl reach, standing caleb, strength and fxnl sitting balance, safety awareness, insight, orientation, learning new tasks and initiation, response time)   Prognosis Good   Assessment Pt is a 80 y.o. female seen for OT evaluation s/p admit to [address] [place] [address] on 7/31/2023 w/SAH (subarachnoid hemorrhage) ([facility] Prior to admission, pt was living at [address] [place] [address], (I) with ADLs, (A) with IADLs, (+) falls. Personal and environmental factors affecting patient at time of evaluation include advanced age, current habits and behavioral patterns, difficulty completing ADLs and difficulty completing IADLs. Personal factors supporting patient at time of evaluation include supportive facility staff at [facility] and accessible home environment. Based upon this evaluation, pt is functioning below baseline. Pt will benefit from continued skilled inpatient OT to maximize safety, level of independence overall performance in ADLs, functional transfers, functional mobility to return to functional baseline/highest level of function. Goals   Patient Goals none stated due to cognition; will continue to assess   LTG Time Frame 10-14   Long Term Goal #1 see goals below   Plan   Treatment Interventions ADL retraining;Functional transfer training; Endurance training;Cognitive reorientation;Patient/family training;Equipment evaluation/education; Compensatory technique education;Continued evaluation; Activityengagement; Energy conservation   Goal Expiration Date 08/11/23   OT Treatment Day 0   OT Frequency 3-5x/wk   Recommendation   OT Discharge Recommendation Post acute rehabilitation services   Additional Comments  This session, pt required and most appropriately benefited from skilled OT/PT co-treat due to extensive physical assistance of SKILLED therapists, significant regression from functional baseline and decreased activity tolerance. OT and PT goals were addressed separately as seen in documentation. Additional Comments 2 The patient's raw score on the AM-PAC Daily Activity inpatient short form is 15, standardized score is 34.69, less than 39.4. From an OT standpoint, patients at this level may benefit from d/c to Post acute rehabilitation services. AM-PAC Daily Activity Inpatient   Lower Body Dressing 2   Bathing 2   Toileting 2   Upper Body Dressing 2   Grooming 3   Eating 4   Daily Activity Raw Score 15   Daily Activity Standardized Score (Calc for Raw Score >=11) 34.69   AM-PAC Applied Cognition Inpatient   Following a Speech/Presentation 2   Understanding Ordinary Conversation 3   Taking Medications 1   Remembering Where Things Are Placed or Put Away 2   Remembering List of 4-5 Errands 1   Taking Care of Complicated Tasks 1   Applied Cognition Raw Score 10   Applied Cognition Standardized Score 24.98   End of Consult   Patient Position at End of Consult Bedside chair;Bed/Chair alarm activated; All needs within reach   Nurse Communication Nurse aware of consult     GOALS:    *Goals established to promote goal of to return to baseline:      *ADL transfers with Min (A) for inc'd independence with ADLs/purposeful tasks    *Patient will perform grooming tasks standing at sink with (S) in order to increase overall independence and dynamic standing balance     *UB ADL with (S) for inc'd independence with self care and decreased caregiver burden    *LB ADL with Mod (A) using AE prn for inc'd independence with self care and decreased caregiver burden    *Toileting with Mod (A) for clothing management and hygiene to increase hygiene/thoroughness in order to reduce caregiver burden    *Increase stand tolerance x 3  m for inc'd tolerance with standing purposeful tasks    *Bed mobility- Min (A) for inc'd independence to manage own comfort and initiate EOB & OOB purposeful tasks    *Patient will verbalize 3 safety awareness/ principles to prevent falls in the home setting. *Patient will increase OOB/sitting tolerance to 2-4 hours per day to increase participation in self-care and leisure tasks with no s/s of exertion. *Pt will verbalize and demonstrate understanding of movement precautions 100% in tx sessions with VC for increased safety and functional mobility. *Patient will increase functional mobility to and from bathroom with rolling walker w/ platform attachment vs LRAD with min assist to increase independence with toileting    *Patient will improve functional activity tolerance to 20 minutes of sustained functional tasks to increase participation in basic self-care and decrease assistance level.      *Pt will consistently follow one step directions during ADL performance w/ 50-75  % accuracy to max I and safety w/ ADL performance    [facility], [place], OTR/L    [place] License [id number]  [address] [place] [address] [id number]

## 2023-08-01 NOTE — ASSESSMENT & PLAN NOTE
· S/p fall  · CT chest/abd/pelvis - Acute or subacute nondisplaced fractures of the right 3rd - 6th and 8th - 10th ribs laterally, which are new from 7/5/2023. Healing fracture of the right 7th rib laterally  · Rib fracture protocol  · Multimodal analgesia as above  · Incentive spirometry  · PT/OT eval/treat.

## 2023-08-01 NOTE — PLAN OF CARE
Problem: MOBILITY - ADULT  Goal: Maintain or return to baseline ADL function  Description: INTERVENTIONS:  -  Assess patient's ability to carry out ADLs; assess patient's baseline for ADL function and identify physical deficits which impact ability to perform ADLs (bathing, care of mouth/teeth, toileting, grooming, dressing, etc.)  - Assess/evaluate cause of self-care deficits   - Assess range of motion  - Assess patient's mobility; develop plan if impaired  - Assess patient's need for assistive devices and provide as appropriate  - Encourage maximum independence but intervene and supervise when necessary  - Involve family in performance of ADLs  - Assess for home care needs following discharge   - Consider OT consult to assist with ADL evaluation and planning for discharge  - Provide patient education as appropriate  Outcome: Progressing     Problem: Potential for Falls  Goal: Patient will remain free of falls  Description: INTERVENTIONS:  - Educate patient/family on patient safety including physical limitations  - Instruct patient to call for assistance with activity   - Consult OT/PT to assist with strengthening/mobility   - Keep Call bell within reach  - Keep bed low and locked with side rails adjusted as appropriate  - Keep care items and personal belongings within reach  - Initiate and maintain comfort rounds  - Make Fall Risk Sign visible to staff  - Apply yellow socks and bracelet for high fall risk patients  - Consider moving patient to room near nurses station  Outcome: Progressing

## 2023-08-01 NOTE — ASSESSMENT & PLAN NOTE
· Laceration over left eye  · Washout and closed at bedside by Trauma  · Local wound care  · Analgesia as above  · Received Tetanus shot in ED.

## 2023-08-01 NOTE — PLAN OF CARE
Problem: Potential for Falls  Goal: Patient will remain free of falls  Description: INTERVENTIONS:  - Educate patient/family on patient safety including physical limitations  - Instruct patient to call for assistance with activity   - Consult OT/PT to assist with strengthening/mobility   - Keep Call bell within reach  - Keep bed low and locked with side rails adjusted as appropriate  - Keep care items and personal belongings within reach  - Initiate and maintain comfort rounds  - Make Fall Risk Sign visible to staff  - Apply yellow socks and bracelet for high fall risk patients  - Consider moving patient to room near nurses station  Outcome: Progressing     Problem: MOBILITY - ADULT  Goal: Maintain or return to baseline ADL function  Description: INTERVENTIONS:  -  Assess patient's ability to carry out ADLs; assess patient's baseline for ADL function and identify physical deficits which impact ability to perform ADLs (bathing, care of mouth/teeth, toileting, grooming, dressing, etc.)  - Assess/evaluate cause of self-care deficits   - Assess range of motion  - Assess patient's mobility; develop plan if impaired  - Assess patient's need for assistive devices and provide as appropriate  - Encourage maximum independence but intervene and supervise when necessary  - Involve family in performance of ADLs  - Assess for home care needs following discharge   - Consider OT consult to assist with ADL evaluation and planning for discharge  - Provide patient education as appropriate  Outcome: Progressing  Goal: Maintains/Returns to pre admission functional level  Description: INTERVENTIONS:  - Perform BMAT or MOVE assessment daily.   - Set and communicate daily mobility goal to care team and patient/family/caregiver.    - Collaborate with rehabilitation services on mobility goals if consulted  - Out of bed for toileting  - Record patient progress and toleration of activity level   Outcome: Progressing

## 2023-08-01 NOTE — SPEECH THERAPY NOTE
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 No pertinent surgical history. Summary    Pt presents with mild-moderate oropharyngeal dysphagia influenced by hard cervical collar characterized by slow oral processing, delayed swallow initiation, effortful swallows w/  fair laryngeal excursion,and possible aspiration risk w/ delayed throat clearing w/ large &/or successive sips of liquids. Recommendations:   Diet: mechanically altered/level 2 diet and nectar thick liquids   Meds: crushed with puree   Frequent Oral care: 4x/day  Aspiration precautions  Other Recommendations/ considerations: will cont to follow for diet tolerance       Current Medical Status  Pt is a 80 y.o. female who presented to [address] [place] [address]  with SAH. Pt s/p unwitnessed fall. She was found on the floor at her facility. Past medical history:   Please see H&P for details    Special Studies:  CT-head: 7/31/23 The previously described subarachnoid hemorrhage has nearly resolved. No new extra-axial hemorrhage is demonstrable. No CT signs of acute infarction. No intracranial mass, positive mass effect or midline shift. No acute parenchymal hemorrhage. Social/Education/Vocational Hx:  Pt lives [facility] Information   Current Risks for Dysphagia & Aspiration: aspen collar in place, age, cog deficits, AMS  Current Symptoms/Concerns: Aspen collar, risk for aspiration. Current Diet: NPO   Baseline Diet: regular diet and thin liquids  Takes pills- unknown. Baseline Assessment   Behavior/Cognition: alert  Speech/Language Status: able to participate in basic conversation and able to follow commands inconsistently  Patient Positioning: upright in bed     Swallow Mechanism Exam   Facial: symmetrical  Labial: WFL  Lingual: WFL  Velum: unable to visualize  Mandible: adequate ROM and  decreased ROM  Dentition: adequate and upper dentures  Vocal quality:clear/adequate   Volitional Cough: strong/productive   Respiratory: RA    Consistencies Assessed and Performance   Consistencies Administered: thin liquids, nectar thick, puree and mechanical soft solids    Oral Stage: pt had some difficulty sucking from straw, suspect cog related. Slow but functional appearing oral processing w/ cookie pcs. Pt appeared w/ adequate oral control w/ NTL and thin liquids from straw. Pharyngeal Stage: delayed swallow initiation which appeared effortful at times. Fair laryngeal excursion. Delayed throat clearing w/ large &/or successive sips of liquids by straw.        Esophageal Concerns: none reported      Results Reviewed with: patient, RN and CRNP   Dysphagia Goals: pt will tolerate dysphagia 2 mech soft with nectar thick liquids without s/s of aspiration x2-4 sessions      April Tamika Nix MA CCC-SLP  Speech Pathologist  [place] license # [id number]  [place] license # [id number]  Available via [employer]

## 2023-08-01 NOTE — H&P
H&P - Trauma   Elina Ariza 80 y.o. female MRN: [id number]  Unit/Bed#: ICU 02 Encounter: [id number]    Trauma Alert: Level B   Model of Arrival: Ambulance    Trauma Team: Attending Kuldip Lynn and Roel Washington PA-C  Consultants: Neurosurgery consultation placed with immediate response and discussion via phone call. Note to follow. Time of consultation at 11:30am    Assessment/Plan   Active Problems / Assessment:   1. C2 fracture  2. SAH  3. Concern for vertebral artery injuries  4. Fall  5. Laceration of left eyebrow  6. Multiple rib fractures  7. Radial fracture     Plan:   -Admit to ICU  -Confirmed with patient's son over the phone that the patient is a DNR/DNI  -Will initiate hot protocol and every hour neurochecks; start [facility]  -Will need plan with neurosurgery to discuss a vertebral artery injury  -Laceration to be repaired at bedside  -Initiate rib fracture protocol  -Orthopedics consultation for radial head fracture  -Patient noted to be a GCS of 14 on arrival with confusion  -Follow-up labs  -FAST exam negative  -Tetanus updated in ED    Disposition: Admit to ICU at this time. We will follow-up with son when he arrives at bedside. He will bring home medication list.    History of Present Illness     Chief Complaint: Fall  Mechanism:Fall     HPI:    Elina Ariza is a 80 y.o. female who presents with is a poor historian with an unwitnessed fall. She was found on the floor at her facility. Comes in as a GCS 14 with slight confusion. She is hard of hearing. She is cooperative on evaluation. She is moving upper and lower extremities. She does endorse pain. She is able to answer simple questions. She was noted to have a left periorbital laceration. Review of Systems   Unable to perform ROS: Acuity of condition     12-point, complete review of systems was reviewed and negative except as stated above. Historical Information     Past Medical History:   Diagnosis Date   • Hypertension      History reviewed.  No pertinent surgical history. Unable to obtain history due to Unreliable historian    Social History     Tobacco Use   • Smoking status: Never   • Smokeless tobacco: Never   Vaping Use   • Vaping Use: Never used   Substance Use Topics   • Alcohol use: Never     Immunization History   Administered Date(s) Administered   • Tdap 07/31/2023     Last Tetanus: updated today  Family History: Non-contributory    1. Before the illness or injury that brought you to the Emergency, did you need someone to help you on a regular basis? 1=Yes   2. Since the illness or injury that brought you to the Emergency, have you needed more help than usual to take care of yourself? 1=Yes   3. Have you been hospitalized for one or more nights during the past 6 months (excluding a stay in the Emergency Department)? 1=Yes   4. In general, do you see well? 0=Yes   5. In general, do you have serious problems with your memory? 1=Yes   6. Do you take more than three different medications everyday? 1=Yes   TOTAL   5     Did you order a geriatric consult if the score was 2 or greater?: yes     Meds/Allergies   PTA meds:   Prior to Admission Medications   Prescriptions Last Dose Informant Patient Reported? Taking?    Betamethasone Sodium Phosphate 0.1 % SOLN   Yes Yes   Sig: Apply to eye   Calcium Carb-Cholecalciferol (Calcium 600/Vitamin D3) 600-20 MG-MCG TABS   Yes Yes   Sig: Take 1 tablet by mouth in the morning   Cyanocobalamin (Vitamin B 12) 100 MCG LOZG   Yes Yes   Sig: Take by mouth   Emollient (CeraVe Moisturizing) CREA   Yes Yes   Sig: Apply topically   acetaminophen (TYLENOL) 325 mg tablet   Yes Yes   Sig: Take 975 mg by mouth every 6 (six) hours as needed for mild pain   atorvastatin (LIPITOR) 10 mg tablet   Yes Yes   Sig: Take 10 mg by mouth daily   b complex vitamins capsule   Yes Yes   Sig: Take 1 capsule by mouth daily   cholecalciferol (VITAMIN D3) 1,000 units tablet   Yes Yes   Sig: Take 1,000 Units by mouth daily   docusate sodium (COLACE) 100 mg capsule   Yes Yes   Sig: Take 100 mg by mouth 2 (two) times a day as needed for constipation   furosemide (LASIX) 20 mg tablet   Yes Yes   Sig: Take 30 mg by mouth daily   potassium chloride (Klor-Con) 10 mEq tablet   Yes Yes   Sig: Take 20 mEq by mouth in the morning      Facility-Administered Medications: None     Allergies have not been reviewed; Not on File    Objective   Initial Vitals:   Temperature: 97.9 °F (36.6 °C) (07/31/23 1016)  Pulse: (!) 109 (07/31/23 1016)  Respirations: 17 (07/31/23 1016)  Blood Pressure: 130/80 (07/31/23 1016)    Primary Survey:   Airway:        Status: patent;        Pre-hospital Interventions: none        Hospital Interventions: none  Breathing:        Pre-hospital Interventions: none       Effort: normal       Right breath sounds: normal       Left breath sounds: normal  Circulation:        Rhythm: regular       Rate: regular   Right Pulses Left Pulses    R radial: 2+  R femoral: 2+  R pedal: 2+  R carotid: 2+  R popliteal: 2+ L radial: 2+  L femoral: 2+  L pedal: 2+  L carotid: 2+  L popliteal: 2+   Disability:        GCS: Eye: 4; Verbal: 4 Motor: 6 Total: 14       Right Pupil: round;  reactive         Left Pupil:  round;  reactive      R Motor Strength L Motor Strength    R : 5/5  R dorsiflex: 5/5  R plantarflex: 5/5 L : 5/5  L dorsiflex: 5/5  L plantarflex: 5/5        Sensory:  No sensory deficit  Exposure:       Completed: Yes      Secondary Survey:  Physical Exam  Vitals reviewed. Constitutional:       Appearance: Normal appearance. She is not ill-appearing. HENT:      Head:      Comments: Left periorbital laceration     Right Ear: External ear normal.      Left Ear: External ear normal.      Mouth/Throat:      Pharynx: Oropharynx is clear. Eyes:      Extraocular Movements: Extraocular movements intact. Pupils: Pupils are equal, round, and reactive to light. Cardiovascular:      Rate and Rhythm: Normal rate and regular rhythm. Pulses: Normal pulses. Heart sounds: Normal heart sounds. Pulmonary:      Effort: Pulmonary effort is normal. No respiratory distress. Breath sounds: Normal breath sounds. Chest:      Chest wall: No tenderness. Abdominal:      General: There is no distension. Palpations: Abdomen is soft. Tenderness: There is no abdominal tenderness. There is no guarding. Musculoskeletal:         General: No swelling or tenderness. Normal range of motion. Cervical back: Normal range of motion. No tenderness. Skin:     General: Skin is warm and dry. Neurological:      General: No focal deficit present. Mental Status: She is alert. She is disoriented. Comments: GCS 14; confusion is present. Invasive Devices     Peripheral Intravenous Line  Duration           Peripheral IV Left Antecubital -- days    Peripheral IV 07/31/23 Dorsal (posterior); Left Hand <1 day              Lab Results: I have personally reviewed all pertinent laboratory/test results 07/31/23 and in the preceding 24 hours. Recent Labs     07/31/23  1021 07/31/23  1022   WBC  --  8.24   HGB 13.9 12.9   HCT 41 40.3   PLT  --  344   SODIUM  --  137   K  --  4.0   CL  --  105   CO2 27 25   BUN  --  15   CREATININE  --  0.64   GLUC  --  142*   CAIONIZED 1.17  --    AST  --  22   ALT  --  13   ALB  --  3.7   TBILI  --  0.67   ALKPHOS  --  119*   PTT  --  25   INR  --  1.07   HSTNI0  --  5       Imaging Results: I have personally reviewed pertinent images saved in PACS. CT scan findings (and other pertinent positive findings on images) were discussed with radiology.  My interpretation of the images/reports are as follows:  Chest Xray(s): pending   FAST exam(s): negative for acute findings   CT Scan(s): pending   Additional Xray(s): pending     Other Studies: no other studies    Code Status: Level 3 - DNAR and DNI  Advance Directive and Living Will:      Power of :    POLST:    I have spent 44 minutes with Patient and family today in which greater than 50% of this time was spent in counseling/coordination of care regarding Diagnostic results, Prognosis, Risks and benefits of tx options, Instructions for management, Patient and family education, Importance of tx compliance, Risk factor reductions, Impressions, Counseling / Coordination of care, Documenting in the medical record, Reviewing / ordering tests, medicine, procedures  , Obtaining or reviewing history   and Communicating with other healthcare professionals .

## 2023-08-01 NOTE — ASSESSMENT & PLAN NOTE
Acute T3 fracture in the setting of multiple chronic thoracolumbar fractures    Imaging:  · CTA head and neck w wo contrast 7/31/2023: New mild T3 compression fracture with mild bony retropulsion. · CT chest abdomen pelvis 7/31/2023: mild loss of vertebral body height along the superior endplate of the T3 vertebral body appears to have slightly progressed from the CT of the chest from 7/5/2023. There is retropulsion into the spinal canal by approximately 4 mm. Plan:  · Do not think patient will tolerate vista TX collar, will defer  · Maintain regular vista collar at all times    Neurosurgery will sign off of this issue. Follow up in 2 weeks with upright thoracic spine XR. Please call with questions or concerns.

## 2023-08-01 NOTE — ASSESSMENT & PLAN NOTE
· S/p fall  · CT c-spine (7/31) - Acute, minimally displaced fracture through the base of the C2 vertebral body, extending to the inferior endplate, into both pedicles to the level of the transverse foramina, and into the right transverse process  · Type 3 dens fracture  · Neurosurgery consulted  · Patient currently following commands, no obvious focal weakness  · Upright cervical XR obtained    Plan:  · Maintain Vista collar at all times, Shital collar while showering  · Will likely need c-collar for life given patients age and type of fracture  · Continue neuro-checks q1h  · Limit bending, twisting, lifting greater than 10 pounds  · Multimodal analgesia as above. · PT/OT evaluation, okay to mobilize and collar.

## 2023-08-01 NOTE — ASSESSMENT & PLAN NOTE
Type 3 dens fracture (hangman's fracture) s/p unwitnessed fall    Imaging:  · CT cervical spine w/o, 7/31/23: Acute, minimally displaced fracture through the base of the C2 vertebral body, extending to the inferior endplate, into both pedicles to the level of the transverse foramina, and into the right transverse process  · XR cervical spine 7/31/2023:  Official read pending, per my interpretation C2 fracture appears stable. Overall quality of image is poor and there is no open mouth view. Plan:  · Continue to monitor neurological exam  · No plans for surgery, continue conservative management  · Vista collar at all times, lópez to shower  · Limit bending, twisting, lifting greater than 10 pounds  · No driving until cleared from collar  · Given nature of fracture and patient's age, patient will likely remain in collar for several months to life  · Pain control per primary team  · PT OT evaluation, okay to mobilize and collar    Neurosurgery to sign off of this issue. Plan for follow up in 2 weeks with repeat upright XR cervical spine. Please call with questions or concerns.

## 2023-08-01 NOTE — PROGRESS NOTES
[facility]  Progress Note  Name: Efren Arana  MRN: [id number]  Unit/Bed#: ICU 02 I Date of Admission: 7/31/2023   Date of Service: 8/1/2023 I Hospital Day: 1    Assessment/Plan   * SAH (subarachnoid hemorrhage) [facility])  Assessment & Plan  [facility] right ambient cistern s/p unwitnessed fall at nursing facility  · No known h/o AC/AP medications    Imaging personally reviewed with attending:  · CT head w/o, 7/31/23 (repeat): The previously described subarachnoid hemorrhage has nearly resolved. No new extra-axial hemorrhage is demonstrable. Plan:  · Continue frequent neurological checks. · No plans for surgery, continue with conservative management  · Imaging reviewed, SAH nearly resolved without new hemorrhage. Okay to start DAPT from [facility] standpoint. · STAT CT head with any neurological decline including drop GCS of 2pts within 1 hr.  · Recommend SBP < 160mmHg. · Keppra per primary team, given age and near resolution of hemorrhage consider stopping this agent  · Pain control per primary team  · Mobilize with PT/OT  · DVT PPX: SCDs, okay for pharm dvt ppx from [facility] standpoint if warranted  · Ongoing medical management per primary team/trauma. Neurosurgery will sign off regarding this issue. Plan for repeat CT head wo contrast in 2 weeks to ensure no delayed hemorrhage while on DAPT. Please call with questions or concerns. C2 cervical fracture (HCC)  Assessment & Plan  Type 3 dens fracture (hangman's fracture) s/p unwitnessed fall    Imaging:  · CT cervical spine w/o, 7/31/23: Acute, minimally displaced fracture through the base of the C2 vertebral body, extending to the inferior endplate, into both pedicles to the level of the transverse foramina, and into the right transverse process  · XR cervical spine 7/31/2023:  Official read pending, per my interpretation C2 fracture appears stable. Overall quality of image is poor and there is no open mouth view.     Plan:  · Continue to monitor neurological exam  · No plans for surgery, continue conservative management  · Vista collar at all times, lópez to shower  · Limit bending, twisting, lifting greater than 10 pounds  · No driving until cleared from collar  · Given nature of fracture and patient's age, patient will likely remain in collar for several months to life  · Pain control per primary team  · PT OT evaluation, okay to mobilize and collar    Neurosurgery to sign off of this issue. Plan for follow up in 2 weeks with repeat upright XR cervical spine. Please call with questions or concerns. Vertebral artery dissection [facility])  Assessment & Plan  Left VA dissection with occlusion, right VA dissection    Imaging:  · CTA head and neck w wo contrast 7/31/2023: Small intimal flap compatible with focal dissection in the V3 segment of the right vertebral body at the level of the C2 fracture. Multifocal narrowing and segmental occlusion of the left vertebral artery concerning for dissection superimposed on atherosclerotic disease. No high-grade intracranial stenosis or large vessel occlusion. Plan:  · Cleared to start DAPT given short interval CT head demonstrates nearly resolved SAH  · Should have asa 81mg and load of plavix 300mg today; will start asa 81mg and plavix 75mg daily tomorrow  · Plan for P2Y12 Thursday or Friday to ensure she is therapeutic on these medications    Neurosurgery will follow peripherally regarding this issue and review labs once completed. Please call with questions or concerns. Closed wedge compression fracture of T3 vertebra with routine healing  Assessment & Plan  Acute T3 fracture in the setting of multiple chronic thoracolumbar fractures    Imaging:  · CTA head and neck w wo contrast 7/31/2023: New mild T3 compression fracture with mild bony retropulsion.   · CT chest abdomen pelvis 7/31/2023: mild loss of vertebral body height along the superior endplate of the T3 vertebral body appears to have slightly progressed from the CT of the chest from 7/5/2023. There is retropulsion into the spinal canal by approximately 4 mm. Plan:  · Do not think patient will tolerate vista TX collar, will defer  · Maintain regular vista collar at all times    Neurosurgery will sign off of this issue. Follow up in 2 weeks with upright thoracic spine XR. Please call with questions or concerns. Laceration of left eyebrow  Assessment & Plan  Repair per trauma    Closed fracture of right scapula  Assessment & Plan  Ortho following  · nonoperative management     Multiple rib fractures  Assessment & Plan  Rib fracture protocol    Fall  Assessment & Plan  · History of frequent falls  · Now mostly uses wheelchair  · See plan above    Radial fracture  Assessment & Plan  Ortho following  · NWB RUE in sugar tong splint         Subjective/Objective   Chief Complaint: follow up poly trauma    Subjective:  Patient reports no pain anywhere. She does not like the collar and wants to know if this is necessary. Very [place], seen at the bedside with critical care. Objective:  Laying in bed, lópez collar in place, NAD    I/O       07/30 0701  07/31 0700 07/31 0701  08/01 0700 08/01 0701  08/02 0700    P. O.  0     I.V. (mL/kg)  30 (0.4)     IV Piggyback  230     Total Intake(mL/kg)  260 (3.8)     Urine (mL/kg/hr)  1050     Total Output  1050     Net  -790            Unmeasured Urine Occurrence  1 x           Invasive Devices     Peripheral Intravenous Line  Duration           Peripheral IV 07/31/23 Dorsal (posterior); Left Hand 1 day    Peripheral IV 08/01/23 Left;Ventral (anterior); Medial Forearm <1 day          Drain  Duration           External Urinary Catheter <1 day                Physical Exam:  Vitals: Blood pressure 149/65, pulse 87, temperature 98.7 °F (37.1 °C), temperature source Axillary, resp. rate (!) 23, height 5' 5" (1.651 m), weight 68.2 kg (150 lb 5.7 oz), SpO2 94 %. ,Body mass index is 25.02 kg/m².       General appearance: alert, appears stated age, cooperative and no distress  Head: Normocephalic, without obvious abnormality, atraumatic  Eyes: conjugate gaze  Neck: supple, symmetrical, trachea midline, lópez collar in place, do not report pain to palpation of the cervical spine  Lungs: non labored breathing  Heart: regular heart rate  Neurologic:   Mental status: GCS 14, confused about location, follows commands when she is able to hear you  Cranial nerves: grossly intact (Cranial nerves II-XII) - very [place]  Motor: moving all extremities, RUE in splint and not assessed, 4/5 LUE, able to lift legs off the bed and wiggle toes      Lab Results:  Results from last 7 days   Lab Units 08/01/23  0505 07/31/23  1022 07/31/23  1021   WBC Thousand/uL 9.73 8.24  --    HEMOGLOBIN g/dL 12.8 12.9  --    I STAT HEMOGLOBIN g/dl  --   --  13.9   HEMATOCRIT % 39.0 40.3  --    HEMATOCRIT, ISTAT %  --   --  41   PLATELETS Thousands/uL 303 344  --    NEUTROS PCT % 75 54  --    MONOS PCT % 12 10  --    EOS PCT % 1 6  --      Results from last 7 days   Lab Units 08/01/23  0505 07/31/23  1022 07/31/23  1021   POTASSIUM mmol/L 3.8 4.0  --    CHLORIDE mmol/L 105 105  --    CO2 mmol/L 23 25  --    CO2, I-STAT mmol/L  --   --  27   BUN mg/dL 10 15  --    CREATININE mg/dL 0.51* 0.64  --    CALCIUM mg/dL 8.7 9.4  --    ALK PHOS U/L  --  119*  --    ALT U/L  --  13  --    AST U/L  --  22  --    GLUCOSE, ISTAT mg/dl  --   --  149*     Results from last 7 days   Lab Units 08/01/23  0505   MAGNESIUM mg/dL 2.1     Results from last 7 days   Lab Units 08/01/23  0505   PHOSPHORUS mg/dL 3.2     Results from last 7 days   Lab Units 07/31/23  1022   INR  1.07   PTT seconds 25       Imaging Studies: I have personally reviewed pertinent reports. and I have personally reviewed pertinent films in PACS    CT head wo contrast    Result Date: 8/1/2023  Impression: Interval improvement. The images are available for clinical review.  Workstation performed: [id number]     TRAUMA - CT spine cervical wo contrast    Addendum Date: 7/31/2023    ADDENDUM: Correction, mild loss of vertebral body height along the superior endplate of the T3 vertebral body appears to have slightly progressed from the CT of the chest from 7/5/2023. There is retropulsion into the spinal canal by approximately 4 mm. Result Date: 7/31/2023  Impression: Acute, minimally displaced fracture through the base of the C2 vertebral body, extending to the inferior endplate, into both pedicles to the level of the transverse foramina, and into the right transverse process. No additional acute fractures. Intact odontoid process. No evidence of traumatic malalignment. Please see separate report for the CT angiogram of the head and neck performed concurrently. Additional findings as above. I personally discussed this study with Dr. Afshin Ramsay on 7/31/2023 at 11:44 AM. Workstation performed: [id number]     TRAUMA - CT chest abdomen pelvis w contrast    Addendum Date: 7/31/2023    ADDENDUM: Correction, mild loss of vertebral body height along the superior endplate of the T3 vertebral body appears to have slightly progressed from the CT of the chest from 7/5/2023. There is retropulsion into the spinal canal by approximately 4 mm. Result Date: 7/31/2023  Impression: Acute or subacute nondisplaced fractures of the 3rd - 6th and 8th - 10th ribs laterally, new from 7/5/2023. New small hypodense right pleural effusion. No associated pneumothorax. Acute or subacute minimally displaced fracture of the right scapula. No other acute thoracic, abdominal or pelvic trauma. Cholelithiasis with no evidence of acute cholecystitis and a droplet of intraluminal air in the gallbladder near the fundus of unclear etiology, possibly related to prior sphincterotomy, in retrospect also present on prior CT from July 2022. No other pneumobilia. Multiple additional findings as above.  I personally discussed the urgent findings of this study with Dr. Afshin Ramsay on 7/31/2023 at 11:44 AM. Workstation performed: [id number]     CTA head and neck w wo contrast    Result Date: 7/31/2023  Impression: Small intimal flap compatible with focal dissection in the V3 segment of the right vertebral body at the level of the C2 fracture. Multifocal narrowing and segmental occlusion of the left vertebral artery concerning for dissection superimposed on atherosclerotic disease. No high-grade intracranial stenosis or large vessel occlusion. Acute C2 fracture. New mild T3 compression fracture with mild bony retropulsion. Please refer to concurrent CTs of the cervical spine and chest. I personally discussed this study with Brady Mckeon on 7/31/2023 11:25 a.m. AM. Workstation performed: [id number]     XR Trauma multiple ([facility] trauma bay ONLY)    Result Date: 7/31/2023  Impression: Trace right pleural effusion. No obvious thoracic displaced fractures within limitation of supine AP chest technique. Acute, mildly impacted fracture of the distal right radial metaphysis with no intra-articular extension. Focal cortical irregularity along the lateral aspect of the right ulnar styloid, possibly an additional nondisplaced fracture. No wrist dislocation. I personally discussed this study with Dr. Sary Mao on 7/31/2023 at 11:44 AM. Workstation performed: [id number]     TRAUMA - CT facial bones wo contrast    Result Date: 7/31/2023  Impression: Soft tissue swelling and focal skin defect in the left supraorbital region Compatible with a laceration. No acute facial bone fracture or evidence of orbital trauma. I personally discussed this study with Dr. Sary Mao on 7/31/2023 at 11:44 AM. Workstation performed: [id number]     TRAUMA - CT head wo contrast    Result Date: 7/31/2023  Impression: Hyperdense material seen in right ambient cistern compatible with acute subarachnoid hemorrhage, new from 7/5/2023. No evidence of intracranial hemorrhage elsewhere. No calvarial fracture.  Please see separate report for the CT angiogram of the head and neck performed concurrently. I personally discussed this study with Dr. Maude Marrero on 7/31/2023 at 11:44 AM. Workstation performed: [id number]       EKG, Pathology, and Other Studies: I have personally reviewed pertinent reports.       VTE Pharmacologic Prophylaxis: none ordered    VTE Mechanical Prophylaxis: sequential compression device

## 2023-08-01 NOTE — ASSESSMENT & PLAN NOTE
· CT chest/abd/pelvis - There is an acute or subacute minimally displaced fracture of the right scapula.   · Ortho consulted, appreciate recommendations  · Non-operative   · Multimodal analgesia as above

## 2023-08-01 NOTE — PHYSICAL THERAPY NOTE
Orthotic Note            Date: 8/1/2023      Patient Name: Elina Ariza           08/01/23 3661   Note Type   Note type Orthotic Management/Training   Type of Brace   Brace Applied [facility] Set   Patient Position When Brace Applied Supine  (ZEKE calixto stabilizing C-Spine, pt requires dependent means to kyle and doff brace, educated and reinforced c-spine precautions)   Bracing Recommendations Recommendation (comment)  (Rubens Barter to be worn at all times)   Education   Education Provided Yes; Other (comment)  (Educated pt and nursing on fit of [place]pen vista, skin inspection, donning/doffing.)   End of Consult   Patient Position at End of Consult Supine; All needs within reach;Bed/Chair alarm activated   Nurse Communication Nurse aware of consult, application of brace  (ZEKE calixto present, stabilizing pt c-spine)     Marina Hines, PT

## 2023-08-01 NOTE — ASSESSMENT & PLAN NOTE
SAH right ambient cistern s/p unwitnessed fall at nursing facility  · No known h/o AC/AP medications    Imaging personally reviewed with attending:  · CT head w/o, 7/31/23 (repeat): The previously described subarachnoid hemorrhage has nearly resolved. No new extra-axial hemorrhage is demonstrable. Plan:  · Continue frequent neurological checks. · No plans for surgery, continue with conservative management  · Imaging reviewed, SAH nearly resolved without new hemorrhage. Okay to start DAPT from [facility] standpoint. · STAT CT head with any neurological decline including drop GCS of 2pts within 1 hr.  · Recommend SBP < 160mmHg. · Keppra per primary team, given age and near resolution of hemorrhage consider stopping this agent  · Pain control per primary team  · Mobilize with PT/OT  · DVT PPX: Evelio, okay for pharm dvt ppx from [facility] standpoint if warranted  · Ongoing medical management per primary team/trauma. Neurosurgery will sign off regarding this issue. Plan for repeat CT head wo contrast in 2 weeks to ensure no delayed hemorrhage while on DAPT. Please call with questions or concerns.

## 2023-08-01 NOTE — ASSESSMENT & PLAN NOTE
· CT chest/abd/pelvis - Chronic, moderate compression deformity of the L3 vertebral body with retropulsion into the spinal canal by 6 mm.   · Neurosurgery consulted  · No surgical intervention at this time  · Continue analgesia  · PT/OT eval/treat

## 2023-08-01 NOTE — CASE MANAGEMENT
Case Management Progress Note    Patient name Kimberly Barger  Location ICU 02/ICU 02 MRN [id number]  : 1930 Date 2023       LOS (days): 1  Geometric Mean LOS (GMLOS) (days): 4.40  Days to GMLOS:3.2        OBJECTIVE:        Current admission status: Inpatient  Preferred Pharmacy: No Pharmacies Listed  Primary Care Provider: No primary care provider on file. Primary Insurance: MEDICARE  Secondary Insurance: AARP    PROGRESS NOTE:    [facility] once medically stable. Son updated at bedside.

## 2023-08-02 PROBLEM — R82.90 ABNORMAL URINALYSIS: Status: ACTIVE | Noted: 2023-08-02

## 2023-08-02 LAB
ANION GAP SERPL CALCULATED.3IONS-SCNC: 7 MMOL/L
ANION GAP SERPL CALCULATED.3IONS-SCNC: 7 MMOL/L
BUN SERPL-MCNC: 10 MG/DL (ref 5–25)
BUN SERPL-MCNC: 10 MG/DL (ref 5–25)
CALCIUM SERPL-MCNC: 8.5 MG/DL (ref 8.4–10.2)
CALCIUM SERPL-MCNC: 8.5 MG/DL (ref 8.4–10.2)
CHLORIDE SERPL-SCNC: 108 MMOL/L (ref 96–108)
CHLORIDE SERPL-SCNC: 108 MMOL/L (ref 96–108)
CO2 SERPL-SCNC: 25 MMOL/L (ref 21–32)
CO2 SERPL-SCNC: 25 MMOL/L (ref 21–32)
CREAT SERPL-MCNC: 0.5 MG/DL (ref 0.6–1.3)
CREAT SERPL-MCNC: 0.5 MG/DL (ref 0.6–1.3)
ERYTHROCYTE [DISTWIDTH] IN BLOOD BY AUTOMATED COUNT: 14 % (ref 11.6–15.1)
ERYTHROCYTE [DISTWIDTH] IN BLOOD BY AUTOMATED COUNT: 14 % (ref 11.6–15.1)
GFR SERPL CREATININE-BSD FRML MDRD: 84 ML/MIN/1.73SQ M
GFR SERPL CREATININE-BSD FRML MDRD: 84 ML/MIN/1.73SQ M
GLUCOSE SERPL-MCNC: 95 MG/DL (ref 65–140)
GLUCOSE SERPL-MCNC: 95 MG/DL (ref 65–140)
HCT VFR BLD AUTO: 37 % (ref 34.8–46.1)
HCT VFR BLD AUTO: 37 % (ref 34.8–46.1)
HGB BLD-MCNC: 12.1 G/DL (ref 11.5–15.4)
HGB BLD-MCNC: 12.1 G/DL (ref 11.5–15.4)
MAGNESIUM SERPL-MCNC: 2.2 MG/DL (ref 1.9–2.7)
MAGNESIUM SERPL-MCNC: 2.2 MG/DL (ref 1.9–2.7)
MCH RBC QN AUTO: 29.5 PG (ref 26.8–34.3)
MCH RBC QN AUTO: 29.5 PG (ref 26.8–34.3)
MCHC RBC AUTO-ENTMCNC: 32.7 G/DL (ref 31.4–37.4)
MCHC RBC AUTO-ENTMCNC: 32.7 G/DL (ref 31.4–37.4)
MCV RBC AUTO: 90 FL (ref 82–98)
MCV RBC AUTO: 90 FL (ref 82–98)
PHOSPHATE SERPL-MCNC: 3.1 MG/DL (ref 2.3–4.1)
PHOSPHATE SERPL-MCNC: 3.1 MG/DL (ref 2.3–4.1)
PLATELET # BLD AUTO: 260 THOUSANDS/UL (ref 149–390)
PLATELET # BLD AUTO: 260 THOUSANDS/UL (ref 149–390)
PMV BLD AUTO: 9.7 FL (ref 8.9–12.7)
PMV BLD AUTO: 9.7 FL (ref 8.9–12.7)
POTASSIUM SERPL-SCNC: 3.5 MMOL/L (ref 3.5–5.3)
POTASSIUM SERPL-SCNC: 3.5 MMOL/L (ref 3.5–5.3)
RBC # BLD AUTO: 4.1 MILLION/UL (ref 3.81–5.12)
RBC # BLD AUTO: 4.1 MILLION/UL (ref 3.81–5.12)
SODIUM SERPL-SCNC: 140 MMOL/L (ref 135–147)
SODIUM SERPL-SCNC: 140 MMOL/L (ref 135–147)
WBC # BLD AUTO: 7.51 THOUSAND/UL (ref 4.31–10.16)
WBC # BLD AUTO: 7.51 THOUSAND/UL (ref 4.31–10.16)

## 2023-08-02 PROCEDURE — 84100 ASSAY OF PHOSPHORUS: CPT | Performed by: PHYSICIAN ASSISTANT

## 2023-08-02 PROCEDURE — 83735 ASSAY OF MAGNESIUM: CPT | Performed by: PHYSICIAN ASSISTANT

## 2023-08-02 PROCEDURE — 80048 BASIC METABOLIC PNL TOTAL CA: CPT | Performed by: PHYSICIAN ASSISTANT

## 2023-08-02 PROCEDURE — 92526 ORAL FUNCTION THERAPY: CPT

## 2023-08-02 PROCEDURE — 87081 CULTURE SCREEN ONLY: CPT | Performed by: HOSPITALIST

## 2023-08-02 PROCEDURE — 85027 COMPLETE CBC AUTOMATED: CPT | Performed by: PHYSICIAN ASSISTANT

## 2023-08-02 PROCEDURE — 99497 ADVNCD CARE PLAN 30 MIN: CPT | Performed by: PHYSICIAN ASSISTANT

## 2023-08-02 PROCEDURE — 99233 SBSQ HOSP IP/OBS HIGH 50: CPT | Performed by: STUDENT IN AN ORGANIZED HEALTH CARE EDUCATION/TRAINING PROGRAM

## 2023-08-02 PROCEDURE — 99232 SBSQ HOSP IP/OBS MODERATE 35: CPT | Performed by: PHYSICIAN ASSISTANT

## 2023-08-02 RX ORDER — CHLORHEXIDINE GLUCONATE 0.12 MG/ML
15 RINSE ORAL EVERY 12 HOURS SCHEDULED
Qty: 120 ML | Refills: 0 | Status: CANCELLED | OUTPATIENT
Start: 2023-08-02

## 2023-08-02 RX ORDER — SENNOSIDES 8.6 MG
2 TABLET ORAL
Status: DISCONTINUED | OUTPATIENT
Start: 2023-08-02 | End: 2023-08-03 | Stop reason: HOSPADM

## 2023-08-02 RX ADMIN — ENOXAPARIN SODIUM 30 MG: 30 INJECTION SUBCUTANEOUS at 20:47

## 2023-08-02 RX ADMIN — LEVETIRACETAM 500 MG: 500 TABLET, FILM COATED ORAL at 20:47

## 2023-08-02 RX ADMIN — CLOPIDOGREL BISULFATE 75 MG: 75 TABLET ORAL at 09:50

## 2023-08-02 RX ADMIN — CEFTRIAXONE SODIUM 1000 MG: 10 INJECTION, POWDER, FOR SOLUTION INTRAVENOUS at 11:03

## 2023-08-02 RX ADMIN — CHLORHEXIDINE GLUCONATE 15 ML: 1.2 SOLUTION ORAL at 20:47

## 2023-08-02 RX ADMIN — ENOXAPARIN SODIUM 30 MG: 30 INJECTION SUBCUTANEOUS at 09:50

## 2023-08-02 RX ADMIN — ACETAMINOPHEN 975 MG: 325 TABLET, FILM COATED ORAL at 05:05

## 2023-08-02 RX ADMIN — HYDROMORPHONE HYDROCHLORIDE 0.1 MG: 0.2 INJECTION, SOLUTION INTRAMUSCULAR; INTRAVENOUS; SUBCUTANEOUS at 16:36

## 2023-08-02 RX ADMIN — LEVETIRACETAM 500 MG: 500 TABLET, FILM COATED ORAL at 09:50

## 2023-08-02 RX ADMIN — SENNOSIDES 17.2 MG: 8.6 TABLET, FILM COATED ORAL at 21:07

## 2023-08-02 RX ADMIN — ACETAMINOPHEN 975 MG: 325 TABLET, FILM COATED ORAL at 15:26

## 2023-08-02 RX ADMIN — HYDROMORPHONE HYDROCHLORIDE 0.1 MG: 0.2 INJECTION, SOLUTION INTRAMUSCULAR; INTRAVENOUS; SUBCUTANEOUS at 20:46

## 2023-08-02 RX ADMIN — ACETAMINOPHEN 975 MG: 325 TABLET, FILM COATED ORAL at 21:07

## 2023-08-02 RX ADMIN — LIDOCAINE 1 PATCH: 700 PATCH TOPICAL at 15:26

## 2023-08-02 RX ADMIN — ASPIRIN 81 MG 81 MG: 81 TABLET ORAL at 09:49

## 2023-08-02 RX ADMIN — CHLORHEXIDINE GLUCONATE 15 ML: 1.2 SOLUTION ORAL at 09:50

## 2023-08-02 RX ADMIN — ATORVASTATIN CALCIUM 10 MG: 10 TABLET, FILM COATED ORAL at 09:49

## 2023-08-02 NOTE — PROGRESS NOTES
[facility]  Progress Note  Name: Martha Traore  MRN: [id number]  Unit/Bed#: W -01 I Date of Admission: 7/31/2023   Date of Service: 8/2/2023 I Hospital Day: 2    Assessment/Plan   Abnormal urinalysis  Assessment & Plan  Rocephin X 3 days    Closed wedge compression fracture of T3 vertebra with routine healing  Assessment & Plan  COnsult to Neurosurgery  Pain control  DVT prophylaxis  PT/OT    Laceration of left eyebrow  Assessment & Plan  repaired    Closed fracture of right scapula  Assessment & Plan  ORtho following  NWB    Multiple rib fractures  Assessment & Plan  Rib fracture protocol  IS  Pain regimen    SAH (subarachnoid hemorrhage) ([address])  Assessment & Plan  Neurosurgery consulted and following patient    C2 cervical fracture ([address])  Assessment & Plan  [place]  Aspen collar in place   Neurologically intact    Radial fracture  Assessment & Plan  ORtho consulted and follow  Splint in place. * Fall  Assessment & Plan  Found on floor at facility  Unwitnessed fall             Bowel Regimen: Senna  VTE Prophylaxis:Enoxaparin (Lovenox)     Disposition: Rehab    Subjective   Chief Complaint: none at this time    Subjective: I'm Fine"     Objective   Vitals:   Temp:  [96.2 °F (35.7 °C)-97.5 °F (36.4 °C)] 96.4 °F (35.8 °C)  HR:  [84-86] 84  Resp:  [16-22] 16  BP: (128-143)/(48-80) 128/73    I/O       07/31 0701  08/01 0700 08/01 0701  08/02 0700 08/02 0701  08/03 0700    P. O. 0 0 120    I.V. (mL/kg) 30 (0.4) 20 (0.3) 30 (0.4)    IV Piggyback 230 130     Total Intake(mL/kg) 260 (3.8) 150 (2.2) 150 (2.2)    Urine (mL/kg/hr) 1050 325 (0.2) 125 (0.2)    Total Output 1050 325 125    Net -790 -175 +25           Unmeasured Urine Occurrence 1 x  1 x           Physical Exam:   GENERAL APPEARANCE: resting in bed  NEURO: GCS - 15, she is [place]  HEENT: EOm's intact  CV: RRR< no complaints of chest pain  LUNGS: CTA bilaterally, no shortness of breath  GI: tolerating a diet, appetite is poor  : voiding  MSK: moving extremities  SKIN: warm and dry    Invasive Devices     Peripheral Intravenous Line  Duration           Peripheral IV 08/01/23 Left;Ventral (anterior); Medial Forearm 1 day          Drain  Duration           External Urinary Catheter 2 days                 PIC Score  PIC Pain Score: 2 (8/2/2023  3:26 PM)  PIC Incentive Spirometry Score: 1 (8/2/2023  3:53 AM)  PIC Cough Description: 1 (8/2/2023  3:53 AM)  PIC Total Score: 5 (8/2/2023  3:53 AM)       If the Total PIC Score </=5, did you consult APS and evaluate patient for further intervention?: not ordered, controlling her pain      Pain:    Incentive Spirometry  Cough  3 = Controlled  4 = Above goal volume 3 = Strong  2 = Moderate  3 = Goal to alert volume 2 = Weak  1 = Severe  2 = Below alert volume 1 = Absent     1 = Unable to perform IS         Lab Results:    Latest Reference Range & Units 08/02/23 06:26   Sodium 135 - 147 mmol/L 140   Potassium 3.5 - 5.3 mmol/L 3.5   Chloride 96 - 108 mmol/L 108   CO2 21 - 32 mmol/L 25   Anion Gap mmol/L 7   BUN 5 - 25 mg/dL 10   Creatinine 0.60 - 1.30 mg/dL 0.50 (L)   Glucose, Random 65 - 140 mg/dL 95   Calcium 8.4 - 10.2 mg/dL 8.5   eGFR ml/min/1.73sq m 84   Phosphorus 2.3 - 4.1 mg/dL 3.1   Magnesium 1.9 - 2.7 mg/dL 2.2   WBC 4.31 - 10.16 Thousand/uL 7.51   Red Blood Cell Count 3.81 - 5.12 Million/uL 4.10   Hemoglobin 11.5 - 15.4 g/dL 12.1   (L): Data is abnormally low  Imaging: none  Other Studies: none

## 2023-08-02 NOTE — ACP (ADVANCE CARE PLANNING)
Advanced Care Planning Progress Note    Serious Illness Conversation    1. What is your understanding now of where you are with your illness? Patient does not appear to have full understanding     2. How much information about what is likely to be ahead with your illness would you like to have? Information: patient wants to be fully informed     3. What did you (clinician) communicate to the patient? Prognostic Communication: Uncertain - It can be difficult to predict what will happen with your illness. I hope you will continue to live well for a long time but I’m worried that you could get sick quickly, and I think it is important to prepare for that possibility. Expressed my concern to patient that with the extent of her injuries and underlying comorbidities, her recovery could be guarded     4. If your health situation worsens, what are your most important goals? Patient could not answer this question     5. What are the biggest fears and worries about the future and your health? No specific fears or concerns     6. What abilities are so critical to your life that you cannot imagine living without them? 7. What gives you strength as you think about the future with your illness? Patient reports she does not have any family around     6. If you become sicker, how much are you willing to go through for the possibility of gaining more time? Patient made it clear that she would not want to be put on life support   Have a feeding tube: No    Live in a nursing home: No   Be on a ventilator: No    Be on dialysis: No    9. How much does your proxy and family know about your priorities and wishes? Patient reports she has no family involved     I’ve heard you say that  is really important to you. Keeping that in mind, and what we know about your illness, I recommend that we  This will help us make sure that your treatment plans reflect what’s important to you. How does this plan sound to you?  I will do everything I can to help you through this.   Patient verbalized understanding of the plan     I have spent 20 minutes speaking with my patient on advanced care planning today or during this visit     Advanced directives         Malissa Bronson PA-C

## 2023-08-02 NOTE — CASE MANAGEMENT
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 Primary Insurance: MEDICARE  [facility]    DISCHARGE DETAILS:    Discharge planning discussed with[de-identified] son  Freedom of Choice: Yes  Comments - Freedom of Choice: Pt is in need of transportation to go to rehab tomorrow at [address].   CM contacted family/caregiver?: Yes  Were Treatment Team discharge recommendations reviewed with patient/caregiver?: Yes  Did patient/caregiver verbalize understanding of patient care needs?: Yes  Were patient/caregiver advised of the risks associated with not following Treatment Team discharge recommendations?: Yes    Contacts  Patient Contacts: SonСергей  Relationship to Patient[de-identified] Family  Contact Method: Phone  Reason/Outcome: Continuity of Care, Emergency Contact, Discharge Planning, Referral    Requested [address]         Is the patient interested in [facility] at discharge?: No    DME Referral Provided  Referral made for DME?: No    Other Referral/Resources/Interventions Provided:  Interventions: Transportation  Referral Comments: Pt is able to DC tomorrow to rehab and is in need of BLS transportation. Referral has been made requesting BLS transport at 1100 tomorrow. [facility] will be providing transport at 1100. All parties involved have been notified of DC arrangements. Would you like to participate in our [address] service program?  : No - Declined    Treatment Team Recommendation: Short Term Rehab  Discharge Destination Plan[de-identified] Short Term Rehab  Transport at Discharge : [facility]  Dispatcher Contacted: Yes  Number/Name of Dispatcher: roundtrip  Transported by [employer] and Unit #): [facility]  ETA of Transport (Date): 08/03/23  ETA of Transport (Time): 1100     Transfer Mode: Stretcher  Accompanied by: EMS personnel  Transfer Equipment: BLS devices  IMM Given (Date):: 08/02/23  IMM Given to[de-identified] Family  Family notified[de-identified] son     IMM reviewed with son, son agrees with discharge determination.

## 2023-08-02 NOTE — ASSESSMENT & PLAN NOTE
· Evaluated by neurosurgery and deemed to be stable  · Serial head imaging if any decline in function  · Empiric Keppra

## 2023-08-02 NOTE — SPEECH THERAPY NOTE
Speech Language/Pathology    Speech/Language Pathology Progress Note    Patient Name: Norma Mendes  ZDPTE'D Date: 8/2/2023       Subjective:  Pt seen at lunch, stated she is not hungry, "it's too much". Pt [place], hard cervical collar in place. Objective:  Pt fed magic cup and ate 1 shortbread cookie, drank NT milk by straw. Prolonged mastication w/ cookie,  slow oral processing of foods. able to drink NTL by straw. Laryngeal excursion complete w/ hard collar on. Cough noted x1 w/ magic cup. No coughing w/ NTL or solids.     Assessment:  Pt c/o no appetite, but suspect tolerating dysphagia 2 w/ NT liquids    Plan/Recommendations:  Cont dysphagia 2 w/ nectar thick liquids  meds  Crushed in puree   aspiration precautions   will cont to follow      Alessandra Lynn [facility] CCC-SLP  Speech Pathologist  Available via  tiger text

## 2023-08-02 NOTE — PLAN OF CARE
Pt appears to be tolerating dysphagia 2 diet w/ nectar thick liquids  Cont meds crushed in puree and aspiration precautions

## 2023-08-02 NOTE — PLAN OF CARE
Problem: Potential for Falls  Goal: Patient will remain free of falls  Description: INTERVENTIONS:  - Educate patient/family on patient safety including physical limitations  - Instruct patient to call for assistance with activity   - Consult OT/PT to assist with strengthening/mobility   - Keep Call bell within reach  - Keep bed low and locked with side rails adjusted as appropriate  - Keep care items and personal belongings within reach  - Initiate and maintain comfort rounds  - Make Fall Risk Sign visible to staff  - Offer Toileting every 2 Hours, in advance of need  - Initiate/Maintain bed alarm  - Obtain necessary fall risk management equipment:   - Apply yellow socks and bracelet for high fall risk patients  - Consider moving patient to room near nurses station  Outcome: Progressing     Problem: MOBILITY - ADULT  Goal: Maintain or return to baseline ADL function  Description: INTERVENTIONS:  -  Assess patient's ability to carry out ADLs; assess patient's baseline for ADL function and identify physical deficits which impact ability to perform ADLs (bathing, care of mouth/teeth, toileting, grooming, dressing, etc.)  - Assess/evaluate cause of self-care deficits   - Assess range of motion  - Assess patient's mobility; develop plan if impaired  - Assess patient's need for assistive devices and provide as appropriate  - Encourage maximum independence but intervene and supervise when necessary  - Involve family in performance of ADLs  - Assess for home care needs following discharge   - Consider OT consult to assist with ADL evaluation and planning for discharge  - Provide patient education as appropriate  Outcome: Progressing

## 2023-08-02 NOTE — PROGRESS NOTES
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 No culture d/t low leuks  · Ordered CTX by ICU, 3 day course    Closed wedge compression fracture of T3 vertebra with routine healing  Assessment & Plan  · Seen by neurosurg  · Pain control measures    Laceration of left eyebrow  Assessment & Plan  · Sutures in place    Radial fracture  Assessment & Plan  · Seen by orthopedics and placed in a splint    Multiple rib fractures  Assessment & Plan  · Pain control measures         VTE Pharmacologic Prophylaxis:   lovenox    Patient Centered Rounds: spoke with RN  Discussions with Specialists or Other Care Team Provider: case mgmt    Education and Discussions with Family / Patient: Attempted to update  (son) via phone. Unable to contact.     Total Time Spent on Date of Encounter in care of patient: 30 min This time was spent on one or more of the following: performing physical exam; counseling and coordination of care; obtaining or reviewing history; documenting in the medical record; reviewing/ordering tests, medications or procedures; communicating with other REFERRAL IS PENDING IN [place]     healthcare professionals and discussing with patient's family/caregivers. Current Length of Stay: 2 day(s)  Current Patient Status: Inpatient   Certification Statement: The patient will continue to require additional inpatient hospital stay due to pending dc to rehab  Discharge Plan: Anticipate discharge tomorrow to rehab facility. Code Status: Level 3 - DNAR and DNI    Subjective:   Patient is a limited historian but denies any pain when asked. Denies shortness of breath, abdominal pain, dysuria    Objective:     Vitals:   Temp (24hrs), Av.7 °F (35.9 °C), Min:96.2 °F (35.7 °C), Max:97.5 °F (36.4 °C)    Temp:  [96.2 °F (35.7 °C)-97.5 °F (36.4 °C)] 96.4 °F (35.8 °C)  HR:  [84-86] 84  Resp:  [16-22] 16  BP: (128-143)/(48-80) 128/73  SpO2:  [95 %-97 %] 95 %  Body mass index is 24.95 kg/m². Input and Output Summary (last 24 hours): Intake/Output Summary (Last 24 hours) at 2023 1323  Last data filed at 2023 0244  Gross per 24 hour   Intake 0 ml   Output 325 ml   Net -325 ml       Physical Exam:   Physical Exam  Vitals reviewed. Constitutional:       General: She is not in acute distress. Appearance: She is not ill-appearing, toxic-appearing or diaphoretic. HENT:      Mouth/Throat:      Comments: Dry mouth  Eyes:      General: No scleral icterus. Right eye: No discharge. Left eye: No discharge. Conjunctiva/sclera: Conjunctivae normal.   Cardiovascular:      Rate and Rhythm: Normal rate and regular rhythm. Heart sounds: No murmur heard. Pulmonary:      Effort: No respiratory distress. Breath sounds: No stridor. No wheezing or rhonchi. Comments: No dyspnea or tachypnea noted  Abdominal:      General: There is no distension. Palpations: Abdomen is soft. Tenderness: There is no abdominal tenderness. There is no guarding. Musculoskeletal:         General: Signs of injury present. Right lower leg: No edema. Left lower leg: No edema. Comments: Cervical collar in place   Skin:     General: Skin is warm. Coloration: Skin is not jaundiced or pale. Findings: No bruising, erythema, lesion or rash. Neurological:      Mental Status: She is alert. Comments: Awake alert interactive   Psychiatric:         Mood and Affect: Mood normal.          Additional Data:     Labs:  Results from last 7 days   Lab Units 08/02/23  0626 08/01/23  0505   WBC Thousand/uL 7.51 9.73   HEMOGLOBIN g/dL 12.1 12.8   HEMATOCRIT % 37.0 39.0   PLATELETS Thousands/uL 260 303   NEUTROS PCT %  --  75   LYMPHS PCT %  --  12*   MONOS PCT %  --  12   EOS PCT %  --  1     Results from last 7 days   Lab Units 08/02/23  0626 08/01/23  0505 07/31/23  1022   SODIUM mmol/L 140   < > 137   POTASSIUM mmol/L 3.5   < > 4.0   CHLORIDE mmol/L 108   < > 105   CO2 mmol/L 25   < > 25   BUN mg/dL 10   < > 15   CREATININE mg/dL 0.50*   < > 0.64   ANION GAP mmol/L 7   < > 7   CALCIUM mg/dL 8.5   < > 9.4   ALBUMIN g/dL  --   --  3.7   TOTAL BILIRUBIN mg/dL  --   --  0.67   ALK PHOS U/L  --   --  119*   ALT U/L  --   --  13   AST U/L  --   --  22   GLUCOSE RANDOM mg/dL 95   < > 142*    < > = values in this interval not displayed. Results from last 7 days   Lab Units 07/31/23  1022   INR  1.07                   Lines/Drains:  Invasive Devices     Peripheral Intravenous Line  Duration           Peripheral IV 08/01/23 Left;Ventral (anterior); Medial Forearm 1 day          Drain  Duration           External Urinary Catheter 1 day                      Imaging    Recent Cultures (last 7 days):         Last 24 Hours Medication List:   Current Facility-Administered Medications   Medication Dose Route Frequency Provider Last Rate   • acetaminophen  975 mg Oral Q8H [address] Jessica Mott PA-C     • aspirin  81 mg Oral Daily Jessica Mott PA-C     • atorvastatin  10 mg Oral Daily Jessica Mott PA-C     • cefTRIAXone  1,000 mg Intravenous Q24H Jessica Mott PA-C 1,000 mg (08/02/23 1103)   • chlorhexidine  15 mL Mouth/Throat Q12H [address] Jessica Mott PA-C     • clopidogrel  75 mg Oral Daily Jessica Mott PA-C     • enoxaparin  30 mg Subcutaneous Q12H [address] Jessica Mott PA-C     • HYDROmorphone  0.1 mg Intravenous Q4H PRN Jessica Mott PA-C     • levETIRAcetam  500 mg Oral Q12H [address] Jessica Mott PA-C     • lidocaine  1 patch Topical Daily Jessica Mott PA-C          Today, Patient Was Seen By: Maura Parry PA-C    **Please Note: This note may have been constructed using a voice recognition system. **

## 2023-08-02 NOTE — ASSESSMENT & PLAN NOTE
· Fall with multiple traumatic injuries as noted below  · Seen by PT and OT and recommended for rehab

## 2023-08-03 ENCOUNTER — TELEPHONE (OUTPATIENT)
Dept: NEUROSURGERY | Facility: CLINIC | Age: 88
End: 2023-08-03

## 2023-08-03 VITALS
TEMPERATURE: 97.1 F | RESPIRATION RATE: 20 BRPM | DIASTOLIC BLOOD PRESSURE: 88 MMHG | HEIGHT: 65 IN | OXYGEN SATURATION: 97 % | HEART RATE: 89 BPM | BODY MASS INDEX: 24.98 KG/M2 | HEART RATE: 89 BPM | RESPIRATION RATE: 20 BRPM | WEIGHT: 149.91 LBS | BODY MASS INDEX: 24.98 KG/M2 | WEIGHT: 149.91 LBS | SYSTOLIC BLOOD PRESSURE: 122 MMHG | HEIGHT: 65 IN | OXYGEN SATURATION: 97 % | TEMPERATURE: 97.1 F | DIASTOLIC BLOOD PRESSURE: 88 MMHG | SYSTOLIC BLOOD PRESSURE: 122 MMHG

## 2023-08-03 DIAGNOSIS — S12.100A C2 CERVICAL FRACTURE (HCC): ICD-10-CM

## 2023-08-03 LAB
MRSA NOSE QL CULT: NORMAL
MRSA NOSE QL CULT: NORMAL
PA ADP BLD-ACNC: 96 PRU (ref 194–418)
PA ADP BLD-ACNC: 96 PRU (ref 194–418)

## 2023-08-03 PROCEDURE — NC001 PR NO CHARGE: Performed by: NURSE PRACTITIONER

## 2023-08-03 PROCEDURE — 99239 HOSP IP/OBS DSCHRG MGMT >30: CPT | Performed by: PHYSICIAN ASSISTANT

## 2023-08-03 PROCEDURE — 85576 BLOOD PLATELET AGGREGATION: CPT | Performed by: PHYSICIAN ASSISTANT

## 2023-08-03 RX ORDER — ASPIRIN 81 MG/1
81 TABLET, CHEWABLE ORAL DAILY
Qty: 30 TABLET | Refills: 0
Start: 2023-08-03

## 2023-08-03 RX ORDER — LIDOCAINE 50 MG/G
1 PATCH TOPICAL DAILY
Qty: 10 PATCH | Refills: 0
Start: 2023-08-03 | End: 2023-08-12 | Stop reason: ALTCHOICE

## 2023-08-03 RX ORDER — OXYCODONE HYDROCHLORIDE 5 MG/1
2.5 TABLET ORAL EVERY 4 HOURS PRN
Qty: 5 TABLET | Refills: 0 | Status: SHIPPED | OUTPATIENT
Start: 2023-08-03 | End: 2023-08-03 | Stop reason: SDUPTHER

## 2023-08-03 RX ORDER — SENNOSIDES 8.6 MG
17.2 TABLET ORAL
Qty: 10 TABLET | Refills: 0
Start: 2023-08-03

## 2023-08-03 RX ORDER — OXYCODONE HYDROCHLORIDE 5 MG/1
2.5 TABLET ORAL EVERY 4 HOURS PRN
Qty: 20 TABLET | Refills: 0 | Status: SHIPPED | OUTPATIENT
Start: 2023-08-03 | End: 2023-08-10

## 2023-08-03 RX ORDER — CLOPIDOGREL BISULFATE 75 MG/1
75 TABLET ORAL DAILY
Qty: 30 TABLET | Refills: 0
Start: 2023-08-03

## 2023-08-03 RX ORDER — LEVETIRACETAM 500 MG/1
500 TABLET ORAL EVERY 12 HOURS SCHEDULED
Qty: 10 TABLET | Refills: 0
Start: 2023-08-03 | End: 2023-08-08

## 2023-08-03 RX ADMIN — ACETAMINOPHEN 975 MG: 325 TABLET, FILM COATED ORAL at 05:21

## 2023-08-03 RX ADMIN — ATORVASTATIN CALCIUM 10 MG: 10 TABLET, FILM COATED ORAL at 08:29

## 2023-08-03 RX ADMIN — HYDROMORPHONE HYDROCHLORIDE 0.1 MG: 0.2 INJECTION, SOLUTION INTRAMUSCULAR; INTRAVENOUS; SUBCUTANEOUS at 13:15

## 2023-08-03 RX ADMIN — CLOPIDOGREL BISULFATE 75 MG: 75 TABLET ORAL at 08:30

## 2023-08-03 RX ADMIN — ASPIRIN 81 MG 81 MG: 81 TABLET ORAL at 08:29

## 2023-08-03 RX ADMIN — CHLORHEXIDINE GLUCONATE 15 ML: 1.2 SOLUTION ORAL at 08:30

## 2023-08-03 RX ADMIN — LEVETIRACETAM 500 MG: 500 TABLET, FILM COATED ORAL at 08:30

## 2023-08-03 RX ADMIN — CEFTRIAXONE SODIUM 1000 MG: 10 INJECTION, POWDER, FOR SOLUTION INTRAVENOUS at 08:47

## 2023-08-03 RX ADMIN — ENOXAPARIN SODIUM 30 MG: 30 INJECTION SUBCUTANEOUS at 08:30

## 2023-08-03 RX ADMIN — HYDROMORPHONE HYDROCHLORIDE 0.1 MG: 0.2 INJECTION, SOLUTION INTRAMUSCULAR; INTRAVENOUS; SUBCUTANEOUS at 07:31

## 2023-08-03 NOTE — ASSESSMENT & PLAN NOTE
· Completed 3-day course of Rocephin  · Asymptomatic  · Afebrile  · Continue to monitor and follow-up with PCP.

## 2023-08-03 NOTE — PLAN OF CARE
Problem: Potential for Falls  Goal: Patient will remain free of falls  Description: INTERVENTIONS:  - Educate patient/family on patient safety including physical limitations  - Instruct patient to call for assistance with activity   - Consult OT/PT to assist with strengthening/mobility   - Keep Call bell within reach  - Keep bed low and locked with side rails adjusted as appropriate  - Keep care items and personal belongings within reach  - Initiate and maintain comfort rounds  - Make Fall Risk Sign visible to staff  - Offer Toileting every  Hours, in advance of need  - Initiate/Maintain alarm  - Obtain necessary fall risk management equipment:   - Apply yellow socks and bracelet for high fall risk patients  - Consider moving patient to room near nurses station  Outcome: Progressing     Problem: MOBILITY - ADULT  Goal: Maintain or return to baseline ADL function  Description: INTERVENTIONS:  -  Assess patient's ability to carry out ADLs; assess patient's baseline for ADL function and identify physical deficits which impact ability to perform ADLs (bathing, care of mouth/teeth, toileting, grooming, dressing, etc.)  - Assess/evaluate cause of self-care deficits   - Assess range of motion  - Assess patient's mobility; develop plan if impaired  - Assess patient's need for assistive devices and provide as appropriate  - Encourage maximum independence but intervene and supervise when necessary  - Involve family in performance of ADLs  - Assess for home care needs following discharge   - Consider OT consult to assist with ADL evaluation and planning for discharge  - Provide patient education as appropriate  Outcome: Progressing  Goal: Maintains/Returns to pre admission functional level  Description: INTERVENTIONS:  - Perform BMAT or MOVE assessment daily.   - Set and communicate daily mobility goal to care team and patient/family/caregiver.    - Collaborate with rehabilitation services on mobility goals if consulted  - Perform Range of Motion  times a day. - Reposition patient every  hours. - Dangle patient  times a day  - Stand patient  times a day  - Ambulate patient  times a day  - Out of bed to chair  times a day   - Out of bed for zelda times a day  - Out of bed for toileting  - Record patient progress and toleration of activity level   Outcome: Progressing     Problem: Prexisting or High Potential for Compromised Skin Integrity  Goal: Skin integrity is maintained or improved  Description: INTERVENTIONS:  - Identify patients at risk for skin breakdown  - Assess and monitor skin integrity  - Assess and monitor nutrition and hydration status  - Monitor labs   - Assess for incontinence   - Turn and reposition patient  - Assist with mobility/ambulation  - Relieve pressure over bony prominences  - Avoid friction and shearing  - Provide appropriate hygiene as needed including keeping skin clean and dry  - Evaluate need for skin moisturizer/barrier cream  - Collaborate with interdisciplinary team   - Patient/family teaching  - Consider wound care consult   Outcome: Progressing     Problem: Nutrition/Hydration-ADULT  Goal: Nutrient/Hydration intake appropriate for improving, restoring or maintaining nutritional needs  Description: Monitor and assess patient's nutrition/hydration status for malnutrition. Collaborate with interdisciplinary team and initiate plan and interventions as ordered. Monitor patient's weight and dietary intake as ordered or per policy. Utilize nutrition screening tool and intervene as necessary. Determine patient's food preferences and provide high-protein, high-caloric foods as appropriate.      INTERVENTIONS:  - Monitor oral intake, urinary output, labs, and treatment plans  - Assess nutrition and hydration status and recommend course of action  - Evaluate amount of meals eaten  - Assist patient with eating if necessary   - Allow adequate time for meals  - Recommend/ encourage appropriate diets, oral nutritional supplements, and vitamin/mineral supplements  - Order, calculate, and assess calorie counts as needed  - Recommend, monitor, and adjust tube feedings and TPN/PPN based on assessed needs  - Assess need for intravenous fluids  - Provide specific nutrition/hydration education as appropriate  - Include patient/family/caregiver in decisions related to nutrition  Outcome: Progressing

## 2023-08-03 NOTE — PROGRESS NOTES
[facility]  Progress Note  Name: Juliocesar Sotomayor  MRN: [id number]  Unit/Bed#: W -48 I Date of Admission: 7/31/2023   Date of Service: 8/3/2023 I Hospital Day: 3    Assessment/Plan   Abnormal urinalysis  Assessment & Plan  · Completed 3-day course of Rocephin  · Asymptomatic  · Afebrile  · Continue to monitor and follow-up with PCP. Closed wedge compression fracture of T3 vertebra with routine healing  Assessment & Plan  · CT imaging showed T3 compression fracture. · Neurosurgery consulted. · Analgesia as needed  · PT/OT  · Continue with [place] collar  · Follow-up with neurosurgery as an outpatient      Laceration of left eyebrow  Assessment & Plan  · See procedure note  · Sutures to be removed in 5-7 days. Closed fracture of right scapula  Assessment & Plan  · Incidentally found on CT chest.  · Orthopedics consulted and note appreciated. · Non weight bearing right upper extremity in sugartong splint  · Ok for patient to weight bear through the forearm/humerus  · Analgesia as needed  · PT/OT  · Follow-up with orthopedics as an outpatient      Multiple rib fractures  Assessment & Plan  CT imaging showed acute/subacute nondisplaced fractures of 3-6 and 8-10  Patient has largely been asymptomatic. Rib fracture protocol. Continue to encourage incentive spirometry and pulmonary toilet. Follow-up with trauma as an outpatient as needed       SAH (subarachnoid hemorrhage) (formerly [facility])  Assessment & Plan  · CTh showed: Hyperdense material seen in right ambient cistern compatible with acute subarachnoid hemorrhage, new from 7/5/2023  · Follow-up CT head showed interval improvement  · Neurologically stable-GCS 14 at baseline due to confusion. · Neurosurgery consulted and note appreciated. · In the setting of vertebral artery dissection patient was cleared by neurosurgery to start DAPT  · Patient will follow-up with a repeat CTA of her head and neck in 1 week with neurosurgery.     Vertebral artery dissection (HCC)  Assessment & Plan  · CT imaging showed: Small intimal flap compatible with focal dissection in the V3 segment of the right vertebral body at the level of the C2 fracture. Multifocal narrowing and segmental occlusion of the left vertebral artery concerning for dissection superimposed on atherosclerotic disease. No high-grade intracranial stenosis or large vessel occlusion. · Neurosurgery consulted and note appreciated. · Patient cleared to start DAPT--started on aspirin/Plavix regimen. Therapeutic level validated with P2Y12  · Patient will follow-up in 1 week with neurosurgery with repeat CTA head    C2 cervical fracture [facility])  Assessment & Plan  CT imaging showed acute minimally displaced fracture through the base of C2 vertebral body extending into the inferior endplate into both pedicles to the level of the transverse foramen, and into the right transverse process  Neurosurgery consult and note appreciated. CTA obtained-see vertebral artery dissection  Continue with [place] collar  Repeat CTA head and neck in 1 week and follow-up with neurosurgery in 1 week. Radial fracture  Assessment & Plan  Orthopedics consulted and note appreciated. RUE splinted. Neurovascularly intact. Analgesia as needed  Follow-up with orthopedics as an outpatient. * Fall  Assessment & Plan  · Found on floor at facility  · Unwitnessed fall  · Injuries listed below             Bowel Regimen: Senna  VTE Prophylaxis:Sequential compression device (Venodyne)  and Enoxaparin (Lovenox)     Disposition: Placement pending--anticipate discharge to rehab today    Subjective   Chief Complaint: "It is naptime"    Subjective: ROS is extremely limited due to dementia     Objective   Vitals:   Temp:  [97.1 °F (36.2 °C)] 97.1 °F (36.2 °C)  HR:  [87-90] 89  Resp:  [18-20] 20  BP: (122-132)/() 122/88    I/O       08/01 0701  08/02 0700 08/02 0701  08/03 0700 08/03 0701  08/04 0700    P. O. 0 300 60    I.V. (mL/kg) 20 (0.3) 30 (0.4)     IV Piggyback 130      Total Intake(mL/kg) 150 (2.2) 330 (4.9) 60 (0.9)    Urine (mL/kg/hr) 325 (0.2) 300 (0.2)     Total Output 325 300     Net -175 +30 +60           Unmeasured Urine Occurrence  1 x            Physical Exam:   GENERAL APPEARANCE: No acute distress. Resting in bed quietly. NEURO: GCS is 14. Moving all extremities. Light touch sensation intact throughout. HEENT: Normocephalic cervical collar in place. CV: Regular rate and rhythm. LUNGS: Clear to auscultation, bilaterally. Chest wall is nontender. GI: Abdomen is soft nontender. : Pelvis stable. MSK: Right upper extremity is in short arm splint-patient is able to move all fingers. All other extremities display full range of motion. SKIN: Warm, dry. Invasive Devices     Peripheral Intravenous Line  Duration           Peripheral IV 08/01/23 Left;Ventral (anterior); Medial Forearm 2 days          Drain  Duration           External Urinary Catheter 2 days                 PIC Score  PIC Pain Score: 3 (8/3/2023 11:00 AM)  PIC Incentive Spirometry Score: 2 (8/3/2023 11:00 AM)  PIC Cough Description: 2 (8/3/2023 11:00 AM)  PIC Total Score: 7 (8/3/2023 11:00 AM)       If the Total PIC Score </=5, did you consult APS and evaluate patient for further intervention?: yes      Pain:    Incentive Spirometry  Cough  3 = Controlled  4 = Above goal volume 3 = Strong  2 = Moderate  3 = Goal to alert volume 2 = Weak  1 = Severe  2 = Below alert volume 1 = Absent     1 = Unable to perform IS         Lab Results: Results: I have personally reviewed all pertinent laboratory/tests results, BMP/CMP: No results found for: "SODIUM", "K", "CL", "CO2", "ANIONGAP", "BUN", "CREATININE", "GLUCOSE", "CALCIUM", "AST", "ALT", "ALKPHOS", "PROT", "BILITOT", "EGFR" and CBC: No results found for: "WBC", "HGB", "HCT", "MCV", "PLT", "ADJUSTEDWBC", "RBC", "MCH", "MCHC", "RDW", "MPV", "NRBC"  Imaging: I have personally reviewed pertinent reports. Other Studies: none

## 2023-08-03 NOTE — ASSESSMENT & PLAN NOTE
· Evaluated by neurosurgery and deemed to be stable  · Serial head imaging if any decline in function  · Empiric Keppra  • repeat CTA head and neck in 1 week

## 2023-08-03 NOTE — ASSESSMENT & PLAN NOTE
· CT imaging showed T3 compression fracture. · Neurosurgery consulted.   · Analgesia as needed  · PT/OT  · Continue with [place] collar  · Follow-up with neurosurgery as an outpatient

## 2023-08-03 NOTE — TELEPHONE ENCOUNTER
8/4/23: DISCHARGED [facility] # [phone]    LMOM FOR Karissa Hollingsworth WITH OV Shy Scherer / Frances Pradhan / Yasemin Sanchez / IMAGING    8/4/23 [address] @ [facility] CALLED TO CONFIRM APPTS. - CONFIRMED THE FOLLOWING & TRANSFERRED TO [facility]   TO [address] [place] [address] CTA HEAD/NECK    8/3/23: INPATIENT    1WK HFU W/ AP  8/14/23 / 10:30 / Edra Ashing    IMAGING:  CTA HEAD/NECK - NO AUTH REQ. PER MARGE OK TO DO HERE. CAN REACH OUT TO [facility] WITH ANY QUESTIONS.    2WK HFU W/ AP  8/21/23 / 10:15 / Edra Ashing    IMAGING:  XRAY CSPINE    PER:    HFU  Received: Today  MIGUE Plata MA  1 week CTA head and neck joint appointment with MO (if possible), otherwise can be solo with an AP for vert injury. 2 week XR cervical spine with AP for cervical spine fracture.

## 2023-08-03 NOTE — CASE MANAGEMENT
Case Management Discharge Planning Note    Patient name Terrial Renny  Location W /W -74 MRN [id number]  : 1930 Date 8/3/2023       Current Admission Date: 2023  Current Admission Diagnosis:Fall   Patient Active Problem List    Diagnosis Date Noted   • Abnormal urinalysis 2023   • Closed wedge compression fracture of T3 vertebra with routine healing 2023   • Radial fracture 2023   • Fall 2023   • C2 cervical fracture ([address]) 2023   • Vertebral artery dissection ([address]) 2023   • SAH (subarachnoid hemorrhage) ([address]) 2023   • Multiple rib fractures 2023   • Closed fracture of right scapula 2023   • Laceration of left eyebrow 2023      LOS (days): 3  Geometric Mean LOS (GMLOS) (days): 4.40  Days to GMLOS:1.5     OBJECTIVE:  Risk of Unplanned Readmission Score: 10.27         Current admission status: Inpatient   Preferred Pharmacy: No Pharmacies Listed  Primary Care Provider: No primary care provider on file. Primary Insurance: MEDICARE  Secondary Insurance: [facility]    DISCHARGE DETAILS:     Transport time has changed from 1100 to 1400. All parties involved have been notified.

## 2023-08-03 NOTE — ASSESSMENT & PLAN NOTE
· Incidentally found on CT chest.  · Orthopedics consulted and note appreciated.   · Non weight bearing right upper extremity in sugartong splint  · Ok for patient to weight bear through the forearm/humerus  · Analgesia as needed  · PT/OT  · Follow-up with orthopedics as an outpatient

## 2023-08-03 NOTE — ASSESSMENT & PLAN NOTE
Orthopedics consulted and note appreciated. RUE splinted. Neurovascularly intact. Analgesia as needed  Follow-up with orthopedics as an outpatient.

## 2023-08-03 NOTE — ASSESSMENT & PLAN NOTE
CT imaging showed acute minimally displaced fracture through the base of C2 vertebral body extending into the inferior endplate into both pedicles to the level of the transverse foramen, and into the right transverse process  Neurosurgery consult and note appreciated. CTA obtained-see vertebral artery dissection  Continue with [place] collar  Repeat CTA head and neck in 1 week and follow-up with neurosurgery in 1 week.

## 2023-08-03 NOTE — ASSESSMENT & PLAN NOTE
· CT imaging showed: Small intimal flap compatible with focal dissection in the V3 segment of the right vertebral body at the level of the C2 fracture. Multifocal narrowing and segmental occlusion of the left vertebral artery concerning for dissection superimposed on atherosclerotic disease. No high-grade intracranial stenosis or large vessel occlusion. · Neurosurgery consulted and note appreciated. · Patient cleared to start DAPT--started on aspirin/Plavix regimen.   Therapeutic level validated with P2Y12  · Patient will follow-up in 1 week with neurosurgery with repeat CTA head

## 2023-08-03 NOTE — TREATMENT PLAN
Assessment:  · Subarachnoid hemorrhage, nearly resolved  · C2 cervical spine fracture / hangman's fracture  · Left vertebral artery dissection with occlusion, right vertebral artery dissection  · T3 compression fracture    Labs:  · P2Y12 96    Plan:  • Vertebral artery injury -patient is therapeutic on her dual antiplatelet therapy. Continue aspirin 81 mg and Plavix 75 mg daily. Plan to return to the office in 1 weeks time with repeat CTA head and neck  • SAH - given repeat CTA in one week, will ensure no delayed hemorrhage on DAPT therapy  • C2 fracture - continue collar at all times. Plan for outpatient follow-up in 2 weeks time with repeat upright cervical spine x-rays  • T3 fracture - defer additional imaging    Outpatient follow-up as noted above. Neurosurgery to sign off, please call with questions or concerns.

## 2023-08-03 NOTE — ASSESSMENT & PLAN NOTE
CT imaging showed acute/subacute nondisplaced fractures of 3-6 and 8-10  Patient has largely been asymptomatic. Rib fracture protocol. Continue to encourage incentive spirometry and pulmonary toilet.   Follow-up with trauma as an outpatient as needed

## 2023-08-03 NOTE — ASSESSMENT & PLAN NOTE
· Fall with multiple traumatic injuries as noted below  · Seen by PT and OT and recommended for rehab  · Appreciate trauma team involvement and geriatrics

## 2023-08-03 NOTE — DISCHARGE SUMMARY
[address]  Discharge- [facility] 12/11/1930, 80 y.o. female MRN: [id number]  Unit/Bed#: W -85 Encounter: [id number]  Primary Care Provider: No primary care provider on file. Date and time admitted to hospital: 7/31/2023 10:12 AM    * Fall  Assessment & Plan  · Fall with multiple traumatic injuries as noted below  · Seen by PT and OT and recommended for rehab  · Appreciate trauma team involvement and geriatrics    Vertebral artery dissection ([address])  Assessment & Plan  · Recommended for aspirin, Plavix, statin    C2 cervical fracture ([address])  Assessment & Plan  · Evaluated by trauma and neurosurgery  · Collar in place  · Pain control measures    Closed fracture of right scapula  Assessment & Plan  · Pain control measures    SAH (subarachnoid hemorrhage) ([address])  Assessment & Plan  · Evaluated by neurosurgery and deemed to be stable  · Serial head imaging if any decline in function  · Empiric Keppra  • repeat CTA head and neck in 1 week      Abnormal urinalysis  Assessment & Plan  · Nitrite positive but asymptomatic. No culture d/t low leuks  · Ordered CTX by ICU, 3 day course    Closed wedge compression fracture of T3 vertebra with routine healing  Assessment & Plan  · Seen by neurosurg  · Pain control measures    Laceration of left eyebrow  Assessment & Plan  · Sutures in place    Radial fracture  Assessment & Plan  · Seen by orthopedics and placed in a splint    Multiple rib fractures  Assessment & Plan  · Pain control measures and incentive spirometry as tolerated      Medical Problems     Resolved Problems  Date Reviewed: 8/3/2023          Resolved    Compression deformity of L3 8/1/2023     Resolved by  Winsome Cheung, [address] [facility] Physician / Practitioner: Pavan Garcia PA-C  PCP: No primary care provider on file.   Admission Date:   Admission Orders (From admission, onward)     Ordered        07/31/23 1141  Inpatient Admission  Once                      Discharge Date: 08/03/23    Consultations During Hospital Stay:  · Trauma  · Geriatrics  · Orthopedics  · Neurosurgery    Procedures Performed:   · Multiple imaging studies-see chart    Significant Findings / Test Results:   · See above    Incidental Findings:   · none    Test Results Pending at Discharge (will require follow up): · None     Outpatient Tests Requested:  · Repeat CTA    Complications: None    Reason for Admission: Unwitnessed fall with serious injury    Hospital Course:   Jayla Morales is a 80 y.o. female patient who originally presented to the hospital on 7/31/2023 due to unwitnessed fall with multiple serious injuries. Due to the fact that she was found to have a subarachnoid hemorrhage she was initially managed in the intensive care unit. She was followed by neurosurgery and trauma. She was then found to have a vertebral artery dissection. She was cleared by neurosurgery to be on dual antiplatelet therapy and statin. She is recommended for repeat CTA in 1 week. She was given pain control measures for management of her multiple other fractures but did not require any operative intervention. She will remain in the c-collar for her cervical fracture. She was seen by PT and OT and recommended for discharge to rehab. Overall prognosis is guarded in this patient of advanced age and an attempt was made to discuss with patient further about goals of care but unfortunately she had limited ability to participate in this conversation      Please see above list of diagnoses and related plan for additional information. Condition at Discharge: stable    Discharge Day Visit / Exam:   Subjective: Patient is a limited historian. Offers no complaints at this time. Nursing felt that she appeared to be in pain and did provide pain medication a short time ago.   Patient currently being fed by nursing  Vitals: Blood Pressure: 122/88 (08/03/23 0836)  Pulse: 89 (08/03/23 0745)  Temperature: (!) 97.1 °F (36.2 °C) (08/03/23 0745)  Temp Source: Axillary (08/02/23 0735)  Respirations: 20 (08/03/23 0745)  Height: 5' 5" (165.1 cm) (07/31/23 1214)  Weight - Scale: 68 kg (149 lb 14.6 oz) (08/03/23 0600)  SpO2: 97 % (08/03/23 0745)  Exam:   Physical Exam  Vitals reviewed. Constitutional:       General: She is not in acute distress. Appearance: She is not toxic-appearing or diaphoretic. Eyes:      General: No scleral icterus. Right eye: No discharge. Left eye: No discharge. Conjunctiva/sclera: Conjunctivae normal.   Neck:      Comments: c collar place  Cardiovascular:      Rate and Rhythm: Normal rate and regular rhythm. Heart sounds: No murmur heard. Pulmonary:      Effort: No respiratory distress. Breath sounds: No stridor. No wheezing or rhonchi. Abdominal:      General: There is no distension. Tenderness: There is no guarding. Musculoskeletal:      Right lower leg: No edema. Left lower leg: No edema. Skin:     General: Skin is warm and dry. Coloration: Skin is not jaundiced or pale. Findings: No bruising, erythema, lesion or rash. Neurological:      Mental Status: She is alert. Comments: Awake but limited info provided          Discussion with Family: Tried to call patient's son Tequila Wong given the phone number listed and this was the wrong number    Discharge instructions/Information to patient and family:   See after visit summary for information provided to patient and family. Provisions for Follow-Up Care:  See after visit summary for information related to follow-up care and any pertinent home health orders. Disposition:   Short-term rehab    Planned Readmission: None     Discharge Statement:  I spent 35 minutes discharging the patient. This time was spent on the day of discharge. I had direct contact with the patient on the day of discharge.  Greater than 50% of the total time was spent examining patient, answering all patient questions, arranging and discussing plan of care with patient as well as directly providing post-discharge instructions. Additional time then spent on discharge activities. Case discussed with trauma. Bedside nursing rounds performed    Discharge Medications:  See after visit summary for reconciled discharge medications provided to patient and/or family.       **Please Note: This note may have been constructed using a voice recognition system**

## 2023-08-03 NOTE — ASSESSMENT & PLAN NOTE
· CTh showed: Hyperdense material seen in right ambient cistern compatible with acute subarachnoid hemorrhage, new from 7/5/2023  · Follow-up CT head showed interval improvement  · Neurologically stable-GCS 14 at baseline due to confusion. · Neurosurgery consulted and note appreciated. · In the setting of vertebral artery dissection patient was cleared by neurosurgery to start DAPT  · Patient will follow-up with a repeat CTA of her head and neck in 1 week with neurosurgery. 5/5

## 2023-08-03 NOTE — PROGRESS NOTES
New admission arrived to SNF for rehab without prescription for oxycodone for C2 fracture. Upon review it appears this was printed upon discharge however nursing is stating they do not have a prescription. I did send in a prescription for 20 tablets to [facility] at this time. Requested nursing to call hospital for prescription.

## 2023-08-04 ENCOUNTER — NURSING HOME VISIT (OUTPATIENT)
Dept: GERIATRICS | Facility: OTHER | Age: 88
End: 2023-08-04
Payer: MEDICARE

## 2023-08-04 ENCOUNTER — TELEPHONE (OUTPATIENT)
Age: 88
End: 2023-08-04

## 2023-08-04 ENCOUNTER — TELEPHONE (OUTPATIENT)
Dept: NEUROSURGERY | Facility: CLINIC | Age: 88
End: 2023-08-04

## 2023-08-04 DIAGNOSIS — F01.B4 MODERATE VASCULAR DEMENTIA WITH ANXIETY (HCC): ICD-10-CM

## 2023-08-04 DIAGNOSIS — I77.74 VERTEBRAL ARTERY DISSECTION (HCC): ICD-10-CM

## 2023-08-04 DIAGNOSIS — S12.191D OTHER CLOSED NONDISPLACED FRACTURE OF SECOND CERVICAL VERTEBRA WITH ROUTINE HEALING, SUBSEQUENT ENCOUNTER: ICD-10-CM

## 2023-08-04 DIAGNOSIS — R26.2 AMBULATORY DYSFUNCTION: ICD-10-CM
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
## 2023-08-04 DIAGNOSIS — E78.2 MIXED HYPERLIPIDEMIA: ICD-10-CM

## 2023-08-04 PROCEDURE — 99305 1ST NF CARE MODERATE MDM 35: CPT | Performed by: INTERNAL MEDICINE

## 2023-08-04 NOTE — TELEPHONE ENCOUNTER
Hello,  Please advise if the following patient can be forced onto the schedule:    Patient: Temi Garcia    : [birth year]    MRN: [id number]    Call back #: Harrison Alvarez [facility] [phone]    Insurance: Medicare    Reason for appointment: 1week hospital follow up,radial fx    Requested doctor/location: Stephanie Sauceda      Thank you.

## 2023-08-04 NOTE — TELEPHONE ENCOUNTER
8/4/23 [address] [facility] CALLED TO CONFIRM APPTS. - CONFIRMED THE FOLLOWING & TRANSFERRED TO [facility]   TO SCHED CTA HEAD/NECK    8/3/23: INPATIENT     1WK HFU W/ AP  8/14/23 / 10:30 / Neda Gonzalez     IMAGING:  CTA HEAD/NECK - NO AUTH REQ. PER [facility] OK TO DO HERE. CAN REACH OUT TO [facility] WITH ANY QUESTIONS.     2WK HFU W/ AP  8/21/23 / 10:15 / Neda Gonzalez     IMAGING:  XRAY CSPINE     PER:     HFU  Received: Today  MIGUE Ron MA  1 week CTA head and neck joint appointment with MO (if possible), otherwise can be solo with an AP for vert injury. 2 week XR cervical spine with AP for cervical spine fracture.

## 2023-08-05 NOTE — PROGRESS NOTES
[address] [place] [address] [id number] [place] [address], [address] [place] [address] Admission    NAME: Elidia Ojeda  AGE: 80 y.o. SEX: female [phone]    DATE OF ENCOUNTER: 8/4/2023    Assessment/Plan:   Patient’s care was coordinated with nursing facility staff. Recent vitals, labs and updated medications were reviewed on [facility] system of facility. Past Medical, surgical, social, medication and allergy history and patient’s previous records reviewed. 1. Fall, sequela        2. 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 Mixed hyperlipidemia        5. Ambulatory dysfunction        6. Moderate vascular dementia with anxiety ([address])             Fall  Pt with hx of recurrent falls  Pt at risk for falls  Pt with multiple traumatic injuries with C2 cervical fx, closed fx of Rt scapula, multiple rib fracture, T3 vertebra, radial fx  Cont fall precaution  PT/OT to eval/tx  F/u outpatient with neurosurgery & trauma    C2 cervical fracture (HCC)  Pt with CT neck finding consistent with C2 vertebral body fracture  Cont C-Collar    Vertebral artery dissection (HCC)  Pt s/p fall  Pt with incidental finding of vertebral aa dissection  Pt evaluated by neurosurgery  Pt on asa, plavix and statin    Mixed hyperlipidemia  Stable  Cont atorvastatin 10 mg 1 tab po QHS    Ambulatory dysfunction  PT/OT to eval/tx    Moderate vascular dementia with anxiety (HCC)  Pt with severe dementia  Redirect, reorient and provide distraction techniques  Assist with adls/iadls  Cont supportive care  Fall precaution  PT/OT to eval/tx  Follow TADA approach:   Tolerate behavior that is not harmful  Anticipate needs & assist with adls/iadls  Do NOT agitate with confused pt & give positive reinforcements      Chief Complaint     Here for STR    HPI   Patient is a 80 y.o. female with PMH Dementia, HTN, osteoarthritis, hyperlipidemia and Breast ca. Pt admitted to [facility] from 7/31/2023-8/3/2023 after sustaining an unwitnessed fall. Pt had multiple injuries and required ICU management for [facility]. She also had a vertebral aa dissection. She was evaluated by neurosurgery and started on asa/plavix/statin. Pt did not require operative intervention for her multiple fractures. She was discharged to [address] [place] [address] on 8/3/2023. Pt seen and examined. Pt sitting next to nurses station making noises in her sleep. Pt arousable and oriented to self only. Pt appears confused then falls back asleep. Past Medical History:   Diagnosis Date   • Chronic pain disorder    • Hypertension    • Low back pain        History reviewed. No pertinent surgical history. Social History     Tobacco Use   Smoking Status Never   Smokeless Tobacco Never        History reviewed. No pertinent family history.      Allergies   Allergen Reactions   • Celecoxib Other (See Comments)          Current Outpatient Medications:   •  acetaminophen (TYLENOL) 325 mg tablet, Take 975 mg by mouth every 6 (six) hours as needed for mild pain, Disp: , Rfl:   •  aspirin 81 mg chewable tablet, Chew 1 tablet (81 mg total) daily, Disp: 30 tablet, Rfl: 0  •  atorvastatin (LIPITOR) 10 mg tablet, Take 10 mg by mouth daily, Disp: , Rfl:   •  b complex vitamins capsule, Take 1 capsule by mouth daily, Disp: , Rfl:   •  Betamethasone Sodium Phosphate 0.1 % SOLN, Apply to eye, Disp: , Rfl:   •  Calcium Carb-Cholecalciferol (Calcium 600/Vitamin D3) 600-20 MG-MCG TABS, Take 1 tablet by mouth in the morning, Disp: , Rfl:   •  cholecalciferol (VITAMIN D3) 1,000 units tablet, Take 1,000 Units by mouth daily, Disp: , Rfl:   •  clopidogrel (PLAVIX) 75 mg tablet, Take 1 tablet (75 mg total) by mouth daily, Disp: 30 tablet, Rfl: 0  •  Cyanocobalamin (Vitamin B 12) 100 MCG LOZG, Take by mouth, Disp: , Rfl:   •  docusate sodium (COLACE) 100 mg capsule, Take 100 mg by mouth 2 (two) times a day as needed for constipation, Disp: , Rfl:   •  Emollient (CeraVe Moisturizing) CREA, Apply topically, Disp: , Rfl:   •  furosemide (LASIX) 20 mg tablet, Take 30 mg by mouth daily, Disp: , Rfl:   •  levETIRAcetam (KEPPRA) 500 mg tablet, Take 1 tablet (500 mg total) by mouth every 12 (twelve) hours for 10 doses, Disp: 10 tablet, Rfl: 0  •  lidocaine (LIDODERM) 5 %, Apply 1 patch topically over 12 hours daily Remove & Discard patch within 12 hours or as directed by MD, Disp: 10 patch, Rfl: 0  •  oxyCODONE (Roxicodone) 5 immediate release tablet, Take 0.5 tablets (2.5 mg total) by mouth every 4 (four) hours as needed for severe pain for up to 7 days Max Daily Amount: 15 mg, Disp: 20 tablet, Rfl: 0  •  potassium chloride (Klor-Con) 10 mEq tablet, Take 20 mEq by mouth in the morning, Disp: , Rfl:   •  senna (SENOKOT) 8.6 mg, Take 2 tablets (17.2 mg total) by mouth daily at bedtime, Disp: 10 tablet, Rfl: 0    Updated list was reviewed in pointclick care system of facility. Vital signs were reviewed in point click care    Review of Systems   Unable to perform ROS: Dementia   and no family at bedside    Physical Exam  Constitutional:       General: She is not in acute distress. Appearance: She is not ill-appearing. HENT:      Head: Normocephalic. Cardiovascular:      Rate and Rhythm: Regular rhythm. Pulses:           Dorsalis pedis pulses are 2+ on the right side and 2+ on the left side. Heart sounds: Normal heart sounds. Pulmonary:      Effort: No respiratory distress. Breath sounds: Normal breath sounds. No wheezing. Abdominal:      General: Bowel sounds are normal. There is no distension. Palpations: Abdomen is soft. Tenderness: There is no abdominal tenderness. Musculoskeletal:      Right lower leg: No edema. Left lower leg: No edema. Neurological:      Mental Status: She is disoriented.        Diagnostic Data     Recent labs and imaging studies were reviewed. Additional notes: Total time spent on H&P 39 minutes in reviewing discharge paperwork from the hospital, interpreting test results from hospital medical records, and documenting information in the medical record. In addition, I provided counseling to the patient and encouraged them to work with physical therapy.      This note was electronically signed by Dr. Arya Appiah

## 2023-08-07 ENCOUNTER — NURSING HOME VISIT (OUTPATIENT)
Dept: GERIATRICS | Facility: OTHER | Age: 88
End: 2023-08-07
Payer: MEDICARE

## 2023-08-07 VITALS
WEIGHT: 144.8 LBS | HEART RATE: 79 BPM | TEMPERATURE: 98.6 F | DIASTOLIC BLOOD PRESSURE: 79 MMHG | OXYGEN SATURATION: 95 % | RESPIRATION RATE: 18 BRPM | SYSTOLIC BLOOD PRESSURE: 159 MMHG | BODY MASS INDEX: 24.1 KG/M2

## 2023-08-07 DIAGNOSIS — S22.030D CLOSED WEDGE COMPRESSION FRACTURE OF T3 VERTEBRA WITH ROUTINE HEALING: ICD-10-CM
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
## 2023-08-07 DIAGNOSIS — S42.101S CLOSED FRACTURE OF RIGHT SCAPULA, UNSPECIFIED PART OF SCAPULA, SEQUELA: ICD-10-CM

## 2023-08-07 DIAGNOSIS — I77.74 VERTEBRAL ARTERY DISSECTION (HCC): ICD-10-CM

## 2023-08-07 DIAGNOSIS — S12.101S CLOSED NONDISPLACED FRACTURE OF SECOND CERVICAL VERTEBRA, UNSPECIFIED FRACTURE MORPHOLOGY, SEQUELA: ICD-10-CM

## 2023-08-07 DIAGNOSIS — S01.112S LACERATION OF LEFT EYEBROW, SEQUELA: ICD-10-CM

## 2023-08-07 DIAGNOSIS — S52.301S: ICD-10-CM

## 2023-08-07 DIAGNOSIS — I60.9 SAH (SUBARACHNOID HEMORRHAGE) (HCC): Primary | ICD-10-CM

## 2023-08-07 DIAGNOSIS — S22.41XS CLOSED FRACTURE OF MULTIPLE RIBS OF RIGHT SIDE, SEQUELA: ICD-10-CM

## 2023-08-07 DIAGNOSIS — F01.B4 MODERATE VASCULAR DEMENTIA WITH ANXIETY (HCC): ICD-10-CM

## 2023-08-07 PROCEDURE — 99309 SBSQ NF CARE MODERATE MDM 30: CPT | Performed by: NURSE PRACTITIONER

## 2023-08-08 NOTE — PROGRESS NOTES
[address]  [phone]  FACILITY: [facility]  Code 31 (STR)      NAME: Adriano Vargas  AGE: 80 y.o. SEX: female CODE STATUS: No CPR    DATE OF ENCOUNTER: 8/7/2023    Assessment and Plan     1. SAH (subarachnoid hemorrhage) (HCC)  Assessment & Plan:  · CTA head with hyperdense material seen in the right ambient cistern compatible with acute subarachnoid hemorrhage  · Serial CT head revealed stability  · Patient also with vertebral artery dissection-followed closely by neurosurgery cleared to start dual antiplatelet therapy  · Outpatient follow-up with neurosurgery and repeat CT imaging of head    Recommended Keppra 500 mg BID x 10 days upon d/c to rehab for seizure ppx      2. Fall, sequela  Assessment & Plan:  · History of recurrent falls  · Resides at [facility]  · Was found on floor at facility after an unwitnessed fall with multiple injuries  · Continue PT/OT  · Fall precautions  · Delirium precautions  · Ensure adequate nutrition/hydration  · Monitor CBC/BMP  ·  following for discharge planning      3. Vertebral artery dissection (HCC)  Assessment & Plan:  · S/p unwitnessed fall with CT imaging revealing small intimal flap compatible with focal dissection in the V3 segment of the right vertebral body at the level of the C2 fracture. Multifocal narrowing and segmental occlusion of the left vertebral artery concerning for dissection superimposed on atherosclerotic disease. No high-grade intracranial stenosis or large vessel occlusion  · Neurosurgery following while inpatient and cleared to start DAPT  · Continue aspirin Plavix and statin  · Patient to follow-up with neurosurgery and repeat CT of the head scheduled 8/10/2023      4.  Closed nondisplaced fracture of second cervical vertebra, unspecified fracture morphology, sequela  Assessment & Plan:  · CT revealing acute minimally displaced fracture through the base of C2 vertebral body extending into the inferior endplate into both pedicles to the level of the transverse foramen and into the right transverse process  · CTA revealing vertebral artery dissection  · Continue Vista collar  · Repeat CTA head and neck in 1 week with outpatient neurosurgery follow-up    Orders:  -     oxyCODONE (Roxicodone) 5 immediate release tablet; Take 0.5 tablets (2.5 mg total) by mouth every 4 (four) hours as needed for moderate pain or severe pain Max Daily Amount: 15 mg    5. Closed fracture of right scapula, unspecified part of scapula, sequela  Assessment & Plan:  · Incidental finding upon CT of chest  · Orthopedics consulted recommended nonweightbearing to the right upper extremity  · Okay for patient to bear weight through the forearm and humerus  · Pain management as ordered  · PT OT  · Outpatient follow-up with orthopedics    Orders:  -     oxyCODONE (Roxicodone) 5 immediate release tablet; Take 0.5 tablets (2.5 mg total) by mouth every 4 (four) hours as needed for moderate pain or severe pain Max Daily Amount: 15 mg    6. Closed fracture of shaft of right radius, unspecified fracture morphology, sequela  Assessment & Plan:  · Continue right upper extremity splint  · Pain management as ordered  · Outpatient follow-up with orthopedics    Orders:  -     oxyCODONE (Roxicodone) 5 immediate release tablet; Take 0.5 tablets (2.5 mg total) by mouth every 4 (four) hours as needed for moderate pain or severe pain Max Daily Amount: 15 mg    7. Closed wedge compression fracture of T3 vertebra with routine healing  Assessment & Plan:  · T3 compression fracture noted on CT image  · Neurosurgery consulted  · Analgesia as needed  · PT/OT  · Fall precautions    Orders:  -     oxyCODONE (Roxicodone) 5 immediate release tablet; Take 0.5 tablets (2.5 mg total) by mouth every 4 (four) hours as needed for moderate pain or severe pain Max Daily Amount: 15 mg    8.  Closed fracture of multiple ribs of right side, sequela  Assessment & Plan:  · Noted with both acute and subacute nondisplaced fractures of ribs 3 through 6 and 8 through 10  · Encourage use of incentive spirometer  · Patient doing well on room air  · Outpatient follow-up with trauma as an outpatient as needed  · Continue PT OT    Orders:  -     oxyCODONE (Roxicodone) 5 immediate release tablet; Take 0.5 tablets (2.5 mg total) by mouth every 4 (four) hours as needed for moderate pain or severe pain Max Daily Amount: 15 mg    9. Laceration of left eyebrow, sequela  Assessment & Plan:  · S/p unwitnessed fall  · Sutures placed while inpatient  · Incision appears healed on exam-okay to remove sutures at this time      10. Moderate vascular dementia with anxiety ([address])  Assessment & Plan:  · AAO X1  · Very hard of hearing  · Able to make her needs known  · Does not have capacity to make her own medical decisions her son Roberto Carlos Bentley is POA  Provide redirection, reorientation, distraction techniques  Fall Precautions  Assist with ADLs/IADLs  Avoid deliriogenic medications such as tramadol, benzodiazepines, anticholinergics, benadryl  Encourage Hydration/ Nutrition  Implement sleep hygiene, limit night time interuptions   Encourage participation in group activities when appropriate            All medications and routine orders were reviewed and updated as needed. Chief Complaint     STR follow up visit    Past Medical and Surgica History      Past Medical History:   Diagnosis Date   • Hypertension      History reviewed. No pertinent surgical history. Not on File       History of Present Illness     Linda Friend is a 80 y.o. female admitted to [address] [place] [address] following hospital stay for unwitnessed fall with multiple injuries. Patient has a past medical Hx including but not limited to HTN/HLD, osteoporosis, chronic low back pain, ambulatory dysfunction, constipation, cognitive impairment.      Patient is seen in collaboration with nursing for medical mgmt and STR follow up. Patient initially presented to hospital on 7/31/2023 after an unwitnessed fall at her [place]) with multiple injuries:  Right radial fracture  C2 cervical fracture  Multiple rib fractures  Closed fracture of right scapula  Closed wedge compression fracture of T3 vertebra  Vertebral artery dissection  Subarachnoid hemorrhage    Patient  required treatment in the ICU. She was followed by neurosurgery and trauma services. Patient was started on dual antiplatelet therapy and statins after cleared by neurosurgery. Patient recommended CTA 1 week post discharge. Patient required pain management for multiple fractures which did not require any operative intervention. Patient also had cervical fracture and is to remain in c-collar. Patient recommended discharge to postacute rehab. Seen and examined at nurses station today. Patient is a poor historian due to cognitive impairment, she is AAO to self only. She is very Hard of hearing, not wearing hearing aides. She is wearing a c-collar. She is moaning but does not answer if she is having any pain. Will schedule Tylenol and monitor for effectiveness. Has order for PRN Oxycodone. She needs to be fed by staff at this time. She is on PT/OT services. Nursing is concerned because patient does not request pain medication. Otherwise no other concerns from staff at this time. The patient's allergies, past medical, surgical, social and family history were reviewed and unchanged. Review of Systems     Review of Systems   Unable to perform ROS: Age         Objective     Vitals:   Vitals:    08/07/23 2130   BP: 159/79   Pulse: 79   Resp: 18   Temp: 98.6 °F (37 °C)   SpO2: 95%         Physical Exam  Vitals and nursing note reviewed. Constitutional:       General: She is not in acute distress. Appearance: Normal appearance. She is ill-appearing. HENT:      Head: Normocephalic and atraumatic. Nose: No congestion or rhinorrhea. Mouth/Throat:      Mouth: Mucous membranes are moist.   Eyes:      General: No scleral icterus. Conjunctiva/sclera: Conjunctivae normal.      Pupils: Pupils are equal, round, and reactive to light. Neck:      Comments: C-Collar in place  Cardiovascular:      Rate and Rhythm: Normal rate and regular rhythm. Pulses: Normal pulses. Heart sounds: Normal heart sounds. No murmur heard. Pulmonary:      Effort: Pulmonary effort is normal. No respiratory distress. Breath sounds: Normal breath sounds. No wheezing, rhonchi or rales. Abdominal:      General: Bowel sounds are normal. There is no distension. Palpations: Abdomen is soft. There is no mass. Tenderness: There is no abdominal tenderness. Hernia: No hernia is present. Musculoskeletal:         General: No swelling or tenderness. Lymphadenopathy:      Cervical: No cervical adenopathy. Skin:     General: Skin is warm and dry. Coloration: Skin is not pale. Findings: No rash. Comments: Sutures intact to Left eyebrow- laceration is clean/dry well approximated   Scattered busing throughout   Right arm in splint  C-collar in place   Neurological:      General: No focal deficit present. Mental Status: She is alert. Mental status is at baseline. She is disoriented. Motor: Weakness present. Gait: Gait abnormal.      Comments: [facility]IN[facility] WALLACE  Very hard of hearing  Does not know her age or where she is could not tell me the date   Psychiatric:         Mood and Affect: Mood normal.         Behavior: Behavior normal.         Pertinent Laboratory/Diagnostic Studies:     Reviewed in facility chart      Current Medications   Medications reviewed and updated see facility [facility] for details.       Current Outpatient Medications:   •  oxyCODONE (Roxicodone) 5 immediate release tablet, Take 0.5 tablets (2.5 mg total) by mouth every 4 (four) hours as needed for moderate pain or severe pain Max Daily Amount: 15 mg, Disp: , Rfl: 0  •  acetaminophen (TYLENOL) 325 mg tablet, Take 975 mg by mouth every 8 (eight) hours, Disp: , Rfl:   •  aspirin 81 mg chewable tablet, Chew 1 tablet (81 mg total) daily, Disp: 30 tablet, Rfl: 0  •  atorvastatin (LIPITOR) 10 mg tablet, Take 10 mg by mouth daily, Disp: , Rfl:   •  B Complex Vitamins (VITAMIN-B COMPLEX PO), Take 1 tablet by mouth, Disp: , Rfl:   •  Betamethasone Sodium Phosphate 0.1 % SOLN, Apply to eye, Disp: , Rfl:   •  Calcium Carb-Cholecalciferol (Calcium 600/Vitamin D3) 600-20 MG-MCG TABS, Take 1 tablet by mouth in the morning, Disp: , Rfl:   •  cholecalciferol (VITAMIN D3) 1,000 units tablet, Take 1,000 Units by mouth daily, Disp: , Rfl:   •  clopidogrel (PLAVIX) 75 mg tablet, Take 1 tablet (75 mg total) by mouth daily, Disp: 30 tablet, Rfl: 0  •  Cyanocobalamin (Vitamin B 12) 100 MCG LOZG, Take by mouth, Disp: , Rfl:   •  docusate sodium (COLACE) 100 mg capsule, Take 100 mg by mouth 2 (two) times a day as needed for constipation, Disp: , Rfl:   •  Emollient (CeraVe Moisturizing) CREA, Apply topically, Disp: , Rfl:   •  fluocinolone (SYNALAR) 0.01 % cream, Apply topically in the morning Apply to scalpe for 2-4 hours daily. Protect with shower cap and shampoo out., Disp: , Rfl:   •  furosemide (LASIX) 20 mg tablet, Take 30 mg by mouth daily 30 mg daily = 1.5tabs, Disp: , Rfl:   •  levETIRAcetam (KEPPRA) 500 mg tablet, Take 1 tablet (500 mg total) by mouth every 12 (twelve) hours for 10 doses, Disp: 10 tablet, Rfl: 0  •  potassium chloride (Klor-Con) 10 mEq tablet, Take 20 mEq by mouth in the morning, Disp: , Rfl:   •  senna (SENOKOT) 8.6 mg, Take 2 tablets (17.2 mg total) by mouth daily at bedtime, Disp: 10 tablet, Rfl: 0     Please note:  Voice-recognition software may have been used in the preparation of this document. Occasional wrong word or "sound-alike" substitutions may have occurred due to the inherent limitations of voice recognition software.   Interpretation should be guided by context.          EDI Mac  8/7/2023  9:31 PM

## 2023-08-09 ENCOUNTER — OFFICE VISIT (OUTPATIENT)
Dept: OBGYN CLINIC | Facility: CLINIC | Age: 88
End: 2023-08-09
Payer: MEDICARE

## 2023-08-09 ENCOUNTER — NURSING HOME VISIT (OUTPATIENT)
Dept: GERIATRICS | Facility: OTHER | Age: 88
End: 2023-08-09
Payer: MEDICARE

## 2023-08-09 ENCOUNTER — APPOINTMENT (OUTPATIENT)
Dept: RADIOLOGY | Facility: AMBULARY SURGERY CENTER | Age: 88
End: 2023-08-09
Payer: MEDICARE

## 2023-08-09 VITALS
OXYGEN SATURATION: 95 % | WEIGHT: 144.8 LBS | RESPIRATION RATE: 18 BRPM | HEART RATE: 79 BPM | BODY MASS INDEX: 24.1 KG/M2 | TEMPERATURE: 98.6 F | SYSTOLIC BLOOD PRESSURE: 159 MMHG | DIASTOLIC BLOOD PRESSURE: 79 MMHG

## 2023-08-09 VITALS — BODY MASS INDEX: 24.12 KG/M2 | HEIGHT: 65 IN | WEIGHT: 144.8 LBS

## 2023-08-09 DIAGNOSIS — I77.74 VERTEBRAL ARTERY DISSECTION (HCC): ICD-10-CM

## 2023-08-09 DIAGNOSIS — G89.11 ACUTE PAIN DUE TO TRAUMA: ICD-10-CM

## 2023-08-09 DIAGNOSIS — I60.9 SAH (SUBARACHNOID HEMORRHAGE) (HCC): ICD-10-CM

## 2023-08-09 DIAGNOSIS — S22.030D CLOSED WEDGE COMPRESSION FRACTURE OF T3 VERTEBRA WITH ROUTINE HEALING: ICD-10-CM

## 2023-08-09 DIAGNOSIS — S42.101S CLOSED FRACTURE OF RIGHT SCAPULA, UNSPECIFIED PART OF SCAPULA, SEQUELA: ICD-10-CM

## 2023-08-09 DIAGNOSIS — S52.301S: ICD-10-CM

## 2023-08-09 DIAGNOSIS — S22.41XS CLOSED FRACTURE OF MULTIPLE RIBS OF RIGHT SIDE, SEQUELA: ICD-10-CM
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
## 2023-08-09 DIAGNOSIS — S12.101S CLOSED NONDISPLACED FRACTURE OF SECOND CERVICAL VERTEBRA, UNSPECIFIED FRACTURE MORPHOLOGY, SEQUELA: ICD-10-CM

## 2023-08-09 DIAGNOSIS — M25.531 RIGHT WRIST PAIN: ICD-10-CM

## 2023-08-09 DIAGNOSIS — S52.601A CLOSED FRACTURE DISTAL RADIUS AND ULNA, RIGHT, INITIAL ENCOUNTER: Primary | ICD-10-CM

## 2023-08-09 DIAGNOSIS — S52.501A CLOSED FRACTURE DISTAL RADIUS AND ULNA, RIGHT, INITIAL ENCOUNTER: Primary | ICD-10-CM

## 2023-08-09 PROCEDURE — 73110 X-RAY EXAM OF WRIST: CPT

## 2023-08-09 PROCEDURE — 99309 SBSQ NF CARE MODERATE MDM 30: CPT | Performed by: NURSE PRACTITIONER

## 2023-08-09 PROCEDURE — 99214 OFFICE O/P EST MOD 30 MIN: CPT | Performed by: PHYSICIAN ASSISTANT

## 2023-08-09 PROCEDURE — 29075 APPL CST ELBW FNGR SHORT ARM: CPT | Performed by: PHYSICIAN ASSISTANT

## 2023-08-09 NOTE — PROGRESS NOTES
[facility]  [address] [address]  [phone]  FACILITY: [facility]  Code 31 (STR)      NAME: Marce Santiago  AGE: 80 y.o. SEX: female CODE STATUS: No CPR    DATE OF ENCOUNTER: 8/9/2023    Assessment and Plan     1. Fall, sequela  Assessment & Plan:  · History of recurrent falls  · Resides at [facility]  · Was found on floor at facility after an unwitnessed fall with multiple injuries  · Continue PT/OT  · Fall precautions  · Delirium precautions  · Ensure adequate nutrition/hydration  · Monitor CBC/BMP  ·  following for discharge planning      2. Acute pain due to trauma  Assessment & Plan:  · Recent unwitnessed fall with multiple serious injuries   · Continue Tylenol RTC  · Oxycodone 2.5 mg Q 4 hrs PRN   · Bowel regimen to prevent constipation       3. Closed fracture of shaft of right radius, unspecified fracture morphology, sequela  Assessment & Plan:  · Continue right upper extremity splint  · Pain management as ordered  · Outpatient follow-up with orthopedics this week      4. Closed fracture of multiple ribs of right side, sequela  Assessment & Plan:  · Noted with both acute and subacute nondisplaced fractures of ribs 3 through 6 and 8 through 10  · Encourage use of incentive spirometer  · Patient doing well on room air  · Outpatient follow-up with trauma as an outpatient as needed  · Continue PT OT      5. Closed wedge compression fracture of T3 vertebra with routine healing  Assessment & Plan:  · T3 compression fracture noted on CT image  · Neurosurgery consulted  · Analgesia as needed  · PT/OT  · Fall precautions      6.  Closed fracture of right scapula, unspecified part of scapula, sequela  Assessment & Plan:  · Incidental finding upon CT of chest  · Orthopedics consulted recommended nonweightbearing to the right upper extremity  · Okay for patient to bear weight through the forearm and humerus  · Pain management as ordered  · PT OT  · Outpatient follow-up with orthopedics this week      7. Closed nondisplaced fracture of second cervical vertebra, unspecified fracture morphology, sequela  Assessment & Plan:  · CT revealing acute minimally displaced fracture through the base of C2 vertebral body extending into the inferior endplate into both pedicles to the level of the transverse foramen and into the right transverse process  · CTA revealing vertebral artery dissection  · Continue Vista collar  · Repeat CTA head and neck in scheduled for 8/10/23  · outpatient neurosurgery follow-up      8. Vertebral artery dissection (HCC)  Assessment & Plan:  · S/p unwitnessed fall with CT imaging revealing small intimal flap compatible with focal dissection in the V3 segment of the right vertebral body at the level of the C2 fracture. Multifocal narrowing and segmental occlusion of the left vertebral artery concerning for dissection superimposed on atherosclerotic disease. No high-grade intracranial stenosis or large vessel occlusion  · Neurosurgery following while inpatient and cleared to start DAPT  · Continue aspirin Plavix and statin  · Patient to follow-up with neurosurgery and repeat CT of the head scheduled 8/10/2023      9. SAH (subarachnoid hemorrhage) (HCC)  Assessment & Plan:  · CTA head with hyperdense material seen in the right ambient cistern compatible with acute subarachnoid hemorrhage  · Serial CT head revealed stability  · Patient also with vertebral artery dissection-followed closely by neurosurgery cleared to start dual antiplatelet therapy  · Outpatient follow-up with neurosurgery and repeat CT imaging of head    Recommended Keppra 500 mg BID x 10 days upon d/c to rehab for seizure ppx         All medications and routine orders were reviewed and updated as needed.     Chief Complaint     STR follow up visit    Past Medical and Surgica History      Past Medical History:   Diagnosis Date   • Chronic pain disorder    • Hypertension    • Low back pain History reviewed. No pertinent surgical history. Allergies   Allergen Reactions   • Celecoxib Other (See Comments)          History of Present Illness     Elena Ardon is a 80 y.o. female admitted to [address] [place] [address] following hospital stay for unwitnessed fall with multiple injuries. Patient has a past medical Hx including but not limited to HTN/HLD, osteoporosis, chronic low back pain, ambulatory dysfunction, constipation, cognitive impairment. Patient is seen in collaboration with nursing for medical mgmt and STR follow up. Patient initially presented to hospital on 7/31/2023 after an unwitnessed fall at her [facility]) with multiple injuries:  Right radial fracture  C2 cervical fracture  Multiple rib fractures  Closed fracture of right scapula  Closed wedge compression fracture of T3 vertebra  Vertebral artery dissection  Subarachnoid hemorrhage    Patient  required treatment in the ICU. She was followed by neurosurgery and trauma services. Patient was started on dual antiplatelet therapy and statins after cleared by neurosurgery. Patient recommended CTA 1 week post discharge. Patient required pain management for multiple fractures which did not require any operative intervention. Patient also had cervical fracture and is to remain in c-collar. Patient recommended discharge to postacute rehab. Seen and examined at nurses station today. Patient is a poor historian due to cognitive impairment, she is AAO to self only. She is very Hard of hearing. Patient appears more comfortable on exam today after scheduling Tylenol RTC. Staff state they have been giving the PRN oxy as needed. Staff state patient attempts to take off her c-collar and her right arm splint. Otherwise, patient continues on PT/OT services. No bowel or bladder concerns from staff at this time. Patient is a feed and needs assistance with ADLs/IADLs.            The patient's allergies, past medical, surgical, social and family history were reviewed and unchanged. Review of Systems     Review of Systems   Unable to perform ROS: Dementia         Objective     Vitals:   Vitals:    08/09/23 1048   BP: 159/79   Pulse: 79   Resp: 18   Temp: 98.6 °F (37 °C)   SpO2: 95%         Physical Exam  Vitals and nursing note reviewed. Constitutional:       General: She is not in acute distress. Appearance: Normal appearance. She is ill-appearing. HENT:      Head: Normocephalic and atraumatic. Nose: No congestion or rhinorrhea. Mouth/Throat:      Mouth: Mucous membranes are moist.   Eyes:      General: No scleral icterus. Conjunctiva/sclera: Conjunctivae normal.      Pupils: Pupils are equal, round, and reactive to light. Neck:      Comments: C-Collar in place  Cardiovascular:      Rate and Rhythm: Normal rate and regular rhythm. Pulses: Normal pulses. Heart sounds: Normal heart sounds. No murmur heard. Pulmonary:      Effort: Pulmonary effort is normal. No respiratory distress. Breath sounds: Normal breath sounds. No wheezing, rhonchi or rales. Abdominal:      General: Bowel sounds are normal. There is no distension. Palpations: Abdomen is soft. There is no mass. Tenderness: There is no abdominal tenderness. Hernia: No hernia is present. Musculoskeletal:         General: No swelling or tenderness. Lymphadenopathy:      Cervical: No cervical adenopathy. Skin:     General: Skin is warm and dry. Coloration: Skin is not pale. Findings: No rash. Comments: Left eyebrow healed  laceration is clean/dry, well approximated   Scattered busing throughout   Right arm in splint  C-collar in place   Neurological:      General: No focal deficit present. Mental Status: She is alert. Mental status is at baseline. She is disoriented. Motor: Weakness present.       Gait: Gait abnormal.      Comments: [facility]APIN[facility] WALLACE  Very hard of hearing  Does not know her age or where she is could not tell me the date   Psychiatric:         Mood and Affect: Mood normal.         Behavior: Behavior normal.         Pertinent Laboratory/Diagnostic Studies:     Reviewed in facility chart      Current Medications   Medications reviewed and updated see facility [facility] for details. Current Outpatient Medications:   •  acetaminophen (TYLENOL) 325 mg tablet, Take 975 mg by mouth every 8 (eight) hours, Disp: , Rfl:   •  aspirin 81 mg chewable tablet, Chew 1 tablet (81 mg total) daily, Disp: 30 tablet, Rfl: 0  •  atorvastatin (LIPITOR) 10 mg tablet, Take 10 mg by mouth daily, Disp: , Rfl:   •  B Complex Vitamins (VITAMIN-B COMPLEX PO), Take 1 tablet by mouth, Disp: , Rfl:   •  Betamethasone Sodium Phosphate 0.1 % SOLN, Apply to eye, Disp: , Rfl:   •  Calcium Carb-Cholecalciferol (Calcium 600/Vitamin D3) 600-20 MG-MCG TABS, Take 1 tablet by mouth in the morning, Disp: , Rfl:   •  cholecalciferol (VITAMIN D3) 1,000 units tablet, Take 1,000 Units by mouth daily, Disp: , Rfl:   •  clopidogrel (PLAVIX) 75 mg tablet, Take 1 tablet (75 mg total) by mouth daily, Disp: 30 tablet, Rfl: 0  •  Cyanocobalamin (Vitamin B 12) 100 MCG LOZG, Take by mouth, Disp: , Rfl:   •  docusate sodium (COLACE) 100 mg capsule, Take 100 mg by mouth 2 (two) times a day as needed for constipation, Disp: , Rfl:   •  Emollient (CeraVe Moisturizing) CREA, Apply topically, Disp: , Rfl:   •  fluocinolone (SYNALAR) 0.01 % cream, Apply topically in the morning Apply to scalpe for 2-4 hours daily.   Protect with shower cap and shampoo out., Disp: , Rfl:   •  furosemide (LASIX) 20 mg tablet, Take 30 mg by mouth daily 30 mg daily = 1.5tabs, Disp: , Rfl:   •  levETIRAcetam (KEPPRA) 500 mg tablet, Take 1 tablet (500 mg total) by mouth every 12 (twelve) hours for 10 doses, Disp: 10 tablet, Rfl: 0  •  oxyCODONE (Roxicodone) 5 immediate release tablet, Take 0.5 tablets (2.5 mg total) by mouth every 4 (four) hours as needed for moderate pain or severe pain Max Daily Amount: 15 mg, Disp: , Rfl: 0  •  potassium chloride (Klor-Con) 10 mEq tablet, Take 20 mEq by mouth in the morning, Disp: , Rfl:   •  senna (SENOKOT) 8.6 mg, Take 2 tablets (17.2 mg total) by mouth daily at bedtime, Disp: 10 tablet, Rfl: 0     Please note:  Voice-recognition software may have been used in the preparation of this document. Occasional wrong word or "sound-alike" substitutions may have occurred due to the inherent limitations of voice recognition software. Interpretation should be guided by context.          EDI Sharma

## 2023-08-09 NOTE — PROGRESS NOTES
Assessment:  1. Closed fracture distal radius and ulna, right, initial encounter  Fracture / Dislocation Treatment      2. Right wrist pain  XR wrist 3+ vw right        Patient Active Problem List   Diagnosis   • Aortic regurgitation   • Bilateral carotid artery disease (HCC)   • History of breast cancer   • Hypertension, essential   • Lumbar compression fracture (HCC)   • Mixed hyperlipidemia   • Osteoporosis   • Thoracic compression fracture (HCC)   • Sacroiliitis (HCC)   • Primary osteoarthritis of right hip   • Acute metabolic encephalopathy   • Orthostatic hypotension   • COVID-19   • Hyponatremia   • Unilateral inguinal hernia without obstruction or gangrene   • Bilateral leg edema   • Fracture of left ischial tuberosity (HCC)   • Ambulatory dysfunction   • Urinary frequency   • Thyroid lesion   • Slow transit constipation   • Radial fracture   • Fall   • C2 cervical fracture (HCC)   • Vertebral artery dissection (HCC)   • SAH (subarachnoid hemorrhage) (HCC)   • Multiple rib fractures   • Closed fracture of right scapula   • Laceration of left eyebrow   • Closed wedge compression fracture of T3 vertebra with routine healing   • Abnormal urinalysis       Plan:    80 y.o. female  with active right distal radius fracture in good alignment, DOI 7/23/23    Short arm cast placed today in the right arm patient tolerated this well  Patient's plan of care was discussed with her son who is listed as her point of contact  Nonweightbearing right upper extremity  May bear weight through the right elbow for use of a platform walker if needed  Follow-up in 3 weeks for repeat x-ray of the right wrist with the cast off          Subjective:   Patient ID: Briana Monroe is a 80 y.o. female . HPI    Patient presents to the office for follow up of right distal radius fracture. She was seen by orthopedics in the hospital placed in a sugar-tong splint. The patient is not oriented to person place or time.   Unable to provide any meaningful history. The following portions of the patient's history were reviewed and updated as appropriate: allergies, current medications, past family history, past social history, past surgical history and problem list.    Social History     Socioeconomic History   • Marital status: /Civil Union     Spouse name: Not on file   • Number of children: Not on file   • Years of education: Not on file   • Highest education level: Not on file   Occupational History   • Occupation: retired   Tobacco Use   • Smoking status: Never   • Smokeless tobacco: Never   Vaping Use   • Vaping Use: Never used   Substance and Sexual Activity   • Alcohol use: Never   • Drug use: Never   • Sexual activity: Never   Other Topics Concern   • Not on file   Social History Narrative    ** Merged History Encounter **         Lives with spouse   Is safe at home     Social Determinants of Health     Financial Resource Strain: Not on file   Food Insecurity: No Food Insecurity (8/23/2022)    Hunger Vital Sign    • Worried About Running Out of Food in the Last Year: Never true    • [address] in the Last Year: Never true   Transportation Needs: No Transportation Needs (8/23/2022)    PRAPARE - Transportation    • Lack of Transportation (Medical): No    • Lack of Transportation (Non-Medical): No   Physical Activity: Not on file   Stress: Not on file   Social Connections: Not on file   Intimate Partner Violence: Not on file   Housing Stability: Low Risk  (8/23/2022)    Housing Stability Vital Sign    • Unable to Pay for Housing in the Last Year: No    • Number of Places Lived in the Last Year: 1    • Unstable Housing in the Last Year: No     Past Medical History:   Diagnosis Date   • Chronic pain disorder    • Hypertension    • Low back pain      History reviewed. No pertinent surgical history.   Allergies   Allergen Reactions   • Celecoxib Other (See Comments)     Current Outpatient Medications on File Prior to Visit   Medication Sig Dispense Refill   • acetaminophen (TYLENOL) 325 mg tablet Take 975 mg by mouth every 6 (six) hours as needed     • acetaminophen (TYLENOL) 325 mg tablet Take 975 mg by mouth every 6 (six) hours as needed for mild pain     • aspirin 81 mg chewable tablet Chew 1 tablet (81 mg total) daily 30 tablet 0   • atorvastatin (LIPITOR) 10 mg tablet Take 10 mg by mouth     • atorvastatin (LIPITOR) 10 mg tablet Take 10 mg by mouth daily     • B Complex Vitamins (VITAMIN-B COMPLEX PO) Take 1 tablet by mouth     • b complex vitamins capsule Take 1 capsule by mouth daily     • benzonatate (TESSALON PERLES) 100 mg capsule Take 1 capsule (100 mg total) by mouth 3 (three) times a day as needed for cough 20 capsule 0   • Betamethasone Sodium Phosphate 0.1 % SOLN Apply to eye     • Calcium Carb-Cholecalciferol (Calcium 600/Vitamin D3) 600-20 MG-MCG TABS Take 1 tablet by mouth in the morning     • Calcium Carb-Cholecalciferol (OSCAL-D) 500 mg-200 units per tablet Take 1 tablet by mouth 2 (two) times a day with meals     • cholecalciferol (VITAMIN D3) 1,000 units tablet Take 1,000 Units by mouth daily     • cholecalciferol (VITAMIN D3) 1,000 units tablet Take 1,000 Units by mouth daily     • clopidogrel (PLAVIX) 75 mg tablet Take 1 tablet (75 mg total) by mouth daily 30 tablet 0   • Cyanocobalamin (Vitamin B 12) 100 MCG LOZG Take by mouth     • docusate sodium (COLACE) 100 mg capsule Take 1 capsule (100 mg total) by mouth daily as needed (constipation unrelieved by Miralax)  0   • docusate sodium (COLACE) 100 mg capsule Take 100 mg by mouth 2 (two) times a day as needed for constipation     • Emollient (CeraVe Moisturizing) CREA Apply topically     • fluocinolone (SYNALAR) 0.01 % cream Apply topically in the morning Apply to scalpe for 2-4 hours daily. Protect with shower cap and shampoo out.      • furosemide (LASIX) 20 mg tablet Take 30 mg by mouth daily     • guaiFENesin 1200 MG TB12 Take 1 tablet (1,200 mg total) by mouth every 12 (twelve) hours  0   • lidocaine (LIDODERM) 5 % Apply 1 patch topically daily Remove & Discard patch within 12 hours or as directed by MD  0   • lidocaine (LIDODERM) 5 % Apply 1 patch topically over 12 hours daily Remove & Discard patch within 12 hours or as directed by MD 10 patch 0   • oxyCODONE (Roxicodone) 5 immediate release tablet Take 0.5 tablets (2.5 mg total) by mouth every 4 (four) hours as needed for severe pain for up to 7 days Max Daily Amount: 15 mg 20 tablet 0   • polyethylene glycol (MIRALAX) 17 g packet Take 17 g by mouth daily as needed     • potassium chloride (Klor-Con) 10 mEq tablet Take 20 mEq by mouth in the morning     • senna (SENOKOT) 8.6 mg Take 2 tablets (17.2 mg total) by mouth daily at bedtime 10 tablet 0   • vitamin B-12 (CYANOCOBALAMIN) 100 MCG tablet Take 1,000 mcg by mouth     • enoxaparin (Lovenox) 40 mg/0.4 mL Inject 0.4 mL (40 mg total) under the skin in the morning for 28 days  0   • levETIRAcetam (KEPPRA) 500 mg tablet Take 1 tablet (500 mg total) by mouth every 12 (twelve) hours for 10 doses 10 tablet 0   • [DISCONTINUED] Calcium-Magnesium-Vitamin D - MG-MG-UNIT TB24 Take 1 tablet by mouth     • [DISCONTINUED] naproxen sodium (ALEVE) 220 MG tablet Take 220 mg by mouth 2 (two) times a day with meals       No current facility-administered medications on file prior to visit. Review of Systems  See HPi    Objective: There were no vitals filed for this visit. Physical Exam    Right Hand Exam     Tenderness   The patient is experiencing tenderness in the dorsal area and radial area.     Range of Motion   Wrist   Extension: abnormal   Flexion: abnormal   Pronation: abnormal   Supination: abnormal     Other   Erythema: absent  Scars: absent  Pulse: present            I have personally reviewed pertinent films in PACS and my interpretation is X-ray of the right wrist performed today demonstrates no visible area of fracture of the base of the ulnar styloid, there is evident impacted distal radius fracture with stable alignment no dorsal angulation no significant shortening. Fracture / Dislocation Treatment    Date/Time: 8/9/2023 2:00 PM    Performed by: Eduardo Ferris PA-C  Authorized by: Eduardo Ferris PA-C    Patient Location:  [facility] Protocol:  Consent: Verbal consent obtained. Risks and benefits: risks, benefits and alternatives were discussed  Consent given by: power of   Patient identity confirmed: verbally with patient      Neurovascular status: Neurovascularly intact    Cast type:  Short arm  Supplies used:  Fiberglass and cotton padding  Neurovascular status: Neurovascularly intact    Patient tolerance:  Patient tolerated the procedure well with no immediate complications                     Portions of the record may have been created with voice recognition software. Occasional wrong word or "sound a like" substitutions may have occurred due to the inherent limitations of voice recognition software. Read the chart carefully and recognize, using context, where substitutions have occurred.

## 2023-08-09 NOTE — PATIENT INSTRUCTIONS
80 y.o. female  with active right distal radius fracture in good alignment, DOI 7/31/23    Short arm cast placed today in the right arm patient tolerated this well  Patient's plan of care was discussed with her son who is listed as her point of contact  Nonweightbearing right upper extremity  May bear weight through the right elbow for use of a platform walker if needed  Follow-up in 3 weeks for repeat x-ray of the right wrist with the cast off

## 2023-08-10 ENCOUNTER — HOSPITAL ENCOUNTER (OUTPATIENT)
Dept: CT IMAGING | Facility: HOSPITAL | Age: 88
Discharge: HOME | End: 2023-08-10
Payer: MEDICARE

## 2023-08-10 DIAGNOSIS — I60.9 SAH (SUBARACHNOID HEMORRHAGE) (HCC): ICD-10-CM

## 2023-08-10 DIAGNOSIS — I77.74 VERTEBRAL ARTERY DISSECTION (HCC): ICD-10-CM

## 2023-08-10 PROCEDURE — 70498 CT ANGIOGRAPHY NECK: CPT

## 2023-08-10 PROCEDURE — G1004 CDSM NDSC: HCPCS

## 2023-08-10 PROCEDURE — 70496 CT ANGIOGRAPHY HEAD: CPT

## 2023-08-10 RX ADMIN — IOHEXOL 65 ML: 350 INJECTION, SOLUTION INTRAVENOUS at 10:01

## 2023-08-12 PROBLEM — G89.11 ACUTE PAIN DUE TO TRAUMA: Status: ACTIVE | Noted: 2023-08-12

## 2023-08-12 PROBLEM — F01.B4 MODERATE VASCULAR DEMENTIA WITH ANXIETY (HCC): Status: ACTIVE | Noted: 2023-08-12

## 2023-08-12 RX ORDER — OXYCODONE HYDROCHLORIDE 5 MG/1
2.5 TABLET ORAL EVERY 4 HOURS PRN
Refills: 0
Start: 2023-08-12

## 2023-08-12 NOTE — ASSESSMENT & PLAN NOTE
· CTA head with hyperdense material seen in the right ambient cistern compatible with acute subarachnoid hemorrhage  · Serial CT head revealed stability  · Patient also with vertebral artery dissection-followed closely by neurosurgery cleared to start dual antiplatelet therapy  · Outpatient follow-up with neurosurgery and repeat CT imaging of head    Recommended Keppra 500 mg BID x 10 days upon d/c to rehab for seizure ppx

## 2023-08-12 NOTE — ASSESSMENT & PLAN NOTE
· CT revealing acute minimally displaced fracture through the base of C2 vertebral body extending into the inferior endplate into both pedicles to the level of the transverse foramen and into the right transverse process  · CTA revealing vertebral artery dissection  · Continue Vista collar  · Repeat CTA head and neck in 1 week with outpatient neurosurgery follow-up

## 2023-08-12 NOTE — ASSESSMENT & PLAN NOTE
· CT revealing acute minimally displaced fracture through the base of C2 vertebral body extending into the inferior endplate into both pedicles to the level of the transverse foramen and into the right transverse process  · CTA revealing vertebral artery dissection  · Continue Vista collar  · Repeat CTA head and neck in scheduled for 8/10/23  · outpatient neurosurgery follow-up

## 2023-08-12 NOTE — ASSESSMENT & PLAN NOTE
· Continue right upper extremity splint  · Pain management as ordered  · Outpatient follow-up with orthopedics

## 2023-08-12 NOTE — ASSESSMENT & PLAN NOTE
· S/p unwitnessed fall with CT imaging revealing small intimal flap compatible with focal dissection in the V3 segment of the right vertebral body at the level of the C2 fracture. Multifocal narrowing and segmental occlusion of the left vertebral artery concerning for dissection superimposed on atherosclerotic disease.   No high-grade intracranial stenosis or large vessel occlusion  · Neurosurgery following while inpatient and cleared to start DAPT  · Continue aspirin Plavix and statin  · Patient to follow-up with neurosurgery and repeat CT of the head scheduled 8/10/2023

## 2023-08-12 NOTE — ASSESSMENT & PLAN NOTE
· AAO X1  · Very hard of hearing  · Able to make her needs known  · Does not have capacity to make her own medical decisions her son Julia Coyne is POA  Provide redirection, reorientation, distraction techniques  Fall Precautions  Assist with ADLs/IADLs  Avoid deliriogenic medications such as tramadol, benzodiazepines, anticholinergics, benadryl  Encourage Hydration/ Nutrition  Implement sleep hygiene, limit night time interuptions   Encourage participation in group activities when appropriate

## 2023-08-12 NOTE — ASSESSMENT & PLAN NOTE
· Continue right upper extremity splint  · Pain management as ordered  · Outpatient follow-up with orthopedics this week

## 2023-08-12 NOTE — ASSESSMENT & PLAN NOTE
· History of recurrent falls  · Resides at [facility]  · Was found on floor at facility after an unwitnessed fall with multiple injuries  · Continue PT/OT  · Fall precautions  · Delirium precautions  · Ensure adequate nutrition/hydration  · Monitor CBC/BMP  ·  following for discharge planning

## 2023-08-12 NOTE — ASSESSMENT & PLAN NOTE
· S/p unwitnessed fall  · Sutures placed while inpatient  · Incision appears healed on exam-okay to remove sutures at this time

## 2023-08-12 NOTE — ASSESSMENT & PLAN NOTE
· Recent unwitnessed fall with multiple serious injuries   · Continue Tylenol RTC  · Oxycodone 2.5 mg Q 4 hrs PRN   · Bowel regimen to prevent constipation

## 2023-08-12 NOTE — ASSESSMENT & PLAN NOTE
· History of recurrent falls  · Resides at [facility]  · Was found on floor at facility after an unwitnessed fall with multiple injuries  · Continue PT/OT  · Fall precautions  · Delirium precautions  · Ensure adequate nutrition/hydration  · Monitor CBC/BMP  ·  following for discharge planning

## 2023-08-12 NOTE — ASSESSMENT & PLAN NOTE
· Noted with both acute and subacute nondisplaced fractures of ribs 3 through 6 and 8 through 10  · Encourage use of incentive spirometer  · Patient doing well on room air  · Outpatient follow-up with trauma as an outpatient as needed  · Continue PT OT

## 2023-08-12 NOTE — ASSESSMENT & PLAN NOTE
· Incidental finding upon CT of chest  · Orthopedics consulted recommended nonweightbearing to the right upper extremity  · Okay for patient to bear weight through the forearm and humerus  · Pain management as ordered  · PT OT  · Outpatient follow-up with orthopedics this week

## 2023-08-12 NOTE — ASSESSMENT & PLAN NOTE
· T3 compression fracture noted on CT image  · Neurosurgery consulted  · Analgesia as needed  · PT/OT  · Fall precautions

## 2023-08-12 NOTE — ASSESSMENT & PLAN NOTE
· Incidental finding upon CT of chest  · Orthopedics consulted recommended nonweightbearing to the right upper extremity  · Okay for patient to bear weight through the forearm and humerus  · Pain management as ordered  · PT OT  · Outpatient follow-up with orthopedics

## 2023-08-14 ENCOUNTER — TELEPHONE (OUTPATIENT)
Dept: NEUROSURGERY | Facility: CLINIC | Age: 88
End: 2023-08-14

## 2023-08-14 ENCOUNTER — NURSING HOME VISIT (OUTPATIENT)
Dept: GERIATRICS | Facility: OTHER | Age: 88
End: 2023-08-14
Payer: MEDICARE

## 2023-08-14 ENCOUNTER — OFFICE VISIT (OUTPATIENT)
Dept: NEUROSURGERY | Facility: CLINIC | Age: 88
End: 2023-08-14
Payer: MEDICARE

## 2023-08-14 VITALS
DIASTOLIC BLOOD PRESSURE: 74 MMHG | TEMPERATURE: 97.3 F | OXYGEN SATURATION: 96 % | SYSTOLIC BLOOD PRESSURE: 124 MMHG | HEART RATE: 95 BPM

## 2023-08-14 VITALS
BODY MASS INDEX: 24 KG/M2 | WEIGHT: 144.2 LBS | DIASTOLIC BLOOD PRESSURE: 65 MMHG | HEART RATE: 72 BPM | RESPIRATION RATE: 18 BRPM | OXYGEN SATURATION: 95 % | TEMPERATURE: 97.3 F | SYSTOLIC BLOOD PRESSURE: 132 MMHG

## 2023-08-14 DIAGNOSIS — S22.030D CLOSED WEDGE COMPRESSION FRACTURE OF T3 VERTEBRA WITH ROUTINE HEALING: ICD-10-CM

## 2023-08-14 DIAGNOSIS — S12.101S CLOSED NONDISPLACED FRACTURE OF SECOND CERVICAL VERTEBRA, UNSPECIFIED FRACTURE MORPHOLOGY, SEQUELA: ICD-10-CM

## 2023-08-14 DIAGNOSIS — G89.11 ACUTE PAIN DUE TO TRAUMA: ICD-10-CM

## 2023-08-14 DIAGNOSIS — I77.74 VERTEBRAL ARTERY DISSECTION (HCC): Primary | ICD-10-CM

## 2023-08-14 DIAGNOSIS — F01.B4 MODERATE VASCULAR DEMENTIA WITH ANXIETY (HCC): ICD-10-CM

## 2023-08-14 DIAGNOSIS — S42.101S CLOSED FRACTURE OF RIGHT SCAPULA, UNSPECIFIED PART OF SCAPULA, SEQUELA: ICD-10-CM

## 2023-08-14 DIAGNOSIS — S22.41XS CLOSED FRACTURE OF MULTIPLE RIBS OF RIGHT SIDE, SEQUELA: ICD-10-CM

## 2023-08-14 DIAGNOSIS — I60.9 SAH (SUBARACHNOID HEMORRHAGE) (HCC): ICD-10-CM

## 2023-08-14 DIAGNOSIS — S52.301S: ICD-10-CM
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
## 2023-08-14 PROCEDURE — 99309 SBSQ NF CARE MODERATE MDM 30: CPT | Performed by: NURSE PRACTITIONER

## 2023-08-14 PROCEDURE — 99213 OFFICE O/P EST LOW 20 MIN: CPT | Performed by: PHYSICIAN ASSISTANT

## 2023-08-14 RX ORDER — LIDOCAINE 4 G/G
PATCH TOPICAL
COMMUNITY

## 2023-08-14 NOTE — ASSESSMENT & PLAN NOTE
· S/p unwitnessed fall with CT imaging revealing small intimal flap compatible with focal dissection in the V3 segment of the right vertebral body at the level of the C2 fracture. Multifocal narrowing and segmental occlusion of the left vertebral artery concerning for dissection superimposed on atherosclerotic disease.   No high-grade intracranial stenosis or large vessel occlusion  · Neurosurgery following while inpatient and cleared to start DAPT  · Continue aspirin Plavix and statin  · Patient to follow-up with neurosurgery and repeat CT of the head done and reviewed by NeuroSx- seen by today in office   · Recommended to Repeat CT head in 3 months  · Continue DAPT

## 2023-08-14 NOTE — PROGRESS NOTES
[facility]  [address] [address]  [phone]  FACILITY: [facility]  Code 31 (STR)      NAME: Elidia Ojeda  AGE: 80 y.o. SEX: female CODE STATUS: No CPR    DATE OF ENCOUNTER: 08/14/23    Assessment and Plan     1. Vertebral artery dissection (HCC)  Assessment & Plan:  · S/p unwitnessed fall with CT imaging revealing small intimal flap compatible with focal dissection in the V3 segment of the right vertebral body at the level of the C2 fracture. Multifocal narrowing and segmental occlusion of the left vertebral artery concerning for dissection superimposed on atherosclerotic disease. No high-grade intracranial stenosis or large vessel occlusion  · Neurosurgery following while inpatient and cleared to start DAPT  · Continue aspirin Plavix and statin  · Patient to follow-up with neurosurgery and repeat CT of the head done and reviewed by NeuroSx- seen by today in office   · Recommended to Repeat CT head in 3 months  · Continue DAPT      2. Closed nondisplaced fracture of second cervical vertebra, unspecified fracture morphology, sequela  Assessment & Plan:  · CT revealing acute minimally displaced fracture through the base of C2 vertebral body extending into the inferior endplate into both pedicles to the level of the transverse foramen and into the right transverse process  · CTA revealing vertebral artery dissection  · Continue [place] collar        3. Closed fracture of right scapula, unspecified part of scapula, sequela  Assessment & Plan:  · Incidental finding upon CT of chest  · Orthopedics consulted recommended nonweightbearing to the right upper extremity  · Okay for patient to bear weight through the forearm and humerus  · Pain management as ordered  · PT OT  · Outpatient follow-up with orthopedics this week      4.  Closed wedge compression fracture of T3 vertebra with routine healing  Assessment & Plan:  · T3 compression fracture noted on CT image  · Neurosurgery consulted  · Analgesia as needed  · PT/OT  · Fall precautions      5. Closed fracture of multiple ribs of right side, sequela  Assessment & Plan:  · Noted with both acute and subacute nondisplaced fractures of ribs 3 through 6 and 8 through 10  · Encourage use of incentive spirometer  · Patient doing well on room air  · Outpatient follow-up with trauma as an outpatient as needed  · Continue PT OT      6. Closed fracture of shaft of right radius, unspecified fracture morphology, sequela  Assessment & Plan:  · Continue right upper extremity splint  · Pain management as ordered  · Outpatient follow-up with orthopedics this week      7. Acute pain due to trauma  Assessment & Plan:  · Recent unwitnessed fall with multiple serious injuries   · Continue Tylenol RTC  · Oxycodone 2.5 mg Q 4 hrs PRN   · Bowel regimen to prevent constipation       8. Fall, sequela  Assessment & Plan:  · History of recurrent falls  · Resides at [facility]  · Was found on floor at facility after an unwitnessed fall with multiple injuries  · Continue PT/OT  · Fall precautions  · Delirium precautions  · Ensure adequate nutrition/hydration  · Monitor CBC/BMP  ·  following for discharge planning      9. SAH (subarachnoid hemorrhage) ([facility])  Assessment & Plan:  · CTA head with hyperdense material seen in the right ambient cistern compatible with acute subarachnoid hemorrhage  · Serial CT head revealed stability  · OP f/u with NeuroSx today- reviewed CT head 8/10 and found complete resolution of SAH and persistent trace IVH bilaterally  · Neuro recommends to continue DAPT      10.  Moderate vascular dementia with anxiety ([address])  Assessment & Plan:  · AAO X1  · Very hard of hearing  · Able to make her needs known  · Does not have capacity to make her own medical decisions her son Narciso Deluna is POA  Provide redirection, reorientation, distraction techniques  Fall Precautions  Assist with ADLs/IADLs  Avoid deliriogenic medications such as tramadol, benzodiazepines, anticholinergics, benadryl  Encourage Hydration/ Nutrition  Implement sleep hygiene, limit night time interuptions   Encourage participation in group activities when appropriate            All medications and routine orders were reviewed and updated as needed. Chief Complaint     STR follow up visit    Past Medical and Surgica History      Past Medical History:   Diagnosis Date   • Chronic pain disorder    • Hypertension    • Low back pain      History reviewed. No pertinent surgical history. Allergies   Allergen Reactions   • Celecoxib Other (See Comments)          History of Present Illness     Jeremy Yee is a 80 y.o. female admitted to [address] [place] [address] following hospital stay for unwitnessed fall with multiple injuries. Patient has a past medical Hx including but not limited to HTN/HLD, osteoporosis, chronic low back pain, ambulatory dysfunction, constipation, cognitive impairment. Patient is seen in collaboration with nursing for medical mgmt and STR follow up. Patient initially presented to hospital on 7/31/2023 after an unwitnessed fall at her [facility]) with multiple injuries:  Right radial fracture  C2 cervical fracture  Multiple rib fractures  Closed fracture of right scapula  Closed wedge compression fracture of T3 vertebra  Vertebral artery dissection  Subarachnoid hemorrhage    Patient  required treatment in the ICU. She was followed by neurosurgery and trauma services. Patient was started on dual antiplatelet therapy and statins after cleared by neurosurgery. Patient recommended CTA 1 week post discharge. Patient required pain management for multiple fractures which did not require any operative intervention. Patient also had cervical fracture and is to remain in c-collar. Patient recommended discharge to postacute rehab. Seen and examined at nurses station today.  Patient is a poor historian due to cognitive impairment, she is AAO to self only. She is very Hard of hearing. Patient has been receiving Tylenol RTC, staff states she is less restless. She does call out in [place] at times per nursing, otherwise she has been cooperative, no reported falls, she needs to be fed. She continues with therapy services. No bowel or bladder concerns from staff at this time. Patient needs assistance with ADLs/IADLs. The patient's allergies, past medical, surgical, social and family history were reviewed and unchanged. Review of Systems     Review of Systems   Unable to perform ROS: Dementia         Objective     Vitals:   Vitals:    08/14/23 1425   BP: 132/65   Pulse: 72   Resp: 18   Temp: (!) 97.3 °F (36.3 °C)   SpO2: 95%         Physical Exam  Vitals and nursing note reviewed. Constitutional:       General: She is not in acute distress. Appearance: Normal appearance. She is ill-appearing. HENT:      Head: Normocephalic and atraumatic. Nose: No congestion or rhinorrhea. Mouth/Throat:      Mouth: Mucous membranes are moist.   Eyes:      General: No scleral icterus. Conjunctiva/sclera: Conjunctivae normal.      Pupils: Pupils are equal, round, and reactive to light. Neck:      Comments: C-Collar in place  Cardiovascular:      Rate and Rhythm: Normal rate and regular rhythm. Pulses: Normal pulses. Heart sounds: Normal heart sounds. No murmur heard. Pulmonary:      Effort: Pulmonary effort is normal. No respiratory distress. Breath sounds: Normal breath sounds. No wheezing, rhonchi or rales. Abdominal:      General: Bowel sounds are normal. There is no distension. Palpations: Abdomen is soft. There is no mass. Tenderness: There is no abdominal tenderness. Hernia: No hernia is present. Musculoskeletal:         General: No swelling or tenderness. Lymphadenopathy:      Cervical: No cervical adenopathy. Skin:     General: Skin is warm and dry. Coloration: Skin is not pale. Findings: No rash. Comments: Left eyebrow healed  laceration is clean/dry, well approximated   Scattered busing throughout   Right arm in splint  C-collar in place   Neurological:      General: No focal deficit present. Mental Status: She is alert. Mental status is at baseline. She is disoriented. Motor: Weakness present. Gait: Gait abnormal.      Comments: [place] [place]  Very hard of hearing  Does not know her age or where she is could not tell me the date   Psychiatric:         Mood and Affect: Mood normal.         Behavior: Behavior normal.         Pertinent Laboratory/Diagnostic Studies:     Reviewed in facility chart      Current Medications   Medications reviewed and updated see facility [facility] for details.       Current Outpatient Medications:   •  acetaminophen (TYLENOL) 325 mg tablet, Take 975 mg by mouth every 8 (eight) hours, Disp: , Rfl:   •  aspirin 81 mg chewable tablet, Chew 1 tablet (81 mg total) daily, Disp: 30 tablet, Rfl: 0  •  atorvastatin (LIPITOR) 10 mg tablet, Take 10 mg by mouth daily, Disp: , Rfl:   •  B Complex Vitamins (VITAMIN-B COMPLEX PO), Take 1 tablet by mouth, Disp: , Rfl:   •  Betamethasone Sodium Phosphate 0.1 % SOLN, Apply to eye, Disp: , Rfl:   •  Calcium Carb-Cholecalciferol (Calcium 600/Vitamin D3) 600-20 MG-MCG TABS, Take 1 tablet by mouth in the morning, Disp: , Rfl:   •  cholecalciferol (VITAMIN D3) 1,000 units tablet, Take 1,000 Units by mouth daily, Disp: , Rfl:   •  clopidogrel (PLAVIX) 75 mg tablet, Take 1 tablet (75 mg total) by mouth daily, Disp: 30 tablet, Rfl: 0  •  Cyanocobalamin (Vitamin B 12) 100 MCG LOZG, Take by mouth, Disp: , Rfl:   •  docusate sodium (COLACE) 100 mg capsule, Take 100 mg by mouth 2 (two) times a day as needed for constipation, Disp: , Rfl:   •  Emollient (CeraVe Moisturizing) CREA, Apply topically (Patient not taking: Reported on 8/14/2023), Disp: , Rfl:   •  fluocinolone (SYNALAR) 0.01 % cream, Apply topically in the morning Apply to scalpe for 2-4 hours daily. Protect with shower cap and shampoo out. (Patient not taking: Reported on 8/14/2023), Disp: , Rfl:   •  furosemide (LASIX) 20 mg tablet, Take 30 mg by mouth daily 30 mg daily = 1.5tabs, Disp: , Rfl:   •  levETIRAcetam (KEPPRA) 500 mg tablet, Take 1 tablet (500 mg total) by mouth every 12 (twelve) hours for 10 doses, Disp: 10 tablet, Rfl: 0  •  Lidocaine 4 % PTCH, Apply topically, Disp: , Rfl:   •  oxyCODONE (Roxicodone) 5 immediate release tablet, Take 0.5 tablets (2.5 mg total) by mouth every 4 (four) hours as needed for moderate pain or severe pain Max Daily Amount: 15 mg, Disp: , Rfl: 0  •  potassium chloride (Klor-Con) 10 mEq tablet, Take 20 mEq by mouth in the morning, Disp: , Rfl:   •  senna (SENOKOT) 8.6 mg, Take 2 tablets (17.2 mg total) by mouth daily at bedtime, Disp: 10 tablet, Rfl: 0     Please note:  Voice-recognition software may have been used in the preparation of this document. Occasional wrong word or "sound-alike" substitutions may have occurred due to the inherent limitations of voice recognition software. Interpretation should be guided by context.          EDI Parham

## 2023-08-14 NOTE — ASSESSMENT & PLAN NOTE
SAH right ambient cistern s/p unwitnessed fall at nursing facility  · No known h/o AC/AP medications  · Patient presents for 2 week follow up with imaging review    Imaging:  · CTA head and neck w/wo, 8/10/23:  Unchanged trace subacute intraventricular hemorrhage in occipital horns of both lateral ventricles.  Resolved previously noted subarachnoid hemorrhage    Plan:  · Imaging reviewed with patient's son, showing complete resolution of SAH and persistent trace IVH bilaterally  · Continue DAPT  · No further imaging is indicated from a SAH/IVH standpoint

## 2023-08-14 NOTE — ASSESSMENT & PLAN NOTE
· AAO X1  · Very hard of hearing  · Able to make her needs known  · Does not have capacity to make her own medical decisions her son Jessiac Ambriz is POA  Provide redirection, reorientation, distraction techniques  Fall Precautions  Assist with ADLs/IADLs  Avoid deliriogenic medications such as tramadol, benzodiazepines, anticholinergics, benadryl  Encourage Hydration/ Nutrition  Implement sleep hygiene, limit night time interuptions   Encourage participation in group activities when appropriate

## 2023-08-14 NOTE — ASSESSMENT & PLAN NOTE
· CTA head with hyperdense material seen in the right ambient cistern compatible with acute subarachnoid hemorrhage  · Serial CT head revealed stability  · OP f/u with NeuroSx today- reviewed CT head 8/10 and found complete resolution of SAH and persistent trace IVH bilaterally  · Neuro recommends to continue DAPT

## 2023-08-14 NOTE — ASSESSMENT & PLAN NOTE
Left VA dissection with occlusion, right VA dissection with associated type 3 dens fracture s/p unwitnessed fall  · Presents for 1 week follow up with review of CTA  · Started on ASA and plavix  · Maintained in cervical collar  · Currently very lethargic. Not following commands. Difficulty to wake up (possibly received pain medication prior to transport, but per son current exam is stable from hospitalization)      Imaging:  · CTA head and neck w wo contrast 8/10/23: Improved patency of hypoplastic left vertebral artery V1 and proximal V2 segments with new very faint contrast opacification in left V3 segment, which may be due to near occlusive disease due to underlying dissection. Differences in contrast opacification when compared to the prior examination may be due to differences in contrast bolus timing. Previously noted focal dissection flap in right vertebral artery V3 segment is no longer seen    Plan:  · Reviewed case with Dr Loraine Jordan. Plan to continue ASA and Plavix. · Repeat CTA neck w/wo in 3 months.  Follow up afterwards to review imaging and discuss when to d/c DAPT

## 2023-08-14 NOTE — ASSESSMENT & PLAN NOTE
· History of recurrent falls  · Resides at [facility]  · Was found on floor at facility after an unwitnessed fall with multiple injuries  · Continue PT/OT  · Fall precautions  · Delirium precautions  · Ensure adequate nutrition/hydration  · Monitor CBC/BMP  ·  following for discharge planning

## 2023-08-14 NOTE — TELEPHONE ENCOUNTER
RECEIVED IN BASKET FOR AP * DB.   APPOINTMENT HAS BEEN RESCHEDULE FOR 11/13/23 /  9:00  / Drea Frazier -  W/ CTA NECK. [facility] AND SPOKE TO [facility] # [id number]. WENT DIRECTLY TO VOICE MAIL. LEFT DETAILED MESSAGE WITH NEW APPOINTMENT / IMAGING INFORMATION. AND THE THE UPCOMING OFFICE VISIT ON 8/21/23 IS TO BE KEPT. LEFT MY DIRECT LINE REQUESTING CALL BACK TO CONFIRM. RE: follow up  Received: Today  HRAMAN Bright MA  I should have clarified, apt can still be with an AP.           Previous Messages       ----- Message -----   From: Bienvenido Ulloa MA   Sent: 8/14/2023  12:08 PM EDT   To: Alejandro Veloz PA-C   Subject: RE: follow up                                     Yes I will call [facility] and let them know of the change and ov fu w/ oselkin     ----- Message -----   From: Alejandro Veloz PA-C   Sent: 8/14/2023  11:44 AM EDT   To: Bienvenido Ulloa MA   Subject: follow up                                         I discussed this patient's case with Dr Belle Berger     She should return in 3 months with repeat CTA ( I changed the order) but keep follow up next week as scheduled. Can you adjust her appointment? Thanks         8/14/23:   TRANSPORTATION LEFT W/O STOPPING AT CHECK OUT. CALLED [facility] AND SPOKE TO [facility] # [id number]. WENT OVER OV & IMAGING VERBALLY WITH HER AND ALSO FAXED [phone] - SHE WILL GIVE TO [facility]  - THEY WILL SCHEDULE.

## 2023-08-14 NOTE — PATIENT INSTRUCTIONS
Continue ASA and plavix  Continue to wear cervical collar at all times  Ok to work with therapy as tolerated  Follow up as scheduled next week with repeat cervical XR

## 2023-08-14 NOTE — PROGRESS NOTES
Neurosurgery Office Note  Bret Boykin 80 y.o. female MRN: [id number]      Assessment/Plan     Vertebral artery dissection [facility])  Left VA dissection with occlusion, right VA dissection with associated type 3 dens fracture s/p unwitnessed fall  · Presents for 1 week follow up with review of CTA  · Started on ASA and plavix  · Maintained in cervical collar  · Currently very lethargic. Not following commands. Difficulty to wake up (possibly received pain medication prior to transport, but per son current exam is stable from hospitalization)      Imaging:  · CTA head and neck w wo contrast 8/10/23: Improved patency of hypoplastic left vertebral artery V1 and proximal V2 segments with new very faint contrast opacification in left V3 segment, which may be due to near occlusive disease due to underlying dissection. Differences in contrast opacification when compared to the prior examination may be due to differences in contrast bolus timing. Previously noted focal dissection flap in right vertebral artery V3 segment is no longer seen    Plan:  · Reviewed case with Dr Nikita Burton. Plan to continue ASA and Plavix. · Repeat CTA neck w/wo in 3 months. Follow up afterwards to review imaging and discuss when to d/c DAPT      SAH (subarachnoid hemorrhage) (HCC)  SAH right ambient cistern s/p unwitnessed fall at nursing facility  · No known h/o AC/AP medications  · Patient presents for 2 week follow up with imaging review    Imaging:  · CTA head and neck w/wo, 8/10/23:  Unchanged trace subacute intraventricular hemorrhage in occipital horns of both lateral ventricles.  Resolved previously noted subarachnoid hemorrhage    Plan:  · Imaging reviewed with patient's son, showing complete resolution of SAH and persistent trace IVH bilaterally  · Continue DAPT  · No further imaging is indicated from a SAH/IVH standpoint      C2 cervical fracture ([address])  Type 3 dens fracture (hangman's fracture) s/p unwitnessed fall  · Patient maintained in cervical collar  · Presents today for follow up of VA dissection and SAH/IVH, due for follow up for C2 fracture in 1 week             Diagnoses and all orders for this visit:    Vertebral artery dissection ([address])  -     Cancel: CTA head and neck w wo contrast; Future  -     CTA neck w wo contrast; Future    SAH (subarachnoid hemorrhage) ([address])  -     Cancel: CTA head and neck w wo contrast; Future  -     CTA neck w wo contrast; Future    Closed nondisplaced fracture of second cervical vertebra, unspecified fracture morphology, sequela  -     Cancel: CTA head and neck w wo contrast; Future  -     CTA neck w wo contrast; Future    Other orders  -     Lidocaine 4 % PTCH; Apply topically          I have spent a total time of 25 minutes on 08/14/23 in caring for this patient including Diagnostic results, Prognosis, Risks and benefits of tx options, Instructions for management, Patient and family education, Importance of tx compliance, Risk factor reductions, Impressions, Counseling / Coordination of care, Documenting in the medical record, Reviewing / ordering tests, medicine, procedures  , Obtaining or reviewing history   and Communicating with other healthcare professionals . CHIEF COMPLAINT    Chief Complaint   Patient presents with   • Follow-up       HISTORY    This is a 80y.o. year old female with PMH including bilateral carotid artery disease, hypertension, osteoporosis with multiple thoracic and lumbar compression fractures, vascular dementia who presented 7/31/23 after an unwitnessed fall at her nursing facility. Per report, she was found on the floor. Imaging was significant for right ambient cistern SAH, bilateral trace IVH, type 3 dens fracture, T3 fracture, R vertebral artery dissection and L vertebral artery occlusion. Currently, she is on a stretcher and very sleepy. She winces with sternal rubbing.  Per report, she was much more awake prior to transport so possibly received pain medication prior to transport. She is significantly hard of hearing. Her son states that her mental status has not improved since her hospitalization.     She does not take any AC/AP medications at baseline      See Discussion    REVIEW OF SYSTEMS    Review of Systems   Unable to perform ROS: Patient unresponsive       ROS obtained by MA. Reviewed. See HPI.      Meds/Allergies     Current Outpatient Medications   Medication Sig Dispense Refill   • acetaminophen (TYLENOL) 325 mg tablet Take 975 mg by mouth every 8 (eight) hours     • aspirin 81 mg chewable tablet Chew 1 tablet (81 mg total) daily 30 tablet 0   • atorvastatin (LIPITOR) 10 mg tablet Take 10 mg by mouth daily     • B Complex Vitamins (VITAMIN-B COMPLEX PO) Take 1 tablet by mouth     • Calcium Carb-Cholecalciferol (Calcium 600/Vitamin D3) 600-20 MG-MCG TABS Take 1 tablet by mouth in the morning     • cholecalciferol (VITAMIN D3) 1,000 units tablet Take 1,000 Units by mouth daily     • clopidogrel (PLAVIX) 75 mg tablet Take 1 tablet (75 mg total) by mouth daily 30 tablet 0   • Cyanocobalamin (Vitamin B 12) 100 MCG LOZG Take by mouth     • docusate sodium (COLACE) 100 mg capsule Take 100 mg by mouth 2 (two) times a day as needed for constipation     • furosemide (LASIX) 20 mg tablet Take 30 mg by mouth daily 30 mg daily = 1.5tabs     • Lidocaine 4 % PTCH Apply topically     • oxyCODONE (Roxicodone) 5 immediate release tablet Take 0.5 tablets (2.5 mg total) by mouth every 4 (four) hours as needed for moderate pain or severe pain Max Daily Amount: 15 mg  0   • potassium chloride (Klor-Con) 10 mEq tablet Take 20 mEq by mouth in the morning     • senna (SENOKOT) 8.6 mg Take 2 tablets (17.2 mg total) by mouth daily at bedtime 10 tablet 0   • Betamethasone Sodium Phosphate 0.1 % SOLN Apply to eye     • Emollient (CeraVe Moisturizing) CREA Apply topically (Patient not taking: Reported on 8/14/2023)     • fluocinolone (SYNALAR) 0.01 % cream Apply topically in the morning Apply to scalpe for 2-4 hours daily. Protect with shower cap and shampoo out. (Patient not taking: Reported on 8/14/2023)     • levETIRAcetam (KEPPRA) 500 mg tablet Take 1 tablet (500 mg total) by mouth every 12 (twelve) hours for 10 doses 10 tablet 0     No current facility-administered medications for this visit. Allergies   Allergen Reactions   • Celecoxib Other (See Comments)       PAST HISTORY    Past Medical History:   Diagnosis Date   • Chronic pain disorder    • Hypertension    • Low back pain        History reviewed. No pertinent surgical history. Social History     Tobacco Use   • Smoking status: Never   • Smokeless tobacco: Never   Vaping Use   • Vaping Use: Never used   Substance Use Topics   • Alcohol use: Never   • Drug use: Never       History reviewed. No pertinent family history. Above history personally reviewed. EXAM    Vitals:Blood pressure 124/74, pulse 95, temperature (!) 97.3 °F (36.3 °C), temperature source Temporal, SpO2 96 %. ,There is no height or weight on file to calculate BMI. Physical Exam  Vitals and nursing note reviewed. Constitutional:       Appearance: Normal appearance. She is well-developed and normal weight. HENT:      Head: Normocephalic and atraumatic. Neck:      Comments: Cervical collar in place  Cardiovascular:      Rate and Rhythm: Normal rate. Pulmonary:      Effort: Pulmonary effort is normal. No respiratory distress. Abdominal:      Palpations: Abdomen is soft. Musculoskeletal:         General: Normal range of motion. Skin:     General: Skin is warm and dry.    Neurological:      Comments: Unable to arouse with sternal rubbing  Snoring  Cervical collar in place  Does not follow commands         Neurologic Exam      MEDICAL DECISION MAKING    Imaging Studies:     CTA head and neck w wo contrast    Result Date: 8/10/2023  Narrative: CTA NECK AND BRAIN WITH AND WITHOUT CONTRAST INDICATION: I60.9: Nontraumatic subarachnoid hemorrhage, unspecified I77.74: Dissection of vertebral artery COMPARISON:   CT head without contrast 7/31/2023. CTA head and neck with and without contrast 7/31/2023. CT head, CT cervical spine, CT facial bones without contrast 7/31/2023. CT head and CT cervical spine 11/1/2022, 8/22/2022. TECHNIQUE:  Routine CT imaging of the Brain without contrast.  Post contrast imaging was performed after administration of iodinated contrast through the neck and brain. Post contrast axial 0.625 mm images timed to opacify the arterial system. 3D rendering was performed on an independent workstation. MIP reconstructions performed. Coronal reconstructions were performed of the noncontrast portion of the brain. Radiation dose length product (DLP) for this visit:  1617.01 mGy-cm . This examination, like all CT scans performed in the [facility], was performed utilizing techniques to minimize radiation dose exposure, including the use of iterative reconstruction and automated exposure control. IV Contrast:  65 mL of iohexol (OMNIPAQUE) IMAGE QUALITY:   Diagnostic FINDINGS: NONCONTRAST BRAIN PARENCHYMA: Decreased attenuation is noted in periventricular and subcortical white matter demonstrating an appearance that is statistically most likely to represent moderate microangiopathic change. No CT signs of acute infarction. No intracranial mass, mass effect or midline shift. No acute parenchymal hemorrhage. VENTRICLES AND EXTRA-AXIAL SPACES: Stable mild ventriculomegaly. Unchanged trace subacute intraventricular hemorrhage in occipital horns of both lateral ventricles. No acute extra-axial hemorrhage, resolved previously noted subarachnoid hemorrhage. VISUALIZED ORBITS: Sequela of bilateral cataract surgery. PARANASAL SINUSES: Normal visualized paranasal sinuses. CERVICAL VASCULATURE AORTIC ARCH AND GREAT VESSELS:  Mild atherosclerotic disease of the arch, proximal great vessels and visualized subclavian vessels.   No significant stenosis. RIGHT VERTEBRAL ARTERY CERVICAL SEGMENT:  Normal origin. The vessel is normal and dominant in caliber throughout the neck. Previously noted focal dissection flap in right vertebral artery V3 segment is no longer seen. LEFT VERTEBRAL ARTERY CERVICAL SEGMENT:  Moderate stenosis at the origin. Improved patency of left vertebral artery V1 and proximal V2 segment with very faint contrast opacification in left V3 segment, which may be due to near occlusive disease due to underlying dissection. Background mild-to-moderate multifocal stenosis likely due to atherosclerotic disease. RIGHT EXTRACRANIAL CAROTID SEGMENT:  Mild atherosclerotic disease of the distal common carotid artery and proximal cervical internal carotid artery without significant stenosis compared to the more distal ICA. Less than 50% stenosis. No dissection. LEFT EXTRACRANIAL CAROTID SEGMENT: Mild atherosclerotic disease of the distal common carotid artery and proximal cervical internal carotid artery without significant stenosis compared to the more distal ICA. Less than 50% stenosis. No dissection. NASCET criteria was used to determine the degree of internal carotid artery diameter stenosis. INTRACRANIAL VASCULATURE INTERNAL CAROTID ARTERIES:  Normal enhancement of the intracranial portions of the internal carotid arteries with mild-to-moderate calcified atherosclerotic disease bilaterally. Normal ophthalmic artery origins. Normal ICA terminus. ANTERIOR CIRCULATION:  Symmetric A1 segments and anterior cerebral arteries with normal enhancement. Normal anterior communicating artery. MIDDLE CEREBRAL ARTERY CIRCULATION:  M1 segment and middle cerebral artery branches demonstrate normal enhancement bilaterally. Unchanged moderate stenosis in left MCA distal M1 segment ([id number]). DISTAL VERTEBRAL ARTERIES: Patent distal vertebral arteries.  Moderate tandem stenosis in left vertebral artery V4 segment due to atherosclerotic disease which is hypoplastic in post PICA segment. Mild stenosis in right vertebral artery V4 segment due to calcified atherosclerotic disease. Posterior inferior cerebellar artery origins are normal. Normal vertebral basilar junction. BASILAR ARTERY:  Basilar artery is normal in caliber. Normal superior cerebellar arteries. POSTERIOR CEREBRAL ARTERIES: Both posterior cerebral arteries arises from the basilar tip. Both arteries demonstrate normal enhancement. Normal posterior communicating arteries. VENOUS STRUCTURES:  Normal. NON VASCULAR ANATOMY BONY STRUCTURES:  No acute osseous abnormality. Healing subacute C2 fracture which involves the posterior inferior endplate of C2 body and extends to the bilateral C2 transverse foramen. Unchanged grade 1 retrolisthesis C3-C4. Moderate multilevel degenerative changes cervical spine. SOFT TISSUES OF THE NECK: Stable multinodular thyroid gland. Unchanged 3.7 cm heterogeneously enhancing exophytic nodule in posterior right thyroid lobe with right retrosternal extension (307:88). THORACIC INLET:  Normal.     Impression: No acute intracranial abnormality. Unchanged trace subacute intraventricular hemorrhage in occipital horns of both lateral ventricles. Resolved previously noted subarachnoid hemorrhage. Improved patency of hypoplastic left vertebral artery V1 and proximal V2 segments with new very faint contrast opacification in left V3 segment, which may be due to near occlusive disease due to underlying dissection. Differences in contrast opacification when compared to the prior examination may be due to differences in contrast bolus timing. Previously noted focal dissection flap in right vertebral artery V3 segment is no longer seen. Healing subacute C2 fracture which involves the posterior inferior endplate of C2 body and extends to bilateral C2 transverse foramen. Additional chronic/incidental findings as detailed above. The study was marked in EPIC for significant notification. Workstation performed: [id number]     I have personally reviewed pertinent reports.    and I have personally reviewed pertinent films in PACS

## 2023-08-14 NOTE — ASSESSMENT & PLAN NOTE
Type 3 dens fracture (hangman's fracture) s/p unwitnessed fall  · Patient maintained in cervical collar  · Presents today for follow up of VA dissection and SAH/IVH, due for follow up for C2 fracture in 1 week

## 2023-08-17 ENCOUNTER — HOSPITAL ENCOUNTER (OUTPATIENT)
Dept: RADIOLOGY | Facility: HOSPITAL | Age: 88
Discharge: HOME | End: 2023-08-17
Payer: MEDICARE

## 2023-08-17 DIAGNOSIS — S12.100A C2 CERVICAL FRACTURE (HCC): ICD-10-CM

## 2023-08-17 PROCEDURE — 72040 X-RAY EXAM NECK SPINE 2-3 VW: CPT

## 2023-08-21 ENCOUNTER — TELEPHONE (OUTPATIENT)
Dept: NEUROSURGERY | Facility: CLINIC | Age: 88
End: 2023-08-21

## 2023-08-21 ENCOUNTER — OFFICE VISIT (OUTPATIENT)
Dept: NEUROSURGERY | Facility: CLINIC | Age: 88
End: 2023-08-21
Payer: MEDICARE

## 2023-08-21 VITALS
OXYGEN SATURATION: 98 % | DIASTOLIC BLOOD PRESSURE: 78 MMHG | TEMPERATURE: 98 F | HEART RATE: 88 BPM | SYSTOLIC BLOOD PRESSURE: 134 MMHG | RESPIRATION RATE: 16 BRPM

## 2023-08-21 DIAGNOSIS — I77.74 VERTEBRAL ARTERY DISSECTION (HCC): ICD-10-CM

## 2023-08-21 DIAGNOSIS — S12.191D OTHER CLOSED NONDISPLACED FRACTURE OF SECOND CERVICAL VERTEBRA WITH ROUTINE HEALING, SUBSEQUENT ENCOUNTER: Primary | ICD-10-CM

## 2023-08-21 PROCEDURE — 99215 OFFICE O/P EST HI 40 MIN: CPT | Performed by: PHYSICIAN ASSISTANT

## 2023-08-21 NOTE — ASSESSMENT & PLAN NOTE
Patient presents today for 3 week follow-up of Type 3 dens fracture  • S/p unwitnessed fall 7/31/2023  • Also noted to have Left VA occlusion and right VA dissection. Currently on ASA 81mg and Plavix 75mg daily    Imaging:   • Cervical XR 8/17/23: Similar appearance of a C2 vertebral body fracture compared to prior radiograph, better evaluated on prior CT.   o On personally review, stable alignment. No signficant overhang noted at C1/2 junction. C1/2 space slightly more on left. Plan:   • Continue to monitor for any neurological symptoms  • XR reviewed in detail with patient and son  • Pain control with OTC medications as needed  • Maintain cervical collar at all times except for changing to alena collar for showering. o Presented with collar poorly fitted and pads heavily soiled. New pads provided. o Old pads returned and instructed to hand clean. • Discussed with son anticipated 12 weeks with collar. • No surgical intervention anticipated at this juncture.    • Plan outpatient follow-up in 1 month with repeat upright XR

## 2023-08-21 NOTE — PROGRESS NOTES
Neurosurgery Office Note  J Carlos Garrett 80 y.o. female MRN: [id number]      Assessment/Plan     C2 cervical fracture [facility])  Patient presents today for 3 week follow-up of Type 3 dens fracture  • S/p unwitnessed fall 7/31/2023  • Also noted to have Left VA occlusion and right VA dissection. Currently on ASA 81mg and Plavix 75mg daily    Imaging:   • Cervical XR 8/17/23: Similar appearance of a C2 vertebral body fracture compared to prior radiograph, better evaluated on prior CT.   o On personally review, stable alignment. No signficant overhang noted at C1/2 junction. C1/2 space slightly more on left. Plan:   • Continue to monitor for any neurological symptoms  • XR reviewed in detail with patient and son  • Pain control with OTC medications as needed  • Maintain cervical collar at all times except for changing to alena collar for showering. o Presented with collar poorly fitted and pads heavily soiled. New pads provided. o Old pads returned and instructed to hand clean. • Discussed with son anticipated 12 weeks with collar. • No surgical intervention anticipated at this juncture. 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 Differences in contrast opacification when compared to the prior examination may be due to differences in contrast bolus timing.  Previously noted focal dissection flap in right vertebral artery V3 segment is no longer seen    Plan:  · As per documented review with neuroendovascular team, continue ASA and Plavix. · Repeat CTA neck w/wo in 3 months and follow up afterwards to review imaging and discuss when to d/c DAPT(appt scheduled for 11/13/23). Diagnoses and all orders for this visit:    Other closed nondisplaced fracture of second cervical vertebra with routine healing, subsequent encounter  -     XR spine cervical 2 or 3 vw injury; Future    Vertebral artery dissection ([address])          I have spent a total time of 40 minutes on 08/21/23 in caring for this patient including Diagnostic results, Instructions for management, Patient and family education, Impressions and Counseling / Coordination of care. CHIEF COMPLAINT    Chief Complaint   Patient presents with   • Follow-up     2 WEEK HSP FOLLOW UP WITH C SPINE X RAY DONE 8/17       HISTORY    History of Present Illness     80y.o. year old female     Marley Bueno is a 81yo female with a PMH of osteoporosis with a hx of thoracic and vertebral fractures and hx of recurrent falls who presents to the office with her son s/p a fall on 07/31/2023 which resulted in a C2 fracture (type III) with evidence of left VA occlusion and right VA dissection. She has been treated conservatively for C2 fracture in a collar which she has been wearing regularly and reports that her pain has improved. She is currently on aspirin 81mg and Plavix 75mg for vertebral occlusion/dissection. Her son states she is significantly improved coginitively compared to 1 week ago. She has started PT at her nursing home which her son states has been going well. Today, she denies neck or radicular pain, headache, dizziness, weakness, numbness or tingling. REVIEW OF SYSTEMS    Review of Systems   Unable to perform ROS: Patient unresponsive   HENT: Positive for hearing loss (hard of hearing ). Musculoskeletal: Positive for gait problem (patient in wheelchair).         Wearing cervical collar    Not complaining of any pain today    Patient doing physical therapy at facility    All other systems reviewed and are negative. ROS obtained by MA. Reviewed. See HPI. Meds/Allergies     Current Outpatient Medications   Medication Sig Dispense Refill   • acetaminophen (TYLENOL) 325 mg tablet Take 975 mg by mouth every 8 (eight) hours     • aspirin 81 mg chewable tablet Chew 1 tablet (81 mg total) daily 30 tablet 0   • atorvastatin (LIPITOR) 10 mg tablet Take 10 mg by mouth daily     • B Complex Vitamins (VITAMIN-B COMPLEX PO) Take 1 tablet by mouth     • Calcium Carb-Cholecalciferol (Calcium 600/Vitamin D3) 600-20 MG-MCG TABS Take 1 tablet by mouth in the morning     • cholecalciferol (VITAMIN D3) 1,000 units tablet Take 1,000 Units by mouth daily     • clopidogrel (PLAVIX) 75 mg tablet Take 1 tablet (75 mg total) by mouth daily 30 tablet 0   • Cyanocobalamin (Vitamin B 12) 100 MCG LOZG Take by mouth     • docusate sodium (COLACE) 100 mg capsule Take 100 mg by mouth 2 (two) times a day as needed for constipation     • furosemide (LASIX) 20 mg tablet Take 30 mg by mouth daily 30 mg daily = 1.5tabs     • oxyCODONE (Roxicodone) 5 immediate release tablet Take 0.5 tablets (2.5 mg total) by mouth every 4 (four) hours as needed for moderate pain or severe pain Max Daily Amount: 15 mg  0   • potassium chloride (Klor-Con) 10 mEq tablet Take 20 mEq by mouth in the morning     • senna (SENOKOT) 8.6 mg Take 2 tablets (17.2 mg total) by mouth daily at bedtime 10 tablet 0   • Betamethasone Sodium Phosphate 0.1 % SOLN Apply to eye (Patient not taking: Reported on 8/21/2023)     • Emollient (CeraVe Moisturizing) CREA Apply topically (Patient not taking: Reported on 8/14/2023)     • fluocinolone (SYNALAR) 0.01 % cream Apply topically in the morning Apply to scalpe for 2-4 hours daily. Protect with shower cap and shampoo out.  (Patient not taking: Reported on 8/14/2023)     • levETIRAcetam (KEPPRA) 500 mg tablet Take 1 tablet (500 mg total) by mouth every 12 (twelve) hours for 10 doses 10 tablet 0   • Lidocaine 4 % PTCH Apply topically (Patient not taking: Reported on 8/21/2023)       No current facility-administered medications for this visit. Allergies   Allergen Reactions   • Celecoxib Other (See Comments)       PAST HISTORY    Past Medical History:   Diagnosis Date   • Chronic pain disorder    • Hypertension    • Low back pain        No past surgical history on file. Social History     Tobacco Use   • Smoking status: Never   • Smokeless tobacco: Never   Vaping Use   • Vaping Use: Never used   Substance Use Topics   • Alcohol use: Never   • Drug use: Never       No family history on file. Above history personally reviewed. EXAM    Vitals:Blood pressure 134/78, pulse 88, temperature 98 °F (36.7 °C), temperature source Temporal, resp. rate 16, SpO2 98 %. ,There is no height or weight on file to calculate BMI. Physical Exam  Constitutional:       Appearance: Normal appearance. She is not ill-appearing. HENT:      Head: Normocephalic and atraumatic. Right Ear: External ear normal.      Left Ear: External ear normal.      Nose: Nose normal.   Eyes:      General: No scleral icterus. Right eye: No discharge. Left eye: No discharge. Extraocular Movements: Extraocular movements intact. Conjunctiva/sclera: Conjunctivae normal.      Pupils: Pupils are equal, round, and reactive to light. Cardiovascular:      Rate and Rhythm: Normal rate. Pulmonary:      Effort: Pulmonary effort is normal. No respiratory distress. Skin:     General: Skin is warm and dry. Neurological:      Mental Status: She is alert. Psychiatric:         Mood and Affect: Mood normal.         Behavior: Behavior normal.         Neurologic Exam     Cranial Nerves     CN III, IV, VI   Pupils are equal, round, and reactive to light.         MEDICAL DECISION MAKING    Imaging Studies:     XR spine cervical 2 or 3 vw injury  Result Date: 8/21/2023  Impression: Similar appearance of a C2 vertebral body fracture compared to prior radiograph, better evaluated on prior CT. Workstation performed: [id number]     CTA head and neck w wo contrast  Result Date: 8/10/2023  Impression: No acute intracranial abnormality. Unchanged trace subacute intraventricular hemorrhage in occipital horns of both lateral ventricles. Resolved previously noted subarachnoid hemorrhage. Improved patency of hypoplastic left vertebral artery V1 and proximal V2 segments with new very faint contrast opacification in left V3 segment, which may be due to near occlusive disease due to underlying dissection. Differences in contrast opacification when compared to the prior examination may be due to differences in contrast bolus timing. Previously noted focal dissection flap in right vertebral artery V3 segment is no longer seen. Healing subacute C2 fracture which involves the posterior inferior endplate of C2 body and extends to bilateral C2 transverse foramen. Additional chronic/incidental findings as detailed above. The study was marked in EPIC for significant notification. Workstation performed: [id number]     XR spine cervical 2 or 3 vw injury  Result Date: 8/1/2023  Impression: Preserved alignment Fracture of the C2 vertebral body better seen on the CT Workstation performed: [id number]       TRAUMA - CT spine cervical wo contrast  Addendum Date: 7/31/2023 Addendum:   ADDENDUM: Correction, mild loss of vertebral body height along the superior endplate of the T3 vertebral body appears to have slightly progressed from the CT of the chest from 7/5/2023. There is retropulsion into the spinal canal by approximately 4 mm. Result Date: 7/31/2023  Impression: Acute, minimally displaced fracture through the base of the C2 vertebral body, extending to the inferior endplate, into both pedicles to the level of the transverse foramina, and into the right transverse process. No additional acute fractures.  Intact odontoid process. No evidence of traumatic malalignment. Please see separate report for the CT angiogram of the head and neck performed concurrently. Additional findings as above. I personally discussed this study with Dr. Juan Francisco Valadez on 7/31/2023 at 11:44 AM. Workstation performed: [id number]       CTA head and neck w wo contrast  Result Date: 7/31/2023  Impression: Small intimal flap compatible with focal dissection in the V3 segment of the right vertebral body at the level of the C2 fracture. Multifocal narrowing and segmental occlusion of the left vertebral artery concerning for dissection superimposed on atherosclerotic disease. No high-grade intracranial stenosis or large vessel occlusion. Acute C2 fracture. New mild T3 compression fracture with mild bony retropulsion. Please refer to concurrent CTs of the cervical spine and chest. I personally discussed this study with Meme Jones on 7/31/2023 11:25 a.m. AM. Workstation performed: [id number]         I have personally reviewed pertinent reports.    and I have personally reviewed pertinent films in PACS

## 2023-08-21 NOTE — PATIENT INSTRUCTIONS
Patient seen in follow-up for Type 3 odontoid fracture    XRays show stable alignment    Plan:   Continue to monitor for any neurological complaints  Continue collar at all times except for showering change to alena collar  Change collar pads every other day. Pads to be washed by hand and lay flat to dry. Continue therapy as scheduled. Plan for follow-up in 4 weeks with repeat cervical spine XRays. Continue ASA 81mg and Plavix 75mg daily fro vertebral artery injury and occlusion. Neurosurgery discharge instructions following neck fracture:     VISTA collar at all times except for showering change to alena (peach) collar. No bending, twisting or heavy lifting. No pushing or pulling over 10lbs. No strenuous activities. **Please notify MD immediately if you have increased neck or arm pain. New numbness and/or weakness in your arm. Difficulty swallowing or breathing especially while lying down. Numbness or weakness in arms or legs.  Increased difficulty walking **

## 2023-08-21 NOTE — ASSESSMENT & PLAN NOTE
Left VA dissection with occlusion, right VA dissection with associated type 3 dens fracture s/p unwitnessed fall  · continues on ASA 81mg and Plavix 75mg daily  · Maintained in cervical collar    Imaging:  · CTA head and neck w wo contrast 8/10/23: Improved patency of hypoplastic left vertebral artery V1 and proximal V2 segments with new very faint contrast opacification in left V3 segment, which may be due to near occlusive disease due to underlying dissection. Differences in contrast opacification when compared to the prior examination may be due to differences in contrast bolus timing. Previously noted focal dissection flap in right vertebral artery V3 segment is no longer seen    Plan:  · As per documented review with neuroendovascular team, continue ASA and Plavix. · Repeat CTA neck w/wo in 3 months and follow up afterwards to review imaging and discuss when to d/c DAPT(appt scheduled for 11/13/23).

## 2023-08-23 ENCOUNTER — NURSING HOME VISIT (OUTPATIENT)
Dept: GERIATRICS | Facility: OTHER | Age: 88
End: 2023-08-23
Payer: MEDICARE

## 2023-08-23 DIAGNOSIS — S22.41XS CLOSED FRACTURE OF MULTIPLE RIBS OF RIGHT SIDE, SEQUELA: ICD-10-CM

## 2023-08-23 DIAGNOSIS — G89.11 ACUTE PAIN DUE TO TRAUMA: ICD-10-CM

## 2023-08-23 DIAGNOSIS — I77.74 VERTEBRAL ARTERY DISSECTION (HCC): ICD-10-CM

## 2023-08-23 DIAGNOSIS — S52.301S: ICD-10-CM

## 2023-08-23 DIAGNOSIS — S12.191D OTHER CLOSED NONDISPLACED FRACTURE OF SECOND CERVICAL VERTEBRA WITH ROUTINE HEALING, SUBSEQUENT ENCOUNTER: ICD-10-CM

## 2023-08-23 DIAGNOSIS — I60.9 SAH (SUBARACHNOID HEMORRHAGE) (HCC): ICD-10-CM

## 2023-08-23 DIAGNOSIS — F01.B4 MODERATE VASCULAR DEMENTIA WITH ANXIETY (HCC): ICD-10-CM

## 2023-08-23 DIAGNOSIS — K59.01 SLOW TRANSIT CONSTIPATION: Primary | ICD-10-CM

## 2023-08-23 PROCEDURE — 99309 SBSQ NF CARE MODERATE MDM 30: CPT | Performed by: NURSE PRACTITIONER

## 2023-08-23 NOTE — PROGRESS NOTES
[facility]  [address] [address]  [phone]  FACILITY: [facility]  Code 31 (STR)      NAME: Wilber Venegas  AGE: 80 y.o. SEX: female CODE STATUS: No CPR    DATE OF ENCOUNTER: 08/23/23    Assessment and Plan     1. Slow transit constipation  Assessment & Plan:  · Having regular BM's per review of SNF records   · Continue bowel regimen      2. Vertebral artery dissection [facility])  Assessment & Plan:  · S/p unwitnessed fall with CT imaging revealing Vertebral artery dissection   · Neurosurgery following while inpatient and cleared to start DAPT  · Continue Aspirin Plavix and statin  · Patient to follow-up with neurosurgery and repeat CT of the head  in 3 months (November 2023)  · Continue DAPT      3. Moderate vascular dementia with anxiety ([address] [facility])  Assessment & Plan:  · AAO X1  · Very hard of hearing  · Able to make her needs known  · Does not have capacity to make her own medical decisions her son Herman Castillo is POA  Provide redirection, reorientation, distraction techniques  Fall Precautions  Assist with ADLs/IADLs  Avoid deliriogenic medications such as tramadol, benzodiazepines, anticholinergics, benadryl  Encourage Hydration/ Nutrition  Implement sleep hygiene, limit night time interuptions   Encourage participation in group activities when appropriate         4. SAH (subarachnoid hemorrhage) (HCC)  Assessment & Plan:  · CTA head with hyperdense material seen in the right ambient cistern compatible with acute subarachnoid hemorrhage  · Serial CT head revealed stability  · Reviewed CT head 8/10 and found complete resolution of SAH and persistent trace IVH bilaterally  · Neuro recommends to continue DAPT      5.  Other closed nondisplaced fracture of second cervical vertebra with routine healing, subsequent encounter  Assessment & Plan:  · CT revealing acute minimally displaced fracture through the base of C2 vertebral body extending into the inferior endplate into both pedicles to the level of the transverse foramen and into the right transverse process  · CTA revealing vertebral artery dissection  · Continue [place] collar        6. Closed fracture of multiple ribs of right side, sequela  Assessment & Plan:  · Noted with both acute and subacute nondisplaced fractures of ribs 3 through 6 and 8 through 10  · Encourage use of incentive spirometer  · Patient doing well on room air  · Outpatient follow-up with trauma as an outpatient as needed  · Continue PT OT      7. Closed fracture of shaft of right radius, unspecified fracture morphology, sequela  Assessment & Plan:  · Continue right upper extremity splint  · Pain management as ordered  · Outpatient follow-up with orthopedics this week      8. Acute pain due to trauma  Assessment & Plan:  · Recent unwitnessed fall with multiple serious injuries   · Continue Tylenol RTC  · Oxycodone 2.5 mg Q 4 hrs PRN   · Bowel regimen to prevent constipation          All medications and routine orders were reviewed and updated as needed. Chief Complaint     STR follow up visit    Past Medical and Surgica History      Past Medical History:   Diagnosis Date   • Chronic pain disorder    • Hypertension    • Low back pain      No past surgical history on file. Allergies   Allergen Reactions   • Celecoxib Other (See Comments)          History of Present Illness     Myra Stewart is a 80 y.o. female admitted to [address] [place] [address] following hospital stay for unwitnessed fall with multiple injuries. Patient has a past medical Hx including but not limited to HTN/HLD, osteoporosis, chronic low back pain, ambulatory dysfunction, constipation, cognitive impairment. Patient is seen in collaboration with nursing for medical mgmt and STR follow up.      Patient initially presented to hospital on 7/31/2023 after an unwitnessed fall at her [facility]) with multiple injuries:  Right radial fracture  C2 cervical fracture  Multiple rib fractures  Closed fracture of right scapula  Closed wedge compression fracture of T3 vertebra  Vertebral artery dissection  Subarachnoid hemorrhage    Patient  required treatment in the ICU. She was followed by neurosurgery and trauma services. Patient was started on dual antiplatelet therapy and statins after cleared by neurosurgery. Patient recommended CTA 1 week post discharge. Patient required pain management for multiple fractures which did not require any operative intervention. Patient also had cervical fracture and is to remain in c-collar. Patient recommended discharge to postacute rehab. Seen and examined in the dining room today. Patient does not appear in any distress. Patient is a poor historian due to cognitive impairment, she is AAO to self only. She is very Hard of hearing. Patient states she is feeling "good". She denies pain on exam. She is actively participating in Charge Payment. Staff have no concerns at this time. The patient's allergies, past medical, surgical, social and family history were reviewed and unchanged. Review of Systems     Review of Systems   Unable to perform ROS: Dementia         Objective     Vitals:   Vitals:    08/23/23 1623   BP: 118/67   Pulse: 86   Resp: 18   Temp: 97.8 °F (36.6 °C)   SpO2: 97%         Physical Exam  Vitals and nursing note reviewed. Constitutional:       General: She is not in acute distress. Appearance: Normal appearance. She is not ill-appearing. HENT:      Head: Normocephalic and atraumatic. Nose: No congestion or rhinorrhea. Mouth/Throat:      Mouth: Mucous membranes are moist.   Eyes:      General: No scleral icterus. Conjunctiva/sclera: Conjunctivae normal.      Pupils: Pupils are equal, round, and reactive to light. Neck:      Comments: C-Collar in place  Cardiovascular:      Rate and Rhythm: Normal rate and regular rhythm. Pulses: Normal pulses. Heart sounds: Normal heart sounds. No murmur heard.   Pulmonary:      Effort: Pulmonary effort is normal. No respiratory distress. Breath sounds: Normal breath sounds. No wheezing, rhonchi or rales. Abdominal:      General: Bowel sounds are normal. There is no distension. Palpations: Abdomen is soft. There is no mass. Tenderness: There is no abdominal tenderness. Hernia: No hernia is present. Musculoskeletal:         General: No swelling or tenderness. Lymphadenopathy:      Cervical: No cervical adenopathy. Skin:     General: Skin is warm and dry. Coloration: Skin is not pale. Findings: No rash. Comments: Left eyebrow healed  laceration is clean/dry, well approximated   Scattered busing throughout   Right arm in splint  C-collar in place   Neurological:      General: No focal deficit present. Mental Status: She is alert. Mental status is at baseline. She is disoriented. Motor: Weakness present. Gait: Gait abnormal.      Comments: [place]  Very hard of hearing  Does not know her age or where she is could not tell me the date   Psychiatric:         Mood and Affect: Mood normal.         Behavior: Behavior normal.         Pertinent Laboratory/Diagnostic Studies:     Reviewed in facility chart      Current Medications   Medications reviewed and updated see facility [facility] for details.       Current Outpatient Medications:   •  acetaminophen (TYLENOL) 325 mg tablet, Take 975 mg by mouth every 8 (eight) hours, Disp: , Rfl:   •  aspirin 81 mg chewable tablet, Chew 1 tablet (81 mg total) daily, Disp: 30 tablet, Rfl: 0  •  atorvastatin (LIPITOR) 10 mg tablet, Take 10 mg by mouth daily, Disp: , Rfl:   •  B Complex Vitamins (VITAMIN-B COMPLEX PO), Take 1 tablet by mouth, Disp: , Rfl:   •  Betamethasone Sodium Phosphate 0.1 % SOLN, Apply to eye (Patient not taking: Reported on 8/21/2023), Disp: , Rfl:   •  Calcium Carb-Cholecalciferol (Calcium 600/Vitamin D3) 600-20 MG-MCG TABS, Take 1 tablet by mouth in the morning, Disp: , Rfl:   •  cholecalciferol (VITAMIN D3) 1,000 units tablet, Take 1,000 Units by mouth daily, Disp: , Rfl:   •  clopidogrel (PLAVIX) 75 mg tablet, Take 1 tablet (75 mg total) by mouth daily, Disp: 30 tablet, Rfl: 0  •  Cyanocobalamin (Vitamin B 12) 100 MCG LOZG, Take by mouth, Disp: , Rfl:   •  docusate sodium (COLACE) 100 mg capsule, Take 100 mg by mouth 2 (two) times a day as needed for constipation, Disp: , Rfl:   •  Emollient (CeraVe Moisturizing) CREA, Apply topically (Patient not taking: Reported on 8/14/2023), Disp: , Rfl:   •  fluocinolone (SYNALAR) 0.01 % cream, Apply topically in the morning Apply to scalpe for 2-4 hours daily. Protect with shower cap and shampoo out. (Patient not taking: Reported on 8/14/2023), Disp: , Rfl:   •  furosemide (LASIX) 20 mg tablet, Take 30 mg by mouth daily 30 mg daily = 1.5tabs, Disp: , Rfl:   •  levETIRAcetam (KEPPRA) 500 mg tablet, Take 1 tablet (500 mg total) by mouth every 12 (twelve) hours for 10 doses, Disp: 10 tablet, Rfl: 0  •  Lidocaine 4 % PTCH, Apply topically (Patient not taking: Reported on 8/21/2023), Disp: , Rfl:   •  oxyCODONE (Roxicodone) 5 immediate release tablet, Take 0.5 tablets (2.5 mg total) by mouth every 4 (four) hours as needed for moderate pain or severe pain Max Daily Amount: 15 mg, Disp: , Rfl: 0  •  potassium chloride (Klor-Con) 10 mEq tablet, Take 20 mEq by mouth in the morning, Disp: , Rfl:   •  senna (SENOKOT) 8.6 mg, Take 2 tablets (17.2 mg total) by mouth daily at bedtime, Disp: 10 tablet, Rfl: 0     Please note:  Voice-recognition software may have been used in the preparation of this document. Occasional wrong word or "sound-alike" substitutions may have occurred due to the inherent limitations of voice recognition software. Interpretation should be guided by context.          EDI Zamora

## 2023-08-27 VITALS
DIASTOLIC BLOOD PRESSURE: 67 MMHG | RESPIRATION RATE: 18 BRPM | BODY MASS INDEX: 23 KG/M2 | TEMPERATURE: 97.8 F | HEART RATE: 86 BPM | OXYGEN SATURATION: 97 % | SYSTOLIC BLOOD PRESSURE: 118 MMHG | WEIGHT: 138.2 LBS

## 2023-08-27 NOTE — ASSESSMENT & PLAN NOTE
· CTA head with hyperdense material seen in the right ambient cistern compatible with acute subarachnoid hemorrhage  · Serial CT head revealed stability  · Reviewed CT head 8/10 and found complete resolution of SAH and persistent trace IVH bilaterally  · Neuro recommends to continue DAPT

## 2023-08-27 NOTE — ASSESSMENT & PLAN NOTE
· S/p unwitnessed fall with CT imaging revealing Vertebral artery dissection   · Neurosurgery following while inpatient and cleared to start DAPT  · Continue Aspirin Plavix and statin  · Patient to follow-up with neurosurgery and repeat CT of the head  in 3 months (November 2023)  · Continue DAPT

## 2023-08-27 NOTE — ASSESSMENT & PLAN NOTE
· CT revealing acute minimally displaced fracture through the base of C2 vertebral body extending into the inferior endplate into both pedicles to the level of the transverse foramen and into the right transverse process  · CTA revealing vertebral artery dissection  · Continue [place] collar

## 2023-08-27 NOTE — ASSESSMENT & PLAN NOTE
· AAO X1  · Very hard of hearing  · Able to make her needs known  · Does not have capacity to make her own medical decisions her son Jessica Ambriz is POA  Provide redirection, reorientation, distraction techniques  Fall Precautions  Assist with ADLs/IADLs  Avoid deliriogenic medications such as tramadol, benzodiazepines, anticholinergics, benadryl  Encourage Hydration/ Nutrition  Implement sleep hygiene, limit night time interuptions   Encourage participation in group activities when appropriate

## 2023-08-29 ENCOUNTER — NURSING HOME VISIT (OUTPATIENT)
Dept: GERIATRICS | Facility: OTHER | Age: 88
End: 2023-08-29
Payer: MEDICARE

## 2023-08-29 VITALS
TEMPERATURE: 97.8 F | HEART RATE: 86 BPM | RESPIRATION RATE: 18 BRPM | SYSTOLIC BLOOD PRESSURE: 118 MMHG | BODY MASS INDEX: 23 KG/M2 | OXYGEN SATURATION: 97 % | DIASTOLIC BLOOD PRESSURE: 67 MMHG | WEIGHT: 138.2 LBS

## 2023-08-29 DIAGNOSIS — S22.41XS CLOSED FRACTURE OF MULTIPLE RIBS OF RIGHT SIDE, SEQUELA: ICD-10-CM

## 2023-08-29 DIAGNOSIS — F01.B4 MODERATE VASCULAR DEMENTIA WITH ANXIETY (HCC): ICD-10-CM

## 2023-08-29 DIAGNOSIS — S12.191D OTHER CLOSED NONDISPLACED FRACTURE OF SECOND CERVICAL VERTEBRA WITH ROUTINE HEALING, SUBSEQUENT ENCOUNTER: ICD-10-CM

## 2023-08-29 DIAGNOSIS — I77.74 VERTEBRAL ARTERY DISSECTION (HCC): Primary | ICD-10-CM

## 2023-08-29 DIAGNOSIS — I60.9 SAH (SUBARACHNOID HEMORRHAGE) (HCC): ICD-10-CM

## 2023-08-29 PROCEDURE — 99309 SBSQ NF CARE MODERATE MDM 30: CPT | Performed by: NURSE PRACTITIONER

## 2023-08-29 NOTE — ASSESSMENT & PLAN NOTE
· AAO X1  · Very hard of hearing  · Able to make her needs known  · Does not have capacity to make her own medical decisions her son Dwayne Burrell is POA  Provide redirection, reorientation, distraction techniques  Fall Precautions  Assist with ADLs/IADLs  Avoid deliriogenic medications such as tramadol, benzodiazepines, anticholinergics, benadryl  Encourage Hydration/ Nutrition  Implement sleep hygiene, limit night time interuptions   Encourage participation in group activities when appropriate

## 2023-08-29 NOTE — PROGRESS NOTES
[facility]  [address] [address]  [phone]  FACILITY: [facility]  Code 31 (STR)      NAME: Lantigua Route  AGE: 80 y.o. SEX: female CODE STATUS: No CPR    DATE OF ENCOUNTER: 08/29/23    Assessment and Plan     1. Vertebral artery dissection [facility])  Assessment & Plan:  · S/p unwitnessed fall with CT imaging revealing Vertebral artery dissection   · Neurosurgery following while inpatient and cleared to start DAPT  · Continue Aspirin Plavix and statin  · Patient to follow-up with neurosurgery and repeat CT of the head  in 3 months (November 2023)  · Continue DAPT      2. Moderate vascular dementia with anxiety ([address] )  Assessment & Plan:  · AAO X1  · Very hard of hearing  · Able to make her needs known  · Does not have capacity to make her own medical decisions her son Lynsey Costa is POA  Provide redirection, reorientation, distraction techniques  Fall Precautions  Assist with ADLs/IADLs  Avoid deliriogenic medications such as tramadol, benzodiazepines, anticholinergics, benadryl  Encourage Hydration/ Nutrition  Implement sleep hygiene, limit night time interuptions   Encourage participation in group activities when appropriate         3. SAH (subarachnoid hemorrhage) (HCC)  Assessment & Plan:  · CTA head with hyperdense material seen in the right ambient cistern compatible with acute subarachnoid hemorrhage  · Serial CT head revealed stability  · Reviewed CT head 8/10 and found complete resolution of SAH and persistent trace IVH bilaterally  · Neuro recommends to continue DAPT      4.  Other closed nondisplaced fracture of second cervical vertebra with routine healing, subsequent encounter  Assessment & Plan:  · CT revealing acute minimally displaced fracture through the base of C2 vertebral body extending into the inferior endplate into both pedicles to the level of the transverse foramen and into the right transverse process  · CTA revealing vertebral artery dissection  · Continue Vista collar        5. Closed fracture of multiple ribs of right side, sequela  Assessment & Plan:  · Noted with both acute and subacute nondisplaced fractures of ribs 3 through 6 and 8 through 10  · Encourage use of incentive spirometer  · Patient doing well on room air  · Outpatient follow-up with trauma as an outpatient as needed  · Continue PT OT         All medications and routine orders were reviewed and updated as needed. Chief Complaint     STR follow up visit    Past Medical and Surgica History      Past Medical History:   Diagnosis Date   • Chronic pain disorder    • Hypertension    • Low back pain      No past surgical history on file. Allergies   Allergen Reactions   • Celecoxib Other (See Comments)          History of Present Illness     Johnny Marc is a 80 y.o. female admitted to [address] [place] [address] following hospital stay for unwitnessed fall with multiple injuries. Patient has a past medical Hx including but not limited to HTN/HLD, osteoporosis, chronic low back pain, ambulatory dysfunction, constipation, cognitive impairment. Patient is seen in collaboration with nursing for medical mgmt and STR follow up. Patient initially presented to hospital on 7/31/2023 after an unwitnessed fall at her [facility]) with multiple injuries:  Right radial fracture  C2 cervical fracture  Multiple rib fractures  Closed fracture of right scapula  Closed wedge compression fracture of T3 vertebra  Vertebral artery dissection  Subarachnoid hemorrhage    Patient  required treatment in the ICU. She was followed by neurosurgery and trauma services. Patient was started on dual antiplatelet therapy and statins after cleared by neurosurgery. Patient recommended CTA 1 week post discharge. Patient required pain management for multiple fractures which did not require any operative intervention. Patient also had cervical fracture and is to remain in c-collar.   Patient recommended discharge to postacute rehab. Seen and examined in the dining room today. Patient does not appear in any distress. Patient is a poor historian due to cognitive impairment, she is AAO to self only. She is very Hard of hearing. Patient states she is feeling "good". She denies pain on exam. She is actively participating in [employer]. Staff have no concerns at this time. The patient's allergies, past medical, surgical, social and family history were reviewed and unchanged. Review of Systems     Review of Systems   Unable to perform ROS: Dementia         Objective     Vitals:   Vitals:    08/29/23 1351   BP: 118/67   Pulse: 86   Resp: 18   Temp: 97.8 °F (36.6 °C)   SpO2: 97%         Physical Exam  Vitals and nursing note reviewed. Constitutional:       General: She is not in acute distress. Appearance: Normal appearance. She is not ill-appearing. HENT:      Head: Normocephalic and atraumatic. Nose: No congestion or rhinorrhea. Mouth/Throat:      Mouth: Mucous membranes are moist.   Eyes:      General: No scleral icterus. Conjunctiva/sclera: Conjunctivae normal.      Pupils: Pupils are equal, round, and reactive to light. Neck:      Comments: C-Collar in place  Cardiovascular:      Rate and Rhythm: Normal rate and regular rhythm. Pulses: Normal pulses. Heart sounds: Normal heart sounds. No murmur heard. Pulmonary:      Effort: Pulmonary effort is normal. No respiratory distress. Breath sounds: Normal breath sounds. No wheezing, rhonchi or rales. Abdominal:      General: Bowel sounds are normal. There is no distension. Palpations: Abdomen is soft. There is no mass. Tenderness: There is no abdominal tenderness. Hernia: No hernia is present. Musculoskeletal:         General: No swelling or tenderness. Lymphadenopathy:      Cervical: No cervical adenopathy. Skin:     General: Skin is warm and dry. Coloration: Skin is not pale. Findings: No rash. Comments: Left eyebrow healed  laceration is clean/dry, well approximated   Scattered busing throughout   Right arm in splint  C-collar in place   Neurological:      General: No focal deficit present. Mental Status: She is alert. Mental status is at baseline. She is disoriented. Motor: Weakness present. Gait: Gait abnormal.      Comments: CHAPINCITO WALLACE  Very hard of hearing  Does not know her age or where she is could not tell me the date   Psychiatric:         Mood and Affect: Mood normal.         Behavior: Behavior normal.         Pertinent Laboratory/Diagnostic Studies:     Reviewed in facility chart      Current Medications   Medications reviewed and updated see facility [facility] for details.       Current Outpatient Medications:   •  acetaminophen (TYLENOL) 325 mg tablet, Take 975 mg by mouth every 8 (eight) hours, Disp: , Rfl:   •  aspirin 81 mg chewable tablet, Chew 1 tablet (81 mg total) daily, Disp: 30 tablet, Rfl: 0  •  atorvastatin (LIPITOR) 10 mg tablet, Take 10 mg by mouth daily, Disp: , Rfl:   •  B Complex Vitamins (VITAMIN-B COMPLEX PO), Take 1 tablet by mouth, Disp: , Rfl:   •  Betamethasone Sodium Phosphate 0.1 % SOLN, Apply to eye (Patient not taking: Reported on 8/21/2023), Disp: , Rfl:   •  Calcium Carb-Cholecalciferol (Calcium 600/Vitamin D3) 600-20 MG-MCG TABS, Take 1 tablet by mouth in the morning, Disp: , Rfl:   •  cholecalciferol (VITAMIN D3) 1,000 units tablet, Take 1,000 Units by mouth daily, Disp: , Rfl:   •  clopidogrel (PLAVIX) 75 mg tablet, Take 1 tablet (75 mg total) by mouth daily, Disp: 30 tablet, Rfl: 0  •  Cyanocobalamin (Vitamin B 12) 100 MCG LOZG, Take by mouth, Disp: , Rfl:   •  docusate sodium (COLACE) 100 mg capsule, Take 100 mg by mouth 2 (two) times a day as needed for constipation, Disp: , Rfl:   •  Emollient (CeraVe Moisturizing) CREA, Apply topically (Patient not taking: Reported on 8/14/2023), Disp: , Rfl:   •  fluocinolone (SYNALAR) 0.01 % cream, Apply topically in the morning Apply to scalpe for 2-4 hours daily. Protect with shower cap and shampoo out. (Patient not taking: Reported on 8/14/2023), Disp: , Rfl:   •  furosemide (LASIX) 20 mg tablet, Take 30 mg by mouth daily 30 mg daily = 1.5tabs, Disp: , Rfl:   •  levETIRAcetam (KEPPRA) 500 mg tablet, Take 1 tablet (500 mg total) by mouth every 12 (twelve) hours for 10 doses, Disp: 10 tablet, Rfl: 0  •  Lidocaine 4 % PTCH, Apply topically (Patient not taking: Reported on 8/21/2023), Disp: , Rfl:   •  oxyCODONE (Roxicodone) 5 immediate release tablet, Take 0.5 tablets (2.5 mg total) by mouth every 4 (four) hours as needed for moderate pain or severe pain Max Daily Amount: 15 mg, Disp: , Rfl: 0  •  potassium chloride (Klor-Con) 10 mEq tablet, Take 20 mEq by mouth in the morning, Disp: , Rfl:   •  senna (SENOKOT) 8.6 mg, Take 2 tablets (17.2 mg total) by mouth daily at bedtime, Disp: 10 tablet, Rfl: 0     Please note:  Voice-recognition software may have been used in the preparation of this document. Occasional wrong word or "sound-alike" substitutions may have occurred due to the inherent limitations of voice recognition software. Interpretation should be guided by context.          EDI Keyes

## 2023-09-05 ENCOUNTER — HOSPITAL ENCOUNTER (OUTPATIENT)
Dept: RADIOLOGY | Facility: HOSPITAL | Age: 88
Discharge: HOME | End: 2023-09-05
Payer: MEDICARE

## 2023-09-05 ENCOUNTER — HOSPITAL ENCOUNTER (OUTPATIENT)
Dept: CT IMAGING | Facility: HOSPITAL | Age: 88
Discharge: HOME | End: 2023-09-05
Payer: MEDICARE

## 2023-09-05 DIAGNOSIS — S12.191D OTHER CLOSED NONDISPLACED FRACTURE OF SECOND CERVICAL VERTEBRA WITH ROUTINE HEALING, SUBSEQUENT ENCOUNTER: ICD-10-CM

## 2023-09-05 DIAGNOSIS — I77.74 DISSECTION OF VERTEBRAL ARTERY (HCC): ICD-10-CM

## 2023-09-05 PROCEDURE — G1004 CDSM NDSC: HCPCS

## 2023-09-05 PROCEDURE — 70498 CT ANGIOGRAPHY NECK: CPT

## 2023-09-05 PROCEDURE — 72040 X-RAY EXAM NECK SPINE 2-3 VW: CPT

## 2023-09-05 PROCEDURE — 70496 CT ANGIOGRAPHY HEAD: CPT

## 2023-09-05 RX ADMIN — IOHEXOL 85 ML: 350 INJECTION, SOLUTION INTRAVENOUS at 10:21

## 2023-09-06 ENCOUNTER — NURSING HOME VISIT (OUTPATIENT)
Dept: GERIATRICS | Facility: OTHER | Age: 88
End: 2023-09-06
Payer: MEDICARE

## 2023-09-06 VITALS
DIASTOLIC BLOOD PRESSURE: 66 MMHG | BODY MASS INDEX: 23 KG/M2 | HEART RATE: 68 BPM | SYSTOLIC BLOOD PRESSURE: 132 MMHG | WEIGHT: 138.2 LBS | RESPIRATION RATE: 18 BRPM | TEMPERATURE: 98.1 F | OXYGEN SATURATION: 96 %

## 2023-09-06 DIAGNOSIS — I77.74 VERTEBRAL ARTERY DISSECTION (HCC): ICD-10-CM

## 2023-09-06 DIAGNOSIS — G89.11 ACUTE PAIN DUE TO TRAUMA: ICD-10-CM

## 2023-09-06 DIAGNOSIS — R26.2 AMBULATORY DYSFUNCTION: ICD-10-CM

## 2023-09-06 DIAGNOSIS — S12.191D OTHER CLOSED NONDISPLACED FRACTURE OF SECOND CERVICAL VERTEBRA WITH ROUTINE HEALING, SUBSEQUENT ENCOUNTER: ICD-10-CM

## 2023-09-06 DIAGNOSIS — E78.2 MIXED HYPERLIPIDEMIA: ICD-10-CM

## 2023-09-06 DIAGNOSIS — S52.301S: ICD-10-CM

## 2023-09-06 DIAGNOSIS — I60.9 SAH (SUBARACHNOID HEMORRHAGE) (HCC): ICD-10-CM

## 2023-09-06 DIAGNOSIS — S22.41XS CLOSED FRACTURE OF MULTIPLE RIBS OF RIGHT SIDE, SEQUELA: ICD-10-CM

## 2023-09-06 DIAGNOSIS — F01.B4 MODERATE VASCULAR DEMENTIA WITH ANXIETY (HCC): Primary | ICD-10-CM

## 2023-09-06 PROCEDURE — 99309 SBSQ NF CARE MODERATE MDM 30: CPT | Performed by: NURSE PRACTITIONER

## 2023-09-06 NOTE — PROGRESS NOTES
[facility]  [address] [address]  [phone]  FACILITY: [facility]  Code 31 (STR)      NAME: Tiffanie Mercado  AGE: 80 y.o. SEX: female CODE STATUS: No CPR    DATE OF ENCOUNTER: 09/06/23    Assessment and Plan     1. Moderate vascular dementia with anxiety ([address] [facility])  Assessment & Plan:  · AAO X1  · Very hard of hearing  · Able to make her needs known  · Does not have capacity to make her own medical decisions her son Dinesh Bryant is POA  Provide redirection, reorientation, distraction techniques  Fall Precautions  Assist with ADLs/IADLs  Avoid deliriogenic medications such as tramadol, benzodiazepines, anticholinergics, benadryl  Encourage Hydration/ Nutrition  Implement sleep hygiene, limit night time interuptions   Encourage participation in group activities when appropriate         2. Vertebral artery dissection [facility])  Assessment & Plan:  · S/p unwitnessed fall with CT imaging revealing Vertebral artery dissection   · Neurosurgery following while inpatient and cleared to start DAPT  · Continue Aspirin Plavix and statin  · Patient to follow-up with neurosurgery and repeat CT of the head and NeuroSx on 9/11/23  · Continue DAPT    Repeat CBC, CMP tomorrow       3. SAH (subarachnoid hemorrhage) (HCC)  Assessment & Plan:  · CTA head with hyperdense material seen in the right ambient cistern compatible with acute subarachnoid hemorrhage  · Serial CT head revealed stability  · Reviewed CT head 8/10 and found complete resolution of SAH and persistent trace IVH bilaterally  · Neuro recommends to continue DAPT      4.  Other closed nondisplaced fracture of second cervical vertebra with routine healing, subsequent encounter  Assessment & Plan:  · CT revealing acute minimally displaced fracture through the base of C2 vertebral body extending into the inferior endplate into both pedicles to the level of the transverse foramen and into the right transverse process  · CTA revealing vertebral artery dissection  · Continue Vista collar        5. Closed fracture of multiple ribs of right side, sequela  Assessment & Plan:  · Noted with both acute and subacute nondisplaced fractures of ribs 3 through 6 and 8 through 10  · Encourage use of incentive spirometer  · Patient doing well on room air  · Outpatient follow-up with trauma as an outpatient as needed  · Continue PT OT      6. Closed fracture of shaft of right radius, unspecified fracture morphology, sequela  Assessment & Plan:  · Continue right upper extremity splint  · Pain management as ordered  · Outpatient follow-up with orthopedics this week      7. Acute pain due to trauma  Assessment & Plan:  · Recent unwitnessed fall with multiple serious injuries   · Continue Tylenol RTC  · Oxycodone 2.5 mg Q 4 hrs PRN   · Bowel regimen to prevent constipation       8. Ambulatory dysfunction  Assessment & Plan:  • Multifactorial  • Continue PT/OT  • Fall Precautions  • Ensure adequate nutrition/hydration   • Monitor CBC/BMP   •  following for d/c planning         9. Mixed hyperlipidemia  Assessment & Plan:  Continue Atorvastatin   Check lipid panel tomorrow (last done in 2022)          All medications and routine orders were reviewed and updated as needed. Chief Complaint     STR follow up visit    Past Medical and Surgica History      Past Medical History:   Diagnosis Date   • Chronic pain disorder    • Hypertension    • Low back pain      No past surgical history on file. Allergies   Allergen Reactions   • Celecoxib Other (See Comments)          History of Present Illness     Cheryle Sloan is a 80 y.o. female admitted to [address] [place] [address] following hospital stay for unwitnessed fall with multiple injuries. Patient has a past medical Hx including but not limited to HTN/HLD, osteoporosis, chronic low back pain, ambulatory dysfunction, constipation, cognitive impairment.      Patient is seen in collaboration with nursing for medical mgmt and STR follow up. Patient initially presented to hospital on 7/31/2023 after an unwitnessed fall at her [facility]) with multiple injuries:  Right radial fracture  C2 cervical fracture  Multiple rib fractures  Closed fracture of right scapula  Closed wedge compression fracture of T3 vertebra  Vertebral artery dissection  Subarachnoid hemorrhage    Patient  required treatment in the ICU. She was followed by neurosurgery and trauma services. Patient was started on dual antiplatelet therapy and statins after cleared by neurosurgery. Patient recommended CTA 1 week post discharge. Patient required pain management for multiple fractures which did not require any operative intervention. Patient also had cervical fracture and is to remain in c-collar. Patient recommended discharge to postacute rehab. Seen and examined oob at nurses station. Patient does not appear in any distress. Patient is a poor historian due to cognitive impairment, she is AAO to self only. She is very Hard of hearing. Patient states she is feeling "good". She denies pain on exam. Patient is eating well per staff. She is actively participating in [employer]. Staff have no concerns at this time. The patient's allergies, past medical, surgical, social and family history were reviewed and unchanged. Review of Systems     Review of Systems   Unable to perform ROS: Dementia         Objective     Vitals:   Vitals:    09/06/23 0914   BP: 132/66   Pulse: 68   Resp: 18   Temp: 98.1 °F (36.7 °C)   SpO2: 96%         Physical Exam  Vitals and nursing note reviewed. Constitutional:       General: She is not in acute distress. Appearance: Normal appearance. She is not ill-appearing. HENT:      Head: Normocephalic and atraumatic. Nose: No congestion or rhinorrhea. Mouth/Throat:      Mouth: Mucous membranes are moist.   Eyes:      General: No scleral icterus.      Conjunctiva/sclera: Conjunctivae normal.      Pupils: Pupils are equal, round, and reactive to light. Neck:      Comments: C-Collar in place  Cardiovascular:      Rate and Rhythm: Normal rate and regular rhythm. Pulses: Normal pulses. Heart sounds: Normal heart sounds. No murmur heard. Pulmonary:      Effort: Pulmonary effort is normal. No respiratory distress. Breath sounds: Normal breath sounds. No wheezing, rhonchi or rales. Abdominal:      General: Bowel sounds are normal. There is no distension. Palpations: Abdomen is soft. There is no mass. Tenderness: There is no abdominal tenderness. Hernia: No hernia is present. Musculoskeletal:         General: No swelling or tenderness. Lymphadenopathy:      Cervical: No cervical adenopathy. Skin:     General: Skin is warm and dry. Coloration: Skin is not pale. Findings: No rash. Comments: Left eyebrow healed  laceration is clean/dry, well approximated   Scattered busing throughout   Right arm in splint  C-collar in place   Neurological:      General: No focal deficit present. Mental Status: She is alert. Mental status is at baseline. She is disoriented. Motor: Weakness present. Gait: Gait abnormal.      Comments: CHAPINCITO WALLACE  Very hard of hearing  Does not know her age or where she is could not tell me the date   Psychiatric:         Mood and Affect: Mood normal.         Behavior: Behavior normal.         Pertinent Laboratory/Diagnostic Studies:     Reviewed in facility chart      Current Medications   Medications reviewed and updated see facility [facility] for details.       Current Outpatient Medications:   •  acetaminophen (TYLENOL) 325 mg tablet, Take 975 mg by mouth every 8 (eight) hours, Disp: , Rfl:   •  aspirin 81 mg chewable tablet, Chew 1 tablet (81 mg total) daily, Disp: 30 tablet, Rfl: 0  •  atorvastatin (LIPITOR) 10 mg tablet, Take 10 mg by mouth daily, Disp: , Rfl:   •  B Complex Vitamins (VITAMIN-B COMPLEX PO), Take 1 tablet by mouth, Disp: , Rfl:   • Calcium Carb-Cholecalciferol (Calcium 600/Vitamin D3) 600-20 MG-MCG TABS, Take 1 tablet by mouth in the morning, Disp: , Rfl:   •  cholecalciferol (VITAMIN D3) 1,000 units tablet, Take 1,000 Units by mouth daily, Disp: , Rfl:   •  clopidogrel (PLAVIX) 75 mg tablet, Take 1 tablet (75 mg total) by mouth daily, Disp: 30 tablet, Rfl: 0  •  Cyanocobalamin (Vitamin B 12) 100 MCG LOZG, Take by mouth, Disp: , Rfl:   •  docusate sodium (COLACE) 100 mg capsule, Take 100 mg by mouth 2 (two) times a day as needed for constipation, Disp: , Rfl:   •  furosemide (LASIX) 20 mg tablet, Take 30 mg by mouth daily 30 mg daily = 1.5tabs, Disp: , Rfl:   •  oxyCODONE (Roxicodone) 5 immediate release tablet, Take 0.5 tablets (2.5 mg total) by mouth every 4 (four) hours as needed for moderate pain or severe pain Max Daily Amount: 15 mg, Disp: , Rfl: 0  •  potassium chloride (Klor-Con) 10 mEq tablet, Take 20 mEq by mouth in the morning, Disp: , Rfl:   •  senna (SENOKOT) 8.6 mg, Take 2 tablets (17.2 mg total) by mouth daily at bedtime, Disp: 10 tablet, Rfl: 0     Please note:  Voice-recognition software may have been used in the preparation of this document. Occasional wrong word or "sound-alike" substitutions may have occurred due to the inherent limitations of voice recognition software. Interpretation should be guided by context.          EDI Zamora

## 2023-09-06 NOTE — ASSESSMENT & PLAN NOTE
· S/p unwitnessed fall with CT imaging revealing Vertebral artery dissection   · Neurosurgery following while inpatient and cleared to start DAPT  · Continue Aspirin Plavix and statin  · Patient to follow-up with neurosurgery and repeat CT of the head and NeuroSx on 9/11/23  · Continue DAPT    Repeat CBC, CMP tomorrow

## 2023-09-06 NOTE — ASSESSMENT & PLAN NOTE
· AAO X1  · Very hard of hearing  · Able to make her needs known  · Does not have capacity to make her own medical decisions her son Dani Encinas is POA  Provide redirection, reorientation, distraction techniques  Fall Precautions  Assist with ADLs/IADLs  Avoid deliriogenic medications such as tramadol, benzodiazepines, anticholinergics, benadryl  Encourage Hydration/ Nutrition  Implement sleep hygiene, limit night time interuptions   Encourage participation in group activities when appropriate

## 2023-09-08 ENCOUNTER — TELEPHONE (OUTPATIENT)
Dept: OTHER | Facility: OTHER | Age: 88
End: 2023-09-08

## 2023-09-08 ENCOUNTER — TELEPHONE (OUTPATIENT)
Dept: NEUROSURGERY | Facility: CLINIC | Age: 88
End: 2023-09-08

## 2023-09-08 ENCOUNTER — HOSPITAL ENCOUNTER (EMERGENCY)
Facility: HOSPITAL | Age: 88
Discharge: HOME | End: 2023-09-09
Attending: EMERGENCY MEDICINE | Admitting: EMERGENCY MEDICINE
Payer: MEDICARE

## 2023-09-08 DIAGNOSIS — R11.10 VOMITING: Primary | ICD-10-CM

## 2023-09-08 DIAGNOSIS — N39.0 UTI (URINARY TRACT INFECTION): ICD-10-CM

## 2023-09-08 LAB
ALBUMIN SERPL BCP-MCNC: 3.5 G/DL (ref 3.5–5)
ALP SERPL-CCNC: 75 U/L (ref 34–104)
ALT SERPL W P-5'-P-CCNC: 8 U/L (ref 7–52)
ANION GAP SERPL CALCULATED.3IONS-SCNC: 7 MMOL/L
AST SERPL W P-5'-P-CCNC: 15 U/L (ref 13–39)
BASOPHILS # BLD AUTO: 0.02 THOUSANDS/ÂΜL (ref 0–0.1)
BASOPHILS NFR BLD AUTO: 0 % (ref 0–1)
BILIRUB SERPL-MCNC: 0.67 MG/DL (ref 0.2–1)
BUN SERPL-MCNC: 16 MG/DL (ref 5–25)
CALCIUM SERPL-MCNC: 9 MG/DL (ref 8.4–10.2)
CARDIAC TROPONIN I PNL SERPL HS: 5 NG/L
CHLORIDE SERPL-SCNC: 106 MMOL/L (ref 96–108)
CO2 SERPL-SCNC: 25 MMOL/L (ref 21–32)
CREAT SERPL-MCNC: 0.62 MG/DL (ref 0.6–1.3)
EOSINOPHIL # BLD AUTO: 0.11 THOUSAND/ÂΜL (ref 0–0.61)
EOSINOPHIL NFR BLD AUTO: 1 % (ref 0–6)
ERYTHROCYTE [DISTWIDTH] IN BLOOD BY AUTOMATED COUNT: 15.3 % (ref 11.6–15.1)
GFR SERPL CREATININE-BSD FRML MDRD: 78 ML/MIN/1.73SQ M
GLUCOSE SERPL-MCNC: 116 MG/DL (ref 65–140)
HCT VFR BLD AUTO: 39.9 % (ref 34.8–46.1)
HGB BLD-MCNC: 12.8 G/DL (ref 11.5–15.4)
IMM GRANULOCYTES # BLD AUTO: 0.02 THOUSAND/UL (ref 0–0.2)
IMM GRANULOCYTES NFR BLD AUTO: 0 % (ref 0–2)
LACTATE SERPL-SCNC: 0.8 MMOL/L (ref 0.5–2)
LIPASE SERPL-CCNC: 14 U/L (ref 11–82)
LYMPHOCYTES # BLD AUTO: 0.77 THOUSANDS/ÂΜL (ref 0.6–4.47)
LYMPHOCYTES NFR BLD AUTO: 9 % (ref 14–44)
MCH RBC QN AUTO: 30 PG (ref 26.8–34.3)
MCHC RBC AUTO-ENTMCNC: 32.1 G/DL (ref 31.4–37.4)
MCV RBC AUTO: 93 FL (ref 82–98)
MONOCYTES # BLD AUTO: 0.7 THOUSAND/ÂΜL (ref 0.17–1.22)
MONOCYTES NFR BLD AUTO: 8 % (ref 4–12)
NEUTROPHILS # BLD AUTO: 7.14 THOUSANDS/ÂΜL (ref 1.85–7.62)
NEUTS SEG NFR BLD AUTO: 82 % (ref 43–75)
NRBC BLD AUTO-RTO: 0 /100 WBCS
PLATELET # BLD AUTO: 238 THOUSANDS/UL (ref 149–390)
PMV BLD AUTO: 9.6 FL (ref 8.9–12.7)
POTASSIUM SERPL-SCNC: 4 MMOL/L (ref 3.5–5.3)
PROT SERPL-MCNC: 6.3 G/DL (ref 6.4–8.4)
RBC # BLD AUTO: 4.27 MILLION/UL (ref 3.81–5.12)
SODIUM SERPL-SCNC: 138 MMOL/L (ref 135–147)
WBC # BLD AUTO: 8.76 THOUSAND/UL (ref 4.31–10.16)

## 2023-09-08 PROCEDURE — 36415 COLL VENOUS BLD VENIPUNCTURE: CPT

## 2023-09-08 PROCEDURE — 93005 ELECTROCARDIOGRAM TRACING: CPT

## 2023-09-08 PROCEDURE — 99284 EMERGENCY DEPT VISIT MOD MDM: CPT

## 2023-09-08 PROCEDURE — 96366 THER/PROPH/DIAG IV INF ADDON: CPT

## 2023-09-08 PROCEDURE — 99285 EMERGENCY DEPT VISIT HI MDM: CPT | Performed by: EMERGENCY MEDICINE

## 2023-09-08 PROCEDURE — 84484 ASSAY OF TROPONIN QUANT: CPT

## 2023-09-08 PROCEDURE — 80053 COMPREHEN METABOLIC PANEL: CPT

## 2023-09-08 PROCEDURE — 85025 COMPLETE CBC W/AUTO DIFF WBC: CPT

## 2023-09-08 PROCEDURE — 83605 ASSAY OF LACTIC ACID: CPT

## 2023-09-08 PROCEDURE — 83690 ASSAY OF LIPASE: CPT

## 2023-09-08 PROCEDURE — 96365 THER/PROPH/DIAG IV INF INIT: CPT

## 2023-09-08 RX ADMIN — SODIUM CHLORIDE, SODIUM LACTATE, POTASSIUM CHLORIDE, AND CALCIUM CHLORIDE 1000 ML: .6; .31; .03; .02 INJECTION, SOLUTION INTRAVENOUS at 22:52

## 2023-09-08 NOTE — TELEPHONE ENCOUNTER
Spoke with Chaka Fang at [facility] called states pt accidently canceled appt 9/18/23 and  please put appt back in, reinstated appt on 9/18/23 for pt

## 2023-09-09 ENCOUNTER — TELEPHONE (OUTPATIENT)
Dept: OTHER | Facility: OTHER | Age: 88
End: 2023-09-09

## 2023-09-09 VITALS
TEMPERATURE: 97.5 F | RESPIRATION RATE: 18 BRPM | OXYGEN SATURATION: 95 % | HEART RATE: 92 BPM | SYSTOLIC BLOOD PRESSURE: 153 MMHG | DIASTOLIC BLOOD PRESSURE: 73 MMHG

## 2023-09-09 LAB
2HR DELTA HS TROPONIN: 0 NG/L
AMORPH URATE CRY URNS QL MICRO: ABNORMAL
ATRIAL RATE: 95 BPM
BACTERIA UR QL AUTO: ABNORMAL /HPF
BILIRUB UR QL STRIP: NEGATIVE
CARDIAC TROPONIN I PNL SERPL HS: 5 NG/L
CLARITY UR: ABNORMAL
COLOR UR: YELLOW
GLUCOSE UR STRIP-MCNC: NEGATIVE MG/DL
HGB UR QL STRIP.AUTO: NEGATIVE
KETONES UR STRIP-MCNC: ABNORMAL MG/DL
LEUKOCYTE ESTERASE UR QL STRIP: ABNORMAL
NITRITE UR QL STRIP: POSITIVE
NON-SQ EPI CELLS URNS QL MICRO: ABNORMAL /HPF
P AXIS: 45 DEGREES
PH UR STRIP.AUTO: 7 [PH]
PR INTERVAL: 204 MS
PROT UR STRIP-MCNC: NEGATIVE MG/DL
QRS AXIS: -14 DEGREES
QRSD INTERVAL: 88 MS
QT INTERVAL: 362 MS
QTC INTERVAL: 454 MS
RBC #/AREA URNS AUTO: ABNORMAL /HPF
SP GR UR STRIP.AUTO: 1.01 (ref 1–1.03)
T WAVE AXIS: 18 DEGREES
UROBILINOGEN UR STRIP-ACNC: <2 MG/DL
VENTRICULAR RATE: 95 BPM
WBC #/AREA URNS AUTO: ABNORMAL /HPF

## 2023-09-09 PROCEDURE — 84484 ASSAY OF TROPONIN QUANT: CPT

## 2023-09-09 PROCEDURE — 87186 SC STD MICRODIL/AGAR DIL: CPT

## 2023-09-09 PROCEDURE — 81001 URINALYSIS AUTO W/SCOPE: CPT

## 2023-09-09 PROCEDURE — 93010 ELECTROCARDIOGRAM REPORT: CPT | Performed by: INTERNAL MEDICINE

## 2023-09-09 PROCEDURE — 87147 CULTURE TYPE IMMUNOLOGIC: CPT

## 2023-09-09 PROCEDURE — 87077 CULTURE AEROBIC IDENTIFY: CPT

## 2023-09-09 PROCEDURE — 36415 COLL VENOUS BLD VENIPUNCTURE: CPT

## 2023-09-09 PROCEDURE — 87086 URINE CULTURE/COLONY COUNT: CPT

## 2023-09-09 RX ORDER — CEPHALEXIN 250 MG/1
500 CAPSULE ORAL ONCE
Status: COMPLETED | OUTPATIENT
Start: 2023-09-09 | End: 2023-09-09

## 2023-09-09 RX ORDER — FUROSEMIDE 20 MG/1
30 TABLET ORAL DAILY
Status: DISCONTINUED | OUTPATIENT
Start: 2023-09-09 | End: 2023-09-09 | Stop reason: HOSPADM

## 2023-09-09 RX ORDER — POTASSIUM CHLORIDE 750 MG/1
20 TABLET, EXTENDED RELEASE ORAL DAILY
Status: DISCONTINUED | OUTPATIENT
Start: 2023-09-09 | End: 2023-09-09 | Stop reason: HOSPADM

## 2023-09-09 RX ORDER — ASPIRIN 81 MG/1
81 TABLET, CHEWABLE ORAL DAILY
Status: DISCONTINUED | OUTPATIENT
Start: 2023-09-09 | End: 2023-09-09 | Stop reason: HOSPADM

## 2023-09-09 RX ORDER — MELATONIN
1000 DAILY
Status: DISCONTINUED | OUTPATIENT
Start: 2023-09-09 | End: 2023-09-09 | Stop reason: HOSPADM

## 2023-09-09 RX ORDER — CLOPIDOGREL BISULFATE 75 MG/1
75 TABLET ORAL DAILY
Status: DISCONTINUED | OUTPATIENT
Start: 2023-09-09 | End: 2023-09-09 | Stop reason: HOSPADM

## 2023-09-09 RX ORDER — ACETAMINOPHEN 325 MG/1
975 TABLET ORAL EVERY 8 HOURS SCHEDULED
Status: DISCONTINUED | OUTPATIENT
Start: 2023-09-09 | End: 2023-09-09 | Stop reason: HOSPADM

## 2023-09-09 RX ORDER — DOCUSATE SODIUM 100 MG/1
100 CAPSULE, LIQUID FILLED ORAL 2 TIMES DAILY PRN
Status: DISCONTINUED | OUTPATIENT
Start: 2023-09-09 | End: 2023-09-09 | Stop reason: HOSPADM

## 2023-09-09 RX ORDER — CEPHALEXIN 500 MG/1
500 CAPSULE ORAL EVERY 12 HOURS SCHEDULED
Qty: 12 CAPSULE | Refills: 0 | Status: SHIPPED | OUTPATIENT
Start: 2023-09-09 | End: 2023-09-15

## 2023-09-09 RX ORDER — ATORVASTATIN CALCIUM 10 MG/1
10 TABLET, FILM COATED ORAL DAILY
Status: DISCONTINUED | OUTPATIENT
Start: 2023-09-09 | End: 2023-09-09 | Stop reason: HOSPADM

## 2023-09-09 RX ADMIN — ACETAMINOPHEN 975 MG: 325 TABLET, FILM COATED ORAL at 05:56

## 2023-09-09 RX ADMIN — CEPHALEXIN 500 MG: 250 CAPSULE ORAL at 03:53

## 2023-09-09 NOTE — ED PROVIDER NOTES
History  Chief Complaint   Patient presents with   • Vomiting     Pt arrives from [facility]. EMS was called because pt vomited 3 times in the span of 10 minutes, vomiting has resolved. Currently pt has no c/o of pain or recollection of vomiting. GCS 14-  baseline      HPI    Francisco Javier Marlow) Francisco Javier Marlow is a 80 y.o. female who identifies as female with a significant PMHx of multiple fractures on 7/31, dementia presents to the emergency department on September 9, 2023 from [facility] via EMS for vomiting onset today. Per EMS, patient had 3 episodes of vomiting in 10 minutes at the facility. However, she has not vomited with EMS or since being in the ED. She had vomitus on her C-collar which she is on long term, which was removed prior to arrival to the ED. Placed pt in [facility] upon initial evaluation. Currently, patient denies any nausea, abdominal pain, headache, urinary symptoms, or any other complaint at this time. History limited due to patient's dementia    Allergies include: Allergies   Allergen Reactions   • Celecoxib Other (See Comments)         Immunizations:  Immunization History   Administered Date(s) Administered   • COVID-19 PFIZER VACCINE 0.3 ML IM 02/25/2021, 03/18/2021, 10/08/2021   • Tdap 07/31/2023     Immunizations Reviewed. Prior to Admission Medications   Prescriptions Last Dose Informant Patient Reported? Taking?    B Complex Vitamins (VITAMIN-B COMPLEX PO)  Self Yes No   Sig: Take 1 tablet by mouth   Calcium Carb-Cholecalciferol (Calcium 600/Vitamin D3) 600-20 MG-MCG TABS  Self Yes No   Sig: Take 1 tablet by mouth in the morning   Cyanocobalamin (Vitamin B 12) 100 MCG LOZG  Self Yes No   Sig: Take by mouth   acetaminophen (TYLENOL) 325 mg tablet  Self Yes No   Sig: Take 975 mg by mouth every 8 (eight) hours   aspirin 81 mg chewable tablet  Self No No   Sig: Chew 1 tablet (81 mg total) daily   atorvastatin (LIPITOR) 10 mg tablet  Self Yes No   Sig: Take 10 mg by mouth daily cholecalciferol (VITAMIN D3) 1,000 units tablet  Self Yes No   Sig: Take 1,000 Units by mouth daily   clopidogrel (PLAVIX) 75 mg tablet  Self No No   Sig: Take 1 tablet (75 mg total) by mouth daily   docusate sodium (COLACE) 100 mg capsule  Self Yes No   Sig: Take 100 mg by mouth 2 (two) times a day as needed for constipation   furosemide (LASIX) 20 mg tablet  Self Yes No   Sig: Take 30 mg by mouth daily 30 mg daily = 1.5tabs   oxyCODONE (Roxicodone) 5 immediate release tablet   No No   Sig: Take 0.5 tablets (2.5 mg total) by mouth every 4 (four) hours as needed for moderate pain or severe pain Max Daily Amount: 15 mg   potassium chloride (Klor-Con) 10 mEq tablet  Self Yes No   Sig: Take 20 mEq by mouth in the morning   senna (SENOKOT) 8.6 mg  Self No No   Sig: Take 2 tablets (17.2 mg total) by mouth daily at bedtime      Facility-Administered Medications: None       Past Medical History:   Diagnosis Date   • Chronic pain disorder    • Hypertension    • Low back pain        History reviewed. No pertinent surgical history. History reviewed. No pertinent family history. I have reviewed and agree with the history as documented.     E-Cigarette/Vaping   • E-Cigarette Use Never User      E-Cigarette/Vaping Substances     Social History     Tobacco Use   • Smoking status: Never   • Smokeless tobacco: Never   Vaping Use   • Vaping Use: Never used   Substance Use Topics   • Alcohol use: Never   • Drug use: Never        Review of Systems   Unable to perform ROS: Dementia       Physical Exam  ED Triage Vitals   Temperature Pulse Respirations Blood Pressure SpO2   09/08/23 2157 09/08/23 2158 09/08/23 2158 09/08/23 2200 09/08/23 2200   97.5 °F (36.4 °C) 95 18 140/65 95 %      Temp Source Heart Rate Source Patient Position - Orthostatic VS BP Location FiO2 (%)   09/08/23 2157 -- -- -- --   Oral          Pain Score       --                    Orthostatic Vital Signs  Vitals:    09/08/23 2158 09/08/23 2200 09/09/23 0035   BP: 140/65 153/73   Pulse: 95  92       Physical Exam  Vitals and nursing note reviewed. Constitutional:       General: She is awake. She is not in acute distress. Appearance: She is well-developed. She is not ill-appearing. Comments: C-collar placed on my assessment. RUE in a cast.   HENT:      Head: Normocephalic and atraumatic. Right Ear: External ear normal.      Left Ear: External ear normal.      Nose: Nose normal.      Mouth/Throat:      Mouth: Mucous membranes are dry. Pharynx: No oropharyngeal exudate or posterior oropharyngeal erythema. Eyes:      Conjunctiva/sclera: Conjunctivae normal.   Cardiovascular:      Rate and Rhythm: Normal rate and regular rhythm. Pulses: Normal pulses. Radial pulses are 2+ on the left side. Dorsalis pedis pulses are 2+ on the right side and 2+ on the left side. Heart sounds: Murmur heard. Systolic murmur is present. No friction rub. No gallop. Comments: Unable to assess R radial pulse because extremity is in a cast.  Pulmonary:      Effort: Pulmonary effort is normal. No respiratory distress. Breath sounds: Normal breath sounds. No stridor. No wheezing or rhonchi. Abdominal:      General: Bowel sounds are normal.      Palpations: Abdomen is soft. Tenderness: There is no abdominal tenderness. There is no guarding or rebound. Musculoskeletal:         General: No deformity or signs of injury. Cervical back: Tenderness present. Skin:     General: Skin is warm and dry. Capillary Refill: Capillary refill takes less than 2 seconds. Coloration: Skin is pale. Neurological:      Mental Status: She is alert. Mental status is at baseline. Comments: Oriented to self   Psychiatric:         Behavior: Behavior is cooperative.          ED Medications  Medications   cephalexin (KEFLEX) capsule 500 mg (has no administration in time range)   lactated ringers bolus 1,000 mL (0 mL Intravenous Stopped 9/9/23 0037)       Diagnostic Studies  Results Reviewed     Procedure Component Value Units Date/Time    Urine Microscopic [[id number]]  (Abnormal) Collected: 09/09/23 0309    Lab Status: Final result Specimen: Urine Updated: 09/09/23 0323     RBC, UA None Seen /hpf      WBC, UA 10-20 /hpf      Epithelial Cells Occasional /hpf      Bacteria, UA Occasional /hpf      Amorphous Crystals, UA Occasional    Urine culture [[id number]] Collected: 09/09/23 0309    Lab Status:  In process Specimen: Urine Updated: 09/09/23 0323    UA w Reflex to Microscopic w Reflex to Culture [[id number]]  (Abnormal) Collected: 09/09/23 0309    Lab Status: Final result Specimen: Urine Updated: 09/09/23 0320     Color, UA Yellow     Clarity, UA Turbid     Specific Gravity, UA 1.014     pH, UA 7.0     Leukocytes, UA Small     Nitrite, UA Positive     Protein, UA Negative mg/dl      Glucose, UA Negative mg/dl      Ketones, UA 10 (1+) mg/dl      Urobilinogen, UA <2.0 mg/dl      Bilirubin, UA Negative     Occult Blood, UA Negative    HS Troponin I 2hr [[id number]]  (Normal) Collected: 09/09/23 0141    Lab Status: Final result Specimen: Blood from Arm, Left Updated: 09/09/23 0247     hs TnI 2hr 5 ng/L      Delta 2hr hsTnI 0 ng/L     HS Troponin I 4hr [[id number]]     Lab Status: No result Specimen: Blood     HS Troponin 0hr (reflex protocol) [[id number]]  (Normal) Collected: 09/08/23 2252    Lab Status: Final result Specimen: Blood from Arm, Left Updated: 09/08/23 2335     hs TnI 0hr 5 ng/L     Comprehensive metabolic panel [[id number]]  (Abnormal) Collected: 09/08/23 2252    Lab Status: Final result Specimen: Blood from Arm, Left Updated: 09/08/23 2332     Sodium 138 mmol/L      Potassium 4.0 mmol/L      Chloride 106 mmol/L      CO2 25 mmol/L      ANION GAP 7 mmol/L      BUN 16 mg/dL      Creatinine 0.62 mg/dL      Glucose 116 mg/dL      Calcium 9.0 mg/dL      AST 15 U/L      ALT 8 U/L      Alkaline Phosphatase 75 U/L      Total Protein 6.3 g/dL Albumin 3.5 g/dL      Total Bilirubin 0.67 mg/dL      eGFR 78 ml/min/1.73sq m     Narrative:      [facility] guidelines for Chronic Kidney Disease (CKD):   •  Stage 1 with normal or high GFR (GFR > 90 mL/min/1.73 square meters)  •  Stage 2 Mild CKD (GFR = 60-89 mL/min/1.73 square meters)  •  Stage 3A Moderate CKD (GFR = 45-59 mL/min/1.73 square meters)  •  Stage 3B Moderate CKD (GFR = 30-44 mL/min/1.73 square meters)  •  Stage 4 Severe CKD (GFR = 15-29 mL/min/1.73 square meters)  •  Stage 5 End Stage CKD (GFR <15 mL/min/1.73 square meters)  Note: GFR calculation is accurate only with a steady state creatinine    Lipase [[id number]]  (Normal) Collected: 09/08/23 2252    Lab Status: Final result Specimen: Blood from Arm, Left Updated: 09/08/23 2332     Lipase 14 u/L     Lactic acid, plasma (w/reflex if result > 2.0) [[id number]]  (Normal) Collected: 09/08/23 2252    Lab Status: Final result Specimen: Blood from Arm, Left Updated: 09/08/23 2329     LACTIC ACID 0.8 mmol/L     Narrative:      Result may be elevated if tourniquet was used during collection.     CBC and differential [[id number]]  (Abnormal) Collected: 09/08/23 2252    Lab Status: Final result Specimen: Blood from Arm, Left Updated: 09/08/23 2323     WBC 8.76 Thousand/uL      RBC 4.27 Million/uL      Hemoglobin 12.8 g/dL      Hematocrit 39.9 %      MCV 93 fL      MCH 30.0 pg      MCHC 32.1 g/dL      RDW 15.3 %      MPV 9.6 fL      Platelets 009 Thousands/uL      nRBC 0 /100 WBCs      Neutrophils Relative 82 %      Immat GRANS % 0 %      Lymphocytes Relative 9 %      Monocytes Relative 8 %      Eosinophils Relative 1 %      Basophils Relative 0 %      Neutrophils Absolute 7.14 Thousands/µL      Immature Grans Absolute 0.02 Thousand/uL      Lymphocytes Absolute 0.77 Thousands/µL      Monocytes Absolute 0.70 Thousand/µL      Eosinophils Absolute 0.11 Thousand/µL      Basophils Absolute 0.02 Thousands/µL                  No orders to display Procedures  ECG 12 Lead Documentation Only    Date/Time: 9/8/2023 10:50 PM    Performed by: Eulalia Arana MD  Authorized by: Eulalia Arana MD    ECG reviewed by me, the ED Provider: yes    Patient location:  ED  Previous ECG:     Previous ECG:  Compared to current    Comparison ECG info:  7/5/2023    Comparison to cardiac monitor: Yes    Rate:     ECG rate:  95    ECG rate assessment: normal    Rhythm:     Rhythm: sinus rhythm    Ectopy:     Ectopy: none    QRS:     QRS axis:  Left  ST segments:     ST segments:  Normal  T waves:     T waves: non-specific    Q waves:     Q waves:  III and aVF          ED Course  ED Course as of 09/09/23 0336   Fri Sep 08, 2023   2249 DDx includes but not limited to: gastroenteritis, SBO, electrolyte imbalance, ACS, UTI   2349 CBC/CMP, lipase and lactic acid unremarkable. 2350 hs TnI 0hr: 5  Will await 2H troponin for comparison. Sat Sep 09, 2023   0248 hs TnI 2hr: 5  Stable. 8345 Urine positive for nitrites. Will treat with Cephalexin and discharge back to her facility. MDM    See ED course for MDM. Disposition  Final diagnoses:   Vomiting   UTI (urinary tract infection)     Time reflects when diagnosis was documented in both MDM as applicable and the Disposition within this note     Time User Action Codes Description Comment    9/9/2023  3:05 AM Eulalia Arana Add [R11.10] Vomiting     9/9/2023  3:27 AM Eulalia Arana Add [N39.0] UTI (urinary tract infection)       ED Disposition     ED Disposition   Discharge    Condition   Stable    Date/Time   Sat Sep 9, 2023  3:05 AM    Comment   Belgica Schrader discharge to home/self care.                Follow-up Information     Follow up With Specialties Details Why [id number] David Kevin MD Internal Medicine In 2 days  [address] [address]  [phone]            Patient's Medications   Discharge Prescriptions    CEPHALEXIN (KEFLEX) 500 MG CAPSULE Take 1 capsule (500 mg total) by mouth every 12 (twelve) hours for 6 days       Start Date: 9/9/2023  End Date: 9/15/2023       Order Dose: 500 mg       Quantity: 12 capsule    Refills: 0     No discharge procedures on file. PDMP Review       Value Time User    PDMP Reviewed  Yes 8/12/2023  3:45 PM Page Dela Cruz, [address] [place] [address]           ED Provider  Attending physically available and evaluated Hussein Dowling. I managed the patient along with the ED Attending.     Electronically Signed by       Brandi Almanza MD  09/09/23 6898

## 2023-09-09 NOTE — ED ATTENDING ATTESTATION
9/8/2023  I, Daina Fuentes MD, saw and evaluated the patient. I have discussed the patient with the resident/non-physician practitioner and agree with the resident's/non-physician practitioner's findings, Plan of Care, and MDM as documented in the resident's/non-physician practitioner's note, except where noted. All available labs and Radiology studies were reviewed. I was present for key portions of any procedure(s) performed by the resident/non-physician practitioner and I was immediately available to provide assistance. At this point I agree with the current assessment done in the Emergency Department. I have conducted an independent evaluation of this patient a history and physical is as follows:    80-year-old female from nursing facility for evaluation of what they described as 3 episodes of vomiting within 10 minutes. No blood noted. Patient has a history of dementia and cannot provide much history. When asked if she has any pain she replies "I do not know."  She does not appear in any distress. Her vitals are normal.  His membranes are somewhat dry. Her abdomen is soft without any definite tenderness. She has a recent history of C2 fracture, vertebral dissection, multiple rib fractures, right radius fracture after a fall at her nursing facility. The patient had vomited on her collar and arrived without one. We replaced an Aspen collar on arrival.    ED Course  ED Course as of 09/09/23 0417   Fri Sep 08, 2023   2332 Lab work thus far unremarkable. Normal CBC, CMP, lipase, lactic.   2358 hs TnI 0hr: 5   Sat Sep 09, 2023   0248 hs TnI 2hr: 5   0248 Delta 2hr hsTnI: 0   0327 WBC, UA(!): 10-20   0327 Bacteria, UA: Occasional   0327 Nitrite, UA(!): Positive   0327 Will treat with keflex. Plan discharge back to facility.          Critical Care Time  Procedures

## 2023-09-09 NOTE — TELEPHONE ENCOUNTER
Patient vomtting 3 times last 10 minutes    Page on call    [facility]  [phone]  [facility]

## 2023-09-11 ENCOUNTER — NURSING HOME VISIT (OUTPATIENT)
Dept: GERIATRICS | Facility: OTHER | Age: 88
End: 2023-09-11
Payer: MEDICARE

## 2023-09-11 ENCOUNTER — TELEPHONE (OUTPATIENT)
Dept: NEUROSURGERY | Facility: CLINIC | Age: 88
End: 2023-09-11

## 2023-09-11 VITALS
HEART RATE: 78 BPM | BODY MASS INDEX: 23 KG/M2 | WEIGHT: 138.2 LBS | RESPIRATION RATE: 18 BRPM | TEMPERATURE: 97.7 F | DIASTOLIC BLOOD PRESSURE: 68 MMHG | OXYGEN SATURATION: 96 % | SYSTOLIC BLOOD PRESSURE: 122 MMHG

## 2023-09-11 DIAGNOSIS — I10 HYPERTENSION, ESSENTIAL: ICD-10-CM

## 2023-09-11 DIAGNOSIS — S25.102A: ICD-10-CM

## 2023-09-11 DIAGNOSIS — I60.9 SAH (SUBARACHNOID HEMORRHAGE) (HCC): ICD-10-CM

## 2023-09-11 DIAGNOSIS — I77.74 VERTEBRAL ARTERY DISSECTION (HCC): Primary | ICD-10-CM

## 2023-09-11 DIAGNOSIS — S12.191D OTHER CLOSED NONDISPLACED FRACTURE OF SECOND CERVICAL VERTEBRA WITH ROUTINE HEALING, SUBSEQUENT ENCOUNTER: ICD-10-CM

## 2023-09-11 DIAGNOSIS — F01.B4 MODERATE VASCULAR DEMENTIA WITH ANXIETY (HCC): ICD-10-CM

## 2023-09-11 DIAGNOSIS — S52.301S: ICD-10-CM

## 2023-09-11 LAB
BACTERIA UR CULT: ABNORMAL
BACTERIA UR CULT: ABNORMAL

## 2023-09-11 PROCEDURE — 99309 SBSQ NF CARE MODERATE MDM 30: CPT | Performed by: NURSE PRACTITIONER

## 2023-09-11 NOTE — PROGRESS NOTES
[address] [address]  [phone]  FACILITY: [facility]  Code 31 (STR)      NAME: Francisco Javier Marlow  AGE: 80 y.o. 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 Hypertension, essential  Assessment & Plan:  · BP stable   · Not currently on medications for this   · Denies dizziness/cp/sob on exam   · Continue to monitor vitals at rehab      3. Moderate vascular dementia with anxiety ([address])  Assessment & Plan:  · AAO X1  · Very hard of hearing  · Able to make her needs known  · Does not have capacity to make her own medical decisions her son Kirsten Wolf is POA  Provide redirection, reorientation, distraction techniques  Fall Precautions  Assist with ADLs/IADLs  Avoid deliriogenic medications such as tramadol, benzodiazepines, anticholinergics, benadryl  Encourage Hydration/ Nutrition  Implement sleep hygiene, limit night time interuptions   Encourage participation in group activities when appropriate         4. SAH (subarachnoid hemorrhage) (HCC)  Assessment & Plan:  · CTA head with hyperdense material seen in the right ambient cistern compatible with acute subarachnoid hemorrhage  · Serial CT head revealed stability  · Reviewed CT head 8/10 and found complete resolution of SAH and persistent trace IVH bilaterally  · Neuro recommends to continue DAPT      5.  Other closed nondisplaced fracture of second cervical vertebra with routine healing, subsequent encounter  Assessment & Plan:  · CTA head with hyperdense material seen in the right ambient cistern compatible with acute subarachnoid hemorrhage  · Serial CT head revealed stability  · Reviewed CT head 8/10 and found complete resolution of SAH and persistent trace IVH bilaterally  · Neuro recommends to continue DAPT      6. Closed fracture of shaft of right radius, unspecified fracture morphology, sequela  Assessment & Plan:  · Continue right upper extremity splint  · Pain management as ordered  · Outpatient follow-up with orthopedics this week         All medications and routine orders were reviewed and updated as needed. Chief Complaint     STR follow up visit    Past Medical and Surgica History      Past Medical History:   Diagnosis Date   • Chronic pain disorder    • Hypertension    • Low back pain      No past surgical history on file. Allergies   Allergen Reactions   • Celecoxib Other (See Comments)          History of Present Illness     Heidi Martines is a 80 y.o. female admitted to [address] [place] [address] following hospital stay for unwitnessed fall with multiple injuries. Patient has a past medical Hx including but not limited to HTN/HLD, osteoporosis, chronic low back pain, ambulatory dysfunction, constipation, cognitive impairment. Patient is seen in collaboration with nursing for medical mgmt and STR follow up. Patient initially presented to hospital on 7/31/2023 after an unwitnessed fall at her [facility]) with multiple injuries:  Right radial fracture  C2 cervical fracture  Multiple rib fractures  Closed fracture of right scapula  Closed wedge compression fracture of T3 vertebra  Vertebral artery dissection  Subarachnoid hemorrhage    Patient  required treatment in the ICU. She was followed by neurosurgery and trauma services. Patient was started on dual antiplatelet therapy and statins after cleared by neurosurgery. Patient recommended CTA 1 week post discharge. Patient required pain management for multiple fractures which did not require any operative intervention. Patient also had cervical fracture and is to remain in c-collar. Patient recommended discharge to postacute rehab. Seen and examined oob at bedside, Patient does not appear in any distress. Patient is a poor historian due to cognitive impairment, she is AAO to self only. She is very Hard of hearing. Patient states she is feeling "good". She ate about 1/4 of her lunch, states she did not want to finish the rest.  She denies pain on exam. Patient is eating well per staff. She is actively participating in [employer]. Staff have no concerns at this time. The patient's allergies, past medical, surgical, social and family history were reviewed and unchanged. Review of Systems     Review of Systems   Unable to perform ROS: Dementia         Objective     Vitals:   Vitals:    09/11/23 1100   BP: 122/68   Pulse: 78   Resp: 18   Temp: 97.7 °F (36.5 °C)   SpO2: 96%         Physical Exam  Vitals and nursing note reviewed. Constitutional:       General: She is not in acute distress. Appearance: Normal appearance. She is not ill-appearing. HENT:      Head: Normocephalic and atraumatic. Nose: No congestion or rhinorrhea. Mouth/Throat:      Mouth: Mucous membranes are moist.   Eyes:      General: No scleral icterus. Conjunctiva/sclera: Conjunctivae normal.      Pupils: Pupils are equal, round, and reactive to light. Neck:      Comments: C-Collar in place  Cardiovascular:      Rate and Rhythm: Normal rate and regular rhythm. Pulses: Normal pulses. Heart sounds: Normal heart sounds. No murmur heard. Pulmonary:      Effort: Pulmonary effort is normal. No respiratory distress. Breath sounds: Normal breath sounds. No wheezing, rhonchi or rales. Abdominal:      General: Bowel sounds are normal. There is no distension. Palpations: Abdomen is soft. There is no mass. Tenderness: There is no abdominal tenderness. Hernia: No hernia is present.    Musculoskeletal: General: No swelling or tenderness. Lymphadenopathy:      Cervical: No cervical adenopathy. Skin:     General: Skin is warm and dry. Coloration: Skin is not pale. Findings: No rash. Comments: Left eyebrow healed  laceration is clean/dry, well approximated   Scattered busing throughout   Right arm in splint  C-collar in place   Neurological:      General: No focal deficit present. Mental Status: She is alert. Mental status is at baseline. She is disoriented. Motor: Weakness present. Gait: Gait abnormal.      Comments: [place]IN[place] WALLACE  Very hard of hearing  Does not know her age or where she is could not tell me the date   Psychiatric:         Mood and Affect: Mood normal.         Behavior: Behavior normal.         Pertinent Laboratory/Diagnostic Studies:     Reviewed in facility chart      Current Medications   Medications reviewed and updated see facility [facility] for details.       Current Outpatient Medications:   •  acetaminophen (TYLENOL) 325 mg tablet, Take 975 mg by mouth every 8 (eight) hours, Disp: , Rfl:   •  aspirin 81 mg chewable tablet, Chew 1 tablet (81 mg total) daily, Disp: 30 tablet, Rfl: 0  •  atorvastatin (LIPITOR) 10 mg tablet, Take 10 mg by mouth daily, Disp: , Rfl:   •  B Complex Vitamins (VITAMIN-B COMPLEX PO), Take 1 tablet by mouth, Disp: , Rfl:   •  Calcium Carb-Cholecalciferol (Calcium 600/Vitamin D3) 600-20 MG-MCG TABS, Take 1 tablet by mouth in the morning, Disp: , Rfl:   •  cephalexin (KEFLEX) 500 mg capsule, Take 1 capsule (500 mg total) by mouth every 12 (twelve) hours for 6 days, Disp: 12 capsule, Rfl: 0  •  cholecalciferol (VITAMIN D3) 1,000 units tablet, Take 1,000 Units by mouth daily, Disp: , Rfl:   •  clopidogrel (PLAVIX) 75 mg tablet, Take 1 tablet (75 mg total) by mouth daily, Disp: 30 tablet, Rfl: 0  •  Cyanocobalamin (Vitamin B 12) 100 MCG LOZG, Take by mouth, Disp: , Rfl:   •  docusate sodium (COLACE) 100 mg capsule, Take 100 mg by mouth 2 (two) times a day as needed for constipation, Disp: , Rfl:   •  furosemide (LASIX) 20 mg tablet, Take 30 mg by mouth daily 30 mg daily = 1.5tabs, Disp: , Rfl:   •  oxyCODONE (Roxicodone) 5 immediate release tablet, Take 0.5 tablets (2.5 mg total) by mouth every 4 (four) hours as needed for moderate pain or severe pain Max Daily Amount: 15 mg, Disp: , Rfl: 0  •  potassium chloride (Klor-Con) 10 mEq tablet, Take 20 mEq by mouth in the morning, Disp: , Rfl:   •  senna (SENOKOT) 8.6 mg, Take 2 tablets (17.2 mg total) by mouth daily at bedtime, Disp: 10 tablet, Rfl: 0     Please note:  Voice-recognition software may have been used in the preparation of this document. Occasional wrong word or "sound-alike" substitutions may have occurred due to the inherent limitations of voice recognition software. Interpretation should be guided by context.          EDI Fontaine

## 2023-09-11 NOTE — TELEPHONE ENCOUNTER
Received request from GERARDO MAE to contact [facility] to discuss her current medication regimen and the need for referral to vascular surgery. Recent CTA reveals subclavian vessel involvement which is outside [id number] [facility] scope. Placed referral and contacted [facility] to advise. Spoke with her nurse Salma Yates who confirmed she remains on DAPT. Faxed referral to vascular surgery to [address] [place] [address] unit [phone]. Confirmed they are aware of the date/time/location of her follow-up 9/18. She was appreciative.

## 2023-09-11 NOTE — ASSESSMENT & PLAN NOTE
· AAO X1  · Very hard of hearing  · Able to make her needs known  · Does not have capacity to make her own medical decisions her son Narciso Deluna is POA  Provide redirection, reorientation, distraction techniques  Fall Precautions  Assist with ADLs/IADLs  Avoid deliriogenic medications such as tramadol, benzodiazepines, anticholinergics, benadryl  Encourage Hydration/ Nutrition  Implement sleep hygiene, limit night time interuptions   Encourage participation in group activities when appropriate

## 2023-09-11 NOTE — ASSESSMENT & PLAN NOTE
· S/p unwitnessed fall with CT imaging revealing Vertebral artery dissection   · Neurosurgery following while inpatient and cleared to start DAPT  · Continue Aspirin Plavix and statin  · Patient to follow-up with neurosurgery and repeat CT of the head and NeuroSx on 9/11/23  · Continue DAPT  · OP f/u with NeuroSx 9/18

## 2023-09-11 NOTE — ASSESSMENT & PLAN NOTE
· BP stable   · Not currently on medications for this   · Denies dizziness/cp/sob on exam   · Continue to monitor vitals at rehab

## 2023-09-13 NOTE — TELEPHONE ENCOUNTER
Spoke with GERARDO AHUJA directly regarding xray results. He reviewed imaging and revealed no changes from prior exam. Since patients POA does not wish to follow-up or have any additional imaging at this time will await communication from facility or POA to reschedule.

## 2023-09-20 ENCOUNTER — NURSING HOME VISIT (OUTPATIENT)
Dept: GERIATRICS | Facility: OTHER | Age: 88
End: 2023-09-20
Payer: MEDICARE

## 2023-09-20 VITALS
HEART RATE: 72 BPM | DIASTOLIC BLOOD PRESSURE: 66 MMHG | OXYGEN SATURATION: 97 % | TEMPERATURE: 97.9 F | SYSTOLIC BLOOD PRESSURE: 127 MMHG | RESPIRATION RATE: 18 BRPM | WEIGHT: 138.6 LBS | BODY MASS INDEX: 23.06 KG/M2

## 2023-09-20 DIAGNOSIS — I60.9 SAH (SUBARACHNOID HEMORRHAGE) (HCC): ICD-10-CM

## 2023-09-20 DIAGNOSIS — I10 HYPERTENSION, ESSENTIAL: ICD-10-CM

## 2023-09-20 DIAGNOSIS — I77.74 VERTEBRAL ARTERY DISSECTION (HCC): Primary | ICD-10-CM

## 2023-09-20 DIAGNOSIS — S52.301S: ICD-10-CM

## 2023-09-20 DIAGNOSIS — R26.2 AMBULATORY DYSFUNCTION: ICD-10-CM

## 2023-09-20 DIAGNOSIS — S12.191D OTHER CLOSED NONDISPLACED FRACTURE OF SECOND CERVICAL VERTEBRA WITH ROUTINE HEALING, SUBSEQUENT ENCOUNTER: ICD-10-CM

## 2023-09-20 DIAGNOSIS — F01.B4 MODERATE VASCULAR DEMENTIA WITH ANXIETY (HCC): ICD-10-CM

## 2023-09-20 PROCEDURE — 99309 SBSQ NF CARE MODERATE MDM 30: CPT | Performed by: NURSE PRACTITIONER

## 2023-09-20 NOTE — PROGRESS NOTES
[facility]  [address] [address]  [phone]  FACILITY: [facility]  Code 31 (STR)      NAME: Candida Lemus  AGE: 80 y.o. SEX: female CODE STATUS: No CPR    DATE OF ENCOUNTER: 09/20/23    Assessment and Plan     1. Vertebral artery dissection [facility])  Assessment & Plan:  · S/p unwitnessed fall with CT imaging revealing Vertebral artery dissection   · Neurosurgery following while inpatient and cleared to start DAPT  · Continue Aspirin Plavix and statin  · Patient to follow-up with neurosurgery and repeat CT of the head and NeuroSx on 9/11/23  · Continue DAPT  · OP f/u with NeuroSx 9/18---> recommended Vascular Sx referral       2. Hypertension, essential  Assessment & Plan:  · BP stable   · Not currently on medications for this   · Denies dizziness/cp/sob on exam   · Continue to monitor vitals at rehab      3. Moderate vascular dementia with anxiety ([address])  Assessment & Plan:  · AAO X1  · Very hard of hearing  · Able to make her needs known  · Does not have capacity to make her own medical decisions her son Pk Mccracken is POA  Provide redirection, reorientation, distraction techniques  Fall Precautions  Assist with ADLs/IADLs  Avoid deliriogenic medications such as tramadol, benzodiazepines, anticholinergics, benadryl  Encourage Hydration/ Nutrition  Implement sleep hygiene, limit night time interuptions   Encourage participation in group activities when appropriate         4. SAH (subarachnoid hemorrhage) (HCC)  Assessment & Plan:  · CTA head with hyperdense material seen in the right ambient cistern compatible with acute subarachnoid hemorrhage  · Serial CT head revealed stability  · Reviewed CT head 8/10 and found complete resolution of SAH and persistent trace IVH bilaterally  · Neuro recommends to continue DAPT      5.  Other closed nondisplaced fracture of second cervical vertebra with routine healing, subsequent encounter  Assessment & Plan:  · CT revealing acute minimally displaced fracture through the base of C2 vertebral body extending into the inferior endplate into both pedicles to the level of the transverse foramen and into the right transverse process  · CTA revealing vertebral artery dissection  · Continue [place] collar  ·       6. Closed fracture of shaft of right radius, unspecified fracture morphology, sequela  Assessment & Plan:  · Continue right upper extremity splint  · Pain management as ordered  · Outpatient follow-up with orthopedics this week      7. Ambulatory dysfunction  Assessment & Plan:  • Multifactorial  • Continue PT/OT  • Fall Precautions  • Ensure adequate nutrition/hydration   • Monitor CBC/BMP   •  following for d/c planning            All medications and routine orders were reviewed and updated as needed. Chief Complaint     STR follow up visit    Past Medical and Surgica History      Past Medical History:   Diagnosis Date   • Chronic pain disorder    • Hypertension    • Low back pain      No past surgical history on file. Allergies   Allergen Reactions   • Celecoxib Other (See Comments)          History of Present Illness     Buck Ambrose is a 80 y.o. female admitted to [address] [place] [address] following hospital stay for unwitnessed fall with multiple injuries. Patient has a past medical Hx including but not limited to HTN/HLD, osteoporosis, chronic low back pain, ambulatory dysfunction, constipation, cognitive impairment. Patient is seen in collaboration with nursing for medical mgmt and STR follow up. Patient initially presented to hospital on 7/31/2023 after an unwitnessed fall at her [facility]) with multiple injuries:  Right radial fracture  C2 cervical fracture  Multiple rib fractures  Closed fracture of right scapula  Closed wedge compression fracture of T3 vertebra  Vertebral artery dissection  Subarachnoid hemorrhage    Patient  required treatment in the ICU. She was followed by neurosurgery and trauma services. Patient was started on dual antiplatelet therapy and statins after cleared by neurosurgery. Patient recommended CTA 1 week post discharge. Patient required pain management for multiple fractures which did not require any operative intervention. Patient also had cervical fracture and is to remain in c-collar. Patient recommended discharge to postacute rehab. Seen and examined oob at bedside, Patient does not appear in any distress. Patient is a poor historian due to cognitive impairment, she is AAO to self only. She is very Hard of hearing. Patient states she is feeling "so-so". She is feeding herself lunch. She denies pain on exam. Patient is eating well per staff. She is actively participating in [employer]. Staff have no concerns at this time. The patient's allergies, past medical, surgical, social and family history were reviewed and unchanged. Review of Systems     Review of Systems   Unable to perform ROS: Dementia         Objective     Vitals:   Vitals:    09/20/23 0951   BP: 127/66   Pulse: 72   Resp: 18   Temp: 97.9 °F (36.6 °C)   SpO2: 97%         Physical Exam  Vitals and nursing note reviewed. Constitutional:       General: She is not in acute distress. Appearance: Normal appearance. She is not ill-appearing. HENT:      Head: Normocephalic and atraumatic. Nose: No congestion or rhinorrhea. Mouth/Throat:      Mouth: Mucous membranes are moist.   Eyes:      General: No scleral icterus. Conjunctiva/sclera: Conjunctivae normal.      Pupils: Pupils are equal, round, and reactive to light. Neck:      Comments: C-Collar in place  Cardiovascular:      Rate and Rhythm: Normal rate and regular rhythm. Pulses: Normal pulses. Heart sounds: Normal heart sounds. No murmur heard. Pulmonary:      Effort: Pulmonary effort is normal. No respiratory distress. Breath sounds: Normal breath sounds. No wheezing, rhonchi or rales.    Abdominal:      General: Bowel sounds are normal. There is no distension. Palpations: Abdomen is soft. There is no mass. Tenderness: There is no abdominal tenderness. Hernia: No hernia is present. Musculoskeletal:         General: No swelling or tenderness. Lymphadenopathy:      Cervical: No cervical adenopathy. Skin:     General: Skin is warm and dry. Coloration: Skin is not pale. Findings: No rash. Comments: Left eyebrow healed  laceration is clean/dry, well approximated   Scattered busing throughout   Right arm in splint  C-collar in place   Neurological:      General: No focal deficit present. Mental Status: She is alert. Mental status is at baseline. She is disoriented. Motor: Weakness present. Gait: Gait abnormal.      Comments: CHAPINCITO WALLACE  Very hard of hearing  Does not know her age or where she is could not tell me the date   Psychiatric:         Mood and Affect: Mood normal.         Behavior: Behavior normal.         Pertinent Laboratory/Diagnostic Studies:     Reviewed in facility chart      Current Medications   Medications reviewed and updated see facility [facility] for details.       Current Outpatient Medications:   •  acetaminophen (TYLENOL) 325 mg tablet, Take 975 mg by mouth every 8 (eight) hours, Disp: , Rfl:   •  aspirin 81 mg chewable tablet, Chew 1 tablet (81 mg total) daily, Disp: 30 tablet, Rfl: 0  •  atorvastatin (LIPITOR) 10 mg tablet, Take 10 mg by mouth daily, Disp: , Rfl:   •  B Complex Vitamins (VITAMIN-B COMPLEX PO), Take 1 tablet by mouth, Disp: , Rfl:   •  Calcium Carb-Cholecalciferol (Calcium 600/Vitamin D3) 600-20 MG-MCG TABS, Take 1 tablet by mouth in the morning, Disp: , Rfl:   •  cholecalciferol (VITAMIN D3) 1,000 units tablet, Take 1,000 Units by mouth daily, Disp: , Rfl:   •  clopidogrel (PLAVIX) 75 mg tablet, Take 1 tablet (75 mg total) by mouth daily, Disp: 30 tablet, Rfl: 0  •  Cyanocobalamin (Vitamin B 12) 100 MCG LOZG, Take by mouth, Disp: , Rfl:   •  docusate sodium (COLACE) 100 mg capsule, Take 100 mg by mouth 2 (two) times a day as needed for constipation, Disp: , Rfl:   •  furosemide (LASIX) 20 mg tablet, Take 30 mg by mouth daily 30 mg daily = 1.5tabs, Disp: , Rfl:   •  oxyCODONE (Roxicodone) 5 immediate release tablet, Take 0.5 tablets (2.5 mg total) by mouth every 4 (four) hours as needed for moderate pain or severe pain Max Daily Amount: 15 mg, Disp: , Rfl: 0  •  potassium chloride (Klor-Con) 10 mEq tablet, Take 20 mEq by mouth in the morning, Disp: , Rfl:   •  senna (SENOKOT) 8.6 mg, Take 2 tablets (17.2 mg total) by mouth daily at bedtime, Disp: 10 tablet, Rfl: 0     Please note:  Voice-recognition software may have been used in the preparation of this document. Occasional wrong word or "sound-alike" substitutions may have occurred due to the inherent limitations of voice recognition software. Interpretation should be guided by context.          EDI Goel

## 2023-09-20 NOTE — ASSESSMENT & PLAN NOTE
· CT revealing acute minimally displaced fracture through the base of C2 vertebral body extending into the inferior endplate into both pedicles to the level of the transverse foramen and into the right transverse process  · CTA revealing vertebral artery dissection  · Continue [place] collar  ·

## 2023-09-20 NOTE — ASSESSMENT & PLAN NOTE
· S/p unwitnessed fall with CT imaging revealing Vertebral artery dissection   · Neurosurgery following while inpatient and cleared to start DAPT  · Continue Aspirin Plavix and statin  · Patient to follow-up with neurosurgery and repeat CT of the head and NeuroSx on 9/11/23  · Continue DAPT  · OP f/u with NeuroSx 9/18---> recommended Vascular Sx referral

## 2023-09-26 ENCOUNTER — NURSING HOME VISIT (OUTPATIENT)
Dept: GERIATRICS | Facility: OTHER | Age: 88
End: 2023-09-26
Payer: MEDICARE

## 2023-09-26 VITALS
HEART RATE: 70 BPM | WEIGHT: 138.6 LBS | TEMPERATURE: 97.9 F | DIASTOLIC BLOOD PRESSURE: 67 MMHG | RESPIRATION RATE: 18 BRPM | BODY MASS INDEX: 23.06 KG/M2 | SYSTOLIC BLOOD PRESSURE: 130 MMHG | OXYGEN SATURATION: 97 %

## 2023-09-26 DIAGNOSIS — S12.191D OTHER CLOSED NONDISPLACED FRACTURE OF SECOND CERVICAL VERTEBRA WITH ROUTINE HEALING, SUBSEQUENT ENCOUNTER: Primary | ICD-10-CM

## 2023-09-26 DIAGNOSIS — F01.B4 MODERATE VASCULAR DEMENTIA WITH ANXIETY (HCC): ICD-10-CM

## 2023-09-26 DIAGNOSIS — R26.2 AMBULATORY DYSFUNCTION: Primary | ICD-10-CM

## 2023-09-26 DIAGNOSIS — S12.191D OTHER CLOSED NONDISPLACED FRACTURE OF SECOND CERVICAL VERTEBRA WITH ROUTINE HEALING, SUBSEQUENT ENCOUNTER: ICD-10-CM

## 2023-09-26 DIAGNOSIS — I10 HYPERTENSION, ESSENTIAL: ICD-10-CM

## 2023-09-26 DIAGNOSIS — I77.74 VERTEBRAL ARTERY DISSECTION (HCC): ICD-10-CM

## 2023-09-26 DIAGNOSIS — I60.9 SAH (SUBARACHNOID HEMORRHAGE) (HCC): ICD-10-CM

## 2023-09-26 DIAGNOSIS — S52.301S: ICD-10-CM

## 2023-09-26 PROCEDURE — 99309 SBSQ NF CARE MODERATE MDM 30: CPT | Performed by: NURSE PRACTITIONER

## 2023-09-26 NOTE — ASSESSMENT & PLAN NOTE
· Continue right upper extremity splint ---> changed to cast by Ortho   · Pain management as ordered  · Outpatient follow-up with orthopedics tomorrow hopefully to remove cast

## 2023-09-26 NOTE — ASSESSMENT & PLAN NOTE
• Multifactorial  • Continue PT/OT  • Fall Precautions  • Ensure adequate nutrition/hydration   • Monitor CBC/BMP ----> has been stable at rehab  •  following for d/c planning

## 2023-09-26 NOTE — PROGRESS NOTES
[facility]  [address] [address]  [phone]  FACILITY: [facility]  Code 31 (STR)      NAME: Nilson Hurst  AGE: 80 y.o. SEX: female CODE STATUS: No CPR    DATE OF ENCOUNTER: 09/26/23    Assessment and Plan     1. Ambulatory dysfunction  Assessment & Plan:  • Multifactorial  • Continue PT/OT  • Fall Precautions  • Ensure adequate nutrition/hydration   • Monitor CBC/BMP ----> has been stable at rehab  •  following for d/c planning         2. Closed fracture of shaft of right radius, unspecified fracture morphology, sequela  Assessment & Plan:  · Continue right upper extremity splint ---> changed to cast by Ortho   · Pain management as ordered  · Outpatient follow-up with orthopedics tomorrow hopefully to remove cast       3. Other closed nondisplaced fracture of second cervical vertebra with routine healing, subsequent encounter  Assessment & Plan:  · CT revealing acute minimally displaced fracture through the base of C2 vertebral body extending into the inferior endplate into both pedicles to the level of the transverse foramen and into the right transverse process  · CTA revealing vertebral artery dissection  · Continue [place] collar        4. SAH (subarachnoid hemorrhage) ([facility])  Assessment & Plan:  · CTA head with hyperdense material seen in the right ambient cistern compatible with acute subarachnoid hemorrhage  · Serial CT head revealed stability  · Reviewed CT head 8/10 and found complete resolution of SAH and persistent trace IVH bilaterally  · Neuro recommends to continue DAPT      5.  Vertebral artery dissection [facility])  Assessment & Plan:  · S/p unwitnessed fall with CT imaging revealing Vertebral artery dissection   · Neurosurgery following while inpatient and cleared to start DAPT  · Continue Aspirin Plavix and statin  · Patient to follow-up with neurosurgery and repeat CT of the head and NeuroSx on 9/11/23  · Continue DAPT  · OP f/u with NeuroSx 9/18---> recommended Vascular Sx referral due to extension into subclavian area      · Spoke with Son/POA today, he would like a more palliative approach to her care and does not want any furthur surgeries or invasive procedures- he asked to cancel to [address] appointment. · He is in agreement with NeuroSx follow up on 10/6 to see if her C- Collar can be removed. 6. Moderate vascular dementia with anxiety ([address])  Assessment & Plan:  · AAO X1  · Very hard of hearing  · Able to make her needs known  · Does not have capacity to make her own medical decisions her son Rachel Whipple is POA  Provide redirection, reorientation, distraction techniques  Fall Precautions  Assist with ADLs/IADLs  Avoid deliriogenic medications such as tramadol, benzodiazepines, anticholinergics, benadryl  Encourage Hydration/ Nutrition  Implement sleep hygiene, limit night time interuptions   Encourage participation in group activities when appropriate     Son/POA would like to focus on comfort at this point, not interested in heroics or surgical interventions. 7. Hypertension, essential  Assessment & Plan:  · BP stable   · Not currently on medications for this   · Denies dizziness/cp/sob on exam   · Continue to monitor vitals at rehab         All medications and routine orders were reviewed and updated as needed. Chief Complaint     STR follow up visit    Past Medical and Surgica History      Past Medical History:   Diagnosis Date   • Chronic pain disorder    • Hypertension    • Low back pain      No past surgical history on file. Allergies   Allergen Reactions   • Celecoxib Other (See Comments)          History of Present Illness     Beka Torres is a 80 y.o. female admitted to [address] [place] [address] following hospital stay for unwitnessed fall with multiple injuries. Patient has a past medical Hx including but not limited to HTN/HLD, osteoporosis, chronic low back pain, ambulatory dysfunction, constipation, cognitive impairment. Patient is seen in collaboration with nursing for medical mgmt and STR follow up. Patient initially presented to hospital on 7/31/2023 after an unwitnessed fall at her [facility]) with multiple injuries:  Right radial fracture  C2 cervical fracture  Multiple rib fractures  Closed fracture of right scapula  Closed wedge compression fracture of T3 vertebra  Vertebral artery dissection  Subarachnoid hemorrhage    Patient  required treatment in the ICU. She was followed by neurosurgery and trauma services. Patient was started on dual antiplatelet therapy and statins after cleared by neurosurgery. Patient recommended CTA 1 week post discharge. Patient required pain management for multiple fractures which did not require any operative intervention. Patient also had cervical fracture and is to remain in c-collar. Patient recommended discharge to postacute rehab. Seen and examined at bedside, does not appear in distress. Patient offers no complaints today. Nursing states patient is still eating and drinking at times has some meal refusals. Patient continues to participate with therapy. Patient is a poor historian due to cognitive impairment she is alert and oriented to self only she is very hard of hearing. Spoke with patient's son/POA today on the phone. Son would prefer a more palliative/comfort care approach with his mother. He is in agreement with her orthopedic follow-up tomorrow to see if her cast can come off and would like her to see neurosurgery in October to see if her c-collar is able to come off, he does not want any further surgical or invasive procedures done. He would like to avoid multiple doctor visits. Discussed goals of care in further detail, he is in agreement with palliative care. Patient is scheduled for discharge back to her [facility] this coming Tuesday, will work with  to arrange palliative care to follow at her [facility].   Otherwise patient staff and son have no other questions or concerns at this time. The patient's allergies, past medical, surgical, social and family history were reviewed and unchanged. Review of Systems     Review of Systems   Unable to perform ROS: Dementia         Objective     Vitals:   Vitals:    09/26/23 1436   BP: 130/67   Pulse: 70   Resp: 18   Temp: 97.9 °F (36.6 °C)   SpO2: 97%         Physical Exam  Vitals and nursing note reviewed. Constitutional:       General: She is not in acute distress. Appearance: Normal appearance. She is not ill-appearing. HENT:      Head: Normocephalic and atraumatic. Nose: No congestion or rhinorrhea. Mouth/Throat:      Mouth: Mucous membranes are moist.   Eyes:      General: No scleral icterus. Conjunctiva/sclera: Conjunctivae normal.      Pupils: Pupils are equal, round, and reactive to light. Neck:      Comments: C-Collar in place  Cardiovascular:      Rate and Rhythm: Normal rate and regular rhythm. Pulses: Normal pulses. Heart sounds: Normal heart sounds. No murmur heard. Pulmonary:      Effort: Pulmonary effort is normal. No respiratory distress. Breath sounds: Normal breath sounds. No wheezing, rhonchi or rales. Abdominal:      General: Bowel sounds are normal. There is no distension. Palpations: Abdomen is soft. There is no mass. Tenderness: There is no abdominal tenderness. Hernia: No hernia is present. Musculoskeletal:         General: No swelling or tenderness. Lymphadenopathy:      Cervical: No cervical adenopathy. Skin:     General: Skin is warm and dry. Coloration: Skin is not pale. Findings: No rash. Comments: Left eyebrow healed  laceration is clean/dry, well approximated   Scattered busing throughout   Right arm in splint  C-collar in place   Neurological:      General: No focal deficit present. Mental Status: She is alert. Mental status is at baseline. She is disoriented.       Motor: Weakness present. Gait: Gait abnormal.      Comments: CHAPINCITO WALLACE  Very hard of hearing  Does not know her age or where she is could not tell me the date   Psychiatric:         Mood and Affect: Mood normal.         Behavior: Behavior normal.         Pertinent Laboratory/Diagnostic Studies:     Reviewed in facility chart      Current Medications   Medications reviewed and updated see facility [facility] for details.       Current Outpatient Medications:   •  acetaminophen (TYLENOL) 325 mg tablet, Take 975 mg by mouth every 8 (eight) hours, Disp: , Rfl:   •  aspirin 81 mg chewable tablet, Chew 1 tablet (81 mg total) daily, Disp: 30 tablet, Rfl: 0  •  atorvastatin (LIPITOR) 10 mg tablet, Take 10 mg by mouth daily, Disp: , Rfl:   •  B Complex Vitamins (VITAMIN-B COMPLEX PO), Take 1 tablet by mouth, Disp: , Rfl:   •  Calcium Carb-Cholecalciferol (Calcium 600/Vitamin D3) 600-20 MG-MCG TABS, Take 1 tablet by mouth in the morning, Disp: , Rfl:   •  cholecalciferol (VITAMIN D3) 1,000 units tablet, Take 1,000 Units by mouth daily, Disp: , Rfl:   •  clopidogrel (PLAVIX) 75 mg tablet, Take 1 tablet (75 mg total) by mouth daily, Disp: 30 tablet, Rfl: 0  •  Cyanocobalamin (Vitamin B 12) 100 MCG LOZG, Take by mouth, Disp: , Rfl:   •  docusate sodium (COLACE) 100 mg capsule, Take 100 mg by mouth 2 (two) times a day as needed for constipation, Disp: , Rfl:   •  furosemide (LASIX) 20 mg tablet, Take 30 mg by mouth daily 30 mg daily = 1.5tabs, Disp: , Rfl:   •  oxyCODONE (Roxicodone) 5 immediate release tablet, Take 0.5 tablets (2.5 mg total) by mouth every 4 (four) hours as needed for moderate pain or severe pain Max Daily Amount: 15 mg, Disp: , Rfl: 0  •  potassium chloride (Klor-Con) 10 mEq tablet, Take 20 mEq by mouth in the morning, Disp: , Rfl:   •  senna (SENOKOT) 8.6 mg, Take 2 tablets (17.2 mg total) by mouth daily at bedtime, Disp: 10 tablet, Rfl: 0     Please note:  Voice-recognition software may have been used in the preparation of this document. Occasional wrong word or "sound-alike" substitutions may have occurred due to the inherent limitations of voice recognition software. Interpretation should be guided by context.          EDI Taylor

## 2023-09-26 NOTE — ASSESSMENT & PLAN NOTE
Pt with severe dementia  Redirect, reorient and provide distraction techniques  Assist with adls/iadls  Cont supportive care  Fall precaution  PT/OT to eval/tx  Follow TADA approach:   Tolerate behavior that is not harmful  Anticipate needs & assist with adls/iadls  Do NOT agitate with confused pt & give positive reinforcements

## 2023-09-26 NOTE — ASSESSMENT & PLAN NOTE
Pt with hx of recurrent falls  Pt at risk for falls  Pt with multiple traumatic injuries with C2 cervical fx, closed fx of Rt scapula, multiple rib fracture, T3 vertebra, radial fx  Cont fall precaution  PT/OT to eval/tx  F/u outpatient with neurosurgery & trauma

## 2023-09-26 NOTE — ASSESSMENT & PLAN NOTE
· S/p unwitnessed fall with CT imaging revealing Vertebral artery dissection   · Neurosurgery following while inpatient and cleared to start DAPT  · Continue Aspirin Plavix and statin  · Patient to follow-up with neurosurgery and repeat CT of the head and NeuroSx on 9/11/23  · Continue DAPT  · OP f/u with NeuroSx 9/18---> recommended Vascular Sx referral due to extension into subclavian area      · Spoke with Son/POA today, he would like a more palliative approach to her care and does not want any furthur surgeries or invasive procedures- he asked to cancel to [address] [facility] appointment. · He is in agreement with NeuroSx follow up on 10/6 to see if her C- Collar can be removed.

## 2023-09-26 NOTE — ASSESSMENT & PLAN NOTE
· AAO X1  · Very hard of hearing  · Able to make her needs known  · Does not have capacity to make her own medical decisions her son Narciso Deluna is POA  Provide redirection, reorientation, distraction techniques  Fall Precautions  Assist with ADLs/IADLs  Avoid deliriogenic medications such as tramadol, benzodiazepines, anticholinergics, benadryl  Encourage Hydration/ Nutrition  Implement sleep hygiene, limit night time interuptions   Encourage participation in group activities when appropriate     Son/POA would like to focus on comfort at this point, not interested in heroics or surgical interventions.

## 2023-09-26 NOTE — ASSESSMENT & PLAN NOTE
Pt s/p fall  Pt with incidental finding of vertebral aa dissection  Pt evaluated by neurosurgery  Pt on asa, plavix and statin

## 2023-09-27 ENCOUNTER — APPOINTMENT (OUTPATIENT)
Dept: RADIOLOGY | Facility: AMBULARY SURGERY CENTER | Age: 88
End: 2023-09-27
Attending: ORTHOPAEDIC SURGERY
Payer: MEDICARE

## 2023-09-27 ENCOUNTER — OFFICE VISIT (OUTPATIENT)
Dept: OBGYN CLINIC | Facility: CLINIC | Age: 88
End: 2023-09-27
Payer: MEDICARE

## 2023-09-27 DIAGNOSIS — S52.601D CLOSED FRACTURE OF DISTAL ENDS OF RIGHT RADIUS AND ULNA WITH ROUTINE HEALING, SUBSEQUENT ENCOUNTER: Primary | ICD-10-CM

## 2023-09-27 DIAGNOSIS — S52.601S CLOSED FRACTURE OF DISTAL ENDS OF RIGHT RADIUS AND ULNA, SEQUELA: ICD-10-CM

## 2023-09-27 DIAGNOSIS — S52.501S CLOSED FRACTURE OF DISTAL ENDS OF RIGHT RADIUS AND ULNA, SEQUELA: ICD-10-CM

## 2023-09-27 DIAGNOSIS — S52.501D CLOSED FRACTURE OF DISTAL ENDS OF RIGHT RADIUS AND ULNA WITH ROUTINE HEALING, SUBSEQUENT ENCOUNTER: Primary | ICD-10-CM

## 2023-09-27 PROCEDURE — 73110 X-RAY EXAM OF WRIST: CPT

## 2023-09-27 PROCEDURE — 99214 OFFICE O/P EST MOD 30 MIN: CPT | Performed by: ORTHOPAEDIC SURGERY

## 2023-09-27 NOTE — PATIENT INSTRUCTIONS
80 y.o. female  with active right distal radius fracture in good alignment, DOI 7/23/23    Patient's right distal radius is healing well on x-ray with maintained alignment and height inclination and tilt  She may continue to use the platform walker at this time but not bear weight through the wrist at this time  She is only mildly tender to palpation along the distal radius with range of motion slightly restricted in flexion extension  At this time we would like her to begin occupational therapy for the hand as soon as possible as she was supposed to follow-up 4 weeks ago for cast removal she was in this cast for 7 weeks which is inappropriate and neglectful if the patient's caregivers were aware that the patient was to follow-up with us in 3 weeks and did not bring her for this appointment.   Ultimately I am not sure what happened as to why she did not come to see us 3 weeks ago  Follow-up in 4 weeks for repeat checkup on the right wrist and x-ray of the right wrist

## 2023-09-27 NOTE — PROGRESS NOTES
Assessment:  1.  Closed fracture of distal ends of right radius and ulna with routine healing, subsequent encounter  XR wrist 3+ vw right    Ambulatory Referral to PT/OT Hand Therapy        Patient Active Problem List   Diagnosis   • Aortic regurgitation   • Bilateral carotid artery disease (HCC)   • History of breast cancer   • Hypertension, essential   • Lumbar compression fracture (HCC)   • Mixed hyperlipidemia   • Osteoporosis   • Thoracic compression fracture (HCC)   • Sacroiliitis (HCC)   • Primary osteoarthritis of right hip   • Acute metabolic encephalopathy   • Orthostatic hypotension   • COVID-19   • Hyponatremia   • Unilateral inguinal hernia without obstruction or gangrene   • Bilateral leg edema   • Fracture of left ischial tuberosity (HCC)   • Ambulatory dysfunction   • Urinary frequency   • Thyroid lesion   • Slow transit constipation   • Radial fracture   • Fall   • C2 cervical fracture (HCC)   • Vertebral artery dissection (HCC)   • SAH (subarachnoid hemorrhage) ([facility])   • Multiple rib fractures   • Closed fracture of right scapula   • Laceration of left eyebrow   • Closed wedge compression fracture of T3 vertebra with routine healing   • Abnormal urinalysis   • Moderate vascular dementia with anxiety (HCC)   • Acute pain due to trauma       Plan:    80 y.o. female  with active right distal radius fracture in good alignment, DOI 7/23/23    • Patient's right distal radius is healing well on x-ray with maintained alignment and height inclination and tilt  • She may continue to use the platform walker at this time but not bear weight through the wrist at this time  • She is only mildly tender to palpation along the distal radius with range of motion slightly restricted in flexion extension  • At this time we would like her to begin occupational therapy for the hand as soon as possible as she was supposed to follow-up 4 weeks ago for cast removal she was in this cast for 7 weeks which is inappropriate and neglectful if the patient's caregivers were aware that the patient was to follow-up with us in 3 weeks and did not bring her for this appointment. Ultimately I am not sure what happened as to why she did not come to see us 3 weeks ago  • Follow-up in 4 weeks for repeat checkup on the right wrist and x-ray of the right wrist          Subjective:   Patient ID: Yoandy Wilks is a 80 y.o. female . HPI    Patient presents to the office for follow up of right distal radius fracture. For reasons unclear at this time patient missed her 3-week follow-up visit 4 weeks ago presents today in a cast that has been on for 7 weeks. She is unable to provide any meaningful history due to her dementia unable to find a meaningful verbal response to any question.   She presents unaccompanied by any family member or guardian or other adult    The following portions of the patient's history were reviewed and updated as appropriate: allergies, current medications, past family history, past social history, past surgical history and problem list.    Social History     Socioeconomic History   • Marital status: /Civil Union     Spouse name: Not on file   • Number of children: Not on file   • Years of education: Not on file   • Highest education level: Not on file   Occupational History   • Occupation: retired   Tobacco Use   • Smoking status: Never   • Smokeless tobacco: Never   Vaping Use   • Vaping Use: Never used   Substance and Sexual Activity   • Alcohol use: Never   • Drug use: Never   • Sexual activity: Never   Other Topics Concern   • Not on file   Social History Narrative    ** Merged History Encounter **         Lives with spouse   Is safe at home     Social Determinants of Health     Financial Resource Strain: Not on file   Food Insecurity: No Food Insecurity (8/23/2022)    Hunger Vital Sign    • Worried About Running Out of Food in the Last Year: Never true    • Ran Out of Food in the Last Year: Never true Transportation Needs: No Transportation Needs (8/23/2022)    PRAPARE - Transportation    • Lack of Transportation (Medical): No    • Lack of Transportation (Non-Medical): No   Physical Activity: Not on file   Stress: Not on file   Social Connections: Not on file   Intimate Partner Violence: Not on file   Housing Stability: Low Risk  (8/23/2022)    Housing Stability Vital Sign    • Unable to Pay for Housing in the Last Year: No    • Number of Places Lived in the Last Year: 1    • Unstable Housing in the Last Year: No     Past Medical History:   Diagnosis Date   • Chronic pain disorder    • Hypertension    • Low back pain      History reviewed. No pertinent surgical history.   Allergies   Allergen Reactions   • Celecoxib Other (See Comments)     Current Outpatient Medications on File Prior to Visit   Medication Sig Dispense Refill   • acetaminophen (TYLENOL) 325 mg tablet Take 975 mg by mouth every 8 (eight) hours     • aspirin 81 mg chewable tablet Chew 1 tablet (81 mg total) daily 30 tablet 0   • atorvastatin (LIPITOR) 10 mg tablet Take 10 mg by mouth daily     • B Complex Vitamins (VITAMIN-B COMPLEX PO) Take 1 tablet by mouth     • Calcium Carb-Cholecalciferol (Calcium 600/Vitamin D3) 600-20 MG-MCG TABS Take 1 tablet by mouth in the morning     • cholecalciferol (VITAMIN D3) 1,000 units tablet Take 1,000 Units by mouth daily     • clopidogrel (PLAVIX) 75 mg tablet Take 1 tablet (75 mg total) by mouth daily 30 tablet 0   • Cyanocobalamin (Vitamin B 12) 100 MCG LOZG Take by mouth     • docusate sodium (COLACE) 100 mg capsule Take 100 mg by mouth 2 (two) times a day as needed for constipation     • furosemide (LASIX) 20 mg tablet Take 30 mg by mouth daily 30 mg daily = 1.5tabs     • oxyCODONE (Roxicodone) 5 immediate release tablet Take 0.5 tablets (2.5 mg total) by mouth every 4 (four) hours as needed for moderate pain or severe pain Max Daily Amount: 15 mg  0   • potassium chloride (Klor-Con) 10 mEq tablet Take 20 mEq by mouth in the morning     • senna (SENOKOT) 8.6 mg Take 2 tablets (17.2 mg total) by mouth daily at bedtime 10 tablet 0   • [DISCONTINUED] Calcium-Magnesium-Vitamin D - MG-MG-UNIT TB24 Take 1 tablet by mouth     • [DISCONTINUED] naproxen sodium (ALEVE) 220 MG tablet Take 220 mg by mouth 2 (two) times a day with meals       No current facility-administered medications on file prior to visit. Review of Systems  See HPi    Objective: There were no vitals filed for this visit. Physical Exam    Right Hand Exam     Tenderness   The patient is experiencing tenderness in the dorsal area and radial area. Range of Motion   Wrist   Extension: abnormal   Flexion: abnormal   Pronation: normal   Supination: normal     Other   Erythema: absent  Scars: absent  Pulse: present            I have personally reviewed pertinent films in PACS and my interpretation is X-ray of the right wrist performed today demonstrates healing fracture of the distal radius is extra-articular there is callus summation there is no further interval impaction no dorsal tilting as compared to last x-ray. Procedures     None            Portions of the record may have been created with voice recognition software. Occasional wrong word or "sound a like" substitutions may have occurred due to the inherent limitations of voice recognition software. Read the chart carefully and recognize, using context, where substitutions have occurred.

## 2023-10-01 PROBLEM — S01.112A LACERATION OF LEFT EYEBROW: Status: RESOLVED | Noted: 2023-07-31 | Resolved: 2023-10-01

## 2023-10-02 ENCOUNTER — NURSING HOME VISIT (OUTPATIENT)
Dept: GERIATRICS | Facility: OTHER | Age: 88
End: 2023-10-02
Payer: MEDICARE

## 2023-10-02 VITALS
WEIGHT: 138.6 LBS | TEMPERATURE: 97.9 F | OXYGEN SATURATION: 97 % | HEART RATE: 68 BPM | BODY MASS INDEX: 23.06 KG/M2 | DIASTOLIC BLOOD PRESSURE: 69 MMHG | RESPIRATION RATE: 18 BRPM | SYSTOLIC BLOOD PRESSURE: 122 MMHG
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
## 2023-10-02 DIAGNOSIS — I60.9 SAH (SUBARACHNOID HEMORRHAGE) (HCC): ICD-10-CM

## 2023-10-02 DIAGNOSIS — F01.B4 MODERATE VASCULAR DEMENTIA WITH ANXIETY (HCC): ICD-10-CM

## 2023-10-02 DIAGNOSIS — I10 HYPERTENSION, ESSENTIAL: ICD-10-CM

## 2023-10-02 DIAGNOSIS — I77.74 VERTEBRAL ARTERY DISSECTION (HCC): ICD-10-CM

## 2023-10-02 DIAGNOSIS — S22.41XS CLOSED FRACTURE OF MULTIPLE RIBS OF RIGHT SIDE, SEQUELA: ICD-10-CM

## 2023-10-02 DIAGNOSIS — S12.191D OTHER CLOSED NONDISPLACED FRACTURE OF SECOND CERVICAL VERTEBRA WITH ROUTINE HEALING, SUBSEQUENT ENCOUNTER: ICD-10-CM

## 2023-10-02 DIAGNOSIS — S52.301S: ICD-10-CM

## 2023-10-02 DIAGNOSIS — E07.9 THYROID LESION: ICD-10-CM

## 2023-10-02 PROCEDURE — 99316 NF DSCHRG MGMT 30 MIN+: CPT | Performed by: NURSE PRACTITIONER

## 2023-10-02 NOTE — ASSESSMENT & PLAN NOTE
· Continue right upper extremity splint ---> changed to cast by Ortho   · Pain management as ordered  · Outpatient follow-up with orthopedics 9/27/2023-reviewed office notes  · Fracture healing well on x-ray with good alignment  · Allowed to use platform walker but not bear weight through the wrist at this time  · Recommended occupational therapy for the hand   · Recommended follow-up in 4 weeks for repeat check with x-rays of right wrist   · Orthopedic office to arrange follow-ups

## 2023-10-02 NOTE — ASSESSMENT & PLAN NOTE
· BP stable upon review  · Not currently on medications for this   · Denies dizziness/cp/sob on exam   · Continue to monitor vitals

## 2023-10-02 NOTE — ASSESSMENT & PLAN NOTE
· S/p unwitnessed fall with CT imaging revealing Vertebral artery dissection   · Neurosurgery following while inpatient and cleared to start DAPT  · Continue Aspirin Plavix and statin  · Patient to follow-up with neurosurgery and repeat CT of the head and [facility] on 9/11/23  · Continue DAPT  · OP f/u with NeuroSx 9/18---> recommended Vascular Sx referral due to extension into subclavian area      · Spoke with Son/POA last week, he would like a more palliative approach to her care and does not want any furthur surgeries or invasive procedures- he asked to cancel to [address] [facility] appointment. · He is in agreement with NeuroSx follow up on 10/6 to see if her C- Collar can be removed.

## 2023-10-02 NOTE — ASSESSMENT & PLAN NOTE
· AAO X1-very pleasant elderly female  · Very hard of hearing  · Able to make her needs known  · Does not have capacity to make her own medical decisions her son Dani Encinas is POA  Provide redirection, reorientation, distraction techniques  Fall Precautions  Assist with ADLs/IADLs  Avoid deliriogenic medications such as tramadol, benzodiazepines, anticholinergics, benadryl  Encourage Hydration/ Nutrition  Implement sleep hygiene, limit night time interuptions   Encourage participation in group activities when appropriate     Son/POA would like to focus on comfort at this point, not interested in heroics or surgical interventions.   Per  patient will discharge back to her assisted living facility I have placed order for palliative care to follow at [facility]

## 2023-10-02 NOTE — ASSESSMENT & PLAN NOTE
· Patient has history of recurrent falls most recently with multiple traumatic injuries as noted below  · Patient has been at rehab and doing quite well she has been scheduled for discharge back to her [facility] tomorrow  · Patient continues to be high risk for falls  · Continue fall precautions  · Spoke with son Inocente/OPAL last week due to his mother's advanced age and Alzheimer's dementia would like to focus more on his mother's comfort would like to minimize as many outpatient appointments as possible  · Would recommend to continue restorative PT/OT upon discharge back to [facility]  · Patient would benefit from palliative care-placed order for discharge and notified  who state [facility] does have a palliative/hospice program at their facility  · Continue supportive care

## 2023-10-02 NOTE — LETTER
October 2, 2023     Neftaly Villela, [facility] [address] [facility]  [address] [address] [id number]    Patient: Leidy Leyva   YOB: 1930   Date of Visit: 10/2/2023       Dear Dr. Kat Esteban:      Leidy Leyva was a patient of mine during a short term rehab stay at [address] [place] [address] (formerly [facility]). Patient is being discharged home. Below is my discharge summary. Please feel free to call or Merna Text me with any questions. Sincerely,     Dhaval Tran, [address] [facility]          CC: No Recipients    Dhaval Tran, [address] [facility]  10/2/2023  3:46 PM  Cosign Needed  [address] [place] [address] Rd  [phone]  DISCHARGE SUMMARY  FACILITY: [facility]  Code 31    NAME: Leidy Leyva  AGE: 80 y.o. SEX: female   CODE STATUS: No CPR    DATE OF ADMISSION: 8/3/2023   DATE OF DISCHARGE: 10/3/2023   DISCHARGE DISPOSITION: Stable for discharge to home with family support and home health PT/OT/SN services. Reason for Admission: Patient was admitted to [address] [place] [address] facility for rehabilitation after hospitalization for unwitnessed fall with multiple traumatic injuries. Past Medical and Surgical History:   Past Medical History:   Diagnosis Date   • Chronic pain disorder    • Hypertension    • Low back pain       History reviewed. No pertinent surgical history. Course of stay:   Sherly Smith is a 80 y.o. female admitted to [address] [place] [address] following hospital stay for unwitnessed fall with multiple injuries. Patient has a past medical Hx including but not limited to HTN/HLD, osteoporosis, chronic low back pain, ambulatory dysfunction, constipation, cognitive impairment. Patient is seen in collaboration with nursing for medical mgmt and discharge visit.      Patient initially presented to hospital on 7/31/2023 after an unwitnessed fall at her [facility]) with multiple injuries:  Right radial fracture  C2 cervical fracture  Multiple rib fractures  Closed fracture of right scapula  Closed wedge compression fracture of T3 vertebra  Vertebral artery dissection  Subarachnoid hemorrhage    Patient  required treatment in the ICU. She was followed by neurosurgery and trauma services. Patient was started on dual antiplatelet therapy and statins after cleared by neurosurgery. Patient recommended CTA 1 week post discharge. Patient required pain management for multiple fractures which did not require any operative intervention. Patient also had cervical fracture and is to remain in c-collar. Patient recommended discharge to postacute rehab. Patient's stay at rehab was uneventful, she has progressed well with PT/OT. She is seen and examined at bedside, does not appear in distress. Patient is a poor historian due to her dementia, she is pleasant and cooperative, she is hard of hearing but does answer questions appropriately. Patient offers no complaints today, she is smiling and laughing during conversation. Nursing states patient is still eating and drinking fairly well, at times has some meal refusals. Patient continues to participate with therapy. I have previously spoken with patient's son/Yeimi JOSEPH Messing on the phone to answer questions and update on plan of care. Son would prefer a more palliative/comfort care approach with his mother He would like her to see neurosurgery in October to see if her c-collar is able to come off, he does not want any further surgical or invasive procedures done. He would like to avoid multiple doctor visits. Discussed goals of care in further detail, he is in agreement with palliative care. Patient is scheduled for discharge back to her [facility] this Tuesday. I spoke with  to arrange palliative care to follow at her [facility], states [facility] does have such a program.  Otherwise patient staff and son have no other questions or concerns at this time.  No bowel or bladder concerns, no reported falls at rehab. ROS:  Review of Systems   Unable to perform ROS: Dementia       PHYSICAL EXAM:  VITALS:   Vitals:    10/02/23 1544   BP: 122/69   Pulse: 68   Resp: 18   Temp: 97.9 °F (36.6 °C)   SpO2: 97%        Physical Exam  Vitals and nursing note reviewed. Constitutional:       General: She is not in acute distress. Appearance: Normal appearance. She is not ill-appearing. HENT:      Head: Normocephalic and atraumatic. Nose: No congestion or rhinorrhea. Mouth/Throat:      Mouth: Mucous membranes are moist.   Eyes:      General: No scleral icterus. Conjunctiva/sclera: Conjunctivae normal.      Pupils: Pupils are equal, round, and reactive to light. Neck:      Comments: C-Collar in place  Cardiovascular:      Rate and Rhythm: Normal rate and regular rhythm. Pulses: Normal pulses. Heart sounds: Normal heart sounds. No murmur heard. Pulmonary:      Effort: Pulmonary effort is normal. No respiratory distress. Breath sounds: Normal breath sounds. No wheezing, rhonchi or rales. Abdominal:      General: Bowel sounds are normal. There is no distension. Palpations: Abdomen is soft. There is no mass. Tenderness: There is no abdominal tenderness. Hernia: No hernia is present. Musculoskeletal:         General: Tenderness (generalized) present. No swelling. Lymphadenopathy:      Cervical: No cervical adenopathy. Skin:     General: Skin is warm and dry. Coloration: Skin is not pale. Findings: No rash. Comments: Left eyebrow healed laceration is clean/dry, well approximated   Scattered busing throughout that is mostly healed at this point  C-collar in place   Neurological:      General: No focal deficit present. Mental Status: She is alert. Mental status is at baseline. She is disoriented. Motor: Weakness present.       Gait: Gait abnormal.      Comments: [place]  Very hard of hearing  Does not know her age or where she is could not tell me the date   Psychiatric:         Mood and Affect: Mood normal.         Behavior: Behavior normal.         Admission Diagnoses:   1. Fall, sequela  Assessment & Plan:  Patient has history of recurrent falls most recently with multiple traumatic injuries as noted below  Patient has been at rehab and doing quite well she has been scheduled for discharge back to her [facility] tomorrow  Patient continues to be high risk for falls  Continue fall precautions  Spoke with son Inocente/OPAL last week due to his mother's advanced age and Alzheimer's dementia would like to focus more on his mother's comfort would like to minimize as many outpatient appointments as possible  Would recommend to continue restorative PT/OT upon discharge back to [facility]  Patient would benefit from palliative care-placed order for discharge and notified  who state [facility] does have a palliative/hospice program at their facility  Continue supportive care      2. Closed fracture of shaft of right radius, unspecified fracture morphology, sequela  Assessment & Plan:  Continue right upper extremity splint ---> changed to cast by Ortho   Pain management as ordered  Outpatient follow-up with orthopedics 9/27/2023-reviewed office notes  Fracture healing well on x-ray with good alignment  Allowed to use platform walker but not bear weight through the wrist at this time  Recommended occupational therapy for the hand   Recommended follow-up in 4 weeks for repeat check with x-rays of right wrist   Orthopedic office to arrange follow-ups      3. Closed fracture of multiple ribs of right side, sequela  Assessment & Plan:  Noted with both acute and subacute nondisplaced fractures of ribs 3 through 6 and 8 through 10  Encourage use of incentive spirometer  Patient doing well on room air  Lungs are clear on exam today  Outpatient follow-up with trauma as an outpatient as needed  Continue PT OT      4.  Other closed nondisplaced fracture of second cervical vertebra with routine healing, subsequent encounter  Assessment & Plan:  CT revealing acute minimally displaced fracture through the base of C2 vertebral body extending into the inferior endplate into both pedicles to the level of the transverse foramen and into the right transverse process  CTA revealing vertebral artery dissection  Continue [place] collar        5. SAH (subarachnoid hemorrhage) (HCC)  Assessment & Plan:  CTA head with hyperdense material seen in the right ambient cistern compatible with acute subarachnoid hemorrhage  Serial CT head revealed stability  Reviewed CT head 8/10 and found complete resolution of SAH and persistent trace IVH bilaterally  Neuro recommends to continue DAPT      6. Vertebral artery dissection [facility])  Assessment & Plan:  S/p unwitnessed fall with CT imaging revealing Vertebral artery dissection   Neurosurgery following while inpatient and cleared to start DAPT  Continue Aspirin Plavix and statin  Patient to follow-up with neurosurgery and repeat CT of the head and NeuroSx on 9/11/23  Continue DAPT  OP f/u with NeuroSx 9/18---> recommended Vascular Sx referral due to extension into subclavian area      Spoke with Son/POA last week, he would like a more palliative approach to her care and does not want any furthur surgeries or invasive procedures- [address]. He is in agreement with NeuroSx follow up on 10/6 to see if her C- Collar can be removed.        7. Moderate vascular dementia with anxiety [facility])  Assessment & Plan:  AAO X1-very pleasant elderly female  Very hard of hearing  Able to make her needs known  Does not have capacity to make her own medical decisions her son Herman Jonathan is POA  Provide redirection, reorientation, distraction techniques  Fall Precautions  Assist with ADLs/IADLs  Avoid deliriogenic medications such as tramadol, benzodiazepines, anticholinergics, benadryl  Encourage Hydration/ Nutrition  Implement sleep hygiene, limit night time interuptions   Encourage participation in group activities when appropriate     Son/POA would like to focus on comfort at this point, not interested in heroics or surgical interventions. Per  patient will discharge back to her assisted living facility I have placed order for palliative care to follow at [facility]      8. Hypertension, essential  Assessment & Plan:  BP stable upon review  Not currently on medications for this   Denies dizziness/cp/sob on exam   Continue to monitor vitals       9. Thyroid lesion  Assessment & Plan:  Incidental finding during initial trauma work-up s/p fall  Was recommended nonemergent outpatient thyroid ultrasound and outpatient follow-up with PCP  I have sent my discharge summary to her PCP upon DC from rehab         Follow-up Recommendations:    Outpatient Follow up with PCP in the next 2 weeks  Home Health PT/OT/SN services     Labs and testing performed during stay:    Reviewed in chart    Discharge Medications: See discharge medication list which was reviewed and signed.       Current Outpatient Medications:   •  acetaminophen (TYLENOL) 325 mg tablet, Take 975 mg by mouth every 8 (eight) hours, Disp: , Rfl:   •  aspirin 81 mg chewable tablet, Chew 1 tablet (81 mg total) daily, Disp: 30 tablet, Rfl: 0  •  atorvastatin (LIPITOR) 10 mg tablet, Take 10 mg by mouth daily, Disp: , Rfl:   •  B Complex Vitamins (VITAMIN-B COMPLEX PO), Take 1 tablet by mouth, Disp: , Rfl:   •  Calcium Carb-Cholecalciferol (Calcium 600/Vitamin D3) 600-20 MG-MCG TABS, Take 1 tablet by mouth in the morning, Disp: , Rfl:   •  cholecalciferol (VITAMIN D3) 1,000 units tablet, Take 1,000 Units by mouth daily, Disp: , Rfl:   •  clopidogrel (PLAVIX) 75 mg tablet, Take 1 tablet (75 mg total) by mouth daily, Disp: 30 tablet, Rfl: 0  •  Cyanocobalamin (Vitamin B 12) 100 MCG LOZG, Take by mouth, Disp: , Rfl:   •  docusate sodium (COLACE) 100 mg capsule, Take 100 mg by mouth 2 (two) times a day as needed for constipation, Disp: , Rfl:   •  furosemide (LASIX) 20 mg tablet, Take 30 mg by mouth daily 30 mg daily = 1.5tabs, Disp: , Rfl:   •  oxyCODONE (Roxicodone) 5 immediate release tablet, Take 0.5 tablets (2.5 mg total) by mouth every 4 (four) hours as needed for moderate pain or severe pain Max Daily Amount: 15 mg, Disp: , Rfl: 0  •  potassium chloride (Klor-Con) 10 mEq tablet, Take 20 mEq by mouth in the morning, Disp: , Rfl:   •  senna (SENOKOT) 8.6 mg, Take 2 tablets (17.2 mg total) by mouth daily at bedtime, Disp: 10 tablet, Rfl: 0     Discussion with patient/family and further instructions:  -Fall precautions  -Aspiration precautions  -Bleeding precautions  -Monitor for signs/symptoms of infection  -Medication list was reviewed and signed  -DME form was completed    Status at time of discharge: Stable      Billing based on time. Time spent on unit, 40 minutes. Time spent counseling pt on debility/condition, 30 minutes. Please note:  Voice-recognition software may have been used in the preparation of this document. Occasional wrong word or "sound-alike" substitutions may have occurred due to the inherent limitations of voice recognition software. Interpretation should be guided by context.         Rosemary Siemens, CRNP  10/2/2023

## 2023-10-02 NOTE — PROGRESS NOTES
[address]  [phone]  [address] [facility] Acute  Code 31    NAME: Hussein Dowling  AGE: 80 y.o. SEX: female   CODE STATUS: No CPR    DATE OF ADMISSION: 8/3/2023   DATE OF DISCHARGE: 10/3/2023   DISCHARGE DISPOSITION: Stable for discharge to home with family support and home health PT/OT/SN services. Reason for Admission: Patient was admitted to [address] [place] [address] for rehabilitation after hospitalization for unwitnessed fall with multiple traumatic injuries. Past Medical and Surgical History:   Past Medical History:   Diagnosis Date   • Chronic pain disorder    • Hypertension    • Low back pain       History reviewed. No pertinent surgical history. Course of stay:   Rodrick Serna is a 80 y.o. female admitted to [address] [place] [address] following hospital stay for unwitnessed fall with multiple injuries. Patient has a past medical Hx including but not limited to HTN/HLD, osteoporosis, chronic low back pain, ambulatory dysfunction, constipation, cognitive impairment. Patient is seen in collaboration with nursing for medical mgmt and discharge visit. Patient initially presented to hospital on 7/31/2023 after an unwitnessed fall at her [facility]) with multiple injuries:  Right radial fracture  C2 cervical fracture  Multiple rib fractures  Closed fracture of right scapula  Closed wedge compression fracture of T3 vertebra  Vertebral artery dissection  Subarachnoid hemorrhage    Patient  required treatment in the ICU. She was followed by neurosurgery and trauma services. Patient was started on dual antiplatelet therapy and statins after cleared by neurosurgery. Patient recommended CTA 1 week post discharge. Patient required pain management for multiple fractures which did not require any operative intervention. Patient also had cervical fracture and is to remain in c-collar.   Patient recommended discharge to postacute rehab. Patient's stay at rehab was uneventful, she has progressed well with PT/OT. She is seen and examined at bedside, does not appear in distress. Patient is a poor historian due to her dementia, she is pleasant and cooperative, she is hard of hearing but does answer questions appropriately. Patient offers no complaints today, she is smiling and laughing during conversation. Nursing states patient is still eating and drinking fairly well, at times has some meal refusals. Patient continues to participate with therapy. I have previously spoken with patient's son/Yeimi JOSEPH Messing on the phone to answer questions and update on plan of care. Son would prefer a more palliative/comfort care approach with his mother He would like her to see neurosurgery in October to see if her c-collar is able to come off, he does not want any further surgical or invasive procedures done. He would like to avoid multiple doctor visits. Discussed goals of care in further detail, he is in agreement with palliative care. Patient is scheduled for discharge back to her [facility] this Tuesday. I spoke with  to arrange palliative care to follow at her [facility], states [facility] does have such a program.  Otherwise patient staff and son have no other questions or concerns at this time. No bowel or bladder concerns, no reported falls at rehab. ROS:  Review of Systems   Unable to perform ROS: Dementia       PHYSICAL EXAM:  VITALS:   Vitals:    10/02/23 1544   BP: 122/69   Pulse: 68   Resp: 18   Temp: 97.9 °F (36.6 °C)   SpO2: 97%        Physical Exam  Vitals and nursing note reviewed. Constitutional:       General: She is not in acute distress. Appearance: Normal appearance. She is not ill-appearing. HENT:      Head: Normocephalic and atraumatic. Nose: No congestion or rhinorrhea. Mouth/Throat:      Mouth: Mucous membranes are moist.   Eyes:      General: No scleral icterus. Conjunctiva/sclera: Conjunctivae normal.      Pupils: Pupils are equal, round, and reactive to light. Neck:      Comments: C-Collar in place  Cardiovascular:      Rate and Rhythm: Normal rate and regular rhythm. Pulses: Normal pulses. Heart sounds: Normal heart sounds. No murmur heard. Pulmonary:      Effort: Pulmonary effort is normal. No respiratory distress. Breath sounds: Normal breath sounds. No wheezing, rhonchi or rales. Abdominal:      General: Bowel sounds are normal. There is no distension. Palpations: Abdomen is soft. There is no mass. Tenderness: There is no abdominal tenderness. Hernia: No hernia is present. Musculoskeletal:         General: Tenderness (generalized) present. No swelling. Lymphadenopathy:      Cervical: No cervical adenopathy. Skin:     General: Skin is warm and dry. Coloration: Skin is not pale. Findings: No rash. Comments: Left eyebrow healed laceration is clean/dry, well approximated   Scattered busing throughout that is mostly healed at this point  C-collar in place   Neurological:      General: No focal deficit present. Mental Status: She is alert. Mental status is at baseline. She is disoriented. Motor: Weakness present. Gait: Gait abnormal.      Comments: [place]  Very hard of hearing  Does not know her age or where she is could not tell me the date   Psychiatric:         Mood and Affect: Mood normal.         Behavior: Behavior normal.         Admission Diagnoses:   1.  Fall, sequela  Assessment & Plan:  · Patient has history of recurrent falls most recently with multiple traumatic injuries as noted below  · Patient has been at rehab and doing quite well she has been scheduled for discharge back to her [facility] tomorrow  · Patient continues to be high risk for falls  · Continue fall precautions  · Spoke with son Inocente/OPAL last week due to his mother's advanced age and Alzheimer's dementia would like to focus more on his mother's comfort would like to minimize as many outpatient appointments as possible  · Would recommend to continue restorative PT/OT upon discharge back to [facility]  · Patient would benefit from palliative care-placed order for discharge and notified  who state [facility] does have a palliative/hospice program at their facility  · Continue supportive care      2. Closed fracture of shaft of right radius, unspecified fracture morphology, sequela  Assessment & Plan:  · Continue right upper extremity splint ---> changed to cast by Ortho   · Pain management as ordered  · Outpatient follow-up with orthopedics 9/27/2023-reviewed office notes  · Fracture healing well on x-ray with good alignment  · Allowed to use platform walker but not bear weight through the wrist at this time  · Recommended occupational therapy for the hand   · Recommended follow-up in 4 weeks for repeat check with x-rays of right wrist   · Orthopedic office to arrange follow-ups      3. Closed fracture of multiple ribs of right side, sequela  Assessment & Plan:  · Noted with both acute and subacute nondisplaced fractures of ribs 3 through 6 and 8 through 10  · Encourage use of incentive spirometer  · Patient doing well on room air  · Lungs are clear on exam today  · Outpatient follow-up with trauma as an outpatient as needed  · Continue PT OT      4. Other closed nondisplaced fracture of second cervical vertebra with routine healing, subsequent encounter  Assessment & Plan:  · CT revealing acute minimally displaced fracture through the base of C2 vertebral body extending into the inferior endplate into both pedicles to the level of the transverse foramen and into the right transverse process  · CTA revealing vertebral artery dissection  · Continue Vista collar        5.  SAH (subarachnoid hemorrhage) ([facility])  Assessment & Plan:  · CTA head with hyperdense material seen in the right ambient cistern compatible with acute subarachnoid hemorrhage  · Serial CT head revealed stability  · Reviewed CT head 8/10 and found complete resolution of SAH and persistent trace IVH bilaterally  · Neuro recommends to continue DAPT      6. Vertebral artery dissection [facility])  Assessment & Plan:  · S/p unwitnessed fall with CT imaging revealing Vertebral artery dissection   · Neurosurgery following while inpatient and cleared to start DAPT  · Continue Aspirin Plavix and statin  · Patient to follow-up with neurosurgery and repeat CT of the head and NeuroSx on 9/11/23  · Continue DAPT  · OP f/u with NeuroSx 9/18---> recommended Vascular Sx referral due to extension into subclavian area      · Spoke with Son/POA last week, he would like a more palliative approach to her care and does not want any furthur surgeries or invasive procedures- he asked to cancel to [address] appointment. · He is in agreement with NeuroSx follow up on 10/6 to see if her C- Collar can be removed. 7. Moderate vascular dementia with anxiety [facility])  Assessment & Plan:  · AAO X1-very pleasant elderly female  · Very hard of hearing  · Able to make her needs known  · Does not have capacity to make her own medical decisions her son Mario Gave is POA  Provide redirection, reorientation, distraction techniques  Fall Precautions  Assist with ADLs/IADLs  Avoid deliriogenic medications such as tramadol, benzodiazepines, anticholinergics, benadryl  Encourage Hydration/ Nutrition  Implement sleep hygiene, limit night time interuptions   Encourage participation in group activities when appropriate     Son/POA would like to focus on comfort at this point, not interested in heroics or surgical interventions. Per  patient will discharge back to her assisted living facility I have placed order for palliative care to follow at [facility]      8.  Hypertension, essential  Assessment & Plan:  · BP stable upon review  · Not currently on medications for this   · Denies dizziness/cp/sob on exam   · Continue to monitor vitals       9. Thyroid lesion  Assessment & Plan:  · Incidental finding during initial trauma work-up s/p fall  · Was recommended nonemergent outpatient thyroid ultrasound and outpatient follow-up with PCP  · I have sent my discharge summary to her PCP upon DC from rehab         Follow-up Recommendations:    • Outpatient Follow up with PCP in the next 2 weeks  • Home Health PT/OT/SN services     Labs and testing performed during stay:    Reviewed in chart    Discharge Medications: See discharge medication list which was reviewed and signed.       Current Outpatient Medications:   •  acetaminophen (TYLENOL) 325 mg tablet, Take 975 mg by mouth every 8 (eight) hours, Disp: , Rfl:   •  aspirin 81 mg chewable tablet, Chew 1 tablet (81 mg total) daily, Disp: 30 tablet, Rfl: 0  •  atorvastatin (LIPITOR) 10 mg tablet, Take 10 mg by mouth daily, Disp: , Rfl:   •  B Complex Vitamins (VITAMIN-B COMPLEX PO), Take 1 tablet by mouth, Disp: , Rfl:   •  Calcium Carb-Cholecalciferol (Calcium 600/Vitamin D3) 600-20 MG-MCG TABS, Take 1 tablet by mouth in the morning, Disp: , Rfl:   •  cholecalciferol (VITAMIN D3) 1,000 units tablet, Take 1,000 Units by mouth daily, Disp: , Rfl:   •  clopidogrel (PLAVIX) 75 mg tablet, Take 1 tablet (75 mg total) by mouth daily, Disp: 30 tablet, Rfl: 0  •  Cyanocobalamin (Vitamin B 12) 100 MCG LOZG, Take by mouth, Disp: , Rfl:   •  docusate sodium (COLACE) 100 mg capsule, Take 100 mg by mouth 2 (two) times a day as needed for constipation, Disp: , Rfl:   •  furosemide (LASIX) 20 mg tablet, Take 30 mg by mouth daily 30 mg daily = 1.5tabs, Disp: , Rfl:   •  oxyCODONE (Roxicodone) 5 immediate release tablet, Take 0.5 tablets (2.5 mg total) by mouth every 4 (four) hours as needed for moderate pain or severe pain Max Daily Amount: 15 mg, Disp: , Rfl: 0  •  potassium chloride (Klor-Con) 10 mEq tablet, Take 20 mEq by mouth in the morning, Disp: , Rfl:   •  senna (SENOKOT) 8.6 mg, Take 2 tablets (17.2 mg total) by mouth daily at bedtime, Disp: 10 tablet, Rfl: 0     Discussion with patient/family and further instructions:  -Fall precautions  -Aspiration precautions  -Bleeding precautions  -Monitor for signs/symptoms of infection  -Medication list was reviewed and signed  -DME form was completed    Status at time of discharge: Stable      Billing based on time. Time spent on unit, 40 minutes. Time spent counseling pt on debility/condition, 30 minutes. Please note:  Voice-recognition software may have been used in the preparation of this document. Occasional wrong word or "sound-alike" substitutions may have occurred due to the inherent limitations of voice recognition software. Interpretation should be guided by context.         Salena Hairston, EDI  10/2/2023

## 2023-10-02 NOTE — ASSESSMENT & PLAN NOTE
· Noted with both acute and subacute nondisplaced fractures of ribs 3 through 6 and 8 through 10  · Encourage use of incentive spirometer  · Patient doing well on room air  · Lungs are clear on exam today  · Outpatient follow-up with trauma as an outpatient as needed  · Continue PT OT

## 2023-10-02 NOTE — ASSESSMENT & PLAN NOTE
· Incidental finding during initial trauma work-up s/p fall  · Was recommended nonemergent outpatient thyroid ultrasound and outpatient follow-up with PCP  · I have sent my discharge summary to her PCP upon DC from rehab